# Patient Record
Sex: FEMALE | Race: WHITE | Employment: UNEMPLOYED
[De-identification: names, ages, dates, MRNs, and addresses within clinical notes are randomized per-mention and may not be internally consistent; named-entity substitution may affect disease eponyms.]

---

## 2017-01-16 ENCOUNTER — OFFICE VISIT (OUTPATIENT)
Dept: INTERNAL MEDICINE | Facility: CLINIC | Age: 22
End: 2017-01-16

## 2017-01-16 VITALS
HEART RATE: 70 BPM | BODY MASS INDEX: 19.74 KG/M2 | SYSTOLIC BLOOD PRESSURE: 113 MMHG | WEIGHT: 115.6 LBS | DIASTOLIC BLOOD PRESSURE: 64 MMHG | HEIGHT: 64 IN

## 2017-01-16 DIAGNOSIS — F33.9 EPISODE OF RECURRENT MAJOR DEPRESSIVE DISORDER, UNSPECIFIED DEPRESSION EPISODE SEVERITY (HCC): ICD-10-CM

## 2017-01-16 DIAGNOSIS — F90.2 ATTENTION DEFICIT HYPERACTIVITY DISORDER (ADHD), COMBINED TYPE: ICD-10-CM

## 2017-01-16 DIAGNOSIS — F31.12 BIPOLAR 1 DISORDER WITH MODERATE MANIA (HCC): ICD-10-CM

## 2017-01-16 DIAGNOSIS — F41.1 GAD (GENERALIZED ANXIETY DISORDER): Primary | ICD-10-CM

## 2017-01-16 DIAGNOSIS — N39.0 URINARY TRACT INFECTION, SITE UNSPECIFIED: ICD-10-CM

## 2017-01-16 LAB
BILIRUBIN, POC: NORMAL
BLOOD URINE, POC: NORMAL
CLARITY, POC: CLEAR
COLOR, POC: NORMAL
GLUCOSE URINE, POC: NORMAL
KETONES, POC: NORMAL
LEUKOCYTE EST, POC: NORMAL
NITRITE, POC: NORMAL
PH, POC: 6
PROTEIN, POC: NORMAL
SPECIFIC GRAVITY, POC: 1.01
UROBILINOGEN, POC: 0.2

## 2017-01-16 PROCEDURE — 99203 OFFICE O/P NEW LOW 30 MIN: CPT | Performed by: STUDENT IN AN ORGANIZED HEALTH CARE EDUCATION/TRAINING PROGRAM

## 2017-01-16 PROCEDURE — 81002 URINALYSIS NONAUTO W/O SCOPE: CPT | Performed by: STUDENT IN AN ORGANIZED HEALTH CARE EDUCATION/TRAINING PROGRAM

## 2017-01-16 RX ORDER — FLUOXETINE HYDROCHLORIDE 20 MG/1
20 CAPSULE ORAL DAILY
Qty: 30 CAPSULE | Refills: 3 | Status: CANCELLED | OUTPATIENT
Start: 2017-01-16

## 2017-01-16 ASSESSMENT — PATIENT HEALTH QUESTIONNAIRE - PHQ9
SUM OF ALL RESPONSES TO PHQ9 QUESTIONS 1 & 2: 4
8. MOVING OR SPEAKING SO SLOWLY THAT OTHER PEOPLE COULD HAVE NOTICED. OR THE OPPOSITE, BEING SO FIGETY OR RESTLESS THAT YOU HAVE BEEN MOVING AROUND A LOT MORE THAN USUAL: 1
7. TROUBLE CONCENTRATING ON THINGS, SUCH AS READING THE NEWSPAPER OR WATCHING TELEVISION: 2
2. FEELING DOWN, DEPRESSED OR HOPELESS: 3
6. FEELING BAD ABOUT YOURSELF - OR THAT YOU ARE A FAILURE OR HAVE LET YOURSELF OR YOUR FAMILY DOWN: 3
1. LITTLE INTEREST OR PLEASURE IN DOING THINGS: 1
SUM OF ALL RESPONSES TO PHQ QUESTIONS 1-9: 20
3. TROUBLE FALLING OR STAYING ASLEEP: 3
9. THOUGHTS THAT YOU WOULD BE BETTER OFF DEAD, OR OF HURTING YOURSELF: 1
5. POOR APPETITE OR OVEREATING: 3
4. FEELING TIRED OR HAVING LITTLE ENERGY: 3
10. IF YOU CHECKED OFF ANY PROBLEMS, HOW DIFFICULT HAVE THESE PROBLEMS MADE IT FOR YOU TO DO YOUR WORK, TAKE CARE OF THINGS AT HOME, OR GET ALONG WITH OTHER PEOPLE: 2

## 2017-05-24 ENCOUNTER — TELEPHONE (OUTPATIENT)
Dept: INTERNAL MEDICINE | Age: 22
End: 2017-05-24

## 2017-06-09 ENCOUNTER — HOSPITAL ENCOUNTER (EMERGENCY)
Age: 22
Discharge: HOME | End: 2017-06-09
Attending: EMERGENCY MEDICINE
Payer: MEDICARE

## 2017-06-09 VITALS
OXYGEN SATURATION: 99 % | DIASTOLIC BLOOD PRESSURE: 79 MMHG | TEMPERATURE: 97.5 F | RESPIRATION RATE: 16 BRPM | HEART RATE: 74 BPM | SYSTOLIC BLOOD PRESSURE: 133 MMHG

## 2017-06-09 DIAGNOSIS — T50.905A DRUG-INDUCED SEIZURE (HCC): ICD-10-CM

## 2017-06-09 DIAGNOSIS — T50.901A OVERDOSE, ACCIDENTAL OR UNINTENTIONAL, INITIAL ENCOUNTER: Primary | ICD-10-CM

## 2017-06-09 DIAGNOSIS — R56.9 DRUG-INDUCED SEIZURE (HCC): ICD-10-CM

## 2017-06-09 LAB
ABSOLUTE EOS #: 0.2 K/UL (ref 0–0.4)
ABSOLUTE LYMPH #: 2.3 K/UL (ref 1.2–5.2)
ABSOLUTE MONO #: 0.6 K/UL (ref 0.1–1.4)
ACETAMINOPHEN LEVEL: <10 UG/ML (ref 10–30)
ALBUMIN SERPL-MCNC: 4.3 G/DL (ref 3.5–5.2)
ALBUMIN/GLOBULIN RATIO: 1.4 (ref 1–2.5)
ALP BLD-CCNC: 74 U/L (ref 35–104)
ALT SERPL-CCNC: 11 U/L (ref 5–33)
AMPHETAMINE SCREEN URINE: NEGATIVE
ANION GAP SERPL CALCULATED.3IONS-SCNC: 15 MMOL/L (ref 9–17)
AST SERPL-CCNC: 24 U/L
BARBITURATE SCREEN URINE: NEGATIVE
BASOPHILS # BLD: 1 %
BASOPHILS ABSOLUTE: 0.1 K/UL (ref 0–0.2)
BENZODIAZEPINE SCREEN, URINE: NEGATIVE
BILIRUB SERPL-MCNC: 0.28 MG/DL (ref 0.3–1.2)
BUN BLDV-MCNC: 11 MG/DL (ref 6–20)
BUN/CREAT BLD: ABNORMAL (ref 9–20)
BUPRENORPHINE URINE: ABNORMAL
CALCIUM SERPL-MCNC: 9.5 MG/DL (ref 8.6–10.4)
CANNABINOID SCREEN URINE: POSITIVE
CHLORIDE BLD-SCNC: 98 MMOL/L (ref 98–107)
CO2: 24 MMOL/L (ref 20–31)
COCAINE METABOLITE, URINE: NEGATIVE
CREAT SERPL-MCNC: 0.73 MG/DL (ref 0.5–0.9)
DIFFERENTIAL TYPE: NORMAL
EOSINOPHILS RELATIVE PERCENT: 2 %
ETHANOL PERCENT: <0.01 %
ETHANOL: <10 MG/DL
GFR AFRICAN AMERICAN: >60 ML/MIN
GFR NON-AFRICAN AMERICAN: >60 ML/MIN
GFR SERPL CREATININE-BSD FRML MDRD: ABNORMAL ML/MIN/{1.73_M2}
GFR SERPL CREATININE-BSD FRML MDRD: ABNORMAL ML/MIN/{1.73_M2}
GLUCOSE BLD-MCNC: 89 MG/DL (ref 70–99)
HCT VFR BLD CALC: 43 % (ref 36–46)
HEMOGLOBIN: 14.6 G/DL (ref 12–16)
LYMPHOCYTES # BLD: 28 %
MCH RBC QN AUTO: 30.1 PG (ref 26–34)
MCHC RBC AUTO-ENTMCNC: 34 G/DL (ref 31–37)
MCV RBC AUTO: 88.6 FL (ref 80–100)
MDMA URINE: ABNORMAL
METHADONE SCREEN, URINE: NEGATIVE
METHAMPHETAMINE, URINE: ABNORMAL
MONOCYTES # BLD: 8 %
OPIATES, URINE: POSITIVE
OXYCODONE SCREEN URINE: NEGATIVE
PDW BLD-RTO: 14 % (ref 12.5–15.4)
PHENCYCLIDINE, URINE: NEGATIVE
PLATELET # BLD: 231 K/UL (ref 140–450)
PLATELET ESTIMATE: NORMAL
PMV BLD AUTO: 10.2 FL (ref 6–12)
POTASSIUM SERPL-SCNC: 3.8 MMOL/L (ref 3.7–5.3)
PROPOXYPHENE, URINE: ABNORMAL
RBC # BLD: 4.85 M/UL (ref 4–5.2)
RBC # BLD: NORMAL 10*6/UL
SALICYLATE LEVEL: <1 MG/DL (ref 3–10)
SEG NEUTROPHILS: 61 %
SEGMENTED NEUTROPHILS ABSOLUTE COUNT: 5.1 K/UL (ref 1.8–8)
SODIUM BLD-SCNC: 137 MMOL/L (ref 135–144)
TEST INFORMATION: ABNORMAL
TOTAL PROTEIN: 7.3 G/DL (ref 6.4–8.3)
TOXIC TRICYCLIC SC,BLOOD: NEGATIVE
TRICYCLIC ANTIDEPRESSANTS, UR: ABNORMAL
WBC # BLD: 8.3 K/UL (ref 4.5–13.5)
WBC # BLD: NORMAL 10*3/UL

## 2017-06-09 PROCEDURE — 80307 DRUG TEST PRSMV CHEM ANLYZR: CPT

## 2017-06-09 PROCEDURE — 80053 COMPREHEN METABOLIC PANEL: CPT

## 2017-06-09 PROCEDURE — 93005 ELECTROCARDIOGRAM TRACING: CPT

## 2017-06-09 PROCEDURE — G0480 DRUG TEST DEF 1-7 CLASSES: HCPCS

## 2017-06-09 PROCEDURE — 99285 EMERGENCY DEPT VISIT HI MDM: CPT

## 2017-06-09 PROCEDURE — 85025 COMPLETE CBC W/AUTO DIFF WBC: CPT

## 2017-06-09 ASSESSMENT — PAIN DESCRIPTION - ORIENTATION: ORIENTATION: LEFT;RIGHT

## 2017-06-09 ASSESSMENT — PAIN DESCRIPTION - DESCRIPTORS: DESCRIPTORS: ACHING

## 2017-06-09 ASSESSMENT — PAIN DESCRIPTION - ONSET: ONSET: ON-GOING

## 2017-06-09 ASSESSMENT — PAIN DESCRIPTION - LOCATION: LOCATION: HEAD

## 2017-06-09 ASSESSMENT — PAIN DESCRIPTION - PROGRESSION: CLINICAL_PROGRESSION: NOT CHANGED

## 2017-06-09 ASSESSMENT — PAIN SCALES - GENERAL: PAINLEVEL_OUTOF10: 9

## 2017-06-10 LAB
EKG ATRIAL RATE: 64 BPM
EKG P AXIS: 33 DEGREES
EKG P-R INTERVAL: 154 MS
EKG Q-T INTERVAL: 404 MS
EKG QRS DURATION: 98 MS
EKG QTC CALCULATION (BAZETT): 416 MS
EKG R AXIS: 81 DEGREES
EKG T AXIS: 48 DEGREES
EKG VENTRICULAR RATE: 64 BPM

## 2017-06-10 ASSESSMENT — ENCOUNTER SYMPTOMS
BACK PAIN: 0
COLOR CHANGE: 0

## 2017-07-05 ENCOUNTER — TELEPHONE (OUTPATIENT)
Dept: INTERNAL MEDICINE | Age: 22
End: 2017-07-05

## 2017-11-21 ENCOUNTER — HOSPITAL ENCOUNTER (EMERGENCY)
Age: 22
Discharge: HOME | End: 2017-11-21
Attending: EMERGENCY MEDICINE

## 2017-11-21 VITALS
DIASTOLIC BLOOD PRESSURE: 64 MMHG | WEIGHT: 124 LBS | HEART RATE: 56 BPM | RESPIRATION RATE: 16 BRPM | HEIGHT: 64 IN | BODY MASS INDEX: 21.17 KG/M2 | TEMPERATURE: 98 F | SYSTOLIC BLOOD PRESSURE: 112 MMHG | OXYGEN SATURATION: 100 %

## 2017-11-21 DIAGNOSIS — Z87.42 HISTORY OF VAGINAL DISCHARGE: Primary | ICD-10-CM

## 2017-11-21 LAB
BILIRUBIN URINE: NEGATIVE
CHP ED QC CHECK: YES
COLOR: YELLOW
COMMENT UA: NORMAL
GLUCOSE URINE: NEGATIVE
KETONES, URINE: NEGATIVE
LEUKOCYTE ESTERASE, URINE: NEGATIVE
NITRITE, URINE: NEGATIVE
PH UA: 6 (ref 5–8)
PREGNANCY TEST URINE, POC: NEGATIVE
PROTEIN UA: NEGATIVE
SPECIFIC GRAVITY UA: 1.02 (ref 1–1.03)
TURBIDITY: CLEAR
URINE HGB: NEGATIVE
UROBILINOGEN, URINE: NORMAL

## 2017-11-21 PROCEDURE — 99283 EMERGENCY DEPT VISIT LOW MDM: CPT

## 2017-11-21 PROCEDURE — 84703 CHORIONIC GONADOTROPIN ASSAY: CPT

## 2017-11-21 PROCEDURE — 81003 URINALYSIS AUTO W/O SCOPE: CPT

## 2017-11-21 RX ORDER — METRONIDAZOLE 500 MG/1
500 TABLET ORAL 2 TIMES DAILY
Qty: 14 TABLET | Refills: 0 | Status: SHIPPED | OUTPATIENT
Start: 2017-11-21 | End: 2017-11-28

## 2017-11-21 RX ORDER — FLUCONAZOLE 150 MG/1
150 TABLET ORAL ONCE
Qty: 1 TABLET | Refills: 0 | Status: SHIPPED | OUTPATIENT
Start: 2017-11-21 | End: 2017-11-21

## 2017-11-21 NOTE — ED PROVIDER NOTES
[address] [place] [address] ED  eMERGENCY dEPARTMENT eNCOUnter      Pt Name: Ira Andino  MRN: [id number]  [place] 1995  Date of evaluation: 11/21/2017  Provider: Sumi Cox Dr       Chief Complaint   Patient presents with    Vaginal Discharge     with odor past week         HISTORY OF PRESENT ILLNESS  (Location/Symptom, Timing/Onset, Context/Setting, Quality, Duration, Modifying Factors, Severity.)   Ira Andino is a 23 y.o. female who presents to the emergency department with Passive discharge and odor for the past week. Patient states it feels similar to back to her vaginosis that she has had in the past.  She is requesting a prescription of Flagyl. She states it does not work very well for her. She has no concern for STDs. No fevers or chills. No pain. No urinary symptoms. No other complaints. No dysuria or urinary frequency. Nursing Notes were reviewed. ALLERGIES     Nuts [macadamia nut oil]    CURRENT MEDICATIONS       Discharge Medication List as of 11/21/2017  6:58 PM      CONTINUE these medications which have NOT CHANGED    Details   Salicylic Acid 2 % PADS Apply 1 each topically daily, Disp-30 each, R-1             PAST MEDICAL HISTORY         Diagnosis Date    ADHD (attention deficit hyperactivity disorder)     ADHD (attention deficit hyperactivity disorder)     Anxiety     ANDRE (generalized anxiety disorder)     MDD (major depressive disorder)        SURGICAL HISTORY     History reviewed. No pertinent surgical history. FAMILY HISTORY     History reviewed. No pertinent family history. No family status information on file. SOCIAL HISTORY      reports that she has been smoking Cigarettes and E-Cigarettes. She started smoking about 2 years ago. She has been smoking about 1.00 pack per day. She has never used smokeless tobacco. She reports that she drinks about 2.4 oz of alcohol per week .  She reports that she uses drugs, including Marijuana, about 7 times per week. REVIEW OF SYSTEMS    (2-9 systems for level 4, 10 or more for level 5)   Review of Systems    Except as noted above the remainder of the review of systems was reviewed and negative. PHYSICAL EXAM    (up to 7 for level 4, 8 or more for level 5)     ED Triage Vitals [11/21/17 1848]   BP Temp Temp Source Pulse Resp SpO2 Height Weight   112/64 98 °F (36.7 °C) Oral 56 16 100 % 5' 4\" (1.626 m) 124 lb (56.2 kg)     Physical Exam   Constitutional: She is oriented to person, place, and time. She appears well-developed. HENT:   Head: Normocephalic and atraumatic. Neck: Normal range of motion. Neck supple. Cardiovascular: Normal rate and regular rhythm. Pulmonary/Chest: Effort normal and breath sounds normal.   Abdominal: Soft. She exhibits no distension. There is no tenderness. No CVA tenderness. Musculoskeletal: Normal range of motion. Neurological: She is alert and oriented to person, place, and time. Skin: Skin is warm. No rash noted. Psychiatric: She has a normal mood and affect. Her behavior is normal.               DIAGNOSTIC RESULTS     EKG: All EKG's are interpreted by the Emergency Department Physician who either signs or Co-signs this chart in the absence of a cardiologist.        RADIOLOGY:   Non-plain film images such as CT, Ultrasound and MRI are read by the radiologist. Plain radiographic images are visualized and preliminarily interpreted by the emergency physician with the below findings:        Interpretation per the Radiologist below, if available at the time of this note:          ED BEDSIDE ULTRASOUND:   Performed by ED Physician - none    LABS:  Labs Reviewed   POCT URINE PREGNANCY - Normal   UA W/REFLEX CULTURE   POCT URINALYSIS DIPSTICK       All other labs were within normal range or not returned as of this dictation.     EMERGENCY DEPARTMENT COURSE and DIFFERENTIAL DIAGNOSIS/MDM:   Vitals:    Vitals:    11/21/17 1848   BP: 112/64   Pulse: 56

## 2017-11-21 NOTE — ED NOTES
Pt. C/o vaginal discharge 2 3 days. History of BV. Denies known fever.       Jay Ku RN  11/21/17 1644

## 2017-11-22 LAB — HCG, PREGNANCY URINE (POC): NEGATIVE

## 2017-11-22 NOTE — ED PROVIDER NOTES
I was present in the ED during this patient's evaluation and management by the Advance Practice Provider and was available to address any concerns about their medical management.     Julia Fuentes MD  Attending, Emergency Department      Jacinto Cox MD  11/22/17 4996

## 2018-02-07 ENCOUNTER — HOSPITAL ENCOUNTER (EMERGENCY)
Age: 23
Discharge: HOME | End: 2018-02-07
Attending: EMERGENCY MEDICINE

## 2018-02-07 VITALS
DIASTOLIC BLOOD PRESSURE: 48 MMHG | BODY MASS INDEX: 21.51 KG/M2 | SYSTOLIC BLOOD PRESSURE: 102 MMHG | TEMPERATURE: 98.7 F | OXYGEN SATURATION: 100 % | WEIGHT: 126 LBS | RESPIRATION RATE: 16 BRPM | HEIGHT: 64 IN | HEART RATE: 70 BPM

## 2018-02-07 DIAGNOSIS — B96.89 BACTERIAL VAGINOSIS: Primary | ICD-10-CM

## 2018-02-07 DIAGNOSIS — N76.0 BACTERIAL VAGINOSIS: Primary | ICD-10-CM

## 2018-02-07 LAB
-: NORMAL
AMORPHOUS: NORMAL
BACTERIA: NORMAL
BILIRUBIN URINE: NEGATIVE
CASTS UA: NORMAL /LPF (ref 0–8)
COLOR: YELLOW
CRYSTALS, UA: NORMAL /HPF
DIRECT EXAM: ABNORMAL
EPITHELIAL CELLS UA: NORMAL /HPF (ref 0–5)
GLUCOSE URINE: NEGATIVE
HCG(URINE) PREGNANCY TEST: NEGATIVE
KETONES, URINE: NEGATIVE
LEUKOCYTE ESTERASE, URINE: NEGATIVE
Lab: ABNORMAL
MUCUS: NORMAL
NITRITE, URINE: NEGATIVE
OTHER OBSERVATIONS UA: NORMAL
PH UA: 6 (ref 5–8)
PROTEIN UA: NEGATIVE
RBC UA: NORMAL /HPF (ref 0–4)
RENAL EPITHELIAL, UA: NORMAL /HPF
SPECIFIC GRAVITY UA: 1.02 (ref 1–1.03)
SPECIMEN DESCRIPTION: ABNORMAL
STATUS: ABNORMAL
TRICHOMONAS: NORMAL
TURBIDITY: CLEAR
URINE HGB: NEGATIVE
UROBILINOGEN, URINE: NORMAL
WBC UA: NORMAL /HPF (ref 0–5)
YEAST: NORMAL

## 2018-02-07 PROCEDURE — 87660 TRICHOMONAS VAGIN DIR PROBE: CPT

## 2018-02-07 PROCEDURE — 87591 N.GONORRHOEAE DNA AMP PROB: CPT

## 2018-02-07 PROCEDURE — 84703 CHORIONIC GONADOTROPIN ASSAY: CPT

## 2018-02-07 PROCEDURE — 87480 CANDIDA DNA DIR PROBE: CPT

## 2018-02-07 PROCEDURE — 99284 EMERGENCY DEPT VISIT MOD MDM: CPT

## 2018-02-07 PROCEDURE — 87491 CHLMYD TRACH DNA AMP PROBE: CPT

## 2018-02-07 PROCEDURE — 81001 URINALYSIS AUTO W/SCOPE: CPT

## 2018-02-07 PROCEDURE — 87510 GARDNER VAG DNA DIR PROBE: CPT

## 2018-02-07 RX ORDER — METRONIDAZOLE 500 MG/1
500 TABLET ORAL 2 TIMES DAILY
Qty: 14 TABLET | Refills: 0 | Status: SHIPPED | OUTPATIENT
Start: 2018-02-07 | End: 2018-02-14

## 2018-02-07 ASSESSMENT — ENCOUNTER SYMPTOMS
SHORTNESS OF BREATH: 0
VOMITING: 0
NAUSEA: 0
BACK PAIN: 0
RHINORRHEA: 0
COUGH: 0
ABDOMINAL PAIN: 0
DIARRHEA: 0
SORE THROAT: 0

## 2018-02-07 NOTE — ED PROVIDER NOTES
[facility] ED  Emergency Department Encounter  Emergency Medicine Resident     Pt Name: Jean Plascencia  MRN: [id number]  [place] 1995  Date of evaluation: 2/7/18  PCP:  No primary care provider on file. CHIEF COMPLAINT       Chief Complaint   Patient presents with    Vaginal Odor    Vaginal Discharge     x 1 day       HISTORY OF PRESENT ILLNESS  (Location/Symptom, Timing/Onset, Context/Setting, Quality, Duration, Modifying Factors, Severity.)      Jean Plascencia is a 26 y.o. female who presents with a chief complaint of malodorous vaginal discharge that has been present for approximately 1 day. Patient reports this is consistent with her prior episodes of bacterial vaginosis. Patient reports the smell is characterized as fishing or cheesy. Patient notes that the discharge is whitish in color. Discharge has been constant since onset. Patient reports that she is having unprotected vaginal sexual intercourse. Patient also reports that she has had intermittent dysuria over the past few weeks without hematuria. Patient denies associated nausea, vomiting, abdominal pain, chest pain, shortness of breath. PAST MEDICAL / SURGICAL / SOCIAL / FAMILY HISTORY      has a past medical history of ADHD (attention deficit hyperactivity disorder); ADHD (attention deficit hyperactivity disorder); Anxiety; ANDRE (generalized anxiety disorder); and MDD (major depressive disorder). No significant past surgical history per review with patient. Social History     Social History    Marital status: Single     Spouse name: N/A    Number of children: N/A    Years of education: N/A     Occupational History    Not on file.      Social History Main Topics    Smoking status: Current Every Day Smoker     Packs/day: 1.00     Types: Cigarettes, E-Cigarettes     Start date: 1/1/2015    Smokeless tobacco: Never Used    Alcohol use 2.4 oz/week     4 Cans of beer per week      Comment: daily     Drug intact distal pulses. Pulmonary/Chest: Effort normal and breath sounds normal. No stridor. No respiratory distress. She has no wheezes. She has no rales. Abdominal: Soft. Bowel sounds are normal. She exhibits no distension and no mass. There is no tenderness. There is no rebound, no guarding, no CVA tenderness, no tenderness at McBurney's point and negative Arellano's sign. Genitourinary: Uterus normal. Uterus is not deviated, not enlarged, not fixed and not tender. Cervix exhibits discharge (thin, milky) and friability. Cervix exhibits no motion tenderness. Right adnexum displays no mass, no tenderness and no fullness. Left adnexum displays no mass, no tenderness and no fullness. No erythema in the vagina. No signs of injury around the vagina. Musculoskeletal: Normal range of motion. She exhibits no deformity. Neurological: She is alert and oriented to person, place, and time. Skin: Skin is warm and dry. No rash noted. She is not diaphoretic. Psychiatric: Her speech is normal.   Nursing note and vitals reviewed. WORK-UP     PLAN (LABS / IMAGING / EKG):  Orders Placed This Encounter   Procedures    C.trachomatis N.gonorrhoeae DNA    VAGINITIS DNA PROBE    Urinalysis with Microscopic    Pregnancy, Urine    Vaginal exam       MEDICATIONS ORDERED:  Orders Placed This Encounter   Medications    metroNIDAZOLE (FLAGYL) 500 MG tablet     Sig: Take 1 tablet by mouth 2 times daily for 7 days     Dispense:  14 tablet     Refill:  0       DIAGNOSTIC RESULTS / EMERGENCY DEPARTMENT COURSE / MDM / DIFFERENTIAL DIAGNOSIS     LABS:  Results for orders placed or performed during the hospital encounter of 02/07/18   VAGINITIS DNA PROBE   Result Value Ref Range    Specimen Description . VAGINA     Special Requests NOT REPORTED     Direct Exam POSITIVE for Gardnerella vaginalis. (A)     Direct Exam NEGATIVE for Candida sp.      Direct Exam NEGATIVE for Trichomonas vaginalis     Direct Exam       Method of testing Ilana Rojas, DO  Resident  02/07/18 1987

## 2018-02-08 LAB
C TRACH DNA GENITAL QL NAA+PROBE: NEGATIVE
N. GONORRHOEAE DNA: NEGATIVE

## 2018-06-23 ENCOUNTER — HOSPITAL ENCOUNTER (EMERGENCY)
Age: 23
Discharge: HOME | End: 2018-06-23

## 2018-06-23 VITALS
SYSTOLIC BLOOD PRESSURE: 129 MMHG | TEMPERATURE: 97.8 F | HEART RATE: 71 BPM | DIASTOLIC BLOOD PRESSURE: 76 MMHG | WEIGHT: 108 LBS | OXYGEN SATURATION: 96 % | HEIGHT: 64 IN | BODY MASS INDEX: 18.44 KG/M2 | RESPIRATION RATE: 20 BRPM

## 2018-06-23 DIAGNOSIS — N76.0 BV (BACTERIAL VAGINOSIS): Primary | ICD-10-CM

## 2018-06-23 DIAGNOSIS — B96.89 BV (BACTERIAL VAGINOSIS): Primary | ICD-10-CM

## 2018-06-23 DIAGNOSIS — N39.0 URINARY TRACT INFECTION WITHOUT HEMATURIA, SITE UNSPECIFIED: ICD-10-CM

## 2018-06-23 LAB
-: ABNORMAL
AMORPHOUS: ABNORMAL
BACTERIA: ABNORMAL
BILIRUBIN URINE: ABNORMAL
CASTS UA: ABNORMAL /LPF
CHP ED QC CHECK: NORMAL
COLOR: YELLOW
COMMENT UA: ABNORMAL
CRYSTALS, UA: ABNORMAL /HPF
DIRECT EXAM: ABNORMAL
EPITHELIAL CELLS UA: ABNORMAL /HPF (ref 0–5)
GLUCOSE URINE: NEGATIVE
KETONES, URINE: NEGATIVE
LEUKOCYTE ESTERASE, URINE: ABNORMAL
Lab: ABNORMAL
MUCUS: ABNORMAL
NITRITE, URINE: NEGATIVE
OTHER OBSERVATIONS UA: ABNORMAL
PH UA: 6.5 (ref 5–8)
PREGNANCY TEST URINE, POC: NORMAL
PROTEIN UA: ABNORMAL
RBC UA: ABNORMAL /HPF (ref 0–2)
RENAL EPITHELIAL, UA: ABNORMAL /HPF
SPECIFIC GRAVITY UA: 1.02 (ref 1–1.03)
SPECIMEN DESCRIPTION: ABNORMAL
STATUS: ABNORMAL
TRICHOMONAS: ABNORMAL
TURBIDITY: ABNORMAL
URINE HGB: NEGATIVE
UROBILINOGEN, URINE: NORMAL
WBC UA: ABNORMAL /HPF (ref 0–5)
YEAST: ABNORMAL

## 2018-06-23 PROCEDURE — 87086 URINE CULTURE/COLONY COUNT: CPT

## 2018-06-23 PROCEDURE — 87510 GARDNER VAG DNA DIR PROBE: CPT

## 2018-06-23 PROCEDURE — 84703 CHORIONIC GONADOTROPIN ASSAY: CPT

## 2018-06-23 PROCEDURE — 87491 CHLMYD TRACH DNA AMP PROBE: CPT

## 2018-06-23 PROCEDURE — 81001 URINALYSIS AUTO W/SCOPE: CPT

## 2018-06-23 PROCEDURE — 87660 TRICHOMONAS VAGIN DIR PROBE: CPT

## 2018-06-23 PROCEDURE — 87591 N.GONORRHOEAE DNA AMP PROB: CPT

## 2018-06-23 PROCEDURE — 87480 CANDIDA DNA DIR PROBE: CPT

## 2018-06-23 PROCEDURE — 99283 EMERGENCY DEPT VISIT LOW MDM: CPT

## 2018-06-23 RX ORDER — NITROFURANTOIN 25; 75 MG/1; MG/1
100 CAPSULE ORAL 2 TIMES DAILY
Qty: 14 CAPSULE | Refills: 0 | Status: SHIPPED | OUTPATIENT
Start: 2018-06-23 | End: 2018-06-27

## 2018-06-23 RX ORDER — FLUCONAZOLE 150 MG/1
150 TABLET ORAL ONCE
Qty: 1 TABLET | Refills: 0 | Status: SHIPPED | OUTPATIENT
Start: 2018-06-23 | End: 2018-06-23

## 2018-06-23 RX ORDER — METRONIDAZOLE 500 MG/1
500 TABLET ORAL 2 TIMES DAILY
Qty: 14 TABLET | Refills: 0 | Status: SHIPPED | OUTPATIENT
Start: 2018-06-23 | End: 2018-06-30

## 2018-06-23 RX ORDER — ALPRAZOLAM 0.5 MG/1
0.5 TABLET ORAL NIGHTLY PRN
Status: ON HOLD | COMMUNITY
End: 2020-07-12

## 2018-06-24 LAB
CULTURE: NORMAL
Lab: NORMAL
SPECIMEN DESCRIPTION: NORMAL
STATUS: NORMAL

## 2018-06-25 LAB
C TRACH DNA GENITAL QL NAA+PROBE: ABNORMAL
HCG, PREGNANCY URINE (POC): NEGATIVE
N. GONORRHOEAE DNA: NEGATIVE

## 2018-12-03 ENCOUNTER — HOSPITAL ENCOUNTER (EMERGENCY)
Age: 23
Discharge: HOME | End: 2018-12-03
Attending: EMERGENCY MEDICINE

## 2018-12-03 VITALS
HEART RATE: 88 BPM | SYSTOLIC BLOOD PRESSURE: 120 MMHG | DIASTOLIC BLOOD PRESSURE: 70 MMHG | RESPIRATION RATE: 16 BRPM | TEMPERATURE: 98.1 F | HEIGHT: 64 IN | OXYGEN SATURATION: 100 % | WEIGHT: 124 LBS | BODY MASS INDEX: 21.17 KG/M2

## 2018-12-03 DIAGNOSIS — R56.9 SEIZURE-LIKE ACTIVITY (HCC): ICD-10-CM

## 2018-12-03 DIAGNOSIS — F19.10 POLYSUBSTANCE ABUSE (HCC): Primary | ICD-10-CM

## 2018-12-03 LAB
ABSOLUTE EOS #: 0.21 K/UL (ref 0–0.44)
ABSOLUTE IMMATURE GRANULOCYTE: 0.1 K/UL (ref 0–0.3)
ABSOLUTE LYMPH #: 2.25 K/UL (ref 1.1–3.7)
ABSOLUTE MONO #: 0.89 K/UL (ref 0.1–1.4)
AMPHETAMINE SCREEN URINE: NEGATIVE
ANION GAP SERPL CALCULATED.3IONS-SCNC: 17 MMOL/L (ref 9–17)
BARBITURATE SCREEN URINE: NEGATIVE
BASOPHILS # BLD: 1 % (ref 0–2)
BASOPHILS ABSOLUTE: 0.11 K/UL (ref 0–0.2)
BENZODIAZEPINE SCREEN, URINE: NEGATIVE
BUN BLDV-MCNC: 9 MG/DL (ref 6–20)
BUN/CREAT BLD: NORMAL (ref 9–20)
BUPRENORPHINE URINE: ABNORMAL
CALCIUM SERPL-MCNC: 9.4 MG/DL (ref 8.6–10.4)
CANNABINOID SCREEN URINE: POSITIVE
CHLORIDE BLD-SCNC: 98 MMOL/L (ref 98–107)
CO2: 22 MMOL/L (ref 20–31)
COCAINE METABOLITE, URINE: POSITIVE
CREAT SERPL-MCNC: 0.71 MG/DL (ref 0.5–0.9)
DIFFERENTIAL TYPE: ABNORMAL
EKG ATRIAL RATE: 65 BPM
EKG P AXIS: 37 DEGREES
EKG P-R INTERVAL: 158 MS
EKG Q-T INTERVAL: 418 MS
EKG QRS DURATION: 98 MS
EKG QTC CALCULATION (BAZETT): 434 MS
EKG R AXIS: 80 DEGREES
EKG T AXIS: 62 DEGREES
EKG VENTRICULAR RATE: 65 BPM
EOSINOPHILS RELATIVE PERCENT: 2 % (ref 1–4)
GFR AFRICAN AMERICAN: >60 ML/MIN
GFR NON-AFRICAN AMERICAN: >60 ML/MIN
GFR SERPL CREATININE-BSD FRML MDRD: NORMAL ML/MIN/{1.73_M2}
GFR SERPL CREATININE-BSD FRML MDRD: NORMAL ML/MIN/{1.73_M2}
GLUCOSE BLD-MCNC: 87 MG/DL (ref 70–99)
HCG QUALITATIVE: NEGATIVE
HCT VFR BLD CALC: 43.9 % (ref 36.3–47.1)
HEMOGLOBIN: 14.4 G/DL (ref 11.9–15.1)
IMMATURE GRANULOCYTES: 1 %
LYMPHOCYTES # BLD: 17 % (ref 25–45)
MCH RBC QN AUTO: 29.9 PG (ref 25.2–33.5)
MCHC RBC AUTO-ENTMCNC: 32.8 G/DL (ref 28.4–34.8)
MCV RBC AUTO: 91.3 FL (ref 82.6–102.9)
MDMA URINE: ABNORMAL
METHADONE SCREEN, URINE: NEGATIVE
METHAMPHETAMINE, URINE: ABNORMAL
MONOCYTES # BLD: 7 % (ref 2–8)
NRBC AUTOMATED: 0 PER 100 WBC
OPIATES, URINE: POSITIVE
OXYCODONE SCREEN URINE: NEGATIVE
PDW BLD-RTO: 13.5 % (ref 11.8–14.4)
PHENCYCLIDINE, URINE: NEGATIVE
PLATELET # BLD: ABNORMAL K/UL (ref 138–453)
PLATELET ESTIMATE: ABNORMAL
PLATELET, FLUORESCENCE: NORMAL K/UL (ref 138–453)
PLATELET, IMMATURE FRACTION: NORMAL % (ref 1.1–10.3)
PMV BLD AUTO: ABNORMAL FL (ref 8.1–13.5)
POTASSIUM SERPL-SCNC: 4.6 MMOL/L (ref 3.7–5.3)
PROPOXYPHENE, URINE: ABNORMAL
RBC # BLD: 4.81 M/UL (ref 3.95–5.11)
RBC # BLD: ABNORMAL 10*6/UL
SEG NEUTROPHILS: 73 % (ref 34–64)
SEGMENTED NEUTROPHILS ABSOLUTE COUNT: 9.64 K/UL (ref 1.5–8.1)
SODIUM BLD-SCNC: 137 MMOL/L (ref 135–144)
TEST INFORMATION: ABNORMAL
TRICYCLIC ANTIDEPRESSANTS, UR: ABNORMAL
WBC # BLD: 13.2 K/UL (ref 4.5–13.5)
WBC # BLD: ABNORMAL 10*3/UL

## 2018-12-03 PROCEDURE — 99283 EMERGENCY DEPT VISIT LOW MDM: CPT

## 2018-12-03 PROCEDURE — 85055 RETICULATED PLATELET ASSAY: CPT

## 2018-12-03 PROCEDURE — 84703 CHORIONIC GONADOTROPIN ASSAY: CPT

## 2018-12-03 PROCEDURE — 80307 DRUG TEST PRSMV CHEM ANLYZR: CPT

## 2018-12-03 PROCEDURE — 93005 ELECTROCARDIOGRAM TRACING: CPT

## 2018-12-03 PROCEDURE — 85025 COMPLETE CBC W/AUTO DIFF WBC: CPT

## 2018-12-03 PROCEDURE — 80048 BASIC METABOLIC PNL TOTAL CA: CPT

## 2018-12-03 ASSESSMENT — ENCOUNTER SYMPTOMS
VOMITING: 0
RHINORRHEA: 0
ABDOMINAL PAIN: 0
NAUSEA: 0
BACK PAIN: 0
SHORTNESS OF BREATH: 0
COUGH: 0

## 2018-12-03 NOTE — ED NOTES
Patient yelling and cussing, grabbing phone from grandmother instructing patient not to push grandmother and to stop yelling with profanity. Patient stops briefly, then continues to yell at grandmother. Instructing her that she will be discharged as soon as she allows IV to be taken out. Security in hallway related to loud voice, screaming profanity and for safety of other patients and families. Grandmother left room, near tears. Patient then stops yelling long enough for discharge instructions and Iv removal.  Bolts from room with belongings and yelling at Grandmother to hurry up. Writer walking with Grandmother to waiting room. Patient given instructions to use phone in lobby for cab for discharge. Aware that she is not allowed to cuss and use profanity in the Waiting room. States understanding. Aware if she continues with behavior Security will escort her from location. Discussed with patient's grandmother and grandmother aware. She may be able to call her son for her to get a ride if patient continues to act out. Dave Caruso RN in Triage aware of events. Aware of possible difficulties. Security aware of events.      Guille Escudero RN  12/03/18 2172

## 2018-12-03 NOTE — ED PROVIDER NOTES
I performed a history and physical examination of the patient and discussed management with the resident. I reviewed the residents note and agree with the documented findings and plan of care. Any areas of disagreement are noted on the chart. I was personally present for the key portions of any procedures. I have documented in the chart those procedures where I was not present during the key portions. I have reviewed the emergency nurses triage note. I agree with the chief complaint, past medical history, past surgical history, allergies, medications, social and family history as documented unless otherwise noted below. Documentation of the HPI, Physical Exam and Medical Decision Making performed by medical students or scribes is based on my personal performance of the HPI, PE and MDM. For Phys Assistant/ Nurse Practitioner cases/documentation I have personally evaluated this patient and have completed at least one if not all key elements of the E/M (history, physical exam, and MDM). I find the patient's history and physical exam are consistent with the NP/PA documentation. I agree with the care provided, treatment rendered, disposition and followup plan. Additional findings are as noted. Aisha Rodriguez. Agapito Guthrie MD  Attending Emergency  Physician    EKG Interpretation    Interpreted by me    Rhythm: normal sinus   Rate: normal  Axis: normal  Ectopy: none  Conduction: normal  ST Segments: no acute change  T Waves: no acute change  Q Waves: none    Clinical Impression: no acute changes and normal EKG. No significant morphologic changes noted on comparison with prior tracing dated 9 June 2017. PATIENT WITH REPORTED 'SEIZURE' TODAY. NOW ADMITS TO SNORTING HEROIN AND COCAINE AND SMOKING MARIJUANA TODAY PRIOR TO ONSET OF SX. NORMAL PO INTAKE, NO CHEST PAIN/PALPITATIONS. NO HX OF MENOMETRORRHAGIA. NO HX OF SEIZURE DISORDER. C/O ONLY OF SWELLING/PAIN IN LEFT UPPER LIP APPARENTLY RESULTING FROM SYNCOPE/FALL.  NO

## 2019-05-12 ENCOUNTER — HOSPITAL ENCOUNTER (EMERGENCY)
Age: 24
Discharge: LEFT BEFORE DISCHARGE | End: 2019-05-12
Attending: EMERGENCY MEDICINE

## 2019-05-12 VITALS
WEIGHT: 108 LBS | TEMPERATURE: 98.4 F | DIASTOLIC BLOOD PRESSURE: 78 MMHG | HEART RATE: 71 BPM | OXYGEN SATURATION: 95 % | HEIGHT: 64 IN | RESPIRATION RATE: 20 BRPM | SYSTOLIC BLOOD PRESSURE: 127 MMHG | BODY MASS INDEX: 18.44 KG/M2

## 2019-05-12 DIAGNOSIS — F19.10 SUBSTANCE ABUSE (HCC): Primary | ICD-10-CM

## 2019-05-12 DIAGNOSIS — F10.10 ALCOHOL ABUSE: ICD-10-CM

## 2019-05-12 PROCEDURE — 99283 EMERGENCY DEPT VISIT LOW MDM: CPT

## 2019-05-12 ASSESSMENT — ENCOUNTER SYMPTOMS
SHORTNESS OF BREATH: 0
BACK PAIN: 0
ABDOMINAL PAIN: 1
COUGH: 0

## 2019-05-12 NOTE — ED NOTES
Bed: 16  Expected date: 5/12/19  Expected time: 11:31 AM  Means of arrival:   Comments:  [facility] [address]  05/12/19 2484

## 2019-05-12 NOTE — ED PROVIDER NOTES
CHANGED    Details   ALPRAZolam (XANAX) 0.5 MG tablet Take 0.5 mg by mouth nightly as needed for Sleep. [facility] Historical Med      Magnesium 100 MG TABS Take by mouth daily otcHistorical Med      Salicylic Acid 2 % PADS Apply 1 each topically daily, Disp-30 each, R-1             ALLERGIES     is allergic to nuts [macadamia nut oil]. FAMILY HISTORY     has no family status information on file. family history is not on file. SOCIAL HISTORY      reports that she has been smoking cigarettes and e-cigarettes. She started smoking about 4 years ago. She has been smoking about 1.00 pack per day. She has never used smokeless tobacco. She reports that she drinks about 2.4 oz of alcohol per week. She reports that she has current or past drug history. Drug: Marijuana. Frequency: 7.00 times per week. PHYSICAL EXAM     INITIAL VITALS:  height is 5' 4\" (1.626 m) and weight is 108 lb (49 kg). Her oral temperature is 98.4 °F (36.9 °C). Her blood pressure is 127/78 and her pulse is 71. Her respiration is 20 and oxygen saturation is 95%. Physical Exam   Constitutional: She appears well-developed. HENT:   Head: Normocephalic and atraumatic. Eyes: Pupils are equal, round, and reactive to light. Neck: Normal range of motion. Cardiovascular: Normal rate, regular rhythm, normal heart sounds and intact distal pulses. Pulmonary/Chest: Effort normal and breath sounds normal.   Abdominal: Soft. Bowel sounds are normal.   Musculoskeletal: Normal range of motion. Neurological: She is alert. She displays normal reflexes. No cranial nerve deficit or sensory deficit. She exhibits normal muscle tone. Coordination abnormal.   distorted slurred speech     Skin: Skin is warm.          DIFFERENTIAL DIAGNOSIS/MDM:   drug intoxication  Substance use  Anxiety  Alcohol use    DIAGNOSTIC RESULTS     EKG: All EKG's are interpreted by the Emergency Department Physician who either signs or Co-signs this chart in the absence of a cardiologist.    RADIOLOGY:   I directly visualized the following  images and reviewed the radiologist interpretations:  No orders to display       LABS:  Labs Reviewed - No data to display        EMERGENCY DEPARTMENT COURSE:   Vitals:    Vitals:    05/12/19 1139   BP: 127/78   Pulse: 71   Resp: 20   Temp: 98.4 °F (36.9 °C)   TempSrc: Oral   SpO2: 95%   Weight: 108 lb (49 kg)   Height: 5' 4\" (1.626 m)     Patient with significant h/o drug abuse comes was brought by EMS due to abnormal behaviour. patient has distorted speech. And looks intoxicated. She does not smell of alcohol right now, not in alcohol withdrawals, no tremors or tongue fasciculations. We talked to the patient, patient can only go home after we have a sober ride. She refuses any testing right now. FINAL IMPRESSION      1. Substance abuse ([facility] [address]    2. Alcohol abuse          DISPOSITION/PLAN   DISPOSITION  05/12/2019 01:19:47 PM    Patient eloped      PATIENT REFERRED TO:  No follow-up provider specified.     DISCHARGE MEDICATIONS:  Discharge Medication List as of 5/12/2019  1:00 PM          (Please note that portions of this note were completed with a voice recognition program.  Efforts were made to edit the dictations but occasionally words are mis-transcribed.)    Charlotte Andrade  Emergency Medicine Resident             Jen Paula MD  Resident  05/12/19 315 [address] MD Momo  Resident  05/12/19

## 2019-05-12 NOTE — ED PROVIDER NOTES
[address]  ED  eMERGENCY dEPARTMENT eNCOUnter   Attending Attestation     Pt Name: Sreedhar Ji  MRN: [id number]  [place] 1995  Date of evaluation: 5/12/19       Sreedhar Ji is a 27 y.o. female who presents with Anxiety      History: PT presents with anxiety and heavy breathing. Pt has no other complaints. Pt was drinking ETOH and used xanax last night. Pt has no other complaints except heroin withdrawal and some diarrhea associated. Pt states she is also on her period. Exam: HRRR, lungs CTABL, abdomen soft and non tender. Pt is well appearing. Plan for observation. Pt does not want any labs. Pt well appearing and alert and oriented but has slurred speech. Pt eloped. I performed a history and physical examination of the patient and discussed management with the resident. I reviewed the residents note and agree with the documented findings and plan of care. Any areas of disagreement are noted on the chart. I was personally present for the key portions of any procedures. I have documented in the chart those procedures where I was not present during the key portions. I have personally reviewed all images and agree with the resident's interpretation. I have reviewed the emergency nurses triage note. I agree with the chief complaint, past medical history, past surgical history, allergies, medications, social and family history as documented unless otherwise noted below. Documentation of the HPI, Physical Exam and Medical Decision Making performed by medical students or scribes is based on my personal performance of the HPI, PE and MDM. For Phys Assistant/ Nurse Practitioner cases/documentation I have had a face to face evaluation of this patient and have completed at least one if not all key elements of the E/M (history, physical exam, and MDM). Additional findings are as noted.     For APC cases I have personally evaluated and examined the patient in conjunction with the
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
## 2019-05-12 NOTE — ED NOTES
Pt up to br and returns and back up to br requests feminine pads. Pt given pads. Pt remains in br. Denies any assistance at this time. Pt aware of need for a sober ride.  Phone is currently on the  to retrieve phone numbers      Romero Salamanca RN  05/12/19 4502 [facility]  05/12/19 1645

## 2019-09-10 ENCOUNTER — HOSPITAL ENCOUNTER (OUTPATIENT)
Age: 24
Discharge: HOME | End: 2019-09-10
Payer: MEDICAID

## 2019-09-10 LAB
ABSOLUTE EOS #: 0.41 K/UL (ref 0–0.44)
ABSOLUTE IMMATURE GRANULOCYTE: 0.04 K/UL (ref 0–0.3)
ABSOLUTE LYMPH #: 2.84 K/UL (ref 1.1–3.7)
ABSOLUTE MONO #: 0.89 K/UL (ref 0.1–1.2)
ALBUMIN SERPL-MCNC: 4.6 G/DL (ref 3.5–5.2)
ALBUMIN/GLOBULIN RATIO: 1.4 (ref 1–2.5)
ALP BLD-CCNC: 97 U/L (ref 35–104)
ALT SERPL-CCNC: 14 U/L (ref 5–33)
ANION GAP SERPL CALCULATED.3IONS-SCNC: 15 MMOL/L (ref 9–17)
AST SERPL-CCNC: 21 U/L
BASOPHILS # BLD: 1 % (ref 0–2)
BASOPHILS ABSOLUTE: 0.12 K/UL (ref 0–0.2)
BILIRUB SERPL-MCNC: 0.22 MG/DL (ref 0.3–1.2)
BILIRUBIN DIRECT: 0.08 MG/DL
BILIRUBIN, INDIRECT: 0.14 MG/DL (ref 0–1)
BUN BLDV-MCNC: 15 MG/DL (ref 6–20)
BUN/CREAT BLD: NORMAL (ref 9–20)
CALCIUM SERPL-MCNC: 9.8 MG/DL (ref 8.6–10.4)
CHLORIDE BLD-SCNC: 100 MMOL/L (ref 98–107)
CO2: 26 MMOL/L (ref 20–31)
CREAT SERPL-MCNC: 0.65 MG/DL (ref 0.5–0.9)
DIFFERENTIAL TYPE: ABNORMAL
EOSINOPHILS RELATIVE PERCENT: 4 % (ref 1–4)
GFR AFRICAN AMERICAN: >60 ML/MIN
GFR NON-AFRICAN AMERICAN: >60 ML/MIN
GFR SERPL CREATININE-BSD FRML MDRD: NORMAL ML/MIN/{1.73_M2}
GFR SERPL CREATININE-BSD FRML MDRD: NORMAL ML/MIN/{1.73_M2}
GLOBULIN: ABNORMAL G/DL (ref 1.5–3.8)
GLUCOSE BLD-MCNC: 81 MG/DL (ref 70–99)
HAV IGM SER IA-ACNC: NONREACTIVE
HCG QUANTITATIVE: <1 IU/L
HCT VFR BLD CALC: 44.3 % (ref 36.3–47.1)
HEMOGLOBIN: 13.8 G/DL (ref 11.9–15.1)
HEPATITIS B CORE IGM ANTIBODY: NONREACTIVE
HEPATITIS B SURFACE ANTIGEN: NONREACTIVE
HEPATITIS C ANTIBODY: NONREACTIVE
HIV AG/AB: NONREACTIVE
IMMATURE GRANULOCYTES: 0 %
LYMPHOCYTES # BLD: 27 % (ref 24–43)
MCH RBC QN AUTO: 28.6 PG (ref 25.2–33.5)
MCHC RBC AUTO-ENTMCNC: 31.2 G/DL (ref 28.4–34.8)
MCV RBC AUTO: 91.9 FL (ref 82.6–102.9)
MONOCYTES # BLD: 8 % (ref 3–12)
NRBC AUTOMATED: 0 PER 100 WBC
PDW BLD-RTO: 14.6 % (ref 11.8–14.4)
PLATELET # BLD: 325 K/UL (ref 138–453)
PLATELET ESTIMATE: ABNORMAL
PMV BLD AUTO: 11 FL (ref 8.1–13.5)
POTASSIUM SERPL-SCNC: 4.4 MMOL/L (ref 3.7–5.3)
RBC # BLD: 4.82 M/UL (ref 3.95–5.11)
RBC # BLD: ABNORMAL 10*6/UL
SEG NEUTROPHILS: 60 % (ref 36–65)
SEGMENTED NEUTROPHILS ABSOLUTE COUNT: 6.36 K/UL (ref 1.5–8.1)
SODIUM BLD-SCNC: 141 MMOL/L (ref 135–144)
TOTAL PROTEIN: 7.9 G/DL (ref 6.4–8.3)
WBC # BLD: 10.7 K/UL (ref 3.5–11.3)
WBC # BLD: ABNORMAL 10*3/UL

## 2019-09-10 PROCEDURE — 85025 COMPLETE CBC W/AUTO DIFF WBC: CPT

## 2019-09-10 PROCEDURE — 80074 ACUTE HEPATITIS PANEL: CPT

## 2019-09-10 PROCEDURE — 86480 TB TEST CELL IMMUN MEASURE: CPT

## 2019-09-10 PROCEDURE — 36415 COLL VENOUS BLD VENIPUNCTURE: CPT

## 2019-09-10 PROCEDURE — 87389 HIV-1 AG W/HIV-1&-2 AB AG IA: CPT

## 2019-09-10 PROCEDURE — 84702 CHORIONIC GONADOTROPIN TEST: CPT

## 2019-09-10 PROCEDURE — 80048 BASIC METABOLIC PNL TOTAL CA: CPT

## 2019-09-10 PROCEDURE — 80076 HEPATIC FUNCTION PANEL: CPT

## 2019-09-13 LAB
QUANTI TB GOLD PLUS: NEGATIVE
QUANTI TB1 MINUS NIL: 0 IU/ML (ref 0–0.34)
QUANTI TB2 MINUS NIL: 0 IU/ML (ref 0–0.34)
QUANTIFERON MITOGEN: >10 IU/ML
QUANTIFERON NIL: 0.02 IU/ML

## 2020-03-15 ENCOUNTER — HOSPITAL ENCOUNTER (EMERGENCY)
Age: 25
Discharge: HOME | End: 2020-03-15
Attending: EMERGENCY MEDICINE
Payer: MEDICAID

## 2020-03-15 VITALS
BODY MASS INDEX: 17.75 KG/M2 | SYSTOLIC BLOOD PRESSURE: 117 MMHG | HEIGHT: 64 IN | RESPIRATION RATE: 16 BRPM | OXYGEN SATURATION: 99 % | HEART RATE: 106 BPM | TEMPERATURE: 98.3 F | DIASTOLIC BLOOD PRESSURE: 77 MMHG | WEIGHT: 104 LBS

## 2020-03-15 PROCEDURE — 99284 EMERGENCY DEPT VISIT MOD MDM: CPT

## 2020-03-15 ASSESSMENT — ENCOUNTER SYMPTOMS
CONSTIPATION: 0
EYE DISCHARGE: 0
WHEEZING: 0
SORE THROAT: 0
SHORTNESS OF BREATH: 0
EYE PAIN: 0
COLOR CHANGE: 0
DIARRHEA: 0
COUGH: 0
FACIAL SWELLING: 0
BLOOD IN STOOL: 0
VOMITING: 0
BACK PAIN: 0
EYE REDNESS: 0
TROUBLE SWALLOWING: 0
SINUS PRESSURE: 0
ABDOMINAL PAIN: 0
CHEST TIGHTNESS: 0
RHINORRHEA: 0
NAUSEA: 0

## 2020-03-15 NOTE — ED PROVIDER NOTES
[address] ED  eMERGENCY dEPARTMENT eNCOUnter      Pt Name: Brianne Navarro  MRN: [id number]  [place] 1995  Date of evaluation: 3/15/20      CHIEF COMPLAINT       Chief Complaint   Patient presents with    Seizures     two today and 1 yesterday         HISTORY OF PRESENT ILLNESS    Brianne Navarro is a 23 y.o. female who presents complaining of seizure. Patient states that she has been having some seizures. Patient has a history of stress-induced seizures and does not take any medication for it. Patient is stating that she had drank some alcohol last night and stayed up all night and that tends to be what causes her to have her seizures. Patient is requesting that we do not do any testing and she just wants to leave. Patient denies any injuries has no headache and is otherwise feeling well. REVIEW OF SYSTEMS       Review of Systems   Constitutional: Negative for activity change, appetite change, chills, diaphoresis and fever. HENT: Negative for congestion, ear pain, facial swelling, nosebleeds, rhinorrhea, sinus pressure, sore throat and trouble swallowing. Eyes: Negative for pain, discharge and redness. Respiratory: Negative for cough, chest tightness, shortness of breath and wheezing. Cardiovascular: Negative for chest pain, palpitations and leg swelling. Gastrointestinal: Negative for abdominal pain, blood in stool, constipation, diarrhea, nausea and vomiting. Genitourinary: Negative for difficulty urinating, dysuria, flank pain, frequency, genital sores and hematuria. Musculoskeletal: Negative for arthralgias, back pain, gait problem, joint swelling, myalgias and neck pain. Skin: Negative for color change, pallor, rash and wound. Neurological: Positive for seizures. Negative for dizziness, tremors, syncope, speech difficulty, weakness, numbness and headaches.    Psychiatric/Behavioral: Negative for confusion, decreased concentration, hallucinations, self-injury, Pulmonary:      Effort: Pulmonary effort is normal. No respiratory distress. Breath sounds: Normal breath sounds. No wheezing or rales. Chest:      Chest wall: No tenderness. Abdominal:      General: Bowel sounds are normal. There is no distension. Palpations: Abdomen is soft. There is no mass. Tenderness: There is no abdominal tenderness. There is no guarding or rebound. Musculoskeletal: Normal range of motion. General: No tenderness. Skin:     General: Skin is warm and dry. Coloration: Skin is not pale. Findings: No erythema or rash. Neurological:      Mental Status: She is alert and oriented to person, place, and time. Cranial Nerves: No cranial nerve deficit. Sensory: No sensory deficit. Motor: No abnormal muscle tone. Coordination: Coordination normal.      Deep Tendon Reflexes: Reflexes normal.   Psychiatric:         Behavior: Behavior normal.         Thought Content: Thought content normal.         Judgment: Judgment normal.         DIAGNOSTIC RESULTS     RADIOLOGY:All plain film, CT,MRI, and formal ultrasound images (except ED bedside ultrasound) are read by the radiologist and the interpretations are directly viewed by the emergency physician. LABS: All lab results were reviewed by myself, and all abnormals are listed below. Labs Reviewed - No data to display      MEDICAL DECISION MAKING:     Patient is alert and oriented x3 and is not requesting that we do any further testing as she has been in the hospital multiple times and it does not change anything. Did recommend that she follow-up with her doctor.       EMERGENCY DEPARTMENT COURSE:   Vitals:    Vitals:    03/15/20 1444   BP: 117/77   Pulse: 106   Resp: 16   Temp: 98.3 °F (36.8 °C)   TempSrc: Oral   SpO2: 99%   Weight: 104 lb (47.2 kg)   Height: 5' 4\" (1.626 m)       The patient was given the following medications while in the emergency department:  No orders of the defined types were placed in this encounter. -------------------------      CONSULTS:  None    PROCEDURES:  None    FINAL IMPRESSION      1.  Seizure [facility])          DISPOSITION/PLAN   DISPOSITION Decision To Discharge 03/15/2020 02:43:52 PM      PATIENT REFERREDTO:  [facility] ED  [facility] [address] [phone]    If symptoms worsen      DISCHARGEMEDICATIONS:  Current Discharge Medication List          (Please note that portions of this note were completed with a voice recognition program.  Efforts were made to edit thedictations but occasionally words are mis-transcribed.)    Murray Canas MD  Attending Emergency Physician                       Murray Canas MD  03/15/20 3907

## 2020-03-15 NOTE — ED NOTES
Bed: 10  Expected date:   Expected time:   Means of arrival:   Comments:     Kaia Vyas RN  03/15/20 1366

## 2020-03-15 NOTE — ED NOTES
Pt made it known to RN and Dr Sydney Teixeira that she doesn't want any treatment and tried to explain that to EMS. Pt states that she only wants to return home and is refusing any care.        Ifrah Stubbs RN  03/15/20 2196

## 2020-03-28 ENCOUNTER — HOSPITAL ENCOUNTER (EMERGENCY)
Age: 25
Discharge: HOME | End: 2020-03-29
Attending: EMERGENCY MEDICINE
Payer: MEDICAID

## 2020-03-28 PROCEDURE — 99285 EMERGENCY DEPT VISIT HI MDM: CPT

## 2020-03-29 VITALS
DIASTOLIC BLOOD PRESSURE: 77 MMHG | WEIGHT: 104 LBS | HEART RATE: 67 BPM | OXYGEN SATURATION: 98 % | SYSTOLIC BLOOD PRESSURE: 122 MMHG | BODY MASS INDEX: 17.85 KG/M2 | TEMPERATURE: 98 F | RESPIRATION RATE: 16 BRPM

## 2020-03-29 LAB
-: ABNORMAL
ABSOLUTE EOS #: 0 K/UL (ref 0–0.4)
ABSOLUTE IMMATURE GRANULOCYTE: ABNORMAL K/UL (ref 0–0.3)
ABSOLUTE LYMPH #: 0.9 K/UL (ref 1–4.8)
ABSOLUTE MONO #: 0.5 K/UL (ref 0.1–1.3)
ACETAMINOPHEN LEVEL: <5 UG/ML (ref 10–30)
ALBUMIN SERPL-MCNC: 4.7 G/DL (ref 3.5–5.2)
ALBUMIN/GLOBULIN RATIO: ABNORMAL (ref 1–2.5)
ALP BLD-CCNC: 79 U/L (ref 35–104)
ALT SERPL-CCNC: 14 U/L (ref 5–33)
AMORPHOUS: ABNORMAL
AMPHETAMINE SCREEN URINE: NEGATIVE
ANION GAP SERPL CALCULATED.3IONS-SCNC: 15 MMOL/L (ref 9–17)
AST SERPL-CCNC: 21 U/L
BACTERIA: ABNORMAL
BARBITURATE SCREEN URINE: NEGATIVE
BASOPHILS # BLD: 1 % (ref 0–2)
BASOPHILS ABSOLUTE: 0.1 K/UL (ref 0–0.2)
BENZODIAZEPINE SCREEN, URINE: NEGATIVE
BILIRUB SERPL-MCNC: <0.15 MG/DL (ref 0.3–1.2)
BILIRUBIN URINE: NEGATIVE
BUN BLDV-MCNC: 11 MG/DL (ref 6–20)
BUN/CREAT BLD: ABNORMAL (ref 9–20)
BUPRENORPHINE URINE: ABNORMAL
CALCIUM SERPL-MCNC: 9.8 MG/DL (ref 8.6–10.4)
CANNABINOID SCREEN URINE: NEGATIVE
CASTS UA: ABNORMAL /LPF
CHLORIDE BLD-SCNC: 105 MMOL/L (ref 98–107)
CO2: 23 MMOL/L (ref 20–31)
COCAINE METABOLITE, URINE: POSITIVE
COLOR: YELLOW
COMMENT UA: ABNORMAL
CREAT SERPL-MCNC: 0.54 MG/DL (ref 0.5–0.9)
CRYSTALS, UA: ABNORMAL /HPF
DIFFERENTIAL TYPE: ABNORMAL
EOSINOPHILS RELATIVE PERCENT: 0 % (ref 0–4)
EPITHELIAL CELLS UA: ABNORMAL /HPF
ETHANOL PERCENT: <0.01 %
ETHANOL: <10 MG/DL
GFR AFRICAN AMERICAN: >60 ML/MIN
GFR NON-AFRICAN AMERICAN: >60 ML/MIN
GFR SERPL CREATININE-BSD FRML MDRD: ABNORMAL ML/MIN/{1.73_M2}
GFR SERPL CREATININE-BSD FRML MDRD: ABNORMAL ML/MIN/{1.73_M2}
GLUCOSE BLD-MCNC: 107 MG/DL (ref 70–99)
GLUCOSE URINE: NEGATIVE
HCG QUALITATIVE: NEGATIVE
HCT VFR BLD CALC: 46.8 % (ref 36–46)
HEMOGLOBIN: 15.6 G/DL (ref 12–16)
IMMATURE GRANULOCYTES: ABNORMAL %
KETONES, URINE: NEGATIVE
LEUKOCYTE ESTERASE, URINE: NEGATIVE
LYMPHOCYTES # BLD: 10 % (ref 24–44)
MCH RBC QN AUTO: 29.6 PG (ref 26–34)
MCHC RBC AUTO-ENTMCNC: 33.3 G/DL (ref 31–37)
MCV RBC AUTO: 88.8 FL (ref 80–100)
MDMA URINE: ABNORMAL
METHADONE SCREEN, URINE: NEGATIVE
METHAMPHETAMINE, URINE: ABNORMAL
MONOCYTES # BLD: 6 % (ref 1–7)
MUCUS: ABNORMAL
NITRITE, URINE: NEGATIVE
NRBC AUTOMATED: ABNORMAL PER 100 WBC
OPIATES, URINE: POSITIVE
OTHER OBSERVATIONS UA: ABNORMAL
OXYCODONE SCREEN URINE: POSITIVE
PDW BLD-RTO: 14.3 % (ref 11.5–14.9)
PH UA: 8 (ref 5–8)
PHENCYCLIDINE, URINE: NEGATIVE
PLATELET # BLD: 236 K/UL (ref 150–450)
PLATELET ESTIMATE: ABNORMAL
PMV BLD AUTO: 10.1 FL (ref 6–12)
POTASSIUM SERPL-SCNC: 3.7 MMOL/L (ref 3.7–5.3)
PROPOXYPHENE, URINE: ABNORMAL
PROTEIN UA: ABNORMAL
RBC # BLD: 5.27 M/UL (ref 4–5.2)
RBC # BLD: ABNORMAL 10*6/UL
RBC UA: ABNORMAL /HPF
RENAL EPITHELIAL, UA: ABNORMAL /HPF
SALICYLATE LEVEL: <1 MG/DL (ref 3–10)
SEG NEUTROPHILS: 83 % (ref 36–66)
SEGMENTED NEUTROPHILS ABSOLUTE COUNT: 7.7 K/UL (ref 1.3–9.1)
SODIUM BLD-SCNC: 143 MMOL/L (ref 135–144)
SPECIFIC GRAVITY UA: 1.03 (ref 1–1.03)
TEST INFORMATION: ABNORMAL
TOTAL CK: 72 U/L (ref 26–192)
TOTAL PROTEIN: 7.9 G/DL (ref 6.4–8.3)
TRICHOMONAS: ABNORMAL
TRICYCLIC ANTIDEPRESSANTS, UR: ABNORMAL
TURBIDITY: ABNORMAL
URINE HGB: NEGATIVE
UROBILINOGEN, URINE: NORMAL
WBC # BLD: 9.2 K/UL (ref 3.5–11)
WBC # BLD: ABNORMAL 10*3/UL
WBC UA: ABNORMAL /HPF
YEAST: ABNORMAL

## 2020-03-29 PROCEDURE — 36415 COLL VENOUS BLD VENIPUNCTURE: CPT

## 2020-03-29 PROCEDURE — G0480 DRUG TEST DEF 1-7 CLASSES: HCPCS

## 2020-03-29 PROCEDURE — 81001 URINALYSIS AUTO W/SCOPE: CPT

## 2020-03-29 PROCEDURE — 80307 DRUG TEST PRSMV CHEM ANLYZR: CPT

## 2020-03-29 PROCEDURE — 82550 ASSAY OF CK (CPK): CPT

## 2020-03-29 PROCEDURE — 93005 ELECTROCARDIOGRAM TRACING: CPT | Performed by: EMERGENCY MEDICINE

## 2020-03-29 PROCEDURE — 84703 CHORIONIC GONADOTROPIN ASSAY: CPT

## 2020-03-29 PROCEDURE — 80053 COMPREHEN METABOLIC PANEL: CPT

## 2020-03-29 PROCEDURE — 85025 COMPLETE CBC W/AUTO DIFF WBC: CPT

## 2020-03-29 NOTE — ED PROVIDER NOTES
EMERGENCY DEPARTMENT ENCOUNTER    Pt Name: Amy Barrett  MRN: [id number]  [place] 1995  Date of evaluation: 3/28/20  CHIEF COMPLAINT       Chief Complaint   Patient presents with    Addiction Problem     HISTORY OF PRESENT ILLNESS   HPI    HISTORY OF PRESENT ILLNESS:  Past medical history of polysubstance abuse presents for chief complaint of request for heroin detox. Patient last used earlier today. No other complaints. Severity is moderate. No aggravating or relieving factors. Timing is 1 day. Course constant.   Context is polysubstance abuse  -----------------------  -----------------------  REVIEW OF SYSTEMS  *see ED Caveat  ED Caveat: [none]  Gen:  No fever  CV: No CP, no palpitations  Resp: No SOB, no respiratory distress  GI: No V/D, no abd pain  : No dysuria, no increased frequency  Skin: No rash, no purulent lesions  Eyes: No blurry vision, No double vision  MSK: No back pain, no joint pain  Neuro: No HA, no sensation changes  Psych: No SI/HI  -----------------------  -----------------------  ALLERGIES  -per nursing records, reviewed    PAST MEDICAL HISTORY  -See HPI    SOCIAL HISTORY  -No daily drinking, no IV drugs  -----------------------  -----------------------  PHYSICAL EXAM  Gen: no acute distress  Skin: no rashes  Head: Normocephalic, atraumatic  Neck: no nuchal rigidity  Eye: PERRLA, normal conjunctiva  ENT: Mucous membranes moist  CV: Normal rate  Resp: Respirations unlabored  MSK: no large joint effusions  ABD: Non distended  Neuro: Alert and oriented, no focal neurological deficits observed  Psych: Cooperative  -----------------------  -----------------------  MEDICAL DECISION MAKING  Differential Diagnosis:  - Consideration is given for hypoglycemia, intracranial hemorrhage, cva, acs, dissection, withdrawal, medication overdose, electrolyte abnormality, sepsis, encephalitis, meningitis, vitamin deficiency,     -  #Impression/Plan:  - Clinically patient's presentation is most MD  Attending Emergency Physician                   Francenia Holter, MD  03/29/20 9104 verbal instruction/written material

## 2020-03-29 NOTE — ED NOTES
Pt is medically stable to be cabbed to [facility], per ED Dr. Osmin Lewis agreeable to be cabbed to [facility]. SW arranged pt transportation through [facility] and [facility] to transport pt to [facility]. ELZBIETA informed Shelby Conner at [facility] of pt's ETA and that pt will be transported to [facility] via cab.

## 2020-03-30 LAB
EKG ATRIAL RATE: 56 BPM
EKG P AXIS: 62 DEGREES
EKG P-R INTERVAL: 154 MS
EKG Q-T INTERVAL: 434 MS
EKG QRS DURATION: 92 MS
EKG QTC CALCULATION (BAZETT): 418 MS
EKG R AXIS: 86 DEGREES
EKG T AXIS: 73 DEGREES
EKG VENTRICULAR RATE: 56 BPM

## 2020-03-30 PROCEDURE — 93010 ELECTROCARDIOGRAM REPORT: CPT | Performed by: INTERNAL MEDICINE

## 2020-06-02 ENCOUNTER — HOSPITAL ENCOUNTER (EMERGENCY)
Age: 25
Discharge: HOME | End: 2020-06-02
Attending: EMERGENCY MEDICINE
Payer: MEDICAID

## 2020-06-02 VITALS
TEMPERATURE: 97 F | HEART RATE: 97 BPM | BODY MASS INDEX: 23.9 KG/M2 | DIASTOLIC BLOOD PRESSURE: 82 MMHG | HEIGHT: 64 IN | WEIGHT: 140 LBS | SYSTOLIC BLOOD PRESSURE: 115 MMHG | OXYGEN SATURATION: 94 % | RESPIRATION RATE: 10 BRPM

## 2020-06-02 PROCEDURE — 12011 RPR F/E/E/N/L/M 2.5 CM/<: CPT

## 2020-06-02 PROCEDURE — 99283 EMERGENCY DEPT VISIT LOW MDM: CPT

## 2020-06-02 RX ORDER — QUETIAPINE FUMARATE 100 MG/1
100 TABLET, FILM COATED ORAL 2 TIMES DAILY
Status: ON HOLD | COMMUNITY
End: 2020-07-15 | Stop reason: HOSPADM

## 2020-06-02 RX ORDER — GABAPENTIN 300 MG/1
600 CAPSULE ORAL 3 TIMES DAILY
Status: ON HOLD | COMMUNITY
End: 2020-07-15 | Stop reason: HOSPADM

## 2020-06-02 RX ORDER — ROPINIROLE 5 MG/1
5 TABLET, FILM COATED ORAL 3 TIMES DAILY
Status: ON HOLD | COMMUNITY
End: 2020-07-15 | Stop reason: HOSPADM

## 2020-06-02 ASSESSMENT — PAIN SCALES - WONG BAKER: WONGBAKER_NUMERICALRESPONSE: 2

## 2020-06-02 ASSESSMENT — PAIN DESCRIPTION - LOCATION: LOCATION: FACE

## 2020-06-02 ASSESSMENT — PAIN DESCRIPTION - PAIN TYPE: TYPE: ACUTE PAIN

## 2020-06-02 NOTE — ED NOTES
Patient resting on cot, eyes closed, equal non labored RR. Will continue to monitor.       Mariana Tate RN  06/02/20 1872

## 2020-06-02 NOTE — ED NOTES
Pt removed all cardiac monitor wires, walked to nurses station stating she wants food and is ready to leave, ED resident at bedside      Emilio Adams RN  06/02/20 6439
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
## 2020-06-23 ENCOUNTER — HOSPITAL ENCOUNTER (EMERGENCY)
Age: 25
Discharge: HOME | End: 2020-06-24
Attending: EMERGENCY MEDICINE
Payer: MEDICAID

## 2020-06-23 PROCEDURE — 80307 DRUG TEST PRSMV CHEM ANLYZR: CPT

## 2020-06-23 PROCEDURE — 99285 EMERGENCY DEPT VISIT HI MDM: CPT

## 2020-06-23 PROCEDURE — G0480 DRUG TEST DEF 1-7 CLASSES: HCPCS

## 2020-06-23 PROCEDURE — 93005 ELECTROCARDIOGRAM TRACING: CPT

## 2020-06-23 PROCEDURE — 84703 CHORIONIC GONADOTROPIN ASSAY: CPT

## 2020-06-23 PROCEDURE — 80053 COMPREHEN METABOLIC PANEL: CPT

## 2020-06-23 PROCEDURE — 85025 COMPLETE CBC W/AUTO DIFF WBC: CPT

## 2020-06-23 PROCEDURE — 80178 ASSAY OF LITHIUM: CPT

## 2020-06-23 RX ORDER — LITHIUM CARBONATE 450 MG
450 TABLET, EXTENDED RELEASE ORAL 2 TIMES DAILY
COMMUNITY

## 2020-06-23 RX ORDER — SODIUM CHLORIDE, SODIUM LACTATE, POTASSIUM CHLORIDE, AND CALCIUM CHLORIDE .6; .31; .03; .02 G/100ML; G/100ML; G/100ML; G/100ML
1000 INJECTION, SOLUTION INTRAVENOUS ONCE
Status: COMPLETED | OUTPATIENT
Start: 2020-06-24 | End: 2020-06-24

## 2020-06-23 RX ORDER — BUSPIRONE HYDROCHLORIDE 10 MG/1
10 TABLET ORAL 3 TIMES DAILY
COMMUNITY

## 2020-06-23 ASSESSMENT — PAIN DESCRIPTION - FREQUENCY: FREQUENCY: CONTINUOUS

## 2020-06-23 ASSESSMENT — PAIN SCALES - GENERAL: PAINLEVEL_OUTOF10: 10

## 2020-06-23 ASSESSMENT — PAIN DESCRIPTION - LOCATION: LOCATION: KNEE

## 2020-06-23 ASSESSMENT — PAIN DESCRIPTION - PAIN TYPE: TYPE: ACUTE PAIN

## 2020-06-24 ENCOUNTER — APPOINTMENT (OUTPATIENT)
Dept: CT IMAGING | Age: 25
End: 2020-06-24
Payer: MEDICAID

## 2020-06-24 VITALS
OXYGEN SATURATION: 98 % | RESPIRATION RATE: 16 BRPM | DIASTOLIC BLOOD PRESSURE: 74 MMHG | TEMPERATURE: 98.1 F | SYSTOLIC BLOOD PRESSURE: 102 MMHG | HEART RATE: 77 BPM

## 2020-06-24 LAB
ABSOLUTE EOS #: 0.48 K/UL (ref 0–0.44)
ABSOLUTE IMMATURE GRANULOCYTE: 0.06 K/UL (ref 0–0.3)
ABSOLUTE LYMPH #: 2.38 K/UL (ref 1.1–3.7)
ABSOLUTE MONO #: 0.87 K/UL (ref 0.1–1.2)
ACETAMINOPHEN LEVEL: <5 UG/ML (ref 10–30)
ALBUMIN SERPL-MCNC: 4.6 G/DL (ref 3.5–5.2)
ALBUMIN/GLOBULIN RATIO: 1.6 (ref 1–2.5)
ALP BLD-CCNC: 127 U/L (ref 35–104)
ALT SERPL-CCNC: 20 U/L (ref 5–33)
AMPHETAMINE SCREEN URINE: NEGATIVE
ANION GAP SERPL CALCULATED.3IONS-SCNC: 13 MMOL/L (ref 9–17)
AST SERPL-CCNC: 27 U/L
BARBITURATE SCREEN URINE: NEGATIVE
BASOPHILS # BLD: 1 % (ref 0–2)
BASOPHILS ABSOLUTE: 0.09 K/UL (ref 0–0.2)
BENZODIAZEPINE SCREEN, URINE: NEGATIVE
BILIRUB SERPL-MCNC: 0.19 MG/DL (ref 0.3–1.2)
BUN BLDV-MCNC: 13 MG/DL (ref 6–20)
BUN/CREAT BLD: ABNORMAL (ref 9–20)
BUPRENORPHINE URINE: ABNORMAL
CALCIUM SERPL-MCNC: 9.6 MG/DL (ref 8.6–10.4)
CANNABINOID SCREEN URINE: NEGATIVE
CHLORIDE BLD-SCNC: 101 MMOL/L (ref 98–107)
CO2: 26 MMOL/L (ref 20–31)
COCAINE METABOLITE, URINE: POSITIVE
CREAT SERPL-MCNC: 0.79 MG/DL (ref 0.5–0.9)
DIFFERENTIAL TYPE: ABNORMAL
EOSINOPHILS RELATIVE PERCENT: 5 % (ref 1–4)
ETHANOL PERCENT: <0.01 %
ETHANOL: <10 MG/DL
GFR AFRICAN AMERICAN: >60 ML/MIN
GFR NON-AFRICAN AMERICAN: >60 ML/MIN
GFR SERPL CREATININE-BSD FRML MDRD: ABNORMAL ML/MIN/{1.73_M2}
GFR SERPL CREATININE-BSD FRML MDRD: ABNORMAL ML/MIN/{1.73_M2}
GLUCOSE BLD-MCNC: 71 MG/DL (ref 70–99)
HCG QUALITATIVE: NEGATIVE
HCT VFR BLD CALC: 45.6 % (ref 36.3–47.1)
HEMOGLOBIN: 14.4 G/DL (ref 11.9–15.1)
IMMATURE GRANULOCYTES: 1 %
LITHIUM DATE LAST DOSE: ABNORMAL
LITHIUM DOSE AMOUNT: ABNORMAL
LITHIUM DOSE TIME: ABNORMAL
LITHIUM LEVEL: 0.4 MMOL/L (ref 0.6–1.2)
LYMPHOCYTES # BLD: 24 % (ref 24–43)
MCH RBC QN AUTO: 29.6 PG (ref 25.2–33.5)
MCHC RBC AUTO-ENTMCNC: 31.6 G/DL (ref 28.4–34.8)
MCV RBC AUTO: 93.6 FL (ref 82.6–102.9)
MDMA URINE: ABNORMAL
METHADONE SCREEN, URINE: NEGATIVE
METHAMPHETAMINE, URINE: ABNORMAL
MONOCYTES # BLD: 9 % (ref 3–12)
NRBC AUTOMATED: 0 PER 100 WBC
OPIATES, URINE: NEGATIVE
OXYCODONE SCREEN URINE: NEGATIVE
PDW BLD-RTO: 14.3 % (ref 11.8–14.4)
PHENCYCLIDINE, URINE: NEGATIVE
PLATELET # BLD: 266 K/UL (ref 138–453)
PLATELET ESTIMATE: ABNORMAL
PMV BLD AUTO: 12 FL (ref 8.1–13.5)
POTASSIUM SERPL-SCNC: 4.5 MMOL/L (ref 3.7–5.3)
PROPOXYPHENE, URINE: ABNORMAL
RBC # BLD: 4.87 M/UL (ref 3.95–5.11)
RBC # BLD: ABNORMAL 10*6/UL
SALICYLATE LEVEL: <1 MG/DL (ref 3–10)
SEG NEUTROPHILS: 60 % (ref 36–65)
SEGMENTED NEUTROPHILS ABSOLUTE COUNT: 6.25 K/UL (ref 1.5–8.1)
SODIUM BLD-SCNC: 140 MMOL/L (ref 135–144)
TEST INFORMATION: ABNORMAL
TOTAL PROTEIN: 7.4 G/DL (ref 6.4–8.3)
TOXIC TRICYCLIC SC,BLOOD: NEGATIVE
TRICYCLIC ANTIDEPRESSANTS, UR: ABNORMAL
WBC # BLD: 10.1 K/UL (ref 3.5–11.3)
WBC # BLD: ABNORMAL 10*3/UL

## 2020-06-24 PROCEDURE — 70450 CT HEAD/BRAIN W/O DYE: CPT

## 2020-06-24 PROCEDURE — 2580000003 HC RX 258: Performed by: STUDENT IN AN ORGANIZED HEALTH CARE EDUCATION/TRAINING PROGRAM

## 2020-06-24 PROCEDURE — 6360000002 HC RX W HCPCS: Performed by: STUDENT IN AN ORGANIZED HEALTH CARE EDUCATION/TRAINING PROGRAM

## 2020-06-24 PROCEDURE — 80307 DRUG TEST PRSMV CHEM ANLYZR: CPT

## 2020-06-24 PROCEDURE — 90471 IMMUNIZATION ADMIN: CPT | Performed by: STUDENT IN AN ORGANIZED HEALTH CARE EDUCATION/TRAINING PROGRAM

## 2020-06-24 PROCEDURE — 90715 TDAP VACCINE 7 YRS/> IM: CPT | Performed by: STUDENT IN AN ORGANIZED HEALTH CARE EDUCATION/TRAINING PROGRAM

## 2020-06-24 RX ADMIN — SODIUM CHLORIDE, POTASSIUM CHLORIDE, SODIUM LACTATE AND CALCIUM CHLORIDE 1000 ML: 600; 310; 30; 20 INJECTION, SOLUTION INTRAVENOUS at 00:00

## 2020-06-24 RX ADMIN — TETANUS TOXOID, REDUCED DIPHTHERIA TOXOID AND ACELLULAR PERTUSSIS VACCINE, ADSORBED 0.5 ML: 5; 2.5; 8; 8; 2.5 SUSPENSION INTRAMUSCULAR at 00:59

## 2020-06-24 ASSESSMENT — PAIN SCALES - GENERAL: PAINLEVEL_OUTOF10: 0

## 2020-06-24 ASSESSMENT — PAIN SCALES - WONG BAKER: WONGBAKER_NUMERICALRESPONSE: 0

## 2020-06-24 NOTE — ED NOTES
1:1 maintained, pt resting with eyes closed even RR noted.              Martín Sr, RN  06/24/20 5621

## 2020-06-24 NOTE — ED NOTES
Pt eating meal tray at this time. Water provided. Pt tolerated well. Dr. Tio Braga made aware.      Xena Thomas RN  06/24/20 2506

## 2020-06-24 NOTE — ED NOTES
Report recd from [facility], pt observed resting at this time with eyes closed and even RR noted.  Alek Hightower RN  06/24/20 1510

## 2020-06-24 NOTE — ED NOTES
Report given to Aydee Lacy, nurse denies any further questions.       Derick Shay, VINCENT  06/24/20 6826

## 2020-06-24 NOTE — ED NOTES
SW made a 2nd attempt to wake up the patient for evaluation. Patient not able to stay awake for evaluation.       Vane Salcido, [facility]  06/24/20 4431

## 2020-06-24 NOTE — ED NOTES
After exiting the restroom, pt walked into the wall instead of doorway. Pt had urinated on bed, this RN guided pt to chair while linens were changed. Pt tense, stating she is cold. Pt leaning forward, this RN had to redirect pt to lean back in chair. This RN asked if pt recalled doing cocaine today, pt mumbles. Pt stumbles into bed, this RN at bedside to guide. Bed locked in lowest position, side rails up X2, call light in bed. Pt provided with two warm blankets. Pt close to nurse's station for monitoring. Per Dr. Priya Lepe, plan to move pt to [facility] to prevent elopement and consult social work for possible transfer to [facility].        Rosalio Padilla RN  06/24/20 0516

## 2020-06-24 NOTE — ED NOTES
Patient return from [address], [facility]  06/24/20 9706

## 2020-06-24 NOTE — ED NOTES
Report received from [address] [place] [address], [address] [place] [address]  06/24/20 8036

## 2020-06-24 NOTE — ED PROVIDER NOTES
intervention. Total critical care time was 5 minutes. This excludes any time for separately reportable procedures.        [address] Barry Thomas, DO  06/23/20 1004 [address] Kurt Moura, DO  06/24/20 84 Mercedes Hodgson, DO  06/24/20 1162
walk around parking lot, acting strange. Patient alert and oriented, answering questions appropriately. Patient recently discharged from [facility] on 7 new antipsychotics. All remaining pills are accounted for, except for 7 gabapentin which she states she has been taking properly. Labs positive for cocaine use    CT head and UDS pending  Patient denies suicidal homicidal ideation, rather manic in the room requiring police, not requiring any medications at this time. ED Course as of Jun 24 0701 Wed Jun 24, 2020   0026 hCG Qual: NEGATIVE [RB]   0033 Cocaine Metabolite, Urine(!): POSITIVE [RB]   4434 Toxic Tricyclic Sc,Blood: NEGATIVE [RB]   0039 hCG Qual: NEGATIVE [RB]   8344 WBC: 10.1 [RB]   0039 Hemoglobin Quant: 14.4 [RB]   0039 Glucose: 71 [RB]   0039 Creatinine: 0.79 [RB]   4784 Pt signed out to Dr. Jacob Beatty     [RB]   0330 Patient sleeping comfortably upon entering room, somnolent, arousable, but quickly falling back asleep. Patient becomes verbally aggressive when asked questions. No obvious deficits, vital signs normal.  This possibly due to gabapentin use, cocaine withdrawal, or combination of multiple new psych medications. Will have patient transferred back to [facility], seen by social work, possibly transfer to [facility].    [SF]   7909 Per nurse patient sprinted out of her room after I attempted to speak with her, running to the bathroom. Then bumped into the wall, asked if she could take her shorts off, went back in bed, urinated in bed. Exhibiting strange behavior. [SF]   3575 Patient somnolent but more arousable and more alert than before, but still quickly falling back asleep. Unable to of obtain a full assessment, patient denies suicidal ideation but does admit to taking some of her medications more than was prescribed but she does not know which ones. She is asked again if she was suicidal she yells no.   Otherwise patient is calm and resting comfortably in no acute distress with normal vital
[address] [id number]  [phone]    As needed      DISCHARGE MEDICATIONS:  Discharge Medication List as of 6/24/2020 10:29 AM          Dr. Alvino Holter.  [address] [place] [address]  Emergency Medicine Resident Physician, PGY-2    (Please note that portions of this note were completed with a voice recognition program.  Efforts were made to edit the dictations but occasionally words are mis-transcribed.)        Castro Rossi DO  Resident  06/26/20 0223

## 2020-06-28 LAB
EKG ATRIAL RATE: 96 BPM
EKG P AXIS: 62 DEGREES
EKG P-R INTERVAL: 180 MS
EKG Q-T INTERVAL: 348 MS
EKG QRS DURATION: 84 MS
EKG QTC CALCULATION (BAZETT): 439 MS
EKG R AXIS: 86 DEGREES
EKG T AXIS: 52 DEGREES
EKG VENTRICULAR RATE: 96 BPM

## 2020-07-07 ENCOUNTER — APPOINTMENT (OUTPATIENT)
Dept: GENERAL RADIOLOGY | Age: 25
DRG: 816 | End: 2020-07-07
Payer: MEDICAID

## 2020-07-07 ENCOUNTER — APPOINTMENT (OUTPATIENT)
Dept: CT IMAGING | Age: 25
DRG: 816 | End: 2020-07-07
Payer: MEDICAID

## 2020-07-07 ENCOUNTER — HOSPITAL ENCOUNTER (INPATIENT)
Age: 25
LOS: 4 days | Discharge: PSYCHIATRIC OR SUBSTANCE USE FACILITY | DRG: 816 | End: 2020-07-11
Attending: EMERGENCY MEDICINE | Admitting: INTERNAL MEDICINE
Payer: MEDICAID

## 2020-07-07 PROBLEM — F11.921: Status: ACTIVE | Noted: 2020-07-07

## 2020-07-07 LAB
-: ABNORMAL
-: NORMAL
ABSOLUTE EOS #: 0.09 K/UL (ref 0–0.44)
ABSOLUTE IMMATURE GRANULOCYTE: 0.38 K/UL (ref 0–0.3)
ABSOLUTE LYMPH #: 1.17 K/UL (ref 1.1–3.7)
ABSOLUTE MONO #: 1.41 K/UL (ref 0.1–1.2)
ACETAMINOPHEN LEVEL: <5 UG/ML (ref 10–30)
ALBUMIN SERPL-MCNC: 4.1 G/DL (ref 3.5–5.2)
ALBUMIN/GLOBULIN RATIO: 1.1 (ref 1–2.5)
ALLEN TEST: ABNORMAL
ALLEN TEST: POSITIVE
ALP BLD-CCNC: 117 U/L (ref 35–104)
ALT SERPL-CCNC: 20 U/L (ref 5–33)
AMORPHOUS: ABNORMAL
AMPHETAMINE SCREEN URINE: NEGATIVE
ANION GAP SERPL CALCULATED.3IONS-SCNC: 18 MMOL/L (ref 9–17)
ANION GAP: 10 MMOL/L (ref 7–16)
AST SERPL-CCNC: 29 U/L
BACTERIA: ABNORMAL
BARBITURATE SCREEN URINE: NEGATIVE
BASOPHILS # BLD: 1 % (ref 0–2)
BASOPHILS ABSOLUTE: 0.18 K/UL (ref 0–0.2)
BENZODIAZEPINE SCREEN, URINE: NEGATIVE
BILIRUB SERPL-MCNC: 0.25 MG/DL (ref 0.3–1.2)
BILIRUBIN URINE: NEGATIVE
BUN BLDV-MCNC: 17 MG/DL (ref 6–20)
BUN/CREAT BLD: ABNORMAL (ref 9–20)
BUPRENORPHINE URINE: ABNORMAL
CALCIUM SERPL-MCNC: 8.9 MG/DL (ref 8.6–10.4)
CANNABINOID SCREEN URINE: NEGATIVE
CASTS UA: ABNORMAL /LPF (ref 0–2)
CASTS UA: ABNORMAL /LPF (ref 0–2)
CHLORIDE BLD-SCNC: 95 MMOL/L (ref 98–107)
CO2: 24 MMOL/L (ref 20–31)
COCAINE METABOLITE, URINE: POSITIVE
COLOR: YELLOW
CREAT SERPL-MCNC: 1.12 MG/DL (ref 0.5–0.9)
CRYSTALS, UA: ABNORMAL /HPF
DIFFERENTIAL TYPE: ABNORMAL
DIRECT EXAM: NORMAL
EOSINOPHILS RELATIVE PERCENT: 0 % (ref 1–4)
EPITHELIAL CELLS UA: ABNORMAL /HPF (ref 0–5)
ETHANOL PERCENT: <0.01 %
ETHANOL: <10 MG/DL
FERRITIN: 502 UG/L (ref 13–150)
FIO2: 100
FIO2: ABNORMAL
GFR AFRICAN AMERICAN: >60 ML/MIN
GFR NON-AFRICAN AMERICAN: 58 ML/MIN
GFR NON-AFRICAN AMERICAN: >60 ML/MIN
GFR SERPL CREATININE-BSD FRML MDRD: >60 ML/MIN
GFR SERPL CREATININE-BSD FRML MDRD: ABNORMAL ML/MIN/{1.73_M2}
GLUCOSE BLD-MCNC: 227 MG/DL (ref 70–99)
GLUCOSE BLD-MCNC: 231 MG/DL (ref 74–100)
GLUCOSE URINE: ABNORMAL
HCG QUALITATIVE: NEGATIVE
HCO3 VENOUS: 25 MMOL/L (ref 22–29)
HCT VFR BLD CALC: 42.2 % (ref 36.3–47.1)
HEMOGLOBIN: 13.2 G/DL (ref 11.9–15.1)
IMMATURE GRANULOCYTES: 2 %
INR BLD: 1
KETONES, URINE: NEGATIVE
LACTATE DEHYDROGENASE: 245 U/L (ref 135–214)
LACTIC ACID, WHOLE BLOOD: 0.8 MMOL/L (ref 0.7–2.1)
LACTIC ACID, WHOLE BLOOD: 4.2 MMOL/L (ref 0.7–2.1)
LACTIC ACID: ABNORMAL MMOL/L
LEUKOCYTE ESTERASE, URINE: NEGATIVE
LITHIUM DATE LAST DOSE: NORMAL
LITHIUM DOSE AMOUNT: NORMAL
LITHIUM DOSE TIME: NORMAL
LITHIUM LEVEL: 1.2 MMOL/L (ref 0.6–1.2)
LYMPHOCYTES # BLD: 5 % (ref 24–43)
Lab: NORMAL
MCH RBC QN AUTO: 29.5 PG (ref 25.2–33.5)
MCHC RBC AUTO-ENTMCNC: 31.3 G/DL (ref 28.4–34.8)
MCV RBC AUTO: 94.4 FL (ref 82.6–102.9)
MDMA URINE: ABNORMAL
METHADONE SCREEN, URINE: NEGATIVE
METHAMPHETAMINE, URINE: ABNORMAL
MODE: ABNORMAL
MODE: ABNORMAL
MONOCYTES # BLD: 6 % (ref 3–12)
MUCUS: ABNORMAL
MYOGLOBIN: 41 NG/ML (ref 25–58)
NEGATIVE BASE EXCESS, ART: ABNORMAL (ref 0–2)
NEGATIVE BASE EXCESS, VEN: ABNORMAL (ref 0–2)
NITRITE, URINE: NEGATIVE
NRBC AUTOMATED: 0 PER 100 WBC
O2 DEVICE/FLOW/%: ABNORMAL
O2 DEVICE/FLOW/%: ABNORMAL
O2 SAT, VEN: 67 % (ref 60–85)
OPIATES, URINE: POSITIVE
OTHER OBSERVATIONS UA: ABNORMAL
OXYCODONE SCREEN URINE: NEGATIVE
PARTIAL THROMBOPLASTIN TIME: 21.6 SEC (ref 20.5–30.5)
PATIENT TEMP: ABNORMAL
PATIENT TEMP: ABNORMAL
PCO2, VEN: 38.7 MM HG (ref 41–51)
PDW BLD-RTO: 13.5 % (ref 11.8–14.4)
PH UA: 6.5 (ref 5–8)
PH VENOUS: 7.42 (ref 7.32–7.43)
PHENCYCLIDINE, URINE: NEGATIVE
PLATELET # BLD: ABNORMAL K/UL (ref 138–453)
PLATELET ESTIMATE: ABNORMAL
PLATELET, FLUORESCENCE: NORMAL K/UL (ref 138–453)
PLATELET, IMMATURE FRACTION: NORMAL % (ref 1.1–10.3)
PMV BLD AUTO: ABNORMAL FL (ref 8.1–13.5)
PO2, VEN: 34.3 MM HG (ref 30–50)
POC CHLORIDE: 100 MMOL/L (ref 98–107)
POC CREATININE: 1.16 MG/DL (ref 0.51–1.19)
POC HCO3: 26.6 MMOL/L (ref 21–28)
POC HEMATOCRIT: 45 % (ref 36–46)
POC HEMOGLOBIN: 15.5 G/DL (ref 12–16)
POC IONIZED CALCIUM: 0.92 MMOL/L (ref 1.15–1.33)
POC LACTIC ACID: 4.05 MMOL/L (ref 0.56–1.39)
POC O2 SATURATION: 100 % (ref 94–98)
POC PCO2 TEMP: ABNORMAL MM HG
POC PCO2 TEMP: ABNORMAL MM HG
POC PCO2: 47.4 MM HG (ref 35–48)
POC PH TEMP: ABNORMAL
POC PH TEMP: ABNORMAL
POC PH: 7.36 (ref 7.35–7.45)
POC PO2 TEMP: ABNORMAL MM HG
POC PO2 TEMP: ABNORMAL MM HG
POC PO2: 432.4 MM HG (ref 83–108)
POC POTASSIUM: 4 MMOL/L (ref 3.5–4.5)
POC SODIUM: 135 MMOL/L (ref 138–146)
POSITIVE BASE EXCESS, ART: 1 (ref 0–3)
POSITIVE BASE EXCESS, VEN: 1 (ref 0–3)
POTASSIUM SERPL-SCNC: 4.1 MMOL/L (ref 3.7–5.3)
PROCALCITONIN: 0.23 NG/ML
PROPOXYPHENE, URINE: ABNORMAL
PROTEIN UA: ABNORMAL
PROTHROMBIN TIME: 10.9 SEC (ref 9–12)
RBC # BLD: 4.47 M/UL (ref 3.95–5.11)
RBC # BLD: ABNORMAL 10*6/UL
RBC UA: ABNORMAL /HPF (ref 0–2)
REASON FOR REJECTION: NORMAL
RENAL EPITHELIAL, UA: ABNORMAL /HPF
SALICYLATE LEVEL: <1 MG/DL (ref 3–10)
SAMPLE SITE: ABNORMAL
SAMPLE SITE: ABNORMAL
SARS-COV-2, PCR: NORMAL
SARS-COV-2, RAPID: NOT DETECTED
SARS-COV-2: NORMAL
SEG NEUTROPHILS: 85 % (ref 36–65)
SEGMENTED NEUTROPHILS ABSOLUTE COUNT: 18.72 K/UL (ref 1.5–8.1)
SODIUM BLD-SCNC: 137 MMOL/L (ref 135–144)
SOURCE: NORMAL
SPECIFIC GRAVITY UA: 1.02 (ref 1–1.03)
SPECIMEN DESCRIPTION: NORMAL
TCO2 (CALC), ART: 28 MMOL/L (ref 22–29)
TEST INFORMATION: ABNORMAL
TOTAL CK: 134 U/L (ref 26–192)
TOTAL CO2, VENOUS: 26 MMOL/L (ref 23–30)
TOTAL PROTEIN: 7.8 G/DL (ref 6.4–8.3)
TOXIC TRICYCLIC SC,BLOOD: NEGATIVE
TRICHOMONAS: ABNORMAL
TRICYCLIC ANTIDEPRESSANTS, UR: ABNORMAL
TROPONIN INTERP: ABNORMAL
TROPONIN T: ABNORMAL NG/ML
TROPONIN, HIGH SENSITIVITY: 34 NG/L (ref 0–14)
TROPONIN, HIGH SENSITIVITY: 70 NG/L (ref 0–14)
TROPONIN, HIGH SENSITIVITY: 82 NG/L (ref 0–14)
TURBIDITY: ABNORMAL
URINE HGB: ABNORMAL
UROBILINOGEN, URINE: NORMAL
WBC # BLD: 22 K/UL (ref 3.5–11.3)
WBC # BLD: ABNORMAL 10*3/UL
WBC UA: ABNORMAL /HPF (ref 0–5)
YEAST: ABNORMAL
ZZ NTE CLEAN UP: ORDERED TEST: NORMAL
ZZ NTE WITH NAME CLEAN UP: SPECIMEN SOURCE: NORMAL

## 2020-07-07 PROCEDURE — 94761 N-INVAS EAR/PLS OXIMETRY MLT: CPT

## 2020-07-07 PROCEDURE — 85610 PROTHROMBIN TIME: CPT

## 2020-07-07 PROCEDURE — 2580000003 HC RX 258: Performed by: EMERGENCY MEDICINE

## 2020-07-07 PROCEDURE — 2700000000 HC OXYGEN THERAPY PER DAY

## 2020-07-07 PROCEDURE — 6360000002 HC RX W HCPCS: Performed by: STUDENT IN AN ORGANIZED HEALTH CARE EDUCATION/TRAINING PROGRAM

## 2020-07-07 PROCEDURE — 87205 SMEAR GRAM STAIN: CPT

## 2020-07-07 PROCEDURE — 84295 ASSAY OF SERUM SODIUM: CPT

## 2020-07-07 PROCEDURE — 94770 HC ETCO2 MONITOR DAILY: CPT

## 2020-07-07 PROCEDURE — 2580000003 HC RX 258: Performed by: STUDENT IN AN ORGANIZED HEALTH CARE EDUCATION/TRAINING PROGRAM

## 2020-07-07 PROCEDURE — 82330 ASSAY OF CALCIUM: CPT

## 2020-07-07 PROCEDURE — 84703 CHORIONIC GONADOTROPIN ASSAY: CPT

## 2020-07-07 PROCEDURE — 99291 CRITICAL CARE FIRST HOUR: CPT

## 2020-07-07 PROCEDURE — 6360000002 HC RX W HCPCS

## 2020-07-07 PROCEDURE — 96367 TX/PROPH/DG ADDL SEQ IV INF: CPT

## 2020-07-07 PROCEDURE — 84484 ASSAY OF TROPONIN QUANT: CPT

## 2020-07-07 PROCEDURE — 36600 WITHDRAWAL OF ARTERIAL BLOOD: CPT

## 2020-07-07 PROCEDURE — 83615 LACTATE (LD) (LDH) ENZYME: CPT

## 2020-07-07 PROCEDURE — 84132 ASSAY OF SERUM POTASSIUM: CPT

## 2020-07-07 PROCEDURE — 6370000000 HC RX 637 (ALT 250 FOR IP): Performed by: STUDENT IN AN ORGANIZED HEALTH CARE EDUCATION/TRAINING PROGRAM

## 2020-07-07 PROCEDURE — 84145 PROCALCITONIN (PCT): CPT

## 2020-07-07 PROCEDURE — 80307 DRUG TEST PRSMV CHEM ANLYZR: CPT

## 2020-07-07 PROCEDURE — 5A1945Z RESPIRATORY VENTILATION, 24-96 CONSECUTIVE HOURS: ICD-10-PCS | Performed by: INTERNAL MEDICINE

## 2020-07-07 PROCEDURE — 85014 HEMATOCRIT: CPT

## 2020-07-07 PROCEDURE — 99292 CRITICAL CARE ADDL 30 MIN: CPT

## 2020-07-07 PROCEDURE — 94002 VENT MGMT INPAT INIT DAY: CPT

## 2020-07-07 PROCEDURE — 71045 X-RAY EXAM CHEST 1 VIEW: CPT

## 2020-07-07 PROCEDURE — 93005 ELECTROCARDIOGRAM TRACING: CPT | Performed by: STUDENT IN AN ORGANIZED HEALTH CARE EDUCATION/TRAINING PROGRAM

## 2020-07-07 PROCEDURE — 6360000002 HC RX W HCPCS: Performed by: EMERGENCY MEDICINE

## 2020-07-07 PROCEDURE — 87086 URINE CULTURE/COLONY COUNT: CPT

## 2020-07-07 PROCEDURE — 82565 ASSAY OF CREATININE: CPT

## 2020-07-07 PROCEDURE — 83874 ASSAY OF MYOGLOBIN: CPT

## 2020-07-07 PROCEDURE — 85025 COMPLETE CBC W/AUTO DIFF WBC: CPT

## 2020-07-07 PROCEDURE — 89220 SPUTUM SPECIMEN COLLECTION: CPT

## 2020-07-07 PROCEDURE — 80053 COMPREHEN METABOLIC PANEL: CPT

## 2020-07-07 PROCEDURE — G0480 DRUG TEST DEF 1-7 CLASSES: HCPCS

## 2020-07-07 PROCEDURE — 99291 CRITICAL CARE FIRST HOUR: CPT | Performed by: INTERNAL MEDICINE

## 2020-07-07 PROCEDURE — 82803 BLOOD GASES ANY COMBINATION: CPT

## 2020-07-07 PROCEDURE — 85730 THROMBOPLASTIN TIME PARTIAL: CPT

## 2020-07-07 PROCEDURE — 85055 RETICULATED PLATELET ASSAY: CPT

## 2020-07-07 PROCEDURE — 2500000003 HC RX 250 WO HCPCS

## 2020-07-07 PROCEDURE — 82435 ASSAY OF BLOOD CHLORIDE: CPT

## 2020-07-07 PROCEDURE — 96365 THER/PROPH/DIAG IV INF INIT: CPT

## 2020-07-07 PROCEDURE — U0002 COVID-19 LAB TEST NON-CDC: HCPCS

## 2020-07-07 PROCEDURE — 80178 ASSAY OF LITHIUM: CPT

## 2020-07-07 PROCEDURE — 87641 MR-STAPH DNA AMP PROBE: CPT

## 2020-07-07 PROCEDURE — 81001 URINALYSIS AUTO W/SCOPE: CPT

## 2020-07-07 PROCEDURE — 86403 PARTICLE AGGLUT ANTBDY SCRN: CPT

## 2020-07-07 PROCEDURE — 2000000000 HC ICU R&B

## 2020-07-07 PROCEDURE — 70450 CT HEAD/BRAIN W/O DYE: CPT

## 2020-07-07 PROCEDURE — 82947 ASSAY GLUCOSE BLOOD QUANT: CPT

## 2020-07-07 PROCEDURE — 0BH18EZ INSERTION OF ENDOTRACHEAL AIRWAY INTO TRACHEA, VIA NATURAL OR ARTIFICIAL OPENING ENDOSCOPIC: ICD-10-PCS | Performed by: INTERNAL MEDICINE

## 2020-07-07 PROCEDURE — 87040 BLOOD CULTURE FOR BACTERIA: CPT

## 2020-07-07 PROCEDURE — 31500 INSERT EMERGENCY AIRWAY: CPT

## 2020-07-07 PROCEDURE — 82728 ASSAY OF FERRITIN: CPT

## 2020-07-07 PROCEDURE — 82550 ASSAY OF CK (CPK): CPT

## 2020-07-07 PROCEDURE — 2500000003 HC RX 250 WO HCPCS: Performed by: EMERGENCY MEDICINE

## 2020-07-07 PROCEDURE — 87070 CULTURE OTHR SPECIMN AEROBIC: CPT

## 2020-07-07 PROCEDURE — 83605 ASSAY OF LACTIC ACID: CPT

## 2020-07-07 RX ORDER — VANCOMYCIN HYDROCHLORIDE 1 G/200ML
15 INJECTION, SOLUTION INTRAVENOUS ONCE
Status: COMPLETED | OUTPATIENT
Start: 2020-07-07 | End: 2020-07-07

## 2020-07-07 RX ORDER — PROMETHAZINE HYDROCHLORIDE 25 MG/1
12.5 TABLET ORAL EVERY 6 HOURS PRN
Status: DISCONTINUED | OUTPATIENT
Start: 2020-07-07 | End: 2020-07-11 | Stop reason: HOSPADM

## 2020-07-07 RX ORDER — SUCCINYLCHOLINE CHLORIDE 20 MG/ML
100 INJECTION INTRAMUSCULAR; INTRAVENOUS ONCE
Status: COMPLETED | OUTPATIENT
Start: 2020-07-07 | End: 2020-07-07

## 2020-07-07 RX ORDER — NALOXONE HYDROCHLORIDE 0.4 MG/ML
0.4 INJECTION, SOLUTION INTRAMUSCULAR; INTRAVENOUS; SUBCUTANEOUS
Status: DISCONTINUED | OUTPATIENT
Start: 2020-07-07 | End: 2020-07-07

## 2020-07-07 RX ORDER — ASPIRIN 300 MG/1
600 SUPPOSITORY RECTAL ONCE
Status: COMPLETED | OUTPATIENT
Start: 2020-07-07 | End: 2020-07-07

## 2020-07-07 RX ORDER — LORAZEPAM 2 MG/ML
INJECTION INTRAMUSCULAR
Status: COMPLETED
Start: 2020-07-07 | End: 2020-07-07

## 2020-07-07 RX ORDER — ETOMIDATE 2 MG/ML
20 INJECTION INTRAVENOUS ONCE
Status: DISCONTINUED | OUTPATIENT
Start: 2020-07-07 | End: 2020-07-07

## 2020-07-07 RX ORDER — SODIUM CHLORIDE 9 MG/ML
INJECTION, SOLUTION INTRAVENOUS CONTINUOUS
Status: DISCONTINUED | OUTPATIENT
Start: 2020-07-07 | End: 2020-07-08

## 2020-07-07 RX ORDER — LIDOCAINE HYDROCHLORIDE 40 MG/ML
4 INJECTION, SOLUTION RETROBULBAR; TOPICAL
Status: DISCONTINUED | OUTPATIENT
Start: 2020-07-07 | End: 2020-07-07

## 2020-07-07 RX ORDER — 0.9 % SODIUM CHLORIDE 0.9 %
1000 INTRAVENOUS SOLUTION INTRAVENOUS ONCE
Status: DISCONTINUED | OUTPATIENT
Start: 2020-07-07 | End: 2020-07-11 | Stop reason: HOSPADM

## 2020-07-07 RX ORDER — NALOXONE HYDROCHLORIDE 0.4 MG/ML
INJECTION, SOLUTION INTRAMUSCULAR; INTRAVENOUS; SUBCUTANEOUS
Status: COMPLETED
Start: 2020-07-07 | End: 2020-07-07

## 2020-07-07 RX ORDER — NALOXONE HYDROCHLORIDE 1 MG/ML
1 INJECTION INTRAMUSCULAR; INTRAVENOUS; SUBCUTANEOUS ONCE
Status: COMPLETED | OUTPATIENT
Start: 2020-07-07 | End: 2020-07-07

## 2020-07-07 RX ORDER — ACETYLCYSTEINE 200 MG/ML
600 SOLUTION ORAL; RESPIRATORY (INHALATION)
Status: DISCONTINUED | OUTPATIENT
Start: 2020-07-07 | End: 2020-07-07

## 2020-07-07 RX ORDER — 0.9 % SODIUM CHLORIDE 0.9 %
30 INTRAVENOUS SOLUTION INTRAVENOUS ONCE
Status: COMPLETED | OUTPATIENT
Start: 2020-07-07 | End: 2020-07-07

## 2020-07-07 RX ORDER — 0.9 % SODIUM CHLORIDE 0.9 %
1000 INTRAVENOUS SOLUTION INTRAVENOUS ONCE
Status: COMPLETED | OUTPATIENT
Start: 2020-07-07 | End: 2020-07-07

## 2020-07-07 RX ORDER — NALOXONE HYDROCHLORIDE 0.4 MG/ML
0.4 INJECTION, SOLUTION INTRAMUSCULAR; INTRAVENOUS; SUBCUTANEOUS ONCE
Status: COMPLETED | OUTPATIENT
Start: 2020-07-07 | End: 2020-07-07

## 2020-07-07 RX ORDER — SODIUM CHLORIDE 0.9 % (FLUSH) 0.9 %
10 SYRINGE (ML) INJECTION EVERY 12 HOURS SCHEDULED
Status: DISCONTINUED | OUTPATIENT
Start: 2020-07-07 | End: 2020-07-11 | Stop reason: HOSPADM

## 2020-07-07 RX ORDER — ALBUTEROL SULFATE 90 UG/1
2 AEROSOL, METERED RESPIRATORY (INHALATION)
Status: DISCONTINUED | OUTPATIENT
Start: 2020-07-07 | End: 2020-07-09

## 2020-07-07 RX ORDER — SODIUM CHLORIDE 0.9 % (FLUSH) 0.9 %
10 SYRINGE (ML) INJECTION PRN
Status: DISCONTINUED | OUTPATIENT
Start: 2020-07-07 | End: 2020-07-11 | Stop reason: HOSPADM

## 2020-07-07 RX ORDER — ONDANSETRON 2 MG/ML
4 INJECTION INTRAMUSCULAR; INTRAVENOUS EVERY 6 HOURS PRN
Status: DISCONTINUED | OUTPATIENT
Start: 2020-07-07 | End: 2020-07-11 | Stop reason: HOSPADM

## 2020-07-07 RX ORDER — IPRATROPIUM BROMIDE AND ALBUTEROL SULFATE 2.5; .5 MG/3ML; MG/3ML
1 SOLUTION RESPIRATORY (INHALATION)
Status: DISCONTINUED | OUTPATIENT
Start: 2020-07-07 | End: 2020-07-07

## 2020-07-07 RX ORDER — LORAZEPAM 2 MG/ML
4 INJECTION INTRAMUSCULAR ONCE
Status: COMPLETED | OUTPATIENT
Start: 2020-07-07 | End: 2020-07-07

## 2020-07-07 RX ORDER — DEXMEDETOMIDINE HYDROCHLORIDE 4 UG/ML
0.2 INJECTION, SOLUTION INTRAVENOUS CONTINUOUS
Status: DISCONTINUED | OUTPATIENT
Start: 2020-07-07 | End: 2020-07-08

## 2020-07-07 RX ORDER — NALOXONE HYDROCHLORIDE 1 MG/ML
2 INJECTION INTRAMUSCULAR; INTRAVENOUS; SUBCUTANEOUS ONCE
Status: DISCONTINUED | OUTPATIENT
Start: 2020-07-07 | End: 2020-07-07

## 2020-07-07 RX ADMIN — NALOXONE HYDROCHLORIDE 1.8 MG/HR: 1 INJECTION PARENTERAL at 14:14

## 2020-07-07 RX ADMIN — NALOXONE HYDROCHLORIDE 0.4 MG: 0.4 INJECTION, SOLUTION INTRAMUSCULAR; INTRAVENOUS; SUBCUTANEOUS at 09:51

## 2020-07-07 RX ADMIN — ASPIRIN 600 MG: 300 SUPPOSITORY RECTAL at 11:52

## 2020-07-07 RX ADMIN — CEFTRIAXONE SODIUM 1 G: 1 INJECTION, POWDER, FOR SOLUTION INTRAMUSCULAR; INTRAVENOUS at 08:47

## 2020-07-07 RX ADMIN — VANCOMYCIN HYDROCHLORIDE 1000 MG: 1 INJECTION, SOLUTION INTRAVENOUS at 10:24

## 2020-07-07 RX ADMIN — Medication 500 MG: at 09:18

## 2020-07-07 RX ADMIN — DEXMEDETOMIDINE HYDROCHLORIDE 0.2 MCG/KG/HR: 400 INJECTION INTRAVENOUS at 23:20

## 2020-07-07 RX ADMIN — NALOXONE HYDROCHLORIDE 0.4 MG/HR: 1 INJECTION PARENTERAL at 11:56

## 2020-07-07 RX ADMIN — LORAZEPAM 4 MG: 2 INJECTION INTRAMUSCULAR at 14:39

## 2020-07-07 RX ADMIN — SODIUM CHLORIDE 1.5 G: 900 INJECTION INTRAVENOUS at 15:35

## 2020-07-07 RX ADMIN — Medication 10 ML: at 21:00

## 2020-07-07 RX ADMIN — SODIUM CHLORIDE: 9 INJECTION, SOLUTION INTRAVENOUS at 15:49

## 2020-07-07 RX ADMIN — SODIUM CHLORIDE 1.5 G: 900 INJECTION INTRAVENOUS at 22:49

## 2020-07-07 RX ADMIN — SODIUM CHLORIDE 1000 ML: 9 INJECTION, SOLUTION INTRAVENOUS at 19:07

## 2020-07-07 RX ADMIN — SODIUM CHLORIDE 1905 ML: 9 INJECTION, SOLUTION INTRAVENOUS at 08:17

## 2020-07-07 RX ADMIN — ENOXAPARIN SODIUM 40 MG: 40 INJECTION SUBCUTANEOUS at 18:52

## 2020-07-07 RX ADMIN — NALOXONE HYDROCHLORIDE 1 MG: 1 INJECTION PARENTERAL at 10:38

## 2020-07-07 RX ADMIN — NALOXONE HYDROCHLORIDE 0.4 MG: 0.4 INJECTION, SOLUTION INTRAMUSCULAR; INTRAVENOUS; SUBCUTANEOUS at 19:00

## 2020-07-07 RX ADMIN — SUCCINYLCHOLINE CHLORIDE 100 MG: 20 INJECTION, SOLUTION INTRAMUSCULAR; INTRAVENOUS at 14:27

## 2020-07-07 RX ADMIN — Medication 10 ML: at 18:27

## 2020-07-07 RX ADMIN — Medication 3 MG/HR: at 14:34

## 2020-07-07 RX ADMIN — NALOXONE HYDROCHLORIDE 0.4 MG: 0.4 INJECTION, SOLUTION INTRAMUSCULAR; INTRAVENOUS; SUBCUTANEOUS at 11:36

## 2020-07-07 ASSESSMENT — PULMONARY FUNCTION TESTS
PIF_VALUE: 27
PIF_VALUE: 24
PIF_VALUE: 26
PIF_VALUE: 35
PIF_VALUE: 23

## 2020-07-07 NOTE — PLAN OF CARE
Problem: Restraint Use - Nonviolent/Non-Self-Destructive Behavior:  Goal: Absence of restraint indications  Description: Absence of restraint indications  Outcome: Not Met This Shift  Goal: Absence of restraint-related injury  Description: Absence of restraint-related injury  Outcome: Ongoing     Problem: OXYGENATION/RESPIRATORY FUNCTION  Goal: Patient will maintain patent airway  Outcome: Ongoing  Goal: Patient will achieve/maintain normal respiratory rate/effort  Description: Respiratory rate and effort will be within normal limits for the patient  Outcome: Ongoing     Problem: SKIN INTEGRITY  Goal: Skin integrity is maintained or improved  Outcome: Ongoing

## 2020-07-07 NOTE — ED PROVIDER NOTES
[facility]U  Emergency Department Encounter  EmergencyMedicine Resident     Pt Name:Marry Eng  MRN: [id number]  [place] 1995  Date of evaluation: 7/7/20  PCP:  No primary care provider on file. CHIEF COMPLAINT       Chief Complaint   Patient presents with    Drug Overdose     possible heroin overdose. Pt arrives arousal to painful stimuli. Pt received 2mg of Narcan IN and 4mg Narcan IV with minimal response. HISTORY OF PRESENT ILLNESS  (Location/Symptom, Timing/Onset, Context/Setting, Quality, Duration, Modifying Factors, Severity.)      Forest Lesch is a 23 y.o. female who presents with Overdose. Unknown bystander which have been called EMS for altered mental status and possible heroin overdose. [place] got to the scene and palpation unresponsive with pinpoint pupils. Patient was given 6 mg of her can intranasally and started become more responsive and pupils became 6 mm responsive to light. Patient respiratory status improved as she started breathing spontaneously. Patient was tachycardic in the 110s per EMS. Patient has been moving around and thrashing during the transport. Known history of lithium and ropinirole gabapentin, trazodone and other psych history based on med list.  No other known history. No concerns of trauma as patient was found in bed per EMS. PAST MEDICAL / SURGICAL / SOCIAL / FAMILY HISTORY      has a past medical history of ADHD (attention deficit hyperactivity disorder), ADHD (attention deficit hyperactivity disorder), Anxiety, ANDRE (generalized anxiety disorder), MDD (major depressive disorder), and Restless leg syndrome. has no past surgical history on file.     Social History     Socioeconomic History    Marital status: Single     Spouse name: Not on file    Number of children: Not on file    Years of education: Not on file    Highest education level: Not on file   Occupational History    Not on file   Social Needs    Financial resource Historical Provider, MD   gabapentin (NEURONTIN) 300 MG capsule Take 300 mg by mouth 3 times daily. Yes Historical Provider, MD   ALPRAZolam Angela [place]) 0.5 MG tablet Take 0.5 mg by mouth nightly as needed for Sleep. .   Yes Historical Provider, MD   Magnesium 100 MG TABS Take by mouth daily otc   Yes Historical Provider, MD   Salicylic Acid 2 % PADS Apply 1 each topically daily 1/16/17  Yes Bobetta Seip, MD       REVIEW OF SYSTEMS    (2-9 systems for level 4, 10 or more for level 5)      Review of Systems   Unable to perform ROS: Patient unresponsive       PHYSICAL EXAM   (up to 7 for level 4, 8 or more for level 5)      INITIAL VITALS:   BP (!) 106/59   Pulse 87   Temp 99.1 °F (37.3 °C) (Oral)   Resp 16   Wt 139 lb 5.3 oz (63.2 kg)   SpO2 97%   BMI 23.92 kg/m²     Physical Exam  Vitals signs and nursing note reviewed. Constitutional:       General: She is not in acute distress. Appearance: She is well-developed. She is not diaphoretic. HENT:      Head: Normocephalic and atraumatic. Right Ear: External ear normal.      Left Ear: External ear normal.      Nose: Nose normal.   Eyes:      Conjunctiva/sclera: Conjunctivae normal.   Neck:      Musculoskeletal: Normal range of motion. Vascular: No JVD. Trachea: No tracheal deviation. Cardiovascular:      Rate and Rhythm: Normal rate and regular rhythm. Heart sounds: Normal heart sounds, S1 normal and S2 normal. No murmur. No friction rub. No gallop. Pulmonary:      Effort: Pulmonary effort is normal. No respiratory distress. Breath sounds: Normal breath sounds. Abdominal:      General: Bowel sounds are normal.      Palpations: Abdomen is soft. Tenderness: There is no abdominal tenderness. There is no guarding. Musculoskeletal: Normal range of motion. General: No tenderness. Skin:     General: Skin is warm and dry. Capillary Refill: Capillary refill takes less than 2 seconds.       Comments: Erythema of MDI Treatment    MDI Treatment    HHN Treatment    HHN Treatment    POC Blood Gas    Venous Blood Gas, POC    Creatinine W/GFR Point of Care    Lactic Acid, POC    POCT Glucose    Anion Gap (Calc) POC    EKG 12 Lead    EKG 12 Lead    Insert peripheral IV    Insert/Change Priest Catheter    PATIENT STATUS (FROM ED OR OR/PROCEDURAL) Inpatient    PATIENT STATUS (FROM ED OR OR/PROCEDURAL) Inpatient    Restraints non-violent or non-self-destructive    Alcohol and or drug assessment    Seizure precautions    Suicide precautions    Fall precautions       MEDICATIONS ORDERED:  Orders Placed This Encounter   Medications    0.9 % sodium chloride bolus    cefTRIAXone (ROCEPHIN) 1 g IVPB in 50 mL D5W minibag    azithromycin (ZITHROMAX) 500 mg in dextrose 5% 250 mL IVPB    vancomycin (VANCOCIN) 1000 mg in dextrose 5% 200 mL IVPB    naloxone (NARCAN) 0.4 MG/ML injection     DESIREE CHILDS: cabinet override    DISCONTD: naloxone (NARCAN) injection 0.4 mg    naloxone (NARCAN) injection 1 mg    aspirin suppository 600 mg    DISCONTD: naloxone (NARCAN) injection 2 mg    DISCONTD: naloxone (NARCAN) 2 mg in sodium chloride 0.9 % 500 mL infusion    succinylcholine (ANECTINE) injection 100 mg    ampicillin-sulbactam (UNASYN) 1.5 g IVPB minibag    sodium chloride flush 0.9 % injection 10 mL    sodium chloride flush 0.9 % injection 10 mL    OR Linked Order Group     promethazine (PHENERGAN) tablet 12.5 mg     ondansetron (ZOFRAN) injection 4 mg    enoxaparin (LOVENOX) injection 40 mg    albuterol sulfate  (90 Base) MCG/ACT inhaler 2 puff    ipratropium-albuterol (DUONEB) nebulizer solution 3 mL    acetylcysteine (MUCOMYST) 20 % solution 600 mg    lidocaine PF 4 % injection 4 mL    DISCONTD: midazolam (VERSED) 1 mg/mL in D5W infusion    MIDAZOLAM 1 MG/ML IN D5W INFUSION     HARINI ANAND: cabinet override    LORazepam (ATIVAN) injection 4 mg    LORazepam (ATIVAN) 2 MG/ML injection Sakshi Fernandez: cabinet override    midazolam (VERSED) 1 mg/mL in D5W infusion    0.9 % sodium chloride bolus    0.9 % sodium chloride infusion       DDX: Heroin overdose, ICH, arrhythmia, abnormality, anemia, hypo-/hyperglycemia, polysubstance abuse, pneumonia    DIAGNOSTIC RESULTS / EMERGENCY DEPARTMENT COURSE / MDM     LABS:  Results for orders placed or performed during the hospital encounter of 07/07/20   Culture, Blood 1   Result Value Ref Range    Specimen Description . BLOOD     Special Requests rt ac 20 ml     Culture NO GROWTH 3 HOURS    Culture, Blood 1   Result Value Ref Range    Specimen Description . BLOOD     Special Requests R WRIST 17ML     Culture NO GROWTH 3 HOURS    Culture, Respiratory   Result Value Ref Range    Specimen Description . ENDOTRACHEAL     Special Requests NOT REPORTED     Direct Exam >25 NEUTROPHILS/LPF     Direct Exam < 10 EPITHELIAL CELLS/LPF     Direct Exam MIXED BACTERIAL MORPHOTYPES SEEN ON GRAM STAIN.  (A)     Culture PENDING    CBC Auto Differential   Result Value Ref Range    WBC 22.0 (H) 3.5 - 11.3 k/uL    RBC 4.47 3.95 - 5.11 m/uL    Hemoglobin 13.2 11.9 - 15.1 g/dL    Hematocrit 42.2 36.3 - 47.1 %    MCV 94.4 82.6 - 102.9 fL    MCH 29.5 25.2 - 33.5 pg    MCHC 31.3 28.4 - 34.8 g/dL    RDW 13.5 11.8 - 14.4 %    Platelets See Reflexed IPF Result 138 - 453 k/uL    MPV NOT REPORTED 8.1 - 13.5 fL    NRBC Automated 0.0 0.0 per 100 WBC    Differential Type NOT REPORTED     WBC Morphology NOT REPORTED     RBC Morphology NOT REPORTED     Platelet Estimate NOT REPORTED     Seg Neutrophils 85 (H) 36 - 65 %    Lymphocytes 5 (L) 24 - 43 %    Monocytes 6 3 - 12 %    Eosinophils % 0 (L) 1 - 4 %    Basophils 1 0 - 2 %    Immature Granulocytes 2 (H) 0 %    Segs Absolute 18.72 (H) 1.50 - 8.10 k/uL    Absolute Lymph # 1.17 1.10 - 3.70 k/uL    Absolute Mono # 1.41 (H) 0.10 - 1.20 k/uL    Absolute Eos # 0.09 0.00 - 0.44 k/uL    Basophils Absolute 0.18 0.00 - 0.20 k/uL    Absolute Immature Granulocyte 0.38 (H) 0.00 - 0.30 k/uL   Comprehensive Metabolic Panel w/ Reflex to MG   Result Value Ref Range    Glucose 227 (H) 70 - 99 mg/dL    BUN 17 6 - 20 mg/dL    CREATININE 1.12 (H) 0.50 - 0.90 mg/dL    Bun/Cre Ratio NOT REPORTED 9 - 20    Calcium 8.9 8.6 - 10.4 mg/dL    Sodium 137 135 - 144 mmol/L    Potassium 4.1 3.7 - 5.3 mmol/L    Chloride 95 (L) 98 - 107 mmol/L    CO2 24 20 - 31 mmol/L    Anion Gap 18 (H) 9 - 17 mmol/L    Alkaline Phosphatase 117 (H) 35 - 104 U/L    ALT 20 5 - 33 U/L    AST 29 <32 U/L    Total Bilirubin 0.25 (L) 0.3 - 1.2 mg/dL    Total Protein 7.8 6.4 - 8.3 g/dL    Alb 4.1 3.5 - 5.2 g/dL    Albumin/Globulin Ratio 1.1 1.0 - 2.5    GFR Non-African American >60 >60 mL/min    GFR African American >60 >60 mL/min    GFR Comment          GFR Staging NOT REPORTED    HCG Qualitative, Serum   Result Value Ref Range    hCG Qual NEGATIVE NEGATIVE   Lactic Acid, Plasma   Result Value Ref Range    Lactic Acid NOT REPORTED mmol/L    Lactic Acid, Whole Blood 4.2 (H) 0.7 - 2.1 mmol/L   Hemoglobin and hematocrit, blood   Result Value Ref Range    POC Hemoglobin 15.5 12.0 - 16.0 g/dL    POC Hematocrit 45 36 - 46 %   SODIUM (POC)   Result Value Ref Range    POC Sodium 135 (L) 138 - 146 mmol/L   POTASSIUM (POC)   Result Value Ref Range    POC Potassium 4.0 3.5 - 4.5 mmol/L   CHLORIDE (POC)   Result Value Ref Range    POC Chloride 100 98 - 107 mmol/L   CALCIUM, IONIC (POC)   Result Value Ref Range    POC Ionized Calcium 0.92 (L) 1.15 - 1.33 mmol/L   Immature Platelet Fraction   Result Value Ref Range    Platelet, Immature Fraction NOT REPORTED 1.1 - 10.3 %    Platelet, Fluorescence Platelet clumps present, count appears adequate.  138 - 453 k/uL   LACTIC ACID, WHOLE BLOOD   Result Value Ref Range    Lactic Acid, Whole Blood 0.8 0.7 - 2.1 mmol/L   Urinalysis with Microscopic   Result Value Ref Range    Color, UA YELLOW YELLOW    Turbidity UA CLOUDY (A) CLEAR    Glucose, Ur TRACE (A) NEGATIVE    Bilirubin Urine NEGATIVE NEGATIVE    Ketones, Urine NEGATIVE NEGATIVE    Specific Gravity, UA 1.018 1.005 - 1.030    Urine Hgb TRACE (A) NEGATIVE    pH, UA 6.5 5.0 - 8.0    Protein, UA 2+ (A) NEGATIVE    Urobilinogen, Urine Normal Normal    Nitrite, Urine NEGATIVE NEGATIVE    Leukocyte Esterase, Urine NEGATIVE NEGATIVE    -          WBC, UA None 0 - 5 /HPF    RBC, UA 2 TO 5 0 - 2 /HPF    Casts UA 10 TO 20 0 - 2 /LPF    Casts UA HYALINE 0 - 2 /LPF    Crystals, UA NOT REPORTED None /HPF    Epithelial Cells UA 10 TO 20 0 - 5 /HPF    Renal Epithelial, UA NOT REPORTED 0 /HPF    Bacteria, UA FEW (A) None    Mucus, UA NOT REPORTED None    Trichomonas, UA NOT REPORTED None    Amorphous, UA NOT REPORTED None    Other Observations UA NOT REPORTED NOT REQ. Yeast, UA NOT REPORTED None   Lithium Level   Result Value Ref Range    Lithium Lvl 1.2 0.6 - 1.2 mmol/L    Lithium Dose Amount . NASOPHARYNGEAL SWAB     Lithium Date Last Dose . NASOPHARYNGEAL SWAB     Lithium Dose Time . NASOPHARYNGEAL SWAB    Procalcitonin   Result Value Ref Range    Procalcitonin 0.23 (H) <0.09 ng/mL   SPECIMEN REJECTION   Result Value Ref Range    Specimen Source . BLOOD     Ordered Test PT,PTT     Reason for Rejection Unable to perform testing: Specimen clotted.      - NOT REPORTED    Protime-INR   Result Value Ref Range    Protime 10.9 9.0 - 12.0 sec    INR 1.0    APTT   Result Value Ref Range    PTT 21.6 20.5 - 30.5 sec   Troponin   Result Value Ref Range    Troponin, High Sensitivity 34 (H) 0 - 14 ng/L    Troponin T NOT REPORTED <0.03 ng/mL    Troponin Interp NOT REPORTED    Troponin   Result Value Ref Range    Troponin, High Sensitivity 82 (HH) 0 - 14 ng/L    Troponin T NOT REPORTED <0.03 ng/mL    Troponin Interp NOT REPORTED    Urine Drug Screen   Result Value Ref Range    Amphetamine Screen, Ur NEGATIVE NEGATIVE    Barbiturate Screen, Ur NEGATIVE NEGATIVE    Benzodiazepine Screen, Urine NEGATIVE NEGATIVE    Cocaine Metabolite, Urine POSITIVE (A) NEGATIVE jaleel. The nasogastric tube side-port is beyond the GE junction. Stable cardiomediastinal silhouette. The lungs show interval worsening of airspace opacities predominantly in the mid to lower lung zones since prior radiograph. No pneumothorax. No acute osseous abnormality. 1. Lines and tubes appear in appropriate position. 2. Interval appearance of airspace opacities bilaterally. Differential considerations include aspiration and atypical infection including viral pneumonia. Xr Chest Portable    Result Date: 7/7/2020  EXAMINATION: ONE XRAY VIEW OF THE CHEST 7/7/2020 8:13 am COMPARISON: None. HISTORY: ORDERING SYSTEM PROVIDED HISTORY: Possible opiate overdose, bilateral rhonchi FINDINGS: Left retrocardiac opacity. No effusion or pneumothorax. The cardiomediastinal silhouette is normal.  The osseous structures are intact without acute process. Left retrocardiac opacity suggestive of pneumonia, possibly secondary to aspiration. EKG  EKG Interpretation    Interpreted by emergency department physician    Rhythm: normal sinus   Rate: normal  Axis: normal  Ectopy: none  Conduction: normal  ST Segments: no acute change  T Waves: non specific changes  Q Waves: II and III    Clinical Impression: non-specific EKG    [facility]      All EKG's are interpreted by the Emergency Department Physician who either signs or Co-signs this chart in the absence of a cardiologist.    EKG Interpretation    Interpreted by emergency department physician    Rhythm: normal sinus   Rate: normal  Axis: normal  Ectopy: none  Conduction: normal  ST Segments: no acute change  T Waves: no acute change  Q Waves: II and III    Clinical Impression: no acute changes    [facility]      EMERGENCY DEPARTMENT COURSE:  ED Course as of Jul 07 1803   Tue Jul 07, 2020   2811 Patient is a 45-year-old female presenting for possible opiate overdose versus other encephalopathy.   Patient has rhonchorous lung sounds bilaterally, 5 mm pupils bilaterally reactive to light. Patient    [CS]   2266  withdraws to pain, and moaning, and opens eyes minimally to pain. GCS of 9. Abdomen soft nontender. Has erythema over the dorsum of the left foot with no other signs of marks including injections or scabs. Patient has stitches above the right eyebrow. Concern for opiate overdose versus other encephalopathy. Will order blood work, EKG, and monitor respiratory status. [CS]   6836 Suspect due to respiratory acidosis. We will continue to monitor and evaluate vitals. At this time I will hang 1 L of normal saline and add on procalcitonin. POC Lactic Acid(!): 4.05 [CS]   0805 Concern for sepsis at this time. WBC(!): 22.0 [CS]   0807 At this time there is no concern for respiratory failure causing elevated lactic acidosis versus septic shock. Will start 30 mL/kg of NS. Lactic Acid, Whole Blood(!): 4.2 [CS]   0824 STEVE, will rehydrate with the 30 mL/kg   Creatinine(!): 1.12 [CS]   8223 Chest x-ray reveals possible retro-cardiac pneumonia on chest x-ray. Possible pneumonia and erythema of the left dors dorsal of the foot concern for pneumonia versus cellulitis. Will start ceftriaxone, vancomycin, azithromycin.    [CS]   4915 Due to concerns of pneumonia however uncertain what patient is septic or having stress reaction from respiratory failure due to opiate overdose. At this time will treat with ABX out of concern. Procalcitonin(!): 0.23 [CS]   0921 Urinalysis is not concerning.    [CS]   1029 Lactic acid is normal 0.8. Is highly suspicious the patient is more having respiratory failure leading to the elevated lactic acid of 4.0. We will continue evaluate.    [CS]   8225 Talk to [facility] midlevel who stated they will discuss the patient with the attending for possible admission versus recommendation to go to the ICU.    [CS]   4006 Patient was reevaluated bedside.   Patient was found to be sonorous with bilateral rhonchi which cleared appropriate positioning of lines and tubes. In addition chest x-ray reveals increased opacities concerning for aspiration versus atypical pneumonia. [CS]      ED Course User Index  [CS] Charlee Councilman, DO       At approximate 1440 patient started to wake up from sedation and fight the tube. Patient required 4 mg of Ativan to be given IV along with the 5 mg of Versed bolus and Versed drip been ordered. Patient desatted to 80 on the pulse ox but no clear waveform was given. Patient's end-tidal went up to over 70 on the vent. During the event patient started to become dusky face and lips but did not turn cyanotic. At that time patient was still 23 at did not last recorded. She was noted that somehow the [facility] vent had not been properly going despite the fact that no change happened prior to the patient waking up. The vent was restarted and patient improved both on pulse ox improved waveform and color returned to normal and within 30 seconds. Sepsis Times and Checklist  Vital Signs: BP: (!) 106/59  Pulse: 87  Resp: 16  Temp: 99.1 °F (37.3 °C) SpO2: 97 %  SIRS (>2)   Temp > 38.3C or < 36C   HR > 90   RR > 20   WBC > 12 or < 4 or >10% bands  SIRS (>2) and confirmed or suspected source of infection = Sepsis  Is Sepsis due to likely bacterial infection?: Yes  If not, then sepsis is due to likely Respiratory failure etiology. Severe Sepsis Identified:  Date: 7/7/20   Time: 0805  Sepsis Orders:  ·  CBC: Yes  ·  CMP: Yes  ·  PT/PTT: Yes  ·  Blood Cultures x2: Yes  ·  Urinalysis and Urine Culture: Yes  ·  Lactate: Yes  ·  Broad Spectrum Antibiotics Given (within 3 hours of sepsis identification,  after blood cultures):  Yes    (If unable to obtain IV access for IV antibiotics within 3 hours of  identification of sepsis, IM antibiotics is acceptable.)  ·             If lactate >2.0 MUST repeat within 6 hours    If elevated, is elevated lactate from a likely infectious source?: Yes.       IV Fluid Bolus:  Is lactate > 4.0:  Yes  If lactate >  4.0 OR hypotension (MAP<65 mmHg) (with 2 BP readings) 30ml/kg crystalloid MUST be ordered. Fluids must be initiated within 3 hours of sepsis identification. These fluids must have a rate > 125 ml/hr. · Is the patient Morbidly Obese (BMI > 30): No  · IV Fluids given prior to arrival can be used in this calculation but the following information must be documented:  Start time: 0815, Type of fluid NS, Volume of fluid 1.9L , and Rate (Duration or End time) 1015. · Does the patient or patient advocate refuse the entire 30 ml/kg IV fluid bolus? No      Septic Shock Identified (Initial lactate > 4.0 or 2 Hypotensive Blood Pressure readings within the first 6 hours): For septic shock sepsis focus physical exam must be completed AND documented (within 6 hours). Date: 7/7/20 Time: 1040      Sepsis focus exam completed. For persistent hypotension after 30ml/kg fluid bolus vasopressors must be started (within 6 hours)        PROCEDURES:  PROCEDURE NOTE - EMERGENCY INTUBATION @ 1428    PATIENT NAME: Tegan Barakat RECORD NO. [id number]  DATE: 7/7/2020  ATTENDING PHYSICIAN: Dr. Lindsey Damon DIAGNOSIS:  Acute Respiratory Failure  POSTOPERATIVE DIAGNOSIS:  Same  PROCEDURE PERFORMED:  Emergency endotracheal intubation  PERFORMING PHYSICIAN: Harsha Cobos DO  COMPLICATIONS:  None    MEDICATIONS: succinycholine intravenously and etomidate 20 mg intravenously    DISCUSSION:  Urmila Alfaro is a 23y.o.-year-old female who requires intubation and ventilatory support due to impending respiratory failure, potential airway compromise and airway protection. The history and physical examination were reviewed and confirmed. CONSENT: Unable to be obtained due to patient's condition. PROCEDURE:  A timeout was initiated by the bedside nurse and was confirmed by those present. The patient was placed in the appropriate position.   Cricoid pressure was not required. Intubation was performed via [place] Scope (Video laryngoscopy) a 7.5 cuffed endotracheal tube. The cuff was then inflated and the tube was secured appropriately at a distance of 21 cm to the dental ridge. Initial confirmation of placement included bilateral breath sounds, an end tidal CO2 detector, absence of sounds over the stomach, tube fogging, adequate chest rise and adequate pulse oximetry reading. A chest x-ray to verify correct placement of the tube showed appropriate tube position. The patient tolerated the procedure well. Garima Garza DO  6:04 PM, 7/7/20    CONSULTS:  IP CONSULT TO SOCIAL WORK  IP CONSULT TO HOSPITALIST  IP CONSULT TO CARDIOLOGY  IP CONSULT TO CRITICAL CARE  IP CONSULT TO SOCIAL WORK    CRITICAL CARE:  Patient reported over hour and 15 minutes of critical care time in terms of procedures, reviewing medical charts, reviewing past medical charts, and bedside evaluation. Addendum:      FINAL IMPRESSION      1. Accidental overdose of heroin, initial encounter ([facility] [address]    2. Sepsis, due to unspecified organism, unspecified whether acute organ dysfunction present ([facility] [address]    3. STEVE (acute kidney injury) ([facility] [address]    4. Cellulitis of left lower extremity    5. Pneumonia due to organism          DISPOSITION / PLAN     DISPOSITION Admitted 07/07/2020 02:15:09 PM      PATIENT REFERRED TO:  No follow-up provider specified.     DISCHARGE MEDICATIONS:  Current Discharge Medication List          Garima Gazra DO  Emergency Medicine Resident    (Please note that portions of thisnote were completed with a voice recognition program.  Efforts were made to edit the dictations but occasionally words are mis-transcribed.)        Garima Garza DO  Resident  07/07/20 [address],   Resident  07/07/20 [address], DO  Resident  07/07/20 [address], DO  Resident  07/08/20 1410

## 2020-07-07 NOTE — ED NOTES
No response from pt with the Narcan drip. Minimal response with painful stimuli. Plan for intubation if no improvement.      Lucas Sorto RN  07/07/20 3206

## 2020-07-07 NOTE — ED NOTES
Plan for intubation.  RT, critical care, Dr. Ami Okeefe and Dr. Terry Villatoro at bedside     Katherine Edinson, [place]  07/07/20 3858

## 2020-07-07 NOTE — ED PROVIDER NOTES
[facility]     Emergency Department     Faculty Note/ Attestation      Pt Name: Tatiana Ag                                       MRN: [id number]  [place] 1995  Date of evaluation: 7/7/2020  Patients PCP:    No primary care provider on file. Attestation  I performed a history and physical examination of the patient/ or directly observed  and discussed management with the resident. I reviewed the residents note and agree with the documented findings and plan of care. Any areas of disagreement are noted on the chart. I was personally present for the key portions of any procedures. I have documented in the chart those procedures where I was not present during the key portions. I have reviewed the emergency nurses triage note. I agree with the chief complaint, past medical history, past surgical history, allergies, medications, social and family history as documented unless otherwise noted below. For Physician Assistant/ Nurse Practitioner cases/documentation I have personally evaluated this patient and have completed at least one if not all key elements of the E/M (history, physical exam, and MDM). Additional findings are as noted. Initial Screens:             Vitals:    Vitals:    07/07/20 0738 07/07/20 0741   BP: 121/80    Pulse: 101 88   Resp: 25    Temp: 97.9 °F (36.6 °C)    TempSrc: Axillary    SpO2: 100%    Weight:  140 lb (63.5 kg)       Chief Complaint      Chief Complaint   Patient presents with    Drug Overdose     possible heroin overdose. Pt arrives arousal to painful stimuli. Pt received 2mg of Narcan IN and 4mg Narcan IV with minimal response. weight is 140 lb (63.5 kg). Her axillary temperature is 97.9 °F (36.6 °C). Her blood pressure is 121/80 and her pulse is 88. Her respiration is 25 and oxygen saturation is 100%.             DIAGNOSTIC RESULTS       RADIOLOGY:   XR CHEST PORTABLE    (Results Pending)         LABS:  Labs Reviewed   SODIUM

## 2020-07-07 NOTE — ED NOTES
Pt remains arousal to painful stimuli with snoring respirations. Remains on full continuous monitor and NRB at 100%.  Will continue to monitor     Marvin Morillo RN  07/07/20 1699

## 2020-07-07 NOTE — ED PROVIDER NOTES
nebulizer solution 3 mL    acetylcysteine (MUCOMYST) 20 % solution 600 mg    lidocaine PF 4 % injection 4 mL    DISCONTD: midazolam (VERSED) 1 mg/mL in D5W infusion    MIDAZOLAM 1 MG/ML IN D5W INFUSION     HARINI ANAND: cabinet override    LORazepam (ATIVAN) injection 4 mg    LORazepam (ATIVAN) 2 MG/ML injection     FREDDIE ROYAL: cabinet override    midazolam (VERSED) 1 mg/mL in D5W infusion       RECENT VITALS:   BP: 105/64  Pulse: 102  Resp: 16  Temp: 97.5 °F (36.4 °C) SpO2: 98 %    (Please note that portions of this note were completed with a voice recognition program.  Efforts were made to edit the dictations but occasionally words are mis-transcribed.)    MD Britney Johnson  Attending Emergency Medicine Physician       Maycol Wild MD  07/07/20 7164

## 2020-07-07 NOTE — ED NOTES
Pt opens eyes to voice but quickly falls back to sleep, snoring respirations noted.           Flaquito López, VINCENT  07/07/20 9015

## 2020-07-07 NOTE — ED NOTES
Pt to ER via [facility] 3 with possible heroin overdose. Pt arrives arousal to painful stimuli only with occasional moaning and groaning. Pt received 2mg of Narcan IN and 4mg Narcan IV with minimal response. Per report pt was at the [address] [place] [address] on Cristine Passer found unresponsive with agonal respirations and pinpoint pupils. Has h/o drug abuse. Pt arrives on a NRB at 100%. Placed on full cardiac monitor. Dr. Juliane Shaw, Dr. Keisha Jung at bedside to evaluate the pt.      Claribel Quintanilla RN  07/07/20 0904

## 2020-07-07 NOTE — PROGRESS NOTES
RAPID Covid 19 swab taken from left nare, labeled, placed in red dot bag, and handed off to second healthcare worker outside of room for transport to laboratory per hospital policy and procedure. Patient tolerated procedure well. Sputum culture collected and sent to lab.

## 2020-07-07 NOTE — FLOWSHEET NOTE
Attempted to call emergency contact listed - Gary Garza at [phone]. When asked if Gary Garza was there a gentleman who answered the phone stated, \"who? \"  Writer repeated again Gary Kayla, he stated, \"who? Writer asked if he knew a Gary Garza or a Keshawn Morgan & he stated, \"no. \"
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
## 2020-07-07 NOTE — ED NOTES
Plan for Narcan drip and possible intubation if no improvement.  Intubation equipment at the bedside     Kiera Gambino RN  07/07/20 9349

## 2020-07-08 PROBLEM — E87.20 LACTIC ACIDOSIS: Status: ACTIVE | Noted: 2020-07-08

## 2020-07-08 PROBLEM — G92.8 TOXIC METABOLIC ENCEPHALOPATHY: Status: ACTIVE | Noted: 2020-07-08

## 2020-07-08 PROBLEM — F19.10 POLYSUBSTANCE ABUSE (HCC): Status: ACTIVE | Noted: 2020-07-08

## 2020-07-08 PROBLEM — N17.0 ACUTE KIDNEY INJURY (AKI) WITH ACUTE TUBULAR NECROSIS (ATN) (HCC): Status: ACTIVE | Noted: 2020-07-08

## 2020-07-08 LAB
ABSOLUTE EOS #: 0.15 K/UL (ref 0–0.4)
ABSOLUTE IMMATURE GRANULOCYTE: 0 K/UL (ref 0–0.3)
ABSOLUTE LYMPH #: 1.98 K/UL (ref 1–4.8)
ABSOLUTE MONO #: 0.76 K/UL (ref 0.1–0.8)
ANION GAP SERPL CALCULATED.3IONS-SCNC: 13 MMOL/L (ref 9–17)
BASOPHILS # BLD: 0 % (ref 0–2)
BASOPHILS ABSOLUTE: 0 K/UL (ref 0–0.2)
BUN BLDV-MCNC: 8 MG/DL (ref 6–20)
BUN/CREAT BLD: ABNORMAL (ref 9–20)
CALCIUM SERPL-MCNC: 7.8 MG/DL (ref 8.6–10.4)
CHLORIDE BLD-SCNC: 111 MMOL/L (ref 98–107)
CO2: 20 MMOL/L (ref 20–31)
CREAT SERPL-MCNC: 0.62 MG/DL (ref 0.5–0.9)
CULTURE: NORMAL
DIFFERENTIAL TYPE: ABNORMAL
EKG ATRIAL RATE: 87 BPM
EKG P AXIS: 38 DEGREES
EKG P-R INTERVAL: 146 MS
EKG Q-T INTERVAL: 394 MS
EKG QRS DURATION: 94 MS
EKG QTC CALCULATION (BAZETT): 474 MS
EKG R AXIS: 90 DEGREES
EKG T AXIS: 24 DEGREES
EKG VENTRICULAR RATE: 87 BPM
EOSINOPHILS RELATIVE PERCENT: 1 % (ref 1–4)
GFR AFRICAN AMERICAN: >60 ML/MIN
GFR NON-AFRICAN AMERICAN: >60 ML/MIN
GFR SERPL CREATININE-BSD FRML MDRD: ABNORMAL ML/MIN/{1.73_M2}
GFR SERPL CREATININE-BSD FRML MDRD: ABNORMAL ML/MIN/{1.73_M2}
GLUCOSE BLD-MCNC: 102 MG/DL (ref 65–105)
GLUCOSE BLD-MCNC: 78 MG/DL (ref 70–99)
GLUCOSE BLD-MCNC: 84 MG/DL (ref 65–105)
GLUCOSE BLD-MCNC: 93 MG/DL (ref 65–105)
GLUCOSE BLD-MCNC: 96 MG/DL (ref 65–105)
HCT VFR BLD CALC: 32.7 % (ref 36.3–47.1)
HEMOGLOBIN: 10.1 G/DL (ref 11.9–15.1)
IMMATURE GRANULOCYTES: 0 %
LV EF: 58 %
LVEF MODALITY: NORMAL
LYMPHOCYTES # BLD: 13 % (ref 24–44)
Lab: NORMAL
MAGNESIUM: 2.2 MG/DL (ref 1.6–2.6)
MCH RBC QN AUTO: 30 PG (ref 25.2–33.5)
MCHC RBC AUTO-ENTMCNC: 30.9 G/DL (ref 28.4–34.8)
MCV RBC AUTO: 97 FL (ref 82.6–102.9)
MONOCYTES # BLD: 5 % (ref 1–7)
MORPHOLOGY: NORMAL
NRBC AUTOMATED: 0 PER 100 WBC
PDW BLD-RTO: 14.2 % (ref 11.8–14.4)
PLATELET # BLD: 243 K/UL (ref 138–453)
PLATELET ESTIMATE: ABNORMAL
PMV BLD AUTO: 12 FL (ref 8.1–13.5)
POTASSIUM SERPL-SCNC: 3.2 MMOL/L (ref 3.7–5.3)
POTASSIUM SERPL-SCNC: 3.5 MMOL/L (ref 3.7–5.3)
RBC # BLD: 3.37 M/UL (ref 3.95–5.11)
RBC # BLD: ABNORMAL 10*6/UL
SEG NEUTROPHILS: 81 % (ref 36–66)
SEGMENTED NEUTROPHILS ABSOLUTE COUNT: 12.31 K/UL (ref 1.8–7.7)
SODIUM BLD-SCNC: 144 MMOL/L (ref 135–144)
SPECIMEN DESCRIPTION: NORMAL
TROPONIN INTERP: ABNORMAL
TROPONIN T: ABNORMAL NG/ML
TROPONIN, HIGH SENSITIVITY: 35 NG/L (ref 0–14)
WBC # BLD: 15.2 K/UL (ref 3.5–11.3)
WBC # BLD: ABNORMAL 10*3/UL

## 2020-07-08 PROCEDURE — 2500000003 HC RX 250 WO HCPCS: Performed by: STUDENT IN AN ORGANIZED HEALTH CARE EDUCATION/TRAINING PROGRAM

## 2020-07-08 PROCEDURE — 2500000003 HC RX 250 WO HCPCS: Performed by: EMERGENCY MEDICINE

## 2020-07-08 PROCEDURE — 94761 N-INVAS EAR/PLS OXIMETRY MLT: CPT

## 2020-07-08 PROCEDURE — 36415 COLL VENOUS BLD VENIPUNCTURE: CPT

## 2020-07-08 PROCEDURE — 6360000002 HC RX W HCPCS: Performed by: STUDENT IN AN ORGANIZED HEALTH CARE EDUCATION/TRAINING PROGRAM

## 2020-07-08 PROCEDURE — 82947 ASSAY GLUCOSE BLOOD QUANT: CPT

## 2020-07-08 PROCEDURE — 83735 ASSAY OF MAGNESIUM: CPT

## 2020-07-08 PROCEDURE — 94003 VENT MGMT INPAT SUBQ DAY: CPT

## 2020-07-08 PROCEDURE — 2580000003 HC RX 258: Performed by: STUDENT IN AN ORGANIZED HEALTH CARE EDUCATION/TRAINING PROGRAM

## 2020-07-08 PROCEDURE — 99291 CRITICAL CARE FIRST HOUR: CPT | Performed by: INTERNAL MEDICINE

## 2020-07-08 PROCEDURE — 2700000000 HC OXYGEN THERAPY PER DAY

## 2020-07-08 PROCEDURE — 85025 COMPLETE CBC W/AUTO DIFF WBC: CPT

## 2020-07-08 PROCEDURE — 84132 ASSAY OF SERUM POTASSIUM: CPT

## 2020-07-08 PROCEDURE — 6370000000 HC RX 637 (ALT 250 FOR IP): Performed by: STUDENT IN AN ORGANIZED HEALTH CARE EDUCATION/TRAINING PROGRAM

## 2020-07-08 PROCEDURE — 93306 TTE W/DOPPLER COMPLETE: CPT

## 2020-07-08 PROCEDURE — 2000000000 HC ICU R&B

## 2020-07-08 PROCEDURE — 93010 ELECTROCARDIOGRAM REPORT: CPT | Performed by: INTERNAL MEDICINE

## 2020-07-08 PROCEDURE — 80048 BASIC METABOLIC PNL TOTAL CA: CPT

## 2020-07-08 PROCEDURE — 94770 HC ETCO2 MONITOR DAILY: CPT

## 2020-07-08 PROCEDURE — 84484 ASSAY OF TROPONIN QUANT: CPT

## 2020-07-08 RX ORDER — FENTANYL CITRATE 50 UG/ML
50 INJECTION, SOLUTION INTRAMUSCULAR; INTRAVENOUS
Status: DISCONTINUED | OUTPATIENT
Start: 2020-07-08 | End: 2020-07-11 | Stop reason: HOSPADM

## 2020-07-08 RX ORDER — POTASSIUM CHLORIDE 7.45 MG/ML
10 INJECTION INTRAVENOUS PRN
Status: DISCONTINUED | OUTPATIENT
Start: 2020-07-08 | End: 2020-07-11 | Stop reason: HOSPADM

## 2020-07-08 RX ORDER — FENTANYL CITRATE 50 UG/ML
50 INJECTION, SOLUTION INTRAMUSCULAR; INTRAVENOUS ONCE
Status: DISCONTINUED | OUTPATIENT
Start: 2020-07-08 | End: 2020-07-08

## 2020-07-08 RX ORDER — POTASSIUM CHLORIDE 1.5 G/1.77G
40 POWDER, FOR SOLUTION ORAL ONCE
Status: DISCONTINUED | OUTPATIENT
Start: 2020-07-08 | End: 2020-07-08

## 2020-07-08 RX ORDER — NICOTINE POLACRILEX 4 MG
15 LOZENGE BUCCAL PRN
Status: DISCONTINUED | OUTPATIENT
Start: 2020-07-08 | End: 2020-07-11 | Stop reason: HOSPADM

## 2020-07-08 RX ORDER — DEXMEDETOMIDINE HYDROCHLORIDE 4 UG/ML
0.2 INJECTION, SOLUTION INTRAVENOUS CONTINUOUS
Status: DISCONTINUED | OUTPATIENT
Start: 2020-07-08 | End: 2020-07-09

## 2020-07-08 RX ORDER — DEXTROSE MONOHYDRATE 25 G/50ML
12.5 INJECTION, SOLUTION INTRAVENOUS PRN
Status: DISCONTINUED | OUTPATIENT
Start: 2020-07-08 | End: 2020-07-11 | Stop reason: HOSPADM

## 2020-07-08 RX ORDER — FENTANYL CITRATE 50 UG/ML
100 INJECTION, SOLUTION INTRAMUSCULAR; INTRAVENOUS ONCE
Status: COMPLETED | OUTPATIENT
Start: 2020-07-08 | End: 2020-07-08

## 2020-07-08 RX ORDER — SODIUM CHLORIDE 9 MG/ML
INJECTION, SOLUTION INTRAVENOUS CONTINUOUS
Status: DISCONTINUED | OUTPATIENT
Start: 2020-07-08 | End: 2020-07-10

## 2020-07-08 RX ORDER — DEXTROSE AND SODIUM CHLORIDE 5; .9 G/100ML; G/100ML
INJECTION, SOLUTION INTRAVENOUS CONTINUOUS
Status: DISCONTINUED | OUTPATIENT
Start: 2020-07-08 | End: 2020-07-08

## 2020-07-08 RX ORDER — LIDOCAINE HYDROCHLORIDE 40 MG/ML
SOLUTION TOPICAL ONCE
Status: COMPLETED | OUTPATIENT
Start: 2020-07-08 | End: 2020-07-09

## 2020-07-08 RX ORDER — POTASSIUM CHLORIDE 20 MEQ/1
40 TABLET, EXTENDED RELEASE ORAL PRN
Status: DISCONTINUED | OUTPATIENT
Start: 2020-07-08 | End: 2020-07-11 | Stop reason: HOSPADM

## 2020-07-08 RX ORDER — DEXTROSE MONOHYDRATE 50 MG/ML
100 INJECTION, SOLUTION INTRAVENOUS PRN
Status: DISCONTINUED | OUTPATIENT
Start: 2020-07-08 | End: 2020-07-11 | Stop reason: HOSPADM

## 2020-07-08 RX ORDER — FENTANYL CITRATE 50 UG/ML
100 INJECTION, SOLUTION INTRAMUSCULAR; INTRAVENOUS
Status: DISCONTINUED | OUTPATIENT
Start: 2020-07-08 | End: 2020-07-11 | Stop reason: HOSPADM

## 2020-07-08 RX ADMIN — Medication 10 ML: at 09:04

## 2020-07-08 RX ADMIN — Medication 3 MG/HR: at 16:17

## 2020-07-08 RX ADMIN — SODIUM CHLORIDE 1.5 G: 900 INJECTION INTRAVENOUS at 09:01

## 2020-07-08 RX ADMIN — SODIUM CHLORIDE 1.5 G: 900 INJECTION INTRAVENOUS at 15:08

## 2020-07-08 RX ADMIN — FENTANYL CITRATE 100 MCG: 50 INJECTION, SOLUTION INTRAMUSCULAR; INTRAVENOUS at 19:48

## 2020-07-08 RX ADMIN — FENTANYL CITRATE 100 MCG: 50 INJECTION, SOLUTION INTRAMUSCULAR; INTRAVENOUS at 15:41

## 2020-07-08 RX ADMIN — FENTANYL CITRATE 100 MCG: 50 INJECTION, SOLUTION INTRAMUSCULAR; INTRAVENOUS at 10:32

## 2020-07-08 RX ADMIN — SODIUM CHLORIDE 1.5 G: 900 INJECTION INTRAVENOUS at 20:56

## 2020-07-08 RX ADMIN — DEXMEDETOMIDINE HYDROCHLORIDE 0.2 MCG/KG/HR: 400 INJECTION INTRAVENOUS at 21:29

## 2020-07-08 RX ADMIN — SODIUM CHLORIDE: 9 INJECTION, SOLUTION INTRAVENOUS at 18:25

## 2020-07-08 RX ADMIN — POTASSIUM BICARBONATE 40 MEQ: 782 TABLET, EFFERVESCENT ORAL at 09:08

## 2020-07-08 RX ADMIN — FENTANYL CITRATE 100 MCG: 50 INJECTION, SOLUTION INTRAMUSCULAR; INTRAVENOUS at 12:40

## 2020-07-08 RX ADMIN — SODIUM CHLORIDE: 9 INJECTION, SOLUTION INTRAVENOUS at 01:17

## 2020-07-08 RX ADMIN — Medication 10 ML: at 20:56

## 2020-07-08 RX ADMIN — FAMOTIDINE 20 MG: 10 INJECTION, SOLUTION INTRAVENOUS at 09:03

## 2020-07-08 RX ADMIN — FENTANYL CITRATE 100 MCG: 50 INJECTION, SOLUTION INTRAMUSCULAR; INTRAVENOUS at 21:00

## 2020-07-08 RX ADMIN — FAMOTIDINE 20 MG: 10 INJECTION, SOLUTION INTRAVENOUS at 20:56

## 2020-07-08 RX ADMIN — DEXMEDETOMIDINE HYDROCHLORIDE 0.5 MCG/KG/HR: 400 INJECTION INTRAVENOUS at 11:06

## 2020-07-08 RX ADMIN — FENTANYL CITRATE 100 MCG: 50 INJECTION, SOLUTION INTRAMUSCULAR; INTRAVENOUS at 16:14

## 2020-07-08 RX ADMIN — SODIUM CHLORIDE 1.5 G: 900 INJECTION INTRAVENOUS at 03:30

## 2020-07-08 RX ADMIN — ENOXAPARIN SODIUM 40 MG: 40 INJECTION SUBCUTANEOUS at 09:02

## 2020-07-08 ASSESSMENT — PULMONARY FUNCTION TESTS
PIF_VALUE: 29
PIF_VALUE: 24
PIF_VALUE: 15
PIF_VALUE: 23
PIF_VALUE: 12
PIF_VALUE: 23
PIF_VALUE: 24

## 2020-07-08 NOTE — PLAN OF CARE
Problem: Nutrition  Goal: Optimal nutrition therapy  Outcome: Ongoing  Note: Nutrition Problem: Inadequate oral intake  Intervention: Food and/or Nutrient Delivery: Start Tube Feeding(Suggest Osmolite 1.2 (std w/o fiber) goal rate 50 mL/hr.)  Nutritional Goals: Meet % of estimated nutrition needs.

## 2020-07-08 NOTE — PROGRESS NOTES
Nutrition Assessment    Type and Reason for Visit: Initial, Consult(Vent Check/TF Ordering and Management)    Nutrition Recommendations:   - As able, start Tube Feedings of Osmolite 1.2 (std without fiber) goal rate 50 mL/hr = 1440 kcals, 67 gm pro/day. Monitor TF tolerance/adequacy of intakes. - Will monitor labs, bowel function, and plan of care. Nutrition Assessment: Consulted for Tube Feedings. Pt currently intubated and sedated. Admitted s/p drug overdose. Discussed with RN start of feedings. Labs reviewed - K+3.2 mmol/L. Malnutrition Assessment:  · Malnutrition Status: At risk for malnutrition  · Context: Acute illness or injury  · Findings of the 6 clinical characteristics of malnutrition (Minimum of 2 out of 6 clinical characteristics is required to make the diagnosis of moderate or severe Protein Calorie Malnutrition based on AND/ASPEN Guidelines):  1. Energy Intake-Unable to assess, Unable to assess    2. Weight Loss-Unable to assess  3. Fat Loss-No significant subcutaneous fat loss  4. Muscle Loss-No significant muscle mass loss  5. Fluid Accumulation-(Mild to Moderate fluid accumulation), Extremities    Nutrition Risk Level: High    Nutrient Needs:  · Estimated Daily Total Kcal: 1787-7396 kcals/day  · Estimated Daily Protein (g): 65-85 gm pro/day    Nutrition Diagnosis:   · Problem: Inadequate oral intake  · Etiology: related to Impaired respiratory function-inability to consume food     Signs and symptoms:  as evidenced by NPO status due to medical condition(need for nutrition support - EN)    Objective Information:  · Nutrition-Focused Physical Findings: Meds reviewed. Labs reviewed: K+3.2.   · Wound Type: None  · Current Nutrition Therapies:  · Oral Diet Orders: NPO   · Oral Diet intake: NPO  · Anthropometric Measures:  · Ht: (5'4\" (162.6 cm) per chart review)   · Current Body Wt: 139 lb 5.3 oz (63.2 kg)  · Admission Body Wt: 139 lb 5.3 oz (63.2 kg)  · Ideal Body Wt: 120 lb (54.4 kg), % Ideal Body 116%  · BMI Classification: BMI 18.5 - 24.9 Normal Weight(23.9)    Nutrition Interventions:   Start Tube Feeding(Suggest Osmolite 1.2 (std w/o fiber) goal rate 50 mL/hr.)  Continued Inpatient Monitoring, Education Not Indicated    Nutrition Evaluation:   · Evaluation: Goals set   · Goals: Meet % of estimated nutrition needs.     · Monitoring: Nutrition Progression, TF Intake, TF Tolerance, Skin Integrity, I&O, Pertinent Labs, Weight, Monitor Bowel Function, Monitor Hemodynamic Status    Electronically signed by Radhika Montoya RD, JEREMY on 7/8/20 at 1:08 PM EDT    Contact Number: [phone]

## 2020-07-08 NOTE — PLAN OF CARE
Problem: Restraint Use - Nonviolent/Non-Self-Destructive Behavior:  Goal: Absence of restraint indications  Description: Absence of restraint indications  7/8/2020 0849 by Austin Andre RN  Outcome: Ongoing  7/7/2020 2039 by Marcie Law RN  Outcome: Not Met This Shift  Goal: Absence of restraint-related injury  Description: Absence of restraint-related injury  7/8/2020 0849 by Austin Andre RN  Outcome: Ongoing  7/7/2020 2039 by Marcie Law RN  Outcome: Ongoing     Problem: OXYGENATION/RESPIRATORY FUNCTION  Goal: Patient will maintain patent airway  7/8/2020 0849 by Austin Andre RN  Outcome: Ongoing  7/7/2020 2223 by Morelia Bermudez RCP  Outcome: Ongoing  7/7/2020 2039 by Marcie Law RN  Outcome: Ongoing  Goal: Patient will achieve/maintain normal respiratory rate/effort  Description: Respiratory rate and effort will be within normal limits for the patient  7/8/2020 0849 by Austin Andre RN  Outcome: Ongoing  7/7/2020 2223 by Morelia Bermudez RCP  Outcome: Ongoing  7/7/2020 2039 by Marcie Law RN  Outcome: Ongoing     Problem: MECHANICAL VENTILATION  Goal: Patient will maintain patent airway  7/8/2020 0849 by Austin Andre RN  Outcome: Ongoing  7/7/2020 2223 by Morelia Bermudez RCP  Outcome: Ongoing  7/7/2020 2039 by Marcie Law RN  Outcome: Ongoing  Goal: Oral health is maintained or improved  7/8/2020 0849 by Austin Andre RN  Outcome: Ongoing  7/7/2020 2223 by Morelia Bermudez RCP  Outcome: Ongoing  7/7/2020 2039 by Marcie Law RN  Outcome: Ongoing  Goal: ET tube will be managed safely  7/8/2020 0849 by Austin Andre RN  Outcome: Ongoing  7/7/2020 2223 by Morelia Bermudez RCP  Outcome: Ongoing  7/7/2020 2039 by Marcie Law RN  Outcome: Ongoing  Goal: Ability to express needs and understand communication  7/8/2020 0849 by Austin Andre RN  Outcome: Ongoing  7/7/2020 2223 by Morelia Bermudez RCP  Outcome: Ongoing  7/7/2020 2039 by Marcie Ani, RN  Outcome: Ongoing  Goal: Mobility/activity is maintained at optimum level for patient  7/8/2020 0849 by Chela Moreno RN  Outcome: Ongoing  7/7/2020 2223 by Morelia Bermudez RCP  Outcome: Ongoing  7/7/2020 2039 by Marcie Law RN  Outcome: Ongoing     Problem: SKIN INTEGRITY  Goal: Skin integrity is maintained or improved  7/8/2020 0849 by Chela Moreno RN  Outcome: Ongoing  7/7/2020 2223 by Morelia Bermudez RCP  Outcome: Ongoing  7/7/2020 2039 by Marcie Law RN  Outcome: Ongoing     Problem: Falls - Risk of:  Goal: Will remain free from falls  Description: Will remain free from falls  Outcome: Ongoing  Goal: Absence of physical injury  Description: Absence of physical injury  Outcome: Ongoing     Problem: Skin Integrity:  Goal: Will show no infection signs and symptoms  Description: Will show no infection signs and symptoms  Outcome: Ongoing  Goal: Absence of new skin breakdown  Description: Absence of new skin breakdown  Outcome: Ongoing

## 2020-07-08 NOTE — FLOWSHEET NOTE
Pt is intubated and did not respond when spoken to. Pt is listed as \"Sabianist\" so writer introduced self and offered prayer of encouragement and comfort. Chaplains will remain available to offer spiritual and emotional support as needed.      07/08/20 1030   Encounter Summary   Services provided to: Patient   Referral/Consult From: Rounding   Complexity of Encounter Moderate   Length of Encounter 15 minutes   Routine   Type Initial   Assessment Unable to respond   Intervention Prayer;Sustaining presence/ Ministry of presence   Outcome Did not respond

## 2020-07-08 NOTE — CARE COORDINATION
Attempt to do initial transitional discharge plan, patient is intubated and ventillated, I called several numbers in chart, no valid contacts, message left for Zaida Winkler, awaiting return call, also a number for her father Beulah Barboza listed as [phone] (not sure if this is a valid number)    0960 Attempt to call both sister and mother at new numbers listed, no answer from either number at this time

## 2020-07-08 NOTE — PROGRESS NOTES
Pt status- during cardiac echo. . pt became highly agitated, almost self extubated several times. . was on precedex drip, not versed. Pt given extra dose of fentanyl IV, restarting versed gtt and will dc precedex. Pt. Is also having some bradycardic episodes. .crit. care is aware. .bp remains stable , on low dose levo drip. Savannah Lance
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
## 2020-07-08 NOTE — PLAN OF CARE
Problem: OXYGENATION/RESPIRATORY FUNCTION  Goal: Patient will maintain patent airway  7/7/2020 2223 by Lisa Jacobo RCP  Outcome: Ongoing     Problem: OXYGENATION/RESPIRATORY FUNCTION  Goal: Patient will achieve/maintain normal respiratory rate/effort  Description: Respiratory rate and effort will be within normal limits for the patient  7/7/2020 2223 by Lisa Jacobo RCP  Outcome: Ongoing     Problem: MECHANICAL VENTILATION  Goal: Patient will maintain patent airway  7/7/2020 2223 by Lisa Jacobo RCP  Outcome: Ongoing     Problem: MECHANICAL VENTILATION  Goal: Oral health is maintained or improved  7/7/2020 2223 by Lisa Jacobo RCP  Outcome: Ongoing     Problem: MECHANICAL VENTILATION  Goal: ET tube will be managed safely  7/7/2020 2223 by Lisa Jacobo RCP  Outcome: Ongoing     Problem: MECHANICAL VENTILATION  Goal: Ability to express needs and understand communication  7/7/2020 2223 by Lisa Jacobo RCP  Outcome: Ongoing     Problem: MECHANICAL VENTILATION  Goal: Mobility/activity is maintained at optimum level for patient  7/7/2020 2223 by Lisa Jacobo RCP  Outcome: Ongoing     Problem: SKIN INTEGRITY  Goal: Skin integrity is maintained or improved  7/7/2020 2223 by Lisa Jacobo RCP  Outcome: Ongoing

## 2020-07-08 NOTE — CONSULTS
Attestation signed by      Attending Physician Statement:    I have discussed the care of  Leora Campos , including pertinent history and exam findings, with the Cardiology fellow/resident. I have seen and examined the patient and the key elements of all parts of the encounter have been performed by me. I agree with the assessment, plan and orders as documented by the fellow/resident, after I modified exam findings and plan of treatments, and the final version is my approved version of the assessment. Additional Comments:   NSTEMI type 2 due to heroine overdose- no trend to suggest ischemia/injury  - echo pending, if preserved LVEF no further cardiac workup    Discussed with patient and nursing. Thank you for allowing me to participate in the care of this patient, please do not hesitate to call if you have any questions. Aziza Jimenez DO, [facility], [facility]  [facility]. [facility]  [phone]     [facility] Cardiology    Consult / H&P               Today's Date: 7/8/2020  Patient Name: Leora Campos  Date of admission: 7/7/2020  7:37 AM  Patient's age: 23 y. o., 1995  Admission Dx: Opioid intoxication, with delirium ([facility] [address] [F11.921]  Opioid intoxication, with delirium ([facility] [address] [X32.703]    Reason for Consult:  Cardiac evaluation    Requesting Physician: Josafat Wise MD    CHIEF COMPLAINT:         History Obtained From: Chart review     HISTORY OF PRESENT ILLNESS:      59-year-old female past medical history of IV drug use was found on at home. Possibly heroin overdose. Received Narcan and her mentation improved. She was intubated because of agitation. Toxicology positive for cocaine and opiates. She was consulted for elevated troponin. Troponin 34->82->70. Had acute kidney injury. Improving. AG showed early repolarization changes and possible left atrial enlargement. No significant ST-T wave abnormality.             Past Medical History:   has a past medical history of ADHD (attention deficit hyperactivity disorder), ADHD (attention deficit hyperactivity disorder), Anxiety, ANDRE (generalized anxiety disorder), MDD (major depressive disorder), and Restless leg syndrome. Past Surgical History:   has no past surgical history on file. Home Medications:    Prior to Admission medications    Medication Sig Start Date End Date Taking? Authorizing Provider   lithium (ESKALITH) 450 MG extended release tablet Take 450 mg by mouth 2 times daily   Yes Historical Provider, MD   busPIRone (BUSPAR) 10 MG tablet Take 10 mg by mouth 3 times daily   Yes Historical Provider, MD   QUEtiapine (SEROQUEL) 100 MG tablet Take 100 mg by mouth 2 times daily   Yes Historical Provider, MD   rOPINIRole (REQUIP) 5 MG tablet Take 5 mg by mouth 3 times daily   Yes Historical Provider, MD   gabapentin (NEURONTIN) 300 MG capsule Take 300 mg by mouth 3 times daily. Yes Historical Provider, MD   ALPRAZolam Gleneela Ramirez) 0.5 MG tablet Take 0.5 mg by mouth nightly as needed for Sleep. .   Yes Historical Provider, MD   Magnesium 100 MG TABS Take by mouth daily otc   Yes Historical Provider, MD   Salicylic Acid 2 % PADS Apply 1 each topically daily 1/16/17  Yes Milton Shankar MD      Current Facility-Administered Medications: ampicillin-sulbactam (UNASYN) 1.5 g IVPB minibag, 1.5 g, Intravenous, Q6H  sodium chloride flush 0.9 % injection 10 mL, 10 mL, Intravenous, 2 times per day  sodium chloride flush 0.9 % injection 10 mL, 10 mL, Intravenous, PRN  promethazine (PHENERGAN) tablet 12.5 mg, 12.5 mg, Oral, Q6H PRN **OR** ondansetron (ZOFRAN) injection 4 mg, 4 mg, Intravenous, Q6H PRN  enoxaparin (LOVENOX) injection 40 mg, 40 mg, Subcutaneous, Daily  albuterol sulfate  (90 Base) MCG/ACT inhaler 2 puff, 2 puff, Inhalation, As Directed RT PRN  midazolam (VERSED) 1 mg/mL in D5W infusion, 1 mg/hr, Intravenous, Continuous  0.9 % sodium chloride bolus, 1,000 mL, Intravenous, Once  0.9 % sodium chloride infusion, , Intravenous, Continuous  dexmedetomidine (PRECEDEX) 400 mcg in sodium chloride 0.9 % 100 mL infusion, 0.2 mcg/kg/hr, Intravenous, Continuous    Allergies:  Nuts [macadamia nut oil]    Social History:   reports that she has been smoking cigarettes and e-cigarettes. She started smoking about 5 years ago. She has been smoking about 1.00 pack per day. She has never used smokeless tobacco. She reports previous alcohol use of about 4.0 standard drinks of alcohol per week. She reports current drug use. Frequency: 2.00 times per week. Drugs: Marijuana and Opiates . Family History: family history is not on file. No h/o sudden cardiac death. No for premature CAD    REVIEW OF SYSTEMS:    · Unable to perform as patient is intubated and sedated       PHYSICAL EXAM:      /82   Pulse 63   Temp 99.3 °F (37.4 °C) (Oral)   Resp 16   Wt 139 lb 5.3 oz (63.2 kg)   SpO2 100%   BMI 23.92 kg/m²    Constitutional and General Appearance: Intubated and sedated     HEENT: PERRL, no cervical lymphadenopathy. No masses palpable. Normal oral mucosa  Respiratory:  · Normal excursion and expansion without use of accessory muscles  · Resp Auscultation: Good respiratory effort. No for increased work of breathing. On auscultation: clear to auscultation bilaterally  Cardiovascular:  · The apical impulse is not displaced  · Heart tones are crisp and normal. regular S1 and S2.  · Jugular venous pulsation Normal  · The carotid upstroke is normal in amplitude and contour without delay or bruit  · Peripheral pulses are symmetrical and full   Abdomen:   · No masses or tenderness  · Bowel sounds present  Extremities:  ·  No Cyanosis or Clubbing  ·  Lower extremity edema: No  ·  Skin: Warm and dry  Neurological:  Intubated and sedated   DATA:    Diagnostics:    EKG: normal EKG, normal sinus rhythm, unchanged from previous tracings. ECHO: not obtained.      Labs:     CBC:   Recent Labs     07/07/20  0746 07/08/20  0426   WBC 22.0* 15.2*   HGB 13.2 10.1*   HCT 42.2 32.7*   PLT See Reflexed IPF Result 243     BMP:   Recent Labs     07/07/20  0746 07/08/20  0426    144   K 4.1 3.2*   CO2 24 20   BUN 17 8   CREATININE 1.12* 0.62   LABGLOM >60 >60   GLUCOSE 227* 78     BNP: No results for input(s): BNP in the last 72 hours. PT/INR:   Recent Labs     07/07/20  0856   PROTIME 10.9   INR 1.0     APTT:  Recent Labs     07/07/20  0856   APTT 21.6     CARDIAC ENZYMES:  Recent Labs     07/07/20  1220   CKTOTAL 134     FASTING LIPID PANEL:No results found for: HDL, LDLDIRECT, LDLCALC, TRIG  LIVER PROFILE:  Recent Labs     07/07/20  0746   AST 29   ALT 20   LABALBU 4.1       IMPRESSION:    Patient Active Problem List   Diagnosis    Opioid intoxication, with delirium ([facility] [address]     1. Elevated troponin - NSTEMI type 2   2. Cocaine abuse   3. Opiate overdose   4. STEVE   5. Bipolar disorder    RECOMMENDATIONS:  Elevated troponin likely 2/2 sepsis and STEVE. 34->82->70. EKG showed early repolarization changes. No significant ST-T wave abnormality. qTc normal.  Will order ECHO.        Velasquez Resendez MD  Internal Medicine [address], [facility]  PGY-3

## 2020-07-08 NOTE — CARE COORDINATION
Consult received d/t drug overdose  Pt +cocaine and opiates  Pt intubated  Will f/u with pt once she is extubated/alert/oriented

## 2020-07-09 LAB
ABSOLUTE EOS #: 0.63 K/UL (ref 0–0.44)
ABSOLUTE IMMATURE GRANULOCYTE: 0.19 K/UL (ref 0–0.3)
ABSOLUTE LYMPH #: 2.68 K/UL (ref 1.1–3.7)
ABSOLUTE MONO #: 1.28 K/UL (ref 0.1–1.2)
ANION GAP SERPL CALCULATED.3IONS-SCNC: 11 MMOL/L (ref 9–17)
BASOPHILS # BLD: 1 % (ref 0–2)
BASOPHILS ABSOLUTE: 0.13 K/UL (ref 0–0.2)
BUN BLDV-MCNC: 9 MG/DL (ref 6–20)
BUN/CREAT BLD: ABNORMAL (ref 9–20)
CALCIUM SERPL-MCNC: 8.1 MG/DL (ref 8.6–10.4)
CHLORIDE BLD-SCNC: 116 MMOL/L (ref 98–107)
CO2: 19 MMOL/L (ref 20–31)
CREAT SERPL-MCNC: 0.62 MG/DL (ref 0.5–0.9)
CULTURE: ABNORMAL
CULTURE: ABNORMAL
DIFFERENTIAL TYPE: ABNORMAL
DIRECT EXAM: ABNORMAL
EKG ATRIAL RATE: 91 BPM
EKG P AXIS: 52 DEGREES
EKG P-R INTERVAL: 148 MS
EKG Q-T INTERVAL: 400 MS
EKG QRS DURATION: 98 MS
EKG QTC CALCULATION (BAZETT): 492 MS
EKG R AXIS: 80 DEGREES
EKG T AXIS: 35 DEGREES
EKG VENTRICULAR RATE: 91 BPM
EOSINOPHILS RELATIVE PERCENT: 5 % (ref 1–4)
GFR AFRICAN AMERICAN: >60 ML/MIN
GFR NON-AFRICAN AMERICAN: >60 ML/MIN
GFR SERPL CREATININE-BSD FRML MDRD: ABNORMAL ML/MIN/{1.73_M2}
GFR SERPL CREATININE-BSD FRML MDRD: ABNORMAL ML/MIN/{1.73_M2}
GLUCOSE BLD-MCNC: 101 MG/DL (ref 70–99)
GLUCOSE BLD-MCNC: 104 MG/DL (ref 65–105)
GLUCOSE BLD-MCNC: 81 MG/DL (ref 65–105)
GLUCOSE BLD-MCNC: 82 MG/DL (ref 65–105)
HCT VFR BLD CALC: 34 % (ref 36.3–47.1)
HEMOGLOBIN: 10.3 G/DL (ref 11.9–15.1)
IMMATURE GRANULOCYTES: 1 %
IRON SATURATION: 46 % (ref 20–55)
IRON: 73 UG/DL (ref 37–145)
LYMPHOCYTES # BLD: 19 % (ref 24–43)
Lab: ABNORMAL
MCH RBC QN AUTO: 29.8 PG (ref 25.2–33.5)
MCHC RBC AUTO-ENTMCNC: 30.3 G/DL (ref 28.4–34.8)
MCV RBC AUTO: 98.3 FL (ref 82.6–102.9)
MONOCYTES # BLD: 9 % (ref 3–12)
NRBC AUTOMATED: 0 PER 100 WBC
PDW BLD-RTO: 14.2 % (ref 11.8–14.4)
PLATELET # BLD: 274 K/UL (ref 138–453)
PLATELET ESTIMATE: ABNORMAL
PMV BLD AUTO: 11.5 FL (ref 8.1–13.5)
POTASSIUM SERPL-SCNC: 3.6 MMOL/L (ref 3.7–5.3)
RBC # BLD: 3.46 M/UL (ref 3.95–5.11)
RBC # BLD: ABNORMAL 10*6/UL
SEG NEUTROPHILS: 65 % (ref 36–65)
SEGMENTED NEUTROPHILS ABSOLUTE COUNT: 9.05 K/UL (ref 1.5–8.1)
SODIUM BLD-SCNC: 146 MMOL/L (ref 135–144)
SPECIMEN DESCRIPTION: ABNORMAL
TOTAL IRON BINDING CAPACITY: 157 UG/DL (ref 250–450)
UNSATURATED IRON BINDING CAPACITY: 84 UG/DL (ref 112–347)
WBC # BLD: 14 K/UL (ref 3.5–11.3)
WBC # BLD: ABNORMAL 10*3/UL

## 2020-07-09 PROCEDURE — 97162 PT EVAL MOD COMPLEX 30 MIN: CPT

## 2020-07-09 PROCEDURE — 2500000003 HC RX 250 WO HCPCS: Performed by: STUDENT IN AN ORGANIZED HEALTH CARE EDUCATION/TRAINING PROGRAM

## 2020-07-09 PROCEDURE — 2580000003 HC RX 258: Performed by: STUDENT IN AN ORGANIZED HEALTH CARE EDUCATION/TRAINING PROGRAM

## 2020-07-09 PROCEDURE — 36415 COLL VENOUS BLD VENIPUNCTURE: CPT

## 2020-07-09 PROCEDURE — 80048 BASIC METABOLIC PNL TOTAL CA: CPT

## 2020-07-09 PROCEDURE — 97166 OT EVAL MOD COMPLEX 45 MIN: CPT

## 2020-07-09 PROCEDURE — 6370000000 HC RX 637 (ALT 250 FOR IP): Performed by: STUDENT IN AN ORGANIZED HEALTH CARE EDUCATION/TRAINING PROGRAM

## 2020-07-09 PROCEDURE — 99291 CRITICAL CARE FIRST HOUR: CPT | Performed by: INTERNAL MEDICINE

## 2020-07-09 PROCEDURE — 93010 ELECTROCARDIOGRAM REPORT: CPT | Performed by: INTERNAL MEDICINE

## 2020-07-09 PROCEDURE — 83540 ASSAY OF IRON: CPT

## 2020-07-09 PROCEDURE — 2060000000 HC ICU INTERMEDIATE R&B

## 2020-07-09 PROCEDURE — 6360000002 HC RX W HCPCS: Performed by: STUDENT IN AN ORGANIZED HEALTH CARE EDUCATION/TRAINING PROGRAM

## 2020-07-09 PROCEDURE — 94761 N-INVAS EAR/PLS OXIMETRY MLT: CPT

## 2020-07-09 PROCEDURE — 82947 ASSAY GLUCOSE BLOOD QUANT: CPT

## 2020-07-09 PROCEDURE — 94770 HC ETCO2 MONITOR DAILY: CPT

## 2020-07-09 PROCEDURE — 2700000000 HC OXYGEN THERAPY PER DAY

## 2020-07-09 PROCEDURE — 97530 THERAPEUTIC ACTIVITIES: CPT

## 2020-07-09 PROCEDURE — 85025 COMPLETE CBC W/AUTO DIFF WBC: CPT

## 2020-07-09 PROCEDURE — 94003 VENT MGMT INPAT SUBQ DAY: CPT

## 2020-07-09 PROCEDURE — 83550 IRON BINDING TEST: CPT

## 2020-07-09 PROCEDURE — 97535 SELF CARE MNGMENT TRAINING: CPT

## 2020-07-09 RX ORDER — QUETIAPINE FUMARATE 100 MG/1
100 TABLET, FILM COATED ORAL 2 TIMES DAILY
Status: DISCONTINUED | OUTPATIENT
Start: 2020-07-09 | End: 2020-07-11 | Stop reason: HOSPADM

## 2020-07-09 RX ORDER — POTASSIUM CHLORIDE 20 MEQ/1
40 TABLET, EXTENDED RELEASE ORAL ONCE
Status: COMPLETED | OUTPATIENT
Start: 2020-07-09 | End: 2020-07-09

## 2020-07-09 RX ORDER — BUSPIRONE HYDROCHLORIDE 10 MG/1
10 TABLET ORAL 3 TIMES DAILY
Status: DISCONTINUED | OUTPATIENT
Start: 2020-07-09 | End: 2020-07-11 | Stop reason: HOSPADM

## 2020-07-09 RX ORDER — NICOTINE 21 MG/24HR
1 PATCH, TRANSDERMAL 24 HOURS TRANSDERMAL DAILY
Status: DISCONTINUED | OUTPATIENT
Start: 2020-07-09 | End: 2020-07-11 | Stop reason: HOSPADM

## 2020-07-09 RX ORDER — ALBUTEROL SULFATE 90 UG/1
2 AEROSOL, METERED RESPIRATORY (INHALATION) EVERY 6 HOURS PRN
Status: DISCONTINUED | OUTPATIENT
Start: 2020-07-09 | End: 2020-07-11 | Stop reason: HOSPADM

## 2020-07-09 RX ORDER — BUPRENORPHINE HYDROCHLORIDE AND NALOXONE HYDROCHLORIDE DIHYDRATE 8; 2 MG/1; MG/1
1 TABLET SUBLINGUAL 2 TIMES DAILY
Status: DISCONTINUED | OUTPATIENT
Start: 2020-07-09 | End: 2020-07-11 | Stop reason: HOSPADM

## 2020-07-09 RX ORDER — ALPRAZOLAM 0.5 MG/1
0.5 TABLET ORAL NIGHTLY PRN
Status: DISCONTINUED | OUTPATIENT
Start: 2020-07-09 | End: 2020-07-11

## 2020-07-09 RX ADMIN — SODIUM CHLORIDE 1.5 G: 900 INJECTION INTRAVENOUS at 20:29

## 2020-07-09 RX ADMIN — FENTANYL CITRATE 100 MCG: 50 INJECTION, SOLUTION INTRAMUSCULAR; INTRAVENOUS at 00:40

## 2020-07-09 RX ADMIN — Medication 8 MG/HR: at 03:36

## 2020-07-09 RX ADMIN — SODIUM CHLORIDE 1.5 G: 900 INJECTION INTRAVENOUS at 15:23

## 2020-07-09 RX ADMIN — SODIUM CHLORIDE: 9 INJECTION, SOLUTION INTRAVENOUS at 03:00

## 2020-07-09 RX ADMIN — POTASSIUM CHLORIDE 40 MEQ: 1500 TABLET, EXTENDED RELEASE ORAL at 22:56

## 2020-07-09 RX ADMIN — Medication 10 ML: at 21:00

## 2020-07-09 RX ADMIN — Medication 10 ML: at 09:12

## 2020-07-09 RX ADMIN — FAMOTIDINE 20 MG: 10 INJECTION, SOLUTION INTRAVENOUS at 09:11

## 2020-07-09 RX ADMIN — ENOXAPARIN SODIUM 40 MG: 40 INJECTION SUBCUTANEOUS at 09:11

## 2020-07-09 RX ADMIN — SODIUM CHLORIDE 1.5 G: 900 INJECTION INTRAVENOUS at 03:50

## 2020-07-09 RX ADMIN — QUETIAPINE FUMARATE 100 MG: 100 TABLET ORAL at 20:29

## 2020-07-09 RX ADMIN — FENTANYL CITRATE 100 MCG: 50 INJECTION, SOLUTION INTRAMUSCULAR; INTRAVENOUS at 06:04

## 2020-07-09 RX ADMIN — LIDOCAINE HYDROCHLORIDE: 40 SOLUTION TOPICAL at 04:50

## 2020-07-09 RX ADMIN — FENTANYL CITRATE 100 MCG: 50 INJECTION, SOLUTION INTRAMUSCULAR; INTRAVENOUS at 04:01

## 2020-07-09 RX ADMIN — FENTANYL CITRATE 100 MCG: 50 INJECTION, SOLUTION INTRAMUSCULAR; INTRAVENOUS at 02:57

## 2020-07-09 RX ADMIN — BUPRENORPHINE AND NALOXONE 1 TABLET: 8; 2 TABLET SUBLINGUAL at 20:29

## 2020-07-09 RX ADMIN — SODIUM CHLORIDE 1.5 G: 900 INJECTION INTRAVENOUS at 09:11

## 2020-07-09 RX ADMIN — BUSPIRONE HYDROCHLORIDE 10 MG: 10 TABLET ORAL at 20:29

## 2020-07-09 ASSESSMENT — PULMONARY FUNCTION TESTS
PIF_VALUE: 21
PIF_VALUE: 21
PIF_VALUE: 11

## 2020-07-09 ASSESSMENT — PAIN SCALES - GENERAL
PAINLEVEL_OUTOF10: 7
PAINLEVEL_OUTOF10: 10
PAINLEVEL_OUTOF10: 0

## 2020-07-09 NOTE — PLAN OF CARE
Problem: Restraint Use - Nonviolent/Non-Self-Destructive Behavior:  Goal: Absence of restraint-related injury  Description: Absence of restraint-related injury  7/8/2020 1959 by Natalia Mendoza RN  Outcome: Ongoing  7/8/2020 0849 by Rizwana Sethi RN  Outcome: Ongoing     Problem: OXYGENATION/RESPIRATORY FUNCTION  Goal: Patient will maintain patent airway  7/8/2020 1959 by Natalia Mendoza RN  Outcome: Ongoing  7/8/2020 0849 by Rizwana Sethi RN  Outcome: Ongoing  Goal: Patient will achieve/maintain normal respiratory rate/effort  Description: Respiratory rate and effort will be within normal limits for the patient  7/8/2020 1959 by Natalia Mendoza RN  Outcome: Ongoing  7/8/2020 0849 by Rizwana Sethi RN  Outcome: Ongoing     Problem: MECHANICAL VENTILATION  Goal: Patient will maintain patent airway  7/8/2020 1959 by Natalia Mendoza RN  Outcome: Ongoing  7/8/2020 0849 by Rizwana Sethi RN  Outcome: Ongoing  Goal: Oral health is maintained or improved  7/8/2020 1959 by Natalia Mendoza RN  Outcome: Ongoing  7/8/2020 0849 by Rizwana Sethi RN  Outcome: Ongoing  Goal: ET tube will be managed safely  7/8/2020 1959 by Natalia Mendoza RN  Outcome: Ongoing  7/8/2020 0849 by Rizwana Sethi RN  Outcome: Ongoing  Goal: Ability to express needs and understand communication  7/8/2020 1959 by Natalia Mendoza RN  Outcome: Ongoing  7/8/2020 0849 by Rizwana Sethi RN  Outcome: Ongoing  Goal: Mobility/activity is maintained at optimum level for patient  7/8/2020 1959 by Natalia Mendoza RN  Outcome: Ongoing  7/8/2020 0849 by Rizwana Sethi RN  Outcome: Ongoing     Problem: SKIN INTEGRITY  Goal: Skin integrity is maintained or improved  7/8/2020 1959 by Natalia Mendoza RN  Outcome: Ongoing  7/8/2020 0849 by Rizwana Sethi RN  Outcome: Ongoing     Problem: Falls - Risk of:  Goal: Will remain free from falls  Description: Will remain free from falls  7/8/2020 1959 by Natalia Mendoza RN  Outcome: Ongoing  7/8/2020 0849 by Rizwana Sethi RN  Outcome: Ongoing  Goal: Absence of physical injury  Description: Absence of physical injury  7/8/2020 1959 by Edinson Huitron RN  Outcome: Ongoing  7/8/2020 0849 by Jose Lauren RN  Outcome: Ongoing     Problem: Skin Integrity:  Goal: Will show no infection signs and symptoms  Description: Will show no infection signs and symptoms  7/8/2020 1959 by Edinson Huitron RN  Outcome: Ongoing  7/8/2020 0849 by Jose Lauren RN  Outcome: Ongoing  Goal: Absence of new skin breakdown  Description: Absence of new skin breakdown  7/8/2020 1959 by Edinson Huitron RN  Outcome: Ongoing  7/8/2020 0849 by Jose Lauren RN  Outcome: Ongoing     Problem: Nutrition  Goal: Optimal nutrition therapy  7/8/2020 1959 by Edinson Huitron RN  Outcome: Ongoing  7/8/2020 1309 by Gem Blackman RD, LD  Outcome: Ongoing  Note: Nutrition Problem: Inadequate oral intake  Intervention: Food and/or Nutrient Delivery: Start Tube Feeding(Suggest Osmolite 1.2 (std w/o fiber) goal rate 50 mL/hr.)  Nutritional Goals: Meet % of estimated nutrition needs.      Problem: Restraint Use - Nonviolent/Non-Self-Destructive Behavior:  Goal: Absence of restraint indications  Description: Absence of restraint indications  7/8/2020 1959 by Edinson Huitron RN  Outcome: Not Met This Shift  7/8/2020 0849 by Jose Lauren RN  Outcome: Ongoing
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
## 2020-07-09 NOTE — PROGRESS NOTES
Occupational Therapy   Occupational Therapy Initial Assessment  Date: 2020   Patient Name: Anjali Madera  MRN: [id number]     : 1995    Date of Service: 2020    Discharge Recommendations:  Patient would benefit from continued therapy after discharge  OT Equipment Recommendations  Other: CTA    Assessment   Performance deficits / Impairments: Decreased functional mobility ; Decreased ADL status; Decreased safe awareness;Decreased cognition;Decreased balance;Decreased high-level IADLs;Decreased posture;Decreased coordination  Assessment: Pt agreeable to OT eval with max encouragement. Pt requires Max VCs/TCs for redirection throughout therapy session. Pt minimally cooperative, attempting to get out of chair prior to instruction and attempting to leave room for a cigarette. Pt requires Mod A X2 for functional transfers and mobility this date d/t severe balance and safety awareness deficits. Pt requires Max A X2 to return to seated in proper positioning. Pt is unsafe to return to prior living arrangements at this time d/t significant balance deficits as well as deficits in ADLs and functional mobility. Pt to require continued OT services to increase safety and independence with functional activities  Prognosis: Good  Decision Making: Medium Complexity  Patient Education: Pt educated on OT role, OT POC, safety awareness; fair return noted  REQUIRES OT FOLLOW UP: Yes  Activity Tolerance  Activity Tolerance: Treatment limited secondary to decreased cognition;Treatment limited secondary to agitation  Safety Devices  Safety Devices in place: Yes  Type of devices: Nurse notified; Left in chair;Gait belt;Call light within reach  Restraints  Initially in place: No           Patient Diagnosis(es): The primary encounter diagnosis was Accidental overdose of heroin, initial encounter ([facility] [address]  Diagnoses of Sepsis, due to unspecified organism, unspecified whether acute organ dysfunction present [facility]), STEVE (acute kidney injury) ([facility] [address] Cellulitis of left lower extremity, and Pneumonia due to organism were also pertinent to this visit. has a past medical history of ADHD (attention deficit hyperactivity disorder), ADHD (attention deficit hyperactivity disorder), Anxiety, ANDRE (generalized anxiety disorder), MDD (major depressive disorder), and Restless leg syndrome. has no past surgical history on file. Restrictions  Restrictions/Precautions  Restrictions/Precautions: Seizure, Up as Tolerated, Fall Risk  Required Braces or Orthoses?: No    Subjective   General  Patient assessed for rehabilitation services?: Yes  Family / Caregiver Present: Yes(father present at end of session)  General Comment  Comments: RN ok'd pt for therapy this date. Pt minimally cooperative throughout session  Patient Currently in Pain: Denies    Social/Functional History  Social/Functional History  Type of Home: Homeless  ADL Assistance: Independent  Ambulation Assistance: Independent  Transfer Assistance: Independent  Active : No  Patient's  Info: Pt not allowed to drive d/t seizures. Occupation: Unemployed  Additional Comments: Per father patient was living with him but was causing too much trouble. Pt has been just going from house to house and living wherever. Objective   Vision: Within Functional Limits  Hearing: Within functional limits         Balance  Sitting Balance: Moderate assistance  Standing Balance: Moderate assistance(X2)  Functional Mobility  Functional - Mobility Device: Rolling Walker  Activity: Other  Assist Level: Moderate assistance  Functional Mobility Comments: Pt requires Mod A X2 for functional mobility within room. Pt has no awareness of safety and was fixated on leaving room to go smoke. Pt very unsteady and is a high fall risk d/t safety awareness deficits     ADL  Feeding: Moderate assistance;Setup; Increased time to complete;Bringing food to mouth assist  Grooming:  Moderate assistance;Setup;Verbal cueing; Increased time to complete  UE Bathing: Moderate assistance;Setup;Verbal cueing; Increased time to complete  LE Bathing: Maximum assistance;Setup;Verbal cueing; Increased time to complete  UE Dressing: Moderate assistance;Setup;Verbal cueing; Increased time to complete  LE Dressing: Maximum assistance;Setup;Verbal cueing; Increased time to complete  Toileting: Maximum assistance;Setup; Increased time to complete  Tone RUE  RUE Tone: Normotonic  Tone LUE  LUE Tone: Normotonic  Coordination  Movements Are Fluid And Coordinated: Yes    Bed mobility  Comment: pt returned to supine after performing functional mobility within room however RN requesting pt to return to chair; pt required Max VCs to follow directions  Transfers  Sit to stand:  Moderate assistance;2 Person assistance  Stand to sit: Moderate assistance;2 Person assistance    Cognition  Overall Cognitive Status: Exceptions  Arousal/Alertness: Inconsistent responses to stimuli  Following Commands: Inconsistently follows commands  Attention Span: Difficulty attending to directions  Safety Judgement: Decreased awareness of need for safety;Decreased awareness of need for assistance  Problem Solving: Assistance required to correct errors made  Insights: Decreased awareness of deficits  Initiation: Requires cues for all  Sequencing: Requires cues for all       Sensation  Overall Sensation Status: WFL        LUE AROM (degrees)  LUE AROM : WFL(based on observation; pt not cooperative with formal ROM test)  Left Hand AROM (degrees)  Left Hand AROM: WFL  RUE AROM (degrees)  RUE AROM : WFL  Right Hand AROM (degrees)  Right Hand AROM: WFL  LUE Strength  Gross LUE Strength: WFL  LUE Strength Comment: based on observation; pt not following commands for formal MMT  RUE Strength  Gross RUE Strength: WFL                   Plan   Plan  Times per week: 3-5 x/wk  Current Treatment Recommendations: Balance Training, Functional Mobility Training, Cognitive Reorientation, Safety Education & Training, Patient/Caregiver Education & Training, Equipment Evaluation, Education, & procurement, Self-Care / ADL, Home Management Training    AM-PAC Score        AM-PAC Inpatient Daily Activity Raw Score: 12 (07/09/20 1609)  AM-PAC Inpatient ADL T-Scale Score : 30.6 (07/09/20 1609)  ADL Inpatient CMS 0-100% Score: 66.57 (07/09/20 1609)  ADL Inpatient CMS G-Code Modifier : CL (07/09/20 1609)    Goals  Short term goals  Time Frame for Short term goals: By discharge, pt will:  Short term goal 1: Demo Min A for functional transfers and functional mobility with use of LRD PRN  Short term goal 2: Demo CGA for UB ADLs and grooming tasks with setup and <5 VCs for safety and direction  Short term goal 3: Demo CGA for LB ADLs and toileting tasks with setup and <5 VCs for safety and direction  Short term goal 4: Demo 5+ minutes dynamic standing with Min A and LRD PRN to increase independence and safety with ADLs  Short term goal 5: Follow 75% of commands throughout therapy session to increase overall safety and functional performance  Short term goal 6: Demo good safety awareness throughout therapy session with <4 VCs/TCs       Therapy Time   Individual Concurrent Group Co-treatment   Time In 1321         Time Out 1341         Minutes 20         Timed Code Treatment Minutes: 8 Minutes       Chen Vu OTR/L

## 2020-07-09 NOTE — PROGRESS NOTES
Physical Therapy    Facility/Department: [address] MICU  Initial Assessment    NAME: Patricia Cameron  : 1995  MRN: [id number]    Date of Service: 2020  Patricia Cameron is a young 23 y.o. female who was found unresponsive in a hotel room. An unknown bystander called EMS for altered mental status and possible heroin overdose. EMS found the patient unresponsive with pinpoint pupils. Patient was given 2 mg IM Narcan followed by 4 mg IV Narcan which improved her mentation and she became more responsive and pupils became 6 mm and responsive to light. Patient became aggressive and agitated thrashing during the transport. Became tachycardic in 110s per EMS. Discharge Recommendations:  Continue to assess pending progress        Assessment   Body structures, Functions, Activity limitations: Decreased functional mobility ; Decreased endurance;Decreased safe awareness;Decreased cognition;Decreased balance  Decision Making: Medium Complexity  PT Education: Plan of Care;General Safety  Barriers to Learning: Agitaition, cognition  REQUIRES PT FOLLOW UP: Yes  Activity Tolerance  Activity Tolerance: Patient limited by cognitive status;Treatment limited secondary to agitation       Patient Diagnosis(es): The primary encounter diagnosis was Accidental overdose of heroin, initial encounter ([facility] [address] Diagnoses of Sepsis, due to unspecified organism, unspecified whether acute organ dysfunction present ([facility] [address] STEVE (acute kidney injury) ([facility] [address] Cellulitis of left lower extremity, and Pneumonia due to organism were also pertinent to this visit. has a past medical history of ADHD (attention deficit hyperactivity disorder), ADHD (attention deficit hyperactivity disorder), Anxiety, ANDRE (generalized anxiety disorder), MDD (major depressive disorder), and Restless leg syndrome. has no past surgical history on file.     Restrictions  Restrictions/Precautions  Restrictions/Precautions: Seizure, Up as Tolerated, Fall Risk  Required 4: Pt to navigate 4 stairs with 1 rail SBA  Short term goal 5: Pt to follow commands for PT treatment. Patient Goals   Patient goals : \" Have a cigarette. \"       Therapy Time   Individual Concurrent Group Co-treatment   Time In 1320         Time Out 1345         Minutes 25         Timed Code Treatment Minutes: [facility]  JUICE LAM
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
## 2020-07-09 NOTE — PLAN OF CARE
Problem: Restraint Use - Nonviolent/Non-Self-Destructive Behavior:  Goal: Absence of restraint indications  Description: Absence of restraint indications  7/9/2020 0803 by Antoinette Fregoso RN  Outcome: Ongoing  7/8/2020 1959 by Jules Pardo RN  Outcome: Not Met This Shift  Goal: Absence of restraint-related injury  Description: Absence of restraint-related injury  7/9/2020 0803 by Antoinette Fregoso RN  Outcome: Ongoing  7/8/2020 1959 by Jules Pardo RN  Outcome: Ongoing     Problem: OXYGENATION/RESPIRATORY FUNCTION  Goal: Patient will maintain patent airway  7/9/2020 0803 by Antoinette Fregoso RN  Outcome: Ongoing  7/9/2020 0800 by Lulú Haro RCP  Outcome: Ongoing  7/8/2020 2148 by Bright Garrett RCP  Outcome: Ongoing  7/8/2020 1959 by Jules Pardo RN  Outcome: Ongoing  Goal: Patient will achieve/maintain normal respiratory rate/effort  Description: Respiratory rate and effort will be within normal limits for the patient  7/9/2020 0803 by Antoinette Fregoso RN  Outcome: Ongoing  7/9/2020 0800 by Lulú Haro RCP  Outcome: Ongoing  7/8/2020 2148 by Bright Garrett RCP  Outcome: Ongoing  7/8/2020 1959 by Jules Pardo RN  Outcome: Ongoing     Problem: MECHANICAL VENTILATION  Goal: Patient will maintain patent airway  7/9/2020 0803 by Antoinette Fregoso RN  Outcome: Ongoing  7/9/2020 0800 by Lulú Haro RCP  Outcome: Ongoing  7/8/2020 2148 by Bright Garrett RCP  Outcome: Ongoing  7/8/2020 1959 by Jules Pardo RN  Outcome: Ongoing  Goal: Oral health is maintained or improved  7/9/2020 0803 by Antoinette Fregoso RN  Outcome: Ongoing  7/9/2020 0800 by Lulú Haro RCP  Outcome: Ongoing  7/8/2020 2148 by Bright Garrett RCP  Outcome: Ongoing  7/8/2020 1959 by Jules Pardo RN  Outcome: Ongoing  Goal: ET tube will be managed safely  7/9/2020 0803 by Antoinette Fregoso RN  Outcome: Ongoing  7/9/2020 0800 by Lulú Haro RCP  Outcome: Ongoing  7/8/2020 2148 by Bright Garrett RCP  Outcome: Ongoing  7/8/2020 65 by Edinson Huitron RN  Outcome: Ongoing  Goal: Ability to express needs and understand communication  7/9/2020 0803 by Jose Lauren RN  Outcome: Ongoing  7/9/2020 0800 by Blane Boss RCP  Outcome: Ongoing  7/8/2020 2148 by Jose Alberto Zimmerman RCP  Outcome: Ongoing  7/8/2020 1959 by Edinson Huitron RN  Outcome: Ongoing  Goal: Mobility/activity is maintained at optimum level for patient  7/9/2020 0803 by Jose Lauren RN  Outcome: Ongoing  7/9/2020 0800 by Blane Boss RCP  Outcome: Ongoing  7/8/2020 2148 by Jose Alberto Zimmerman BRYAN  Outcome: Ongoing  7/8/2020 1959 by Edinson Huitron RN  Outcome: Ongoing     Problem: SKIN INTEGRITY  Goal: Skin integrity is maintained or improved  7/9/2020 0803 by Jose Lauren RN  Outcome: Ongoing  7/8/2020 2148 by Jose Alberto Zimmerman RCP  Outcome: Ongoing  7/8/2020 1959 by Edinson Huitron RN  Outcome: Ongoing     Problem: Falls - Risk of:  Goal: Will remain free from falls  Description: Will remain free from falls  7/9/2020 0803 by Jose Lauren RN  Outcome: Ongoing  7/8/2020 1959 by Edinson Huitron RN  Outcome: Ongoing  Goal: Absence of physical injury  Description: Absence of physical injury  7/9/2020 0803 by Jose Lauren RN  Outcome: Ongoing  7/8/2020 1959 by Edinson Huitron RN  Outcome: Ongoing     Problem: Skin Integrity:  Goal: Will show no infection signs and symptoms  Description: Will show no infection signs and symptoms  7/9/2020 0803 by Jose Lauren RN  Outcome: Ongoing  7/8/2020 1959 by Edinson Huitron RN  Outcome: Ongoing  Goal: Absence of new skin breakdown  Description: Absence of new skin breakdown  7/9/2020 0803 by Jose Lauren RN  Outcome: Ongoing  7/8/2020 1959 by Edinson Huitron RN  Outcome: Ongoing     Problem: Nutrition  Goal: Optimal nutrition therapy  7/9/2020 0803 by Jose Lauren RN  Outcome: Ongoing  7/8/2020 1959 by Edinson Huitron RN  Outcome: Ongoing

## 2020-07-09 NOTE — PROGRESS NOTES
[facility]   Progress Note                   Date:   7/9/2020  Patient name: Natalia Caba  Date of admission:  7/7/2020  7:37 AM  MRN:   [id number]  YOB: 1995  PCP: No primary care provider on file. Reason for Admission:      Subjective: There were no acute events overnight, remained hemodynamically stable,  Intubated. Medications:   Scheduled Meds:   famotidine (PEPCID) injection  20 mg Intravenous BID    insulin lispro  0-12 Units Subcutaneous TID WC    insulin lispro  0-6 Units Subcutaneous Nightly    ampicillin-sulbactam  1.5 g Intravenous Q6H    sodium chloride flush  10 mL Intravenous 2 times per day    enoxaparin  40 mg Subcutaneous Daily    sodium chloride  1,000 mL Intravenous Once       Continuous Infusions:   dextrose      midazolam 6 mg/hr (07/09/20 0702)    sodium chloride 125 mL/hr at 07/09/20 0300    dexmedetomidine Stopped (07/08/20 2341)       CBC:   Recent Labs     07/07/20  0746 07/08/20 0426 07/09/20  0345   WBC 22.0* 15.2* 14.0*   HGB 13.2 10.1* 10.3*   PLT See Reflexed IPF Result [id number]     BMP:    Recent Labs     07/07/20  0746 07/08/20  0426 07/08/20 2026 07/09/20  0345    144  --  146*   K 4.1 3.2* 3.5* 3.6*   CL 95* 111*  --  116*   CO2 24 20  --  19*   BUN 17 8  --  9   CREATININE 1.12* 0.62  --  0.62   GLUCOSE 227* 78  --  101*     Hepatic:   Recent Labs     07/07/20  0746   AST 29   ALT 20   BILITOT 0.25*   ALKPHOS 117*     Troponin: No results for input(s): TROPONINI in the last 72 hours. BNP: No results for input(s): BNP in the last 72 hours. Lipids: No results for input(s): CHOL, HDL in the last 72 hours.     Invalid input(s): LDLCALCU  INR:   Recent Labs     07/07/20  0856   INR 1.0       Objective:   Vitals: /65   Pulse 69   Temp 98.4 °F (36.9 °C) (Oral)   Resp 19   Wt 139 lb 5.3 oz (63.2 kg)   SpO2 100%   BMI 23.92 kg/m²     General appearance:Intubated   HEENT: Head: Normocephalic, no lesions, without obvious abnormality  Neck: no JVD  Lungs: clear to auscultation bilaterally, no basilar rales, no wheezing   Heart: regular rate and rhythm, S1, S2 normal, no murmur, click, rub or gallop  Abdomen: soft, non-tender; bowel sounds normal  Extremities: No LE edema  Neurologic: Intubated, follows commands             Echocardiogram:  Left ventricle is normal in size with normal systolic function globally. Calculated ejection fraction is 58%  Mitral valve structure is normal.  Mild mitral regurgitation. Tricuspid valve structure is normal.  Mild tricuspid regurgitation. Estimated right ventricular systolic pressure is 23 mmHg. 1. Elevated troponin - NSTEMI type 2   2. Cocaine abuse   3. Opiate overdose   4. STEVE   5. Bipolar disorder  6. Prolong qT    RECOMMENDATIONS:  1. Elevated troponin likely 2/2 sepsis and STEVE. 34->82->70->35. EKG showed early repolarization changes. No significant ST-T wave abnormality. ECHO Normal. No further ischemia workup. 2. Will sign off.      Bella Johnson MD  Internal Medicine [address] [facility], [place], [place]  PGY-3    I performed a history and physical examination of the patient and discussed management with the resident. I reviewed the residents note and agree with the documented findings and plan of care. Any areas of disagreement are noted on the chart. I was personally present for the key portions of any procedures. I have documented in the chart those procedures where I was not present during the key portions. I have personally evaluated this patient and have completed at least one if not all key elements of the E/M (history, physical exam, and MDM). Additional findings are as noted. Preserved LV systolic function. Presentation with drug overdose including cocaine. No ischemic changes. Extubated. Counseled to quit drug use. Call cardiology with questions.     Spencer Ward MD

## 2020-07-09 NOTE — PLAN OF CARE
Problem: MECHANICAL VENTILATION  Goal: Patient will maintain patent airway  7/9/2020 0800 by Carol Kerns RCP  Outcome: Ongoing     Problem: MECHANICAL VENTILATION  Goal: Oral health is maintained or improved  7/9/2020 0800 by Carol Kerns RCP  Outcome: Ongoing     Problem: MECHANICAL VENTILATION  Goal: ET tube will be managed safely  7/9/2020 0800 by Carol Kerns RCP  Outcome: Ongoing     Problem: MECHANICAL VENTILATION  Goal: Mobility/activity is maintained at optimum level for patient  7/9/2020 0800 by Carol Kerns RCP  Outcome: Ongoing

## 2020-07-09 NOTE — PLAN OF CARE
Problem: OXYGENATION/RESPIRATORY FUNCTION  Goal: Patient will maintain patent airway  7/8/2020 2148 by Sierra Griffith RCP  Outcome: Ongoing  Goal: Patient will achieve/maintain normal respiratory rate/effort  Description: Respiratory rate and effort will be within normal limits for the patient  7/8/2020 2148 by Sierra Griffith RCP  Outcome: Ongoing     Problem: MECHANICAL VENTILATION  Goal: Patient will maintain patent airway  7/8/2020 2148 by Sierra Griffith RCP  Outcome: Ongoing  Goal: Oral health is maintained or improved  7/8/2020 2148 by Sierra Griffith RCP  Outcome: Ongoing  Goal: ET tube will be managed safely  7/8/2020 2148 by Sierra Griffith RCP  Outcome: Ongoing  Goal: Ability to express needs and understand communication  7/8/2020 2148 by Sierra Griffith RCP  Outcome: Ongoing  Goal: Mobility/activity is maintained at optimum level for patient  7/8/2020 2148 by Sierra Griffith RCP  Outcome: Ongoing     Problem: SKIN INTEGRITY  Goal: Skin integrity is maintained or improved  7/8/2020 2148 by Sierra Griffith RCP  Outcome: Ongoing

## 2020-07-09 NOTE — PROGRESS NOTES
Ventilator Bronchodilator assessment    Breath sounds: clear  Inspiratory Pressure: 23  Plateau Pressure: 19    Patient assessed at level 1          []    Bronchodilator Assessment    BRONCHODILATOR ASSESSMENT SCORE  Score 0 (Home) 1 2 3 4   Breath Sounds   []  Chronic Ventilator: Patient at baseline [x]  Mild Wheezes/ Clear []  Intermittent wheezes with good air entry []  Bilateral/unilateral wheezing with diminished air entry []  Insp/Exp wheeze and/or poor aeration   Ventilator Pressures   []  Chronic Ventilator [x]  Insp. Pressure less than 25 cm H20 []  Insp. Pressure less than 25 cm H20 []  Insp. Pressure exceeds 25 cm H20 []  Insp.  Pressure exceeds 30 cm H20   Plateau Pressure []  NA   [x]  Plateau Pressure less than 4  []  Plateau Pressure less than or equal to 5 []  Plateau Pressure greater than or equal to 6 []  Plateau Pressure greater than or equal to [place]  8:01 AM

## 2020-07-09 NOTE — CARE COORDINATION
Case Management Initial Discharge Plan  Keshawn Morgan,             Met with:patient to discuss discharge plans. Information verified: address, contacts, phone number, , insurance yes    Emergency Contact/Next of Kin name & number: Mother Cassia Wild [phone]    PCP: No primary care provider on file. Date of last visit: could not recall     Insurance Provider: [employer]    Living Arrangements:      Support Systems:       Patient is independent and does not use any DME for mobility  Patient able to perform ADL's:Independent    Current Services (outpatient & in home) none  DME equipment: none  DME provider: n/a     Receiving oral anticoagulation therapy? No    If indicated:   Physician managing anticoagulation treatment: no  Where does patient obtain lab work for ATC treatment? N/a       Potential Assistance Needed:       Patient agreeable to home care: No  [place] of choice provided:  n/a    Prior SNF/Rehab Placement and Facility: no  Agreeable to SNF/Rehab: No  [place] of choice provided: n/a     Evaluation: yes    Expected Discharge date:       Patient expects to be discharged to: Follow Up Appointment: Best Day/ Time:      Transportation provider: [facility]  Transportation arrangements needed for discharge: Yes    Readmission Risk              Risk of Unplanned Readmission:        30             Does patient have a readmission risk score greater than 14?: yes  If yes, follow-up appointment must be made within 7 days of discharge. Goals of Care: psych eval ordered today. Patient on 1:1 monitoring with security. Discharge Plan: independent. Anticipate BHI depending on psych recs. Patient has a very flat affect and gives minimal information.            Electronically signed by Stacie Bolton RN on 20 at 1:14 PM EDT

## 2020-07-10 PROBLEM — F11.921: Status: ACTIVE | Noted: 2020-07-10

## 2020-07-10 LAB
ABSOLUTE EOS #: 0.28 K/UL (ref 0–0.4)
ABSOLUTE IMMATURE GRANULOCYTE: 0.7 K/UL (ref 0–0.3)
ABSOLUTE LYMPH #: 3.89 K/UL (ref 1–4.8)
ABSOLUTE MONO #: 0.14 K/UL (ref 0.1–0.8)
ANION GAP SERPL CALCULATED.3IONS-SCNC: 15 MMOL/L (ref 9–17)
BASOPHILS # BLD: 1 % (ref 0–2)
BASOPHILS ABSOLUTE: 0.14 K/UL (ref 0–0.2)
BUN BLDV-MCNC: 5 MG/DL (ref 6–20)
BUN/CREAT BLD: ABNORMAL (ref 9–20)
CALCIUM SERPL-MCNC: 8.4 MG/DL (ref 8.6–10.4)
CHLORIDE BLD-SCNC: 110 MMOL/L (ref 98–107)
CO2: 23 MMOL/L (ref 20–31)
CREAT SERPL-MCNC: 0.67 MG/DL (ref 0.5–0.9)
DIFFERENTIAL TYPE: ABNORMAL
EOSINOPHILS RELATIVE PERCENT: 2 % (ref 1–4)
GFR AFRICAN AMERICAN: >60 ML/MIN
GFR NON-AFRICAN AMERICAN: >60 ML/MIN
GFR SERPL CREATININE-BSD FRML MDRD: ABNORMAL ML/MIN/{1.73_M2}
GFR SERPL CREATININE-BSD FRML MDRD: ABNORMAL ML/MIN/{1.73_M2}
GLUCOSE BLD-MCNC: 81 MG/DL (ref 65–105)
GLUCOSE BLD-MCNC: 88 MG/DL (ref 65–105)
GLUCOSE BLD-MCNC: 88 MG/DL (ref 70–99)
HCT VFR BLD CALC: 37.8 % (ref 36.3–47.1)
HEMOGLOBIN: 12.3 G/DL (ref 11.9–15.1)
IMMATURE GRANULOCYTES: 5 %
LYMPHOCYTES # BLD: 28 % (ref 24–44)
MAGNESIUM: 1.6 MG/DL (ref 1.6–2.6)
MCH RBC QN AUTO: 29.4 PG (ref 25.2–33.5)
MCHC RBC AUTO-ENTMCNC: 32.5 G/DL (ref 28.4–34.8)
MCV RBC AUTO: 90.4 FL (ref 82.6–102.9)
MONOCYTES # BLD: 1 % (ref 1–7)
MORPHOLOGY: NORMAL
NRBC AUTOMATED: 0 PER 100 WBC
PDW BLD-RTO: 13.7 % (ref 11.8–14.4)
PLATELET # BLD: 342 K/UL (ref 138–453)
PLATELET ESTIMATE: ABNORMAL
PMV BLD AUTO: 12.1 FL (ref 8.1–13.5)
POTASSIUM SERPL-SCNC: 3.1 MMOL/L (ref 3.7–5.3)
RBC # BLD: 4.18 M/UL (ref 3.95–5.11)
RBC # BLD: ABNORMAL 10*6/UL
SEG NEUTROPHILS: 63 % (ref 36–66)
SEGMENTED NEUTROPHILS ABSOLUTE COUNT: 8.75 K/UL (ref 1.8–7.7)
SODIUM BLD-SCNC: 148 MMOL/L (ref 135–144)
WBC # BLD: 13.9 K/UL (ref 3.5–11.3)
WBC # BLD: ABNORMAL 10*3/UL

## 2020-07-10 PROCEDURE — 6360000002 HC RX W HCPCS: Performed by: STUDENT IN AN ORGANIZED HEALTH CARE EDUCATION/TRAINING PROGRAM

## 2020-07-10 PROCEDURE — 99222 1ST HOSP IP/OBS MODERATE 55: CPT | Performed by: INTERNAL MEDICINE

## 2020-07-10 PROCEDURE — 85025 COMPLETE CBC W/AUTO DIFF WBC: CPT

## 2020-07-10 PROCEDURE — 80048 BASIC METABOLIC PNL TOTAL CA: CPT

## 2020-07-10 PROCEDURE — 6370000000 HC RX 637 (ALT 250 FOR IP): Performed by: STUDENT IN AN ORGANIZED HEALTH CARE EDUCATION/TRAINING PROGRAM

## 2020-07-10 PROCEDURE — 90792 PSYCH DIAG EVAL W/MED SRVCS: CPT | Performed by: NURSE PRACTITIONER

## 2020-07-10 PROCEDURE — 36415 COLL VENOUS BLD VENIPUNCTURE: CPT

## 2020-07-10 PROCEDURE — 2580000003 HC RX 258: Performed by: STUDENT IN AN ORGANIZED HEALTH CARE EDUCATION/TRAINING PROGRAM

## 2020-07-10 PROCEDURE — 83735 ASSAY OF MAGNESIUM: CPT

## 2020-07-10 PROCEDURE — 82947 ASSAY GLUCOSE BLOOD QUANT: CPT

## 2020-07-10 PROCEDURE — 1200000000 HC SEMI PRIVATE

## 2020-07-10 PROCEDURE — 6360000002 HC RX W HCPCS: Performed by: INTERNAL MEDICINE

## 2020-07-10 RX ORDER — MAGNESIUM SULFATE 1 G/100ML
1 INJECTION INTRAVENOUS
Status: COMPLETED | OUTPATIENT
Start: 2020-07-10 | End: 2020-07-10

## 2020-07-10 RX ORDER — ACETAMINOPHEN 325 MG/1
650 TABLET ORAL EVERY 4 HOURS PRN
Status: DISCONTINUED | OUTPATIENT
Start: 2020-07-10 | End: 2020-07-11 | Stop reason: HOSPADM

## 2020-07-10 RX ADMIN — ALPRAZOLAM 0.5 MG: 0.5 TABLET ORAL at 04:28

## 2020-07-10 RX ADMIN — BUSPIRONE HYDROCHLORIDE 10 MG: 10 TABLET ORAL at 13:00

## 2020-07-10 RX ADMIN — BUPRENORPHINE AND NALOXONE 1 TABLET: 8; 2 TABLET SUBLINGUAL at 09:14

## 2020-07-10 RX ADMIN — SODIUM CHLORIDE 1.5 G: 900 INJECTION INTRAVENOUS at 02:42

## 2020-07-10 RX ADMIN — POTASSIUM CHLORIDE 40 MEQ: 1500 TABLET, EXTENDED RELEASE ORAL at 15:41

## 2020-07-10 RX ADMIN — BENZOCAINE AND MENTHOL 1 LOZENGE: 15; 3.6 LOZENGE ORAL at 17:49

## 2020-07-10 RX ADMIN — BUSPIRONE HYDROCHLORIDE 10 MG: 10 TABLET ORAL at 09:14

## 2020-07-10 RX ADMIN — SODIUM CHLORIDE 1.5 G: 900 INJECTION INTRAVENOUS at 09:14

## 2020-07-10 RX ADMIN — MAGNESIUM SULFATE HEPTAHYDRATE 1 G: 1 INJECTION, SOLUTION INTRAVENOUS at 18:43

## 2020-07-10 RX ADMIN — BUSPIRONE HYDROCHLORIDE 10 MG: 10 TABLET ORAL at 20:28

## 2020-07-10 RX ADMIN — ACETAMINOPHEN 650 MG: 325 TABLET ORAL at 17:48

## 2020-07-10 RX ADMIN — BENZOCAINE AND MENTHOL 1 LOZENGE: 15; 3.6 LOZENGE ORAL at 14:03

## 2020-07-10 RX ADMIN — SODIUM CHLORIDE 1.5 G: 900 INJECTION INTRAVENOUS at 20:28

## 2020-07-10 RX ADMIN — QUETIAPINE FUMARATE 100 MG: 100 TABLET ORAL at 20:28

## 2020-07-10 RX ADMIN — ENOXAPARIN SODIUM 40 MG: 40 INJECTION SUBCUTANEOUS at 09:14

## 2020-07-10 RX ADMIN — QUETIAPINE FUMARATE 100 MG: 100 TABLET ORAL at 09:14

## 2020-07-10 RX ADMIN — ALPRAZOLAM 0.5 MG: 0.5 TABLET ORAL at 20:35

## 2020-07-10 RX ADMIN — BUPRENORPHINE AND NALOXONE 1 TABLET: 8; 2 TABLET SUBLINGUAL at 20:28

## 2020-07-10 RX ADMIN — MAGNESIUM SULFATE HEPTAHYDRATE 1 G: 1 INJECTION, SOLUTION INTRAVENOUS at 17:42

## 2020-07-10 RX ADMIN — SODIUM CHLORIDE 1.5 G: 900 INJECTION INTRAVENOUS at 14:03

## 2020-07-10 RX ADMIN — Medication 10 ML: at 20:29

## 2020-07-10 ASSESSMENT — PAIN SCALES - GENERAL
PAINLEVEL_OUTOF10: 10
PAINLEVEL_OUTOF10: 8
PAINLEVEL_OUTOF10: 10

## 2020-07-10 NOTE — FLOWSHEET NOTE
DATE: 7/10/2020    NAME: Gelacio Fuchs  MRN: [id number]   : 1995    Patient not seen this date for Physical Therapy due to:  [] Blood transfusion in progress  [] Hemodialysis  [x]  Patient Declined- pt states she just took her meds and they make her sleep. Encouraged pt to attempt even just sitting on EOB, however pt continues to decline. Will check back as able.   [] Spine Precautions   [] Strict Bedrest  [] Surgery/ Procedure  [] Testing      [] Other        [] PT being discontinued at this time. Patient independent. No further needs. [] PT being discontinued at this time as the patient has been transferred to palliative care. No further needs.     Marina Keating, PT

## 2020-07-10 NOTE — PROGRESS NOTES
[facility]  Internal Medicine Teaching Residency Program  Inpatient Daily Progress Note  ______________________________________________________________________________    Patient: Jessie Zafar  YOB: 1995   NTQ:[id number]    Acct: [de-identified]     Room: [facility]  Admit date: 7/7/2020  Today's date: 07/10/20  Number of days in the hospital: 3    SUBJECTIVE   Admitting Diagnosis: Acute toxic metabolic encephalopathy. CC: AMS  Pt examined at bedside. Chart & results reviewed. Vitals stable. Today c/o cough, no fever or chills. No change in urinary or bowel movements. Pt tested negative for HCG on 7/07/2020. ROS:  Constitutional:  negative for chills, fevers, sweats  Respiratory:  C/O cough. No dyspnea on exertion, hemoptysis, shortness of breath, wheezing  Cardiovascular:  negative for chest pain, chest pressure/discomfort, lower extremity edema, palpitations  Gastrointestinal:  negative for abdominal pain, constipation, diarrhea, nausea, vomiting  Neurological:  negative for dizziness, headache  BRIEF HISTORY     23 y.o. female with history of polysubstance abuse was found unresponsive in a hotel room. She was brought to the ED via EMS for altered mental status and possible drug overdose. She was unresponsive with pinpoint pupils, was given 2 mg IM Narcan followed by 4 mg IV Narcan mentation improved and she became unresponsive pupils were 6 mm responsive to light. EMS reported that she became aggressive and agitated, heart rate went up to 110, during the transit. In ED GCS was 9 pupil size 5mm with waxing and waning mentation, she worsened to GCS 3 and improved to 9 with Narcan. She was started on Narcan drip. Lactic acid was 4.2, WBC 22 patient became hypotensive. CXR showed consolidation. Patient was given 1 dose of ceftriaxone, azithromycin and vancomycin. Also received IV fluids per sepsis protocol.   Urine tox was positive for opioids and cocaine, lactic acid was high at presentation. Patient was saturating 95 200% with NRB, but her mentation was not improving. Patient was intubated in the ED to protect airways on 2020 and Narcan was discontinued. Tested negative for COVID. OBJECTIVE     Vital Signs:  BP (!) 131/93   Pulse 75   Temp 98.4 °F (36.9 °C) (Oral)   Resp 21   Wt 139 lb 5.3 oz (63.2 kg)   SpO2 97%   BMI 23.92 kg/m²     Temp (24hrs), Av.8 °F (36.6 °C), Min:97.2 °F (36.2 °C), Max:98.4 °F (36.9 °C)    In: 1364   Out: 500 [Urine:500]    Physical Exam:  Constitutional: This is a well developed, well nourished, 18.5-24.9 - Normal 23y.o. year old female who is alert, oriented, cooperative and in no apparent distress. EENT:  PERRLA. No conjunctival injections. Neck: Supple without thyromegaly. No elevated JVP. Trachea was midline. Respiratory: Chest was symmetrical without dullness to percussion. Breath sounds bilaterally were clear to auscultation. Cardiovascular: Regular without murmur, clicks, gallops or rubs. Abdomen: Slightly rounded and soft without organomegaly. No rebound, rigidity or guarding was appreciated. Lymphatic: No lymphadenopathy. Musculoskeletal: Normal curvature of the spine. No gross muscle weakness. Extremities:  No lower extremity edema, ulcerations, tenderness, varicosities or erythema. Muscle size, tone and strength are normal.  No involuntary movements are noted. Skin:  Warm and dry. Good color, turgor and pigmentation. No lesions or scars.   No cyanosis or clubbing  Neurological/Psychiatric: The patient's general behavior, level of consciousness, thought content and emotional status is normal.        Medications:  Scheduled Medications:    nicotine  1 patch Transdermal Daily    busPIRone  10 mg Oral TID    QUEtiapine  100 mg Oral BID    buprenorphine-naloxone  1 tablet Sublingual BID    insulin lispro  0-12 Units Subcutaneous TID     insulin lispro  0-6 Units Subcutaneous Nightly    ampicillin-sulbactam  1.5 g Intravenous Q6H    sodium chloride flush  10 mL Intravenous 2 times per day    enoxaparin  40 mg Subcutaneous Daily    sodium chloride  1,000 mL Intravenous Once     Continuous Infusions:    dextrose      sodium chloride 50 mL/hr at 07/09/20 1640     PRN Medicationsalbuterol sulfate HFA, 2 puff, Q6H PRN  benzocaine-menthol, 1 lozenge, Q2H PRN  ALPRAZolam, 0.5 mg, Nightly PRN  potassium chloride, 40 mEq, PRN    Or  potassium alternative oral replacement, 40 mEq, PRN    Or  potassium chloride, 10 mEq, PRN  fentanNYL, 50 mcg, Q1H PRN    Or  fentanNYL, 100 mcg, Q1H PRN  glucose, 15 g, PRN  dextrose, 12.5 g, PRN  glucagon (rDNA), 1 mg, PRN  dextrose, 100 mL/hr, PRN  sodium chloride flush, 10 mL, PRN  promethazine, 12.5 mg, Q6H PRN    Or  ondansetron, 4 mg, Q6H PRN        Diagnostic Labs:  CBC:   Recent Labs     07/08/20 0426 07/09/20  0345   WBC 15.2* 14.0*   RBC 3.37* 3.46*   HGB 10.1* 10.3*   HCT 32.7* 34.0*   MCV 97.0 98.3   RDW 14.2 14.2    274     BMP:   Recent Labs     07/08/20 0426 07/08/20 2026 07/09/20  0345     --  146*   K 3.2* 3.5* 3.6*   *  --  116*   CO2 20  --  19*   BUN 8  --  9   CREATININE 0.62  --  0.62     BNP: No results for input(s): BNP in the last 72 hours. PT/INR: No results for input(s): PROTIME, INR in the last 72 hours. APTT: No results for input(s): APTT in the last 72 hours. CARDIAC ENZYMES: No results for input(s): CKMB, CKMBINDEX, TROPONINI in the last 72 hours. Invalid input(s): CKTOTAL;3  FASTING LIPID PANEL:No results found for: CHOL, HDL, TRIG  LIVER PROFILE: No results for input(s): AST, ALT, ALB, BILIDIR, BILITOT, ALKPHOS in the last 72 hours.    MICROBIOLOGY:   Lab Results   Component Value Date/Time    CULTURE STREPTOCOCCUS PYOGENES (GROUP A) MODERATE GROWTH (A) 07/07/2020 03:12 PM    CULTURE NORMAL RESPIRATORY KOFI MODERATE GROWTH 07/07/2020 03:12 PM       Imaging:    Ct Head Wo

## 2020-07-10 NOTE — PROGRESS NOTES
Nutrition Assessment    Type and Reason for Visit: Reassess    Nutrition Recommendations:   - Continue current General diet. Encourage/monitor intakes as tolerated. Monitor need for ONS. Nutrition Assessment: Pt extubated 7/9. Pt has been started on a General diet - consuming more than 50% of her meals. Sitter at bedside. Malnutrition Assessment:  · Malnutrition Status: At risk for malnutrition  · Context: Acute illness or injury  · Findings of the 6 clinical characteristics of malnutrition (Minimum of 2 out of 6 clinical characteristics is required to make the diagnosis of moderate or severe Protein Calorie Malnutrition based on AND/ASPEN Guidelines):  1. Energy Intake-Unable to assess, Unable to assess    2. Weight Loss-Unable to assess  3. Fat Loss-No significant subcutaneous fat loss  4. Muscle Loss-No significant muscle mass loss  5. Fluid Accumulation-(Mild to Moderate fluid accumulation), Extremities    Nutrition Risk Level: Moderate    Nutrient Needs:  · Estimated Daily Total Kcal: 6274-3145 kcals/day  · Estimated Daily Protein (g): 65-85 gm pro/day    Nutrition Diagnosis:   · Problem: Inadequate oral intake  · Etiology: related to (Recent extubation, current medical condition)     Signs and symptoms:  as evidenced by (recent start of oral diet, improving PO intakes)    Objective Information:  · Nutrition-Focused Physical Findings: Labs/Meds reviewed.   · Wound Type: None  · Current Nutrition Therapies:  · Oral Diet Orders: General   · Oral Diet intake: 51-75%, %  · Oral Nutrition Supplement (ONS) Orders: None  · Anthropometric Measures:  · Ht: (5'4\" (162.6 cm) per chart review)   · Current Body Wt: 139 lb 5.3 oz (63.2 kg)  · Admission Body Wt: 139 lb 5.3 oz (63.2 kg)  · Ideal Body Wt: 120 lb (54.4 kg), % Ideal Body 116%  · BMI Classification: BMI 18.5 - 24.9 Normal Weight(23.9)    Nutrition Interventions:   Continue current diet(Monitor need for ONS with meals.)  Continued Inpatient

## 2020-07-10 NOTE — H&P
[address] [facility]     Department of Internal Medicine - Staff Internal Medicine Teaching Service          ADMISSION NOTE/HISTORY AND PHYSICAL EXAMINATION   Date: 7/9/2020  Patient Name: Gelacio Fuchs  Date of admission: 7/7/2020  7:37 AM  YOB: 1995  PCP: No primary care provider on file. History Obtained From:  patient, electronic medical record    CHIEF COMPLAINT     Chief complaint: Altered mental status    HISTORY OF PRESENTING ILLNESS      23 y.o. female with history of polysubstance abuse was found unresponsive in a hotel room. She was brought to the ED via EMS for altered mental status and possible heroin overdose. She was unresponsive with pinpoint pupils. She was given 2 mg IM Narcan followed by 4 mg IV Narcan mentation improved and she became unresponsive pupils were 6 mm responsive to light. During the transport, EMS reported that she became aggressive and agitated, heart rate went up to 110. In ED GCS was 9 pupil size 5mm. Patient's mentation was waxing and waning, she worsened to GCS 3 and improved to 9 with Narcan. She was started on Narcan drip. Lactic acid was 4.2, WBC 22 patient became hypotensive. CXR showed consolidation. Patient was given 1 dose of ceftriaxone, azithromycin and vancomycin. Also received IV fluids per sepsis protocol. Urine tox was positive for opioids and cocaine, lactic acid was high at presentation. Patient was saturating 95 200% with NRB, but her mentation was not improving. Patient was intubated in the ED to protect airways on July 7, 2020 and Narcan was discontinued. Tested negative for COVID. Cardiology was consulted due to elevated troponin. EKG showed early repolarization changes. No significant ST-T wave abnormality. QTc normal.  Echo done, normal with EF 58%.       PAST MEDICAL HISTORY     Past Medical History:   Diagnosis Date    ADHD (attention deficit hyperactivity disorder)     ADHD (attention deficit hyperactivity disorder)     Anxiety     ANDRE (generalized anxiety disorder)     MDD (major depressive disorder)     Restless leg syndrome        PAST SURGICAL HISTORY     No past surgical history on file. ALLERGIES     Nuts [macadamia nut oil]    MEDICATIONS PRIOR TO ADMISSION     Prior to Admission medications    Medication Sig Start Date End Date Taking? Authorizing Provider   lithium (ESKALITH) 450 MG extended release tablet Take 450 mg by mouth 2 times daily   Yes Historical Provider, MD   busPIRone (BUSPAR) 10 MG tablet Take 10 mg by mouth 3 times daily   Yes Historical Provider, MD   QUEtiapine (SEROQUEL) 100 MG tablet Take 100 mg by mouth 2 times daily   Yes Historical Provider, MD   rOPINIRole (REQUIP) 5 MG tablet Take 5 mg by mouth 3 times daily   Yes Historical Provider, MD   gabapentin (NEURONTIN) 300 MG capsule Take 300 mg by mouth 3 times daily. Yes Historical Provider, MD   ALPRAZolam Angela [place]) 0.5 MG tablet Take 0.5 mg by mouth nightly as needed for Sleep. Harjinder Alpha Historical Provider, MD   Magnesium 100 MG TABS Take by mouth daily otc   Yes Historical Provider, MD   Salicylic Acid 2 % PADS Apply 1 each topically daily 17  Yes Bobetta Seip, MD       SOCIAL HISTORY     Tobacco: Smokes cigarettes and e-cigarettes. Alcohol: Alcohol use of about 4 standard drinks of alcohol per week  Illicits: Uses marijuana and opiates, 2 times per week. FAMILY HISTORY     No family history on file. PHYSICAL EXAM     Vitals: BP (!) 131/100   Pulse 83   Temp 98 °F (36.7 °C) (Oral)   Resp 24   Wt 139 lb 5.3 oz (63.2 kg)   SpO2 97%   BMI 23.92 kg/m²   Tmax: Temp (24hrs), Av.2 °F (36.8 °C), Min:98 °F (36.7 °C), Max:98.4 °F (36.9 °C)    Last Body weight:   Wt Readings from Last 3 Encounters:   20 139 lb 5.3 oz (63.2 kg)   20 140 lb (63.5 kg)   20 104 lb (47.2 kg)     Body Mass Index : Body mass index is 23.92 kg/m².       PHYSICAL EXAMINATION:  Constitutional:  18.5-24.9 - Normal 23y.o. year old female who is alert, oriented with flat affect. EENT: Pupil normal, got scar kinsey on right eyebrow. Geralene Carmela Respiratory: Chest was symmetrical without dullness to percussion. Breath sounds bilaterally were clear to auscultation. Cardiovascular: Regular without murmur, clicks, gallops or rubs. Musculoskeletal:  No gross muscle weakness. Extremities: Rash on medial aspect of left foot. Skin:  Warm and dry. Good color, turgor and pigmentation.   Neurological/Psychiatric: The patient's general behavior, level of consciousness, thought content and emotional status is normal.          INVESTIGATIONS     Laboratory Testing:     Recent Results (from the past 24 hour(s))   POC Glucose Fingerstick    Collection Time: 07/08/20  9:37 PM   Result Value Ref Range    POC Glucose 93 65 - 105 mg/dL   Basic Metabolic Panel w/ Reflex to MG    Collection Time: 07/09/20  3:45 AM   Result Value Ref Range    Glucose 101 (H) 70 - 99 mg/dL    BUN 9 6 - 20 mg/dL    CREATININE 0.62 0.50 - 0.90 mg/dL    Bun/Cre Ratio NOT REPORTED 9 - 20    Calcium 8.1 (L) 8.6 - 10.4 mg/dL    Sodium 146 (H) 135 - 144 mmol/L    Potassium 3.6 (L) 3.7 - 5.3 mmol/L    Chloride 116 (H) 98 - 107 mmol/L    CO2 19 (L) 20 - 31 mmol/L    Anion Gap 11 9 - 17 mmol/L    GFR Non-African American >60 >60 mL/min    GFR African American >60 >60 mL/min    GFR Comment          GFR Staging NOT REPORTED    CBC auto differential    Collection Time: 07/09/20  3:45 AM   Result Value Ref Range    WBC 14.0 (H) 3.5 - 11.3 k/uL    RBC 3.46 (L) 3.95 - 5.11 m/uL    Hemoglobin 10.3 (L) 11.9 - 15.1 g/dL    Hematocrit 34.0 (L) 36.3 - 47.1 %    MCV 98.3 82.6 - 102.9 fL    MCH 29.8 25.2 - 33.5 pg    MCHC 30.3 28.4 - 34.8 g/dL    RDW 14.2 11.8 - 14.4 %    Platelets 158 522 - 523 k/uL    MPV 11.5 8.1 - 13.5 fL    NRBC Automated 0.0 0.0 per 100 WBC    Differential Type NOT REPORTED     Seg Neutrophils 65 36 - 65 %    Lymphocytes 19 (L) 24 - 43 %    Monocytes 9 3 - 12 % consulted. Patient refused to see SANE worker. 7.  History of bipolar disorder  Psych consulted. Suicide precautions and bedside sitter. DVT ppx: Lovenox 40 mg      PT/OT/SW: Consulted  Discharge Planning: As per  recommendation. Pema Hutchison MD  Internal Medicine Resident, PGY-1  [facility]; [place], [place]  7/9/2020, 8:55 PM      Attending Physician Statement  I have discussed the case, including pertinent history and exam findings with the resident and the team.  I have seen and examined the patient and the key elements of the encounter have been performed by me. I agree with the assessment, plan and orders as documented by the resident. 72-year-old female transferred from ICU. Initially admitted for drug overdose. Possibly suicide attempt but that is not confirmed. Currently she is awake and following commands. She is not in acute distress. She is on room air. She is on Unasyn. Denies any acute issues. Patient to have psychiatry consult today. If accepted to [facility] patient can be discharged as she is cleared medically to be discharged to [facility].   On discharge can change Unasyn to Augmentin    Franco Patterson MD  Attending Physician, Internal Medicine Service    Internal Medicine Residency Program  7/10/2020, 5:34 PM

## 2020-07-10 NOTE — CARE COORDINATION
Noted psych recs for [facility] at discharge    Met with pt again  Discussed recovery and importance of getting into a tx program that also has recovery housing  Provided pt with list of resources/tx programs/recovery houses  Pt stated she would only be agreeable to Aubrey Pryor the last time he was at [facility], she was supposed to go to [facility] afterwards but \"I f---ed up\"   Pt stated she is leaving tomorrow and she is going to go stay with Randall Rm (RN updated)  Stated she will consider tx/recovery housing but would not be agreeable to going from the hospital    Rec AOD tx/recovery housing once pt is discharged from [facility], however, pt would need to be agreeable

## 2020-07-10 NOTE — PROGRESS NOTES
Occupational Therapy    Occupational Therapy Not Seen Note    DATE: 7/10/2020  Name: Miriam Villatoro  : 1995  MRN: [id number]    Patient not available for Occupational Therapy due to:    Patient Declined: Pt agitated when woken up stating \"leave me alone, I just want to sleep\".         Electronically signed by ELIE Hinson on 7/10/2020 at 4:12 PM

## 2020-07-10 NOTE — PLAN OF CARE
Skin assessment complete. No breakdown noted. Interventions in place. See doc flowsheet for interventions initiated. Pt encouraged to turn. Will continue to monitor for additional needs and skin breakdown. Patient assessed for fall risk and fall precautions initiated. Patient and family instructed about safety devices. Environment kept free of clutter and adequate lighting provided. Bed in lowest position with brakes locked. Call light in reach. Patient ID band correct and in place. Patient transferred with appropriate methods. Will continue to monitor.   Sin Esteban RN

## 2020-07-10 NOTE — VIRTUAL HEALTH
Inpatient consult to Psychiatry  Consult performed by: OLGA Hutson - CNP  Consult ordered by: Nallely Mcgregor MD      Patient Location:  [address] [facility]    Department of Psychiatry  Consult Service  Psychiatric Assessment      Thank you very much for allowing us to participate in the care of this patient. Reason for Consult:  Overdose, unknown intention      History obtained from:  patient, electronic medical record    HISTORY OF PRESENT ILLNESS:          The patient is a 23 y.o. female with significant past medical history of   Past Medical History:        Diagnosis Date    ADHD (attention deficit hyperactivity disorder)     ADHD (attention deficit hyperactivity disorder)     Anxiety     ANDRE (generalized anxiety disorder)     MDD (major depressive disorder)     Restless leg syndrome       Per records, patient was found unresponsive in a hotel room. She was brought in by EMS for altered mental status and possible heroin overdose. She was given 2mg IM Narcan followed by 4mg IV Narcan which improved responsiveness and mentation. She was aggressive on transport. Patient was able extubated yesterday morning. She would not give a clear answer if overdose was intentional or accidental.  Additionally, when RN commented about EMS saving patients life, patient sated I wish they wouldnt have. Current Outpatient Psychiatric Medications:    Currently receiving   buspar 10mg TID, suboxone 8-2mg SL BID, seroquel 100mg BID    PAST PSYCHIATRIC HISTORY:      Patient reports recent use of heroin and crack. She been to [facility] 3x and was most recently in their detox and PHP program. Her longest period of sobriety was 3 years. She is unable to identify when she last relapsed, frequency or use. She reports she is not currently in treatment. She reports a history of \"manic depression\".   She has tried many medications per her report but was unable to identify any of them.    Past psychiatric medications include: reports previous trials but did not recall names    Adverse reactions from psychotropic medications:  DIAMANTE    Lifetime Psychiatric Review of Systems:     Megan: endorses hx of bipolar disorder.  Recent sx include irritability and decreased sleep  Panic: denies  Phobia: denies  Hallucinations: denies  Delusions: denies     Medications:    Current Facility-Administered Medications: albuterol sulfate  (90 Base) MCG/ACT inhaler 2 puff, 2 puff, Inhalation, Q6H PRN  nicotine (NICODERM CQ) 21 MG/24HR 1 patch, 1 patch, Transdermal, Daily  benzocaine-menthol (CEPACOL SORE THROAT) lozenge 1 lozenge, 1 lozenge, Oral, Q2H PRN  ALPRAZolam (XANAX) tablet 0.5 mg, 0.5 mg, Oral, Nightly PRN  busPIRone (BUSPAR) tablet 10 mg, 10 mg, Oral, TID  QUEtiapine (SEROQUEL) tablet 100 mg, 100 mg, Oral, BID  buprenorphine-naloxone (SUBOXONE) 8-2 MG SL tablet 1 tablet, 1 tablet, Sublingual, BID  potassium chloride (KLOR-CON M) extended release tablet 40 mEq, 40 mEq, Oral, PRN **OR** potassium bicarb-citric acid (EFFER-K) effervescent tablet 40 mEq, 40 mEq, Oral, PRN **OR** potassium chloride 10 mEq/100 mL IVPB (Peripheral Line), 10 mEq, Intravenous, PRN  fentaNYL (SUBLIMAZE) injection 50 mcg, 50 mcg, Intravenous, Q1H PRN **OR** fentaNYL (SUBLIMAZE) injection 100 mcg, 100 mcg, Intravenous, Q1H PRN  glucose (GLUTOSE) 40 % oral gel 15 g, 15 g, Oral, PRN  dextrose 50 % IV solution, 12.5 g, Intravenous, PRN  glucagon (rDNA) injection 1 mg, 1 mg, Intramuscular, PRN  dextrose 5 % solution, 100 mL/hr, Intravenous, PRN  insulin lispro (HUMALOG) injection vial 0-12 Units, 0-12 Units, Subcutaneous, TID WC  insulin lispro (HUMALOG) injection vial 0-6 Units, 0-6 Units, Subcutaneous, Nightly  0.9 % sodium chloride infusion, , Intravenous, Continuous  ampicillin-sulbactam (UNASYN) 1.5 g IVPB minibag, 1.5 g, Intravenous, Q6H  sodium chloride flush 0.9 % injection 10 mL, 10 mL, Intravenous, 2 times per day  sodium chloride flush 0.9 % injection 10 mL, 10 mL, Intravenous, PRN  promethazine (PHENERGAN) tablet 12.5 mg, 12.5 mg, Oral, Q6H PRN **OR** ondansetron (ZOFRAN) injection 4 mg, 4 mg, Intravenous, Q6H PRN  enoxaparin (LOVENOX) injection 40 mg, 40 mg, Subcutaneous, Daily  0.9 % sodium chloride bolus, 1,000 mL, Intravenous, Once        Past Surgical History:    No past surgical history on file. Allergies: Nuts [macadamia nut oil]      Social History:    Patients mother has been in contact with with hospital staff for collateral and stated patient does not have a set address. Patient reports she lives with her grandmother. She is unable to identify a support system. She reports use of cigarettes, crack, and heroin but was unable to identify timeline or approximate usage.     Social History     Socioeconomic History    Marital status: Single     Spouse name: Not on file    Number of children: Not on file    Years of education: Not on file    Highest education level: Not on file   Occupational History    Not on file   Social Needs    Financial resource strain: Not on file    Food insecurity     Worry: Not on file     Inability: Not on file    Transportation needs     Medical: Not on file     Non-medical: Not on file   Tobacco Use    Smoking status: Current Every Day Smoker     Packs/day: 1.00     Types: Cigarettes, E-Cigarettes     Start date: 1/1/2015    Smokeless tobacco: Never Used   Substance and Sexual Activity    Alcohol use: Not Currently     Alcohol/week: 4.0 standard drinks     Types: 4 Cans of beer per week     Comment: social    Drug use: Yes     Frequency: 2.0 times per week     Types: Marijuana, Opiates      Comment: uses heroin everyday she states     Sexual activity: Yes     Partners: Male   Lifestyle    Physical activity     Days per week: Not on file     Minutes per session: Not on file    Stress: Not on file   Relationships    Social connections     Talks on phone: Not on file Gets together: Not on file     Attends Christian service: Not on file     Active member of club or organization: Not on file     Attends meetings of clubs or organizations: Not on file     Relationship status: Not on file    Intimate partner violence     Fear of current or ex partner: Not on file     Emotionally abused: Not on file     Physically abused: Not on file     Forced sexual activity: Not on file   Other Topics Concern    Not on file   Social History Narrative    Not on file       Family Psychiatric History:  DIAMANTE    Family History:   No family history on file. Physical  BP (!) 131/93   Pulse 75   Temp 97.2 °F (36.2 °C) (Temporal)   Resp 21   Wt 139 lb 5.3 oz (63.2 kg)   SpO2 95%   BMI 23.92 kg/m²     MENTAL STATUS EXAM:  Level of consciousness:  lethargic  Appearance:  ill-appearing, hospital attire and fair grooming  Behavior/Motor:  psychomotor retardation  Attitude toward examiner:  evasive, guarded and withdrawn  Speech:  slow and interrupting  Mood:  Dysphoric, irritable  Affect:  mood congruent  Thought processes:  slow  Thought content:  Homocidal ideation denies  Suicidal Ideation:  denies suicidal ideation  Delusions:  no evidence of delusions  Perceptual Disturbance:  denies any perceptual disturbance  Cognition:  oriented to person, place, and time (month and year only)  Memory: does not recall incident leading up to hosptialization  Insight & Judgement: limited   Medication side effects: denies       Patient Active Problem List   Diagnosis Code    Opioid intoxication, with delirium ([address] F11.921    Toxic metabolic encephalopathy N89    Polysubstance abuse ([address] F19.10    Acute kidney injury (STEVE) with acute tubular necrosis (ATN) ([address] N17.0    Lactic acidosis E87.2    Accidental overdose of heroin ([address] T40.1X1A         DSM-V DIAGNOSIS:  Opiate use disorder    Impression    Jane Cárdenas was tired, guarded, irritable, and her speech was slurred.   Questions needed to be repeated to elicit answers. Neli Pratt reports that she has been depressed for years. Her most recent symptoms include sadness, irritability, difficulty sleeping, isolation, hopelessness. She denies auditory or visual hallucinations. Denies HI. She reports she is unable to recall why she is in the hospital or the event that led up to the hospitalization, \"I don't know, probably an overdose\". She stated she doesn't know what her intention was with the last use but that she hadn't been suicidal over the last few weeks. She has had several accidental overdoses in the past. She denies a history of suicide attempts; though ED records indicate a suicide attempt by hanging Sept 2019 (cord broke and she stated it was for attention). She expresses hopelessness and declined to future plan. She feels her medication is not stabilizing her mood and she could benefit from adjustments. . She verbalized that she would return to [facility] but would not be willing to go to a sober house. Recommendations: Though patient declined current suicidal ideation, her symptoms prior to admission, actions, and comments to nursing staff warrant additional treatment. The patient was guarded, falling asleep, and had difficulty engaging in the assessment. If she begins to better engage in conversation, a follow up consult may be beneficial. However, at this time it is recommended to contact the on-call provider once patient is medically cleared for consideration for admission to the [facility]. This tele-consult is in place for 72 hours. After 72 hours, please request a re-consult for evaluation of patient status prior to contacting the [facility] for admission consideration. Thank you very much for allowing us to participate in the care of this patient. Time spent > 60 min.  Physicians Signature:  Electronically signed by OLGA Hamilton CNP on 7/10/2020 at 9:57 AM.    Dragon voice recognition software used in portions of this document. Provider Location (City/State): [place], [place]    This virtual visit was conducted via interactive/real-time audio/video.

## 2020-07-10 NOTE — CARE COORDINATION
Transitional Planning  Psych consult completed this morning, recommending discharge to [facility]. IV antibiotics. Patient declined PT/OT today. Anticipate discharge soon.

## 2020-07-11 ENCOUNTER — HOSPITAL ENCOUNTER (INPATIENT)
Age: 25
LOS: 4 days | Discharge: HOME | DRG: 753 | End: 2020-07-15
Attending: PSYCHIATRY & NEUROLOGY | Admitting: PSYCHIATRY & NEUROLOGY
Payer: MEDICAID

## 2020-07-11 VITALS
SYSTOLIC BLOOD PRESSURE: 106 MMHG | OXYGEN SATURATION: 92 % | HEART RATE: 60 BPM | DIASTOLIC BLOOD PRESSURE: 81 MMHG | HEIGHT: 64 IN | WEIGHT: 139.33 LBS | BODY MASS INDEX: 23.79 KG/M2 | TEMPERATURE: 97.9 F | RESPIRATION RATE: 15 BRPM

## 2020-07-11 PROBLEM — F31.9 BIPOLAR 1 DISORDER (HCC): Status: ACTIVE | Noted: 2020-07-11

## 2020-07-11 LAB
GLUCOSE BLD-MCNC: 76 MG/DL (ref 65–105)
GLUCOSE BLD-MCNC: 87 MG/DL (ref 65–105)

## 2020-07-11 PROCEDURE — 6360000002 HC RX W HCPCS: Performed by: STUDENT IN AN ORGANIZED HEALTH CARE EDUCATION/TRAINING PROGRAM

## 2020-07-11 PROCEDURE — 6370000000 HC RX 637 (ALT 250 FOR IP): Performed by: STUDENT IN AN ORGANIZED HEALTH CARE EDUCATION/TRAINING PROGRAM

## 2020-07-11 PROCEDURE — 82947 ASSAY GLUCOSE BLOOD QUANT: CPT

## 2020-07-11 PROCEDURE — 6370000000 HC RX 637 (ALT 250 FOR IP): Performed by: PSYCHIATRY & NEUROLOGY

## 2020-07-11 PROCEDURE — 2580000003 HC RX 258: Performed by: STUDENT IN AN ORGANIZED HEALTH CARE EDUCATION/TRAINING PROGRAM

## 2020-07-11 PROCEDURE — 99232 SBSQ HOSP IP/OBS MODERATE 35: CPT | Performed by: INTERNAL MEDICINE

## 2020-07-11 PROCEDURE — 1240000000 HC EMOTIONAL WELLNESS R&B

## 2020-07-11 RX ORDER — MAGNESIUM SULFATE 1 G/100ML
1 INJECTION INTRAVENOUS
Status: COMPLETED | OUTPATIENT
Start: 2020-07-11 | End: 2020-07-11

## 2020-07-11 RX ORDER — ALPRAZOLAM 0.5 MG/1
0.5 TABLET ORAL 2 TIMES DAILY PRN
Status: DISCONTINUED | OUTPATIENT
Start: 2020-07-11 | End: 2020-07-11 | Stop reason: HOSPADM

## 2020-07-11 RX ORDER — GUAIFENESIN 600 MG/1
600 TABLET, EXTENDED RELEASE ORAL 2 TIMES DAILY
Status: DISCONTINUED | OUTPATIENT
Start: 2020-07-11 | End: 2020-07-11 | Stop reason: HOSPADM

## 2020-07-11 RX ORDER — TRAZODONE HYDROCHLORIDE 50 MG/1
50 TABLET ORAL NIGHTLY PRN
Status: DISCONTINUED | OUTPATIENT
Start: 2020-07-11 | End: 2020-07-15 | Stop reason: HOSPADM

## 2020-07-11 RX ORDER — GUAIFENESIN 600 MG/1
600 TABLET, EXTENDED RELEASE ORAL 2 TIMES DAILY
Qty: 20 TABLET | Refills: 0 | Status: ON HOLD | OUTPATIENT
Start: 2020-07-11 | End: 2020-07-12

## 2020-07-11 RX ORDER — BENZTROPINE MESYLATE 1 MG/ML
2 INJECTION INTRAMUSCULAR; INTRAVENOUS 2 TIMES DAILY PRN
Status: DISCONTINUED | OUTPATIENT
Start: 2020-07-11 | End: 2020-07-15 | Stop reason: HOSPADM

## 2020-07-11 RX ORDER — LOPERAMIDE HYDROCHLORIDE 2 MG/1
2 CAPSULE ORAL 4 TIMES DAILY PRN
Status: DISCONTINUED | OUTPATIENT
Start: 2020-07-11 | End: 2020-07-15 | Stop reason: HOSPADM

## 2020-07-11 RX ORDER — CLONIDINE HYDROCHLORIDE 0.1 MG/1
0.1 TABLET ORAL 3 TIMES DAILY PRN
Status: DISCONTINUED | OUTPATIENT
Start: 2020-07-11 | End: 2020-07-14

## 2020-07-11 RX ORDER — TRAZODONE HYDROCHLORIDE 50 MG/1
50 TABLET ORAL NIGHTLY PRN
Status: DISCONTINUED | OUTPATIENT
Start: 2020-07-12 | End: 2020-07-11

## 2020-07-11 RX ORDER — POTASSIUM CHLORIDE 20 MEQ/1
40 TABLET, EXTENDED RELEASE ORAL 2 TIMES DAILY WITH MEALS
Status: DISCONTINUED | OUTPATIENT
Start: 2020-07-11 | End: 2020-07-11 | Stop reason: HOSPADM

## 2020-07-11 RX ORDER — BENZONATATE 100 MG/1
100 CAPSULE ORAL 3 TIMES DAILY PRN
Qty: 10 CAPSULE | Refills: 0 | Status: ON HOLD | OUTPATIENT
Start: 2020-07-11 | End: 2020-07-12

## 2020-07-11 RX ORDER — BACLOFEN 10 MG/1
10 TABLET ORAL 3 TIMES DAILY PRN
Status: DISCONTINUED | OUTPATIENT
Start: 2020-07-11 | End: 2020-07-15 | Stop reason: HOSPADM

## 2020-07-11 RX ORDER — DICYCLOMINE HCL 20 MG
20 TABLET ORAL 4 TIMES DAILY PRN
Status: DISCONTINUED | OUTPATIENT
Start: 2020-07-11 | End: 2020-07-15 | Stop reason: HOSPADM

## 2020-07-11 RX ORDER — HYDROXYZINE HYDROCHLORIDE 25 MG/1
25 TABLET, FILM COATED ORAL 3 TIMES DAILY PRN
Status: DISCONTINUED | OUTPATIENT
Start: 2020-07-11 | End: 2020-07-15 | Stop reason: HOSPADM

## 2020-07-11 RX ORDER — PROMETHAZINE HYDROCHLORIDE 25 MG/1
25 TABLET ORAL EVERY 6 HOURS PRN
Status: DISCONTINUED | OUTPATIENT
Start: 2020-07-11 | End: 2020-07-15 | Stop reason: HOSPADM

## 2020-07-11 RX ORDER — AMOXICILLIN AND CLAVULANATE POTASSIUM 875; 125 MG/1; MG/1
1 TABLET, FILM COATED ORAL 2 TIMES DAILY
Qty: 14 TABLET | Refills: 0 | Status: ON HOLD | OUTPATIENT
Start: 2020-07-11 | End: 2020-07-12

## 2020-07-11 RX ORDER — ACETAMINOPHEN 325 MG/1
650 TABLET ORAL EVERY 4 HOURS PRN
Status: DISCONTINUED | OUTPATIENT
Start: 2020-07-11 | End: 2020-07-15 | Stop reason: HOSPADM

## 2020-07-11 RX ORDER — BENZONATATE 100 MG/1
100 CAPSULE ORAL 3 TIMES DAILY PRN
Status: DISCONTINUED | OUTPATIENT
Start: 2020-07-11 | End: 2020-07-11 | Stop reason: HOSPADM

## 2020-07-11 RX ADMIN — SODIUM CHLORIDE 1.5 G: 900 INJECTION INTRAVENOUS at 08:09

## 2020-07-11 RX ADMIN — BUSPIRONE HYDROCHLORIDE 10 MG: 10 TABLET ORAL at 08:09

## 2020-07-11 RX ADMIN — MAGNESIUM SULFATE HEPTAHYDRATE 1 G: 1 INJECTION, SOLUTION INTRAVENOUS at 10:50

## 2020-07-11 RX ADMIN — CLONIDINE HYDROCHLORIDE 0.1 MG: 0.1 TABLET ORAL at 22:20

## 2020-07-11 RX ADMIN — SODIUM CHLORIDE 1.5 G: 900 INJECTION INTRAVENOUS at 02:30

## 2020-07-11 RX ADMIN — BACLOFEN 10 MG: 10 TABLET ORAL at 22:20

## 2020-07-11 RX ADMIN — GUAIFENESIN 600 MG: 600 TABLET, EXTENDED RELEASE ORAL at 14:38

## 2020-07-11 RX ADMIN — ENOXAPARIN SODIUM 40 MG: 40 INJECTION SUBCUTANEOUS at 08:10

## 2020-07-11 RX ADMIN — HYDROXYZINE HYDROCHLORIDE 25 MG: 25 TABLET, FILM COATED ORAL at 22:20

## 2020-07-11 RX ADMIN — BUPRENORPHINE AND NALOXONE 1 TABLET: 8; 2 TABLET SUBLINGUAL at 08:15

## 2020-07-11 RX ADMIN — MAGNESIUM SULFATE HEPTAHYDRATE 1 G: 1 INJECTION, SOLUTION INTRAVENOUS at 09:34

## 2020-07-11 RX ADMIN — TRAZODONE HYDROCHLORIDE 50 MG: 50 TABLET ORAL at 22:20

## 2020-07-11 RX ADMIN — QUETIAPINE FUMARATE 100 MG: 100 TABLET ORAL at 08:08

## 2020-07-11 RX ADMIN — DICYCLOMINE HYDROCHLORIDE 20 MG: 20 TABLET ORAL at 22:20

## 2020-07-11 RX ADMIN — Medication 10 ML: at 10:08

## 2020-07-11 RX ADMIN — ALPRAZOLAM 0.5 MG: 0.5 TABLET ORAL at 10:08

## 2020-07-11 RX ADMIN — POTASSIUM CHLORIDE 40 MEQ: 1500 TABLET, EXTENDED RELEASE ORAL at 09:34

## 2020-07-11 RX ADMIN — BUSPIRONE HYDROCHLORIDE 10 MG: 10 TABLET ORAL at 14:38

## 2020-07-11 ASSESSMENT — LIFESTYLE VARIABLES: HISTORY_ALCOHOL_USE: NO

## 2020-07-11 ASSESSMENT — PAIN SCALES - GENERAL
PAINLEVEL_OUTOF10: 0
PAINLEVEL_OUTOF10: 10

## 2020-07-11 ASSESSMENT — SLEEP AND FATIGUE QUESTIONNAIRES
AVERAGE NUMBER OF SLEEP HOURS: 6
DO YOU USE A SLEEP AID: NO
DO YOU HAVE DIFFICULTY SLEEPING: NO

## 2020-07-11 NOTE — CARE COORDINATION
Discharge [facility] Case Management Department  Written by: Ricky Miranda RN    Patient Name: Jessie Zafar  Attending Provider: Ceasar Story MD  Admit Date: 2020  7:37 AM  MRN: [id number]  Account: [de-identified]                     : 1995  Discharge Date:  2020    Disposition:  To [facility] and transport by [facility].     Ricky Miranda RN

## 2020-07-11 NOTE — PROGRESS NOTES
[facility]  Internal Medicine Teaching Residency Program  Inpatient Daily Progress Note  ______________________________________________________________________________    Patient: Miguel Lopez  YOB: 1995   OGX:[id number]    Acct: [de-identified]     Room: [address] [place] [address]  Admit date: 7/7/2020  Today's date: 07/11/20  Number of days in the hospital: 4    SUBJECTIVE   Admitting Diagnosis: Acute Toxic Metabolic Encephalopathy. CC: AMS  Pt examined at bedside. Chart & results reviewed. Vitals stable, Pt is awake and follow commands. TelePsych recommended: Though patient declined current suicidal ideation, her symptoms prior to admission, actions, and comments to nursing staff warrant additional treatment, follow up consult may be beneficial. However, at this time it is recommended to consider for admission to the [facility].      ROS:  Constitutional:  negative for chills, fevers, sweats  Respiratory:  negative for cough, dyspnea on exertion, hemoptysis, shortness of breath, wheezing  Cardiovascular:  negative for chest pain, chest pressure/discomfort, lower extremity edema, palpitations  Gastrointestinal:  negative for abdominal pain, constipation, diarrhea, nausea, vomiting  Neurological:  negative for dizziness, headache  BRIEF HISTORY     25 y.o. female with history of polysubstance abuse was found unresponsive in a hotel room.  She was brought to the ED via EMS for AMS and possible drug overdose.  She was unresponsive with pinpoint pupils,recieved 2 mg IM Narcan followed by 4 mg IV Narcan mentation improved but soon she became unresponsive pupils were 6 mm responsive to light. EMS reported that she became aggressive and agitated, heart rate went up to 110, during the transit. In ED GCS was 9 pupil size 5mm with waxing and waning mentation, she worsened to GCS 3 and improved to 9 with Narcan.  She was started on Narcan drip.   Lactic acid was 4.2, WBC 22 patient became hypotensive.  CXR showed consolidation.  Patient was given 1 dose of ceftriaxone, azithromycin and vancomycin.  Also received IV fluids per sepsis protocol. Urine tox was positive for opioids and cocaine, lactic acid was high at presentation. Patient was saturating 95 200% with NRB, but her mentation was not improving.  Patient was intubated in the ED to protect airways on 2020 and Narcan was discontinued. Tested negative for COVID. OBJECTIVE     Vital Signs:  BP (!) 100/58   Pulse 70   Temp 96.1 °F (35.6 °C) (Axillary)   Resp 15   Ht 5' 4\" (1.626 m)   Wt 139 lb 5.3 oz (63.2 kg)   SpO2 92%   BMI 23.92 kg/m²     Temp (24hrs), Av.8 °F (36.6 °C), Min:96.1 °F (35.6 °C), Max:98.8 °F (37.1 °C)    In: 1323   Out: -     Physical Exam:  Constitutional: This is a well developed, well nourished, 18.5-24.9 - Normal 23y.o. year old female who is alert, oriented, cooperative and in no apparent distress. Head:normocephalic and atraumatic. EENT:  PERRLA. No conjunctival injections. Septum was midline, mucosa was without erythema, exudates or cobblestoning. No thrush was noted. Neck: Supple without thyromegaly. No elevated JVP. Trachea was midline. Respiratory: Chest was symmetrical without dullness to percussion. Breath sounds bilaterally were clear to auscultation. Cardiovascular: Regular without murmur, clicks, gallops or rubs. Abdomen: Slightly rounded and soft without organomegaly. No rebound, rigidity or guarding was appreciated. Lymphatic: No lymphadenopathy. Musculoskeletal: Normal curvature of the spine. No gross muscle weakness. Extremities:  No lower extremity edema, ulcerations, tenderness, varicosities or erythema. Muscle size, tone and strength are normal.  No involuntary movements are noted. Skin:  Warm and dry. Good color, turgor and pigmentation. No lesions or scars.   No cyanosis or clubbing  Neurological/Psychiatric: The patient's general behavior, level of consciousness, thought content and emotional status is normal.        Medications:  Scheduled Medications:    potassium chloride  40 mEq Oral BID     guaiFENesin  600 mg Oral BID    nicotine  1 patch Transdermal Daily    busPIRone  10 mg Oral TID    QUEtiapine  100 mg Oral BID    buprenorphine-naloxone  1 tablet Sublingual BID    insulin lispro  0-12 Units Subcutaneous TID     insulin lispro  0-6 Units Subcutaneous Nightly    ampicillin-sulbactam  1.5 g Intravenous Q6H    sodium chloride flush  10 mL Intravenous 2 times per day    enoxaparin  40 mg Subcutaneous Daily    sodium chloride  1,000 mL Intravenous Once     Continuous Infusions:    dextrose       PRN MedicationsALPRAZolam, 0.5 mg, BID PRN  benzonatate, 100 mg, TID PRN  acetaminophen, 650 mg, Q4H PRN  albuterol sulfate HFA, 2 puff, Q6H PRN  benzocaine-menthol, 1 lozenge, Q2H PRN  potassium chloride, 40 mEq, PRN    Or  potassium alternative oral replacement, 40 mEq, PRN    Or  potassium chloride, 10 mEq, PRN  fentanNYL, 50 mcg, Q1H PRN    Or  fentanNYL, 100 mcg, Q1H PRN  glucose, 15 g, PRN  dextrose, 12.5 g, PRN  glucagon (rDNA), 1 mg, PRN  dextrose, 100 mL/hr, PRN  sodium chloride flush, 10 mL, PRN  promethazine, 12.5 mg, Q6H PRN    Or  ondansetron, 4 mg, Q6H PRN        Diagnostic Labs:  CBC:   Recent Labs     07/09/20  0345 07/10/20  1233   WBC 14.0* 13.9*   RBC 3.46* 4.18   HGB 10.3* 12.3   HCT 34.0* 37.8   MCV 98.3 90.4   RDW 14.2 13.7    342     BMP:   Recent Labs     07/08/20 2026 07/09/20  0345 07/10/20  1233   NA  --  146* 148*   K 3.5* 3.6* 3.1*   CL  --  116* 110*   CO2  --  19* 23   BUN  --  9 5*   CREATININE  --  0.62 0.67     BNP: No results for input(s): BNP in the last 72 hours. PT/INR: No results for input(s): PROTIME, INR in the last 72 hours. APTT: No results for input(s): APTT in the last 72 hours. CARDIAC ENZYMES: No results for input(s): CKMB, CKMBINDEX, TROPONINI in the last 72 hours.     Invalid input(s): CKTOTAL;3  FASTING LIPID PANEL:No results found for: CHOL, HDL, TRIG  LIVER PROFILE: No results for input(s): AST, ALT, ALB, BILIDIR, BILITOT, ALKPHOS in the last 72 hours. MICROBIOLOGY:   Lab Results   Component Value Date/Time    CULTURE STREPTOCOCCUS PYOGENES (GROUP A) MODERATE GROWTH (A) 07/07/2020 03:12 PM    CULTURE NORMAL RESPIRATORY KOFI MODERATE GROWTH 07/07/2020 03:12 PM       Imaging:    Ct Head Wo Contrast    Result Date: 7/7/2020  Negative CT brain with no acute intracranial abnormality. Xr Chest Portable    Result Date: 7/7/2020  1. Lines and tubes appear in appropriate position. 2. Interval appearance of airspace opacities bilaterally. Differential considerations include aspiration and atypical infection including viral pneumonia. Xr Chest Portable    Result Date: 7/7/2020  Left retrocardiac opacity suggestive of pneumonia, possibly secondary to aspiration. ASSESSMENT & PLAN     LLL pneumonia  Plan:IV ampi-sulbactum started on 7/7/2020. Mucinex started. Insentive spirometry        Opioid intoxication, with delirium ([address]  Plan: Improved.       Toxic metabolic encephalopathy  Plan: Resolving           Seizure precaution, sitter in room.       Polysubstance abuse ([address]  Plan:  consulted.       Lactic acidosis  Plan: Resolved.        STEVE  Plan: Resolved.         DVT ppx : Lovenox 40 mg. PT/OT: Pt declined PT/OT today. Discharge Planning : Possibly today. Pema Hutchison MD  Internal Medicine Resident, PGY-1. [facility]; [place], [place]  7/11/2020, 1:12 PM      Attending Physician Statement  I have discussed the case, including pertinent history and exam findings with the resident and the team.  I have seen and examined the patient and the key elements of the encounter have been performed by me. I agree with the assessment, plan and orders as documented by the resident.       Continue Unasyn, Augmentin on discharge  Add incentive spirometry  Add Mucinex  Medically stable for discharge to [facility]  Follow-up on BMP  John Chilel MD  Attending Physician, Internal Medicine Service    Internal Medicine Residency Program  7/11/2020, 1:45 PM

## 2020-07-12 PROBLEM — F43.10 POSTTRAUMATIC STRESS DISORDER: Status: ACTIVE | Noted: 2020-07-12

## 2020-07-12 PROCEDURE — 1240000000 HC EMOTIONAL WELLNESS R&B

## 2020-07-12 PROCEDURE — 6370000000 HC RX 637 (ALT 250 FOR IP): Performed by: NURSE PRACTITIONER

## 2020-07-12 PROCEDURE — 6370000000 HC RX 637 (ALT 250 FOR IP): Performed by: PSYCHIATRY & NEUROLOGY

## 2020-07-12 PROCEDURE — 90792 PSYCH DIAG EVAL W/MED SRVCS: CPT | Performed by: NURSE PRACTITIONER

## 2020-07-12 RX ORDER — TRAZODONE HYDROCHLORIDE 100 MG/1
100 TABLET ORAL NIGHTLY
COMMUNITY

## 2020-07-12 RX ORDER — MIRTAZAPINE 15 MG/1
15 TABLET, FILM COATED ORAL NIGHTLY
Status: ON HOLD | COMMUNITY
End: 2020-07-15 | Stop reason: HOSPADM

## 2020-07-12 RX ORDER — TRAZODONE HYDROCHLORIDE 100 MG/1
100 TABLET ORAL NIGHTLY
Status: DISCONTINUED | OUTPATIENT
Start: 2020-07-12 | End: 2020-07-15 | Stop reason: HOSPADM

## 2020-07-12 RX ORDER — ESCITALOPRAM OXALATE 10 MG/1
10 TABLET ORAL DAILY
Status: DISCONTINUED | OUTPATIENT
Start: 2020-07-12 | End: 2020-07-15 | Stop reason: HOSPADM

## 2020-07-12 RX ORDER — ESCITALOPRAM OXALATE 10 MG/1
10 TABLET ORAL DAILY
COMMUNITY

## 2020-07-12 RX ORDER — BUSPIRONE HYDROCHLORIDE 10 MG/1
10 TABLET ORAL 3 TIMES DAILY
Status: DISCONTINUED | OUTPATIENT
Start: 2020-07-12 | End: 2020-07-15 | Stop reason: HOSPADM

## 2020-07-12 RX ORDER — LITHIUM CARBONATE 450 MG
450 TABLET, EXTENDED RELEASE ORAL 2 TIMES DAILY
Status: DISCONTINUED | OUTPATIENT
Start: 2020-07-12 | End: 2020-07-15 | Stop reason: HOSPADM

## 2020-07-12 RX ORDER — BUPRENORPHINE AND NALOXONE 8; 2 MG/1; MG/1
1 FILM, SOLUBLE BUCCAL; SUBLINGUAL 2 TIMES DAILY
COMMUNITY

## 2020-07-12 RX ADMIN — BACLOFEN 10 MG: 10 TABLET ORAL at 09:48

## 2020-07-12 RX ADMIN — BUSPIRONE HYDROCHLORIDE 10 MG: 10 TABLET ORAL at 20:41

## 2020-07-12 RX ADMIN — HYDROXYZINE HYDROCHLORIDE 25 MG: 25 TABLET, FILM COATED ORAL at 20:41

## 2020-07-12 RX ADMIN — NICOTINE POLACRILEX 2 MG: 2 GUM, CHEWING BUCCAL at 19:53

## 2020-07-12 RX ADMIN — CLONIDINE HYDROCHLORIDE 0.1 MG: 0.1 TABLET ORAL at 17:07

## 2020-07-12 RX ADMIN — NICOTINE POLACRILEX 2 MG: 2 GUM, CHEWING BUCCAL at 11:13

## 2020-07-12 RX ADMIN — DICYCLOMINE HYDROCHLORIDE 20 MG: 20 TABLET ORAL at 09:48

## 2020-07-12 RX ADMIN — CLONIDINE HYDROCHLORIDE 0.1 MG: 0.1 TABLET ORAL at 09:47

## 2020-07-12 RX ADMIN — TRAZODONE HYDROCHLORIDE 100 MG: 100 TABLET ORAL at 20:41

## 2020-07-12 RX ADMIN — HYDROXYZINE HYDROCHLORIDE 25 MG: 25 TABLET, FILM COATED ORAL at 09:48

## 2020-07-12 RX ADMIN — ACETAMINOPHEN 650 MG: 325 TABLET, FILM COATED ORAL at 09:47

## 2020-07-12 RX ADMIN — ACETAMINOPHEN 650 MG: 325 TABLET, FILM COATED ORAL at 17:44

## 2020-07-12 RX ADMIN — PROMETHAZINE HYDROCHLORIDE 25 MG: 25 TABLET ORAL at 09:48

## 2020-07-12 RX ADMIN — DICYCLOMINE HYDROCHLORIDE 20 MG: 20 TABLET ORAL at 20:40

## 2020-07-12 RX ADMIN — BUSPIRONE HYDROCHLORIDE 10 MG: 10 TABLET ORAL at 17:08

## 2020-07-12 RX ADMIN — BACLOFEN 10 MG: 10 TABLET ORAL at 19:51

## 2020-07-12 RX ADMIN — QUETIAPINE FUMARATE 150 MG: 100 TABLET ORAL at 20:41

## 2020-07-12 RX ADMIN — NICOTINE POLACRILEX 2 MG: 2 GUM, CHEWING BUCCAL at 17:44

## 2020-07-12 RX ADMIN — NICOTINE POLACRILEX 2 MG: 2 GUM, CHEWING BUCCAL at 13:02

## 2020-07-12 RX ADMIN — LITHIUM CARBONATE 450 MG: 450 TABLET, EXTENDED RELEASE ORAL at 20:41

## 2020-07-12 RX ADMIN — ESCITALOPRAM OXALATE 10 MG: 10 TABLET ORAL at 17:08

## 2020-07-12 ASSESSMENT — LIFESTYLE VARIABLES: HISTORY_ALCOHOL_USE: NO

## 2020-07-12 ASSESSMENT — PAIN DESCRIPTION - LOCATION
LOCATION: FOOT
LOCATION: LEG

## 2020-07-12 ASSESSMENT — PAIN DESCRIPTION - ORIENTATION
ORIENTATION: RIGHT
ORIENTATION: RIGHT;LEFT

## 2020-07-12 ASSESSMENT — PAIN SCALES - GENERAL
PAINLEVEL_OUTOF10: 5
PAINLEVEL_OUTOF10: 10
PAINLEVEL_OUTOF10: 3
PAINLEVEL_OUTOF10: 6

## 2020-07-12 ASSESSMENT — PAIN DESCRIPTION - PAIN TYPE: TYPE: ACUTE PAIN

## 2020-07-12 NOTE — BH NOTE
BHI Biopsychosocial Assessment  Current Level of Psychosocial Functioning      Independent   Dependent    Minimal Assist      Comments:  Client has a provisional diagnosis of at time of admission of. .; client has a prior diagnosis of.      Psychosocial High-Risk Factors (check all that apply)     Unable to obtain meds   Chronic illness/pain    Not taking medications  Substance abuse   Lack of Family Support   Addictive Behaviors  Financial stress   Isolation  Inadequate Community Resources  Suicide attempt(s)   Homicidal ideations  Self-mutilation  Victim of crime   Legal issues  Developmental Delay  Unable to manage personal needs    Age 72 or older   Homeless  No transportation   Readmission within 30 days   Unemployment  Traumatic Event    Psychiatric Advanced Directives:        Family to [employer] in Treatment:       Sexual Orientation:        Patient Strengths:       Patient Barriers:        Opiate/AOD Referral and/or Education Provided:        CMHC/mental health history:        Plan of Care:  Medication management, group/individual therapies, family meetings, psycho-education, treatment team meetings to assist with stabilization     Safety Plan:  Writer initiates safety plan at time of assessment    Initial Discharge Plan:         Clinical Summary:

## 2020-07-12 NOTE — BH NOTE
Patient given tobacco quitline number 64505223605 at this time, refusing to call at this time, states \" I just dont want to quit now\"- patient given information as to the dangers of long term tobacco use. Continue to reinforce the importance of tobacco cessation.

## 2020-07-12 NOTE — GROUP NOTE
Group Therapy Note    Date: 7/12/2020    Group Start Time: 0900  Group End Time: 0915  Group Topic: Community Meeting    [facility]    [place]    Patient refused to attend community meeting/ goals group at 0900 after encouragement from staff. 1:1 talk time provided by staff.      Signature:  H[place] Osgood
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
## 2020-07-12 NOTE — BH NOTE
`[facility] [place]  Admission Note     Admission Type:   Admission Type: Voluntary    Reason for admission:  Reason for Admission: OD    PATIENT STRENGTHS:  Strengths: Communication    Patient Strengths and Limitations:  Limitations: Inappropriate/potentially harmful leisure interests    Addictive Behavior:   Addictive Behavior  In the past 3 months, have you felt or has someone told you that you have a problem with:  : None  Do you have a history of Chemical Use?: No  Do you have a history of Alcohol Use?: No  Do you have a history of Street Drug Abuse?: Yes  Histroy of Prescripton Drug Abuse?: No    Medical Problems:   Past Medical History:   Diagnosis Date    ADHD (attention deficit hyperactivity disorder)     ADHD (attention deficit hyperactivity disorder)     Anxiety     ANDRE (generalized anxiety disorder)     MDD (major depressive disorder)     Restless leg syndrome        Status EXAM:  Status and Exam  Normal: No  Facial Expression: Hostile  Affect: Blunt  Level of Consciousness: Alert  Mood:Normal: No  Mood: Depressed, Anxious, Labile  Motor Activity:Normal: Yes  Interview Behavior: Cooperative  Preception: [place] to Person, [place] to Time, [place] to Place, [place] to Situation  Attention:Normal: No  Attention: Distractible  Thought Processes: Circumstantial  Thought Content:Normal: No  Thought Content: Preoccupations  Hallucinations: None  Delusions: No  Memory:Normal: No  Memory: Confabulation  Insight and Judgment: No  Insight and Judgment: Poor Judgment, Poor Insight, Unrealistic  Present Suicidal Ideation: No  Present Homicidal Ideation: No    Tobacco Screening:  Practical Counseling, on admission, kinsey X, if applicable and completed (first 3 are required if patient doesn't refuse):            ( )  Recognizing danger situations (included triggers and roadblocks)                    ( )  Coping skills (new ways to manage stress, exercise, relaxation techniques, changing routine,

## 2020-07-12 NOTE — PLAN OF CARE
[id number] [address]  Initial Interdisciplinary Treatment Plan NO      Original treatment plan Date & Time: 7/12/2020 0942    Admission Type:  Admission Type: Voluntary    Reason for admission:   Reason for Admission: OD    Estimated Length of Stay:  5-7days  Estimated Discharge Date: to be determined by physician    PATIENT STRENGTHS:  Patient Strengths:Strengths: Communication  Patient Strengths and Limitations:Limitations: Inappropriate/potentially harmful leisure interests  Addictive Behavior: Addictive Behavior  In the past 3 months, have you felt or has someone told you that you have a problem with:  : None  Do you have a history of Chemical Use?: No  Do you have a history of Alcohol Use?: No  Do you have a history of Street Drug Abuse?: Yes  Histroy of Prescripton Drug Abuse?: No  Medical Problems:  Past Medical History:   Diagnosis Date    ADHD (attention deficit hyperactivity disorder)     ADHD (attention deficit hyperactivity disorder)     Anxiety     ANDRE (generalized anxiety disorder)     MDD (major depressive disorder)     Restless leg syndrome      Status EXAM:Status and Exam  Normal: No  Facial Expression: Hostile  Affect: Blunt  Level of Consciousness: Alert  Mood:Normal: No  Mood: Depressed, Anxious, Labile  Motor Activity:Normal: Yes  Interview Behavior: Cooperative  Preception: [place] to Person, [place] to Time, [place] to Place, [place] to Situation  Attention:Normal: No  Attention: Distractible  Thought Processes: Circumstantial  Thought Content:Normal: No  Thought Content: Preoccupations  Hallucinations: None  Delusions: No  Memory:Normal: No  Memory: Confabulation  Insight and Judgment: No  Insight and Judgment: Poor Judgment, Poor Insight, Unrealistic  Present Suicidal Ideation: No  Present Homicidal Ideation: No    EDUCATION:   Learner Progress Toward Treatment Goals: reviewed group plans and strategies for care    Method:group therapy, medication compliance, individualized assessments and care planning    Outcome: needs reinforcement    PATIENT GOALS: to be discussed with patient within 72 hours    PLAN/TREATMENT RECOMMENDATIONS:     continue group therapy , medications compliance, goal setting, individualized assessments and care, continue to monitor pt on unit      SHORT-TERM GOALS:   Time frame for Short-Term Goals: 5-7 days    LONG-TERM GOALS:  Time frame for Long-Term Goals: 6 months  Members Present in Team Meeting: See Signature Sheet    Emily Baptiste

## 2020-07-12 NOTE — PLAN OF CARE
Problem: Altered Mood, Depressive Behavior:  Goal: Able to verbalize and/or display a decrease in depressive symptoms  Description: Able to verbalize and/or display a decrease in depressive symptoms  Note: Patient out for meals. Medication compliant and behavior controlled. Focused on trying to get her xanax but doctor has refused for now. No morning groups.
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
## 2020-07-13 LAB
CULTURE: NORMAL
CULTURE: NORMAL
Lab: NORMAL
Lab: NORMAL
SPECIMEN DESCRIPTION: NORMAL
SPECIMEN DESCRIPTION: NORMAL

## 2020-07-13 PROCEDURE — 1240000000 HC EMOTIONAL WELLNESS R&B

## 2020-07-13 PROCEDURE — 6370000000 HC RX 637 (ALT 250 FOR IP): Performed by: PSYCHIATRY & NEUROLOGY

## 2020-07-13 PROCEDURE — 99232 SBSQ HOSP IP/OBS MODERATE 35: CPT | Performed by: PSYCHIATRY & NEUROLOGY

## 2020-07-13 PROCEDURE — 6370000000 HC RX 637 (ALT 250 FOR IP): Performed by: NURSE PRACTITIONER

## 2020-07-13 RX ADMIN — CLONIDINE HYDROCHLORIDE 0.1 MG: 0.1 TABLET ORAL at 19:12

## 2020-07-13 RX ADMIN — TRAZODONE HYDROCHLORIDE 100 MG: 100 TABLET ORAL at 19:04

## 2020-07-13 RX ADMIN — NICOTINE POLACRILEX 2 MG: 2 GUM, CHEWING BUCCAL at 12:45

## 2020-07-13 RX ADMIN — HYDROXYZINE HYDROCHLORIDE 25 MG: 25 TABLET, FILM COATED ORAL at 12:03

## 2020-07-13 RX ADMIN — CLONIDINE HYDROCHLORIDE 0.1 MG: 0.1 TABLET ORAL at 12:03

## 2020-07-13 RX ADMIN — ESCITALOPRAM OXALATE 10 MG: 10 TABLET ORAL at 08:08

## 2020-07-13 RX ADMIN — NICOTINE POLACRILEX 2 MG: 2 GUM, CHEWING BUCCAL at 08:11

## 2020-07-13 RX ADMIN — LITHIUM CARBONATE 450 MG: 450 TABLET, EXTENDED RELEASE ORAL at 08:08

## 2020-07-13 RX ADMIN — NICOTINE POLACRILEX 2 MG: 2 GUM, CHEWING BUCCAL at 17:38

## 2020-07-13 RX ADMIN — ACETAMINOPHEN 650 MG: 325 TABLET, FILM COATED ORAL at 17:38

## 2020-07-13 RX ADMIN — LITHIUM CARBONATE 450 MG: 450 TABLET, EXTENDED RELEASE ORAL at 21:13

## 2020-07-13 RX ADMIN — BUSPIRONE HYDROCHLORIDE 10 MG: 10 TABLET ORAL at 08:08

## 2020-07-13 RX ADMIN — QUETIAPINE FUMARATE 150 MG: 100 TABLET ORAL at 19:03

## 2020-07-13 RX ADMIN — BUSPIRONE HYDROCHLORIDE 10 MG: 10 TABLET ORAL at 14:18

## 2020-07-13 RX ADMIN — TRAZODONE HYDROCHLORIDE 50 MG: 50 TABLET ORAL at 21:23

## 2020-07-13 RX ADMIN — HYDROXYZINE HYDROCHLORIDE 25 MG: 25 TABLET, FILM COATED ORAL at 19:04

## 2020-07-13 RX ADMIN — BUSPIRONE HYDROCHLORIDE 10 MG: 10 TABLET ORAL at 21:13

## 2020-07-13 RX ADMIN — ACETAMINOPHEN 650 MG: 325 TABLET, FILM COATED ORAL at 09:03

## 2020-07-13 RX ADMIN — QUETIAPINE FUMARATE 150 MG: 100 TABLET ORAL at 08:08

## 2020-07-13 ASSESSMENT — PAIN SCALES - GENERAL
PAINLEVEL_OUTOF10: 3
PAINLEVEL_OUTOF10: 3
PAINLEVEL_OUTOF10: 0

## 2020-07-13 NOTE — GROUP NOTE
Group Therapy Note    Date: 7/13/2020    Group Start Time: 1430  Group End Time: 3989  Group Topic: Cognitive Skills    SEBASTIAN Perry        Group Therapy Note    Attendees: 11/19         Patient's Goal:  To increase social interaction and to practice self expression, explore positive coping skills and resources, and communication skills. Notes: Pt participated fully in group task . Pt was able to practice practice self expression using creative expression, explore positive coping skills and resources, and communication skills through individual sharing and group discussion. Pt was supportive of peers and able to relate to some of peers' feelings and experiences. Status After Intervention:  Improved    Participation Level:  Active Listener and Interactive    Participation Quality: Appropriate, Attentive, Sharing and Supportive      Speech:  normal      Thought Process/Content: Logical   but guarded    Affective Functioning: Congruent      Mood: euthymic      Level of consciousness:  Alert, Oriented x4 and Attentive      Response to Learning: Able to verbalize current knowledge/experience, Able to verbalize/acknowledge new learning, Able to retain information and Progressing to goal      Endings: None Reported    Modes of Intervention: Education, Support, Socialization, Exploration, Clarifying and Problem-solving      Discipline Responsible: Psychoeducational Specialist      Signature:  Maite Form

## 2020-07-13 NOTE — GROUP NOTE
Group Therapy Note    Date: 7/13/2020    Group Start Time: 1100  Group End Time: 1130  Group Topic: Healthy Living/Wellness    [facility] Edrick Denver, CHAPINS    Pt did not attend 1100 wellness group d/t resting in room despite staff invitation to attend. 1:1 talk time offered as alternative to group session, pt declined.           Signature:  Keyona Valdivia

## 2020-07-13 NOTE — GROUP NOTE
Group Therapy Note    Date: 2020    Group Start Time: 1000  Group End Time:   Group Topic: Group Therapy    [facility]    Eunice Riley              Patient's Goal:  ***    Notes:  ***    Status After Intervention:  {Status After Intervention:491061456}    Participation Level: {Participation Level:577886938}    Participation Quality: {[facility] PARTICIPATION QUALITY:239214225}      Speech:  {ED  CD_SPEECH:69378}      Thought Process/Content: {Thought Process/Content:420227575}      Affective Functioning: {Affective Functionin}      Mood: {Mood:434533605}      Level of consciousness:  {Level of consciousness:110805186}      Response to Learnin [facility] Responses to Learnin}      Endings: {[facility] Endings:12250}    Modes of Intervention: {[facility] Modes of Intervention:835409435}      Discipline Responsible: {[facility] Multidisciplinary:505259041}      Signature:  Mak Nickerson

## 2020-07-13 NOTE — H&P
HISTORY and [address] [facility] [id number]       NAME:  Miriam Villatoro  MRN: [id number]   YOB: 1995   Date: 7/13/2020   Age: 23 y.o. Gender: female       COMPLAINT AND PRESENT HISTORY:     Miriam Villatoro is 23 y.o.  female, admitted because of depression/ Bipolar 1 Disorder. According to the ER note  The patient was found overdosed  on heroin. Patient denies any current alcohol use. She said  In the past she had marijuana but, currently use heroin everyday  because of her depression and anxiety. Patient denies sleep disturbances . Patient admits to good appetite . She smoke cigaret about  1 pack / day, and drink alcohol often but she did not say how much. She denies drugs used. besides  her heroin. Pt said she had couple seizure episode before but she is not on medication for epilepsy. Patient complain of her right lateral toes pain. The pain level is 4 out of 10  but can not tell when it started. She denies history of fall or injury. Patient denies any other  somatic complaints. No  chest pain or  shortness of breath. No fever/chills. Please see patient's psychiatric hx for more information. PAST MEDICAL HISTORY     Past Medical History:   Diagnosis Date    ADHD (attention deficit hyperactivity disorder)     ADHD (attention deficit hyperactivity disorder)     Anxiety     ANDRE (generalized anxiety disorder)     MDD (major depressive disorder)     Restless leg syndrome      SURGICAL HISTORY     History reviewed. No pertinent surgical history. FAMILY HISTORY     History reviewed. No pertinent family history.     SOCIAL HISTORY       Social History     Socioeconomic History    Marital status: Single     Spouse name: None    Number of children: None    Years of education: None    Highest education level: None   Occupational History    None   Social Needs    Financial resource strain: None    Food insecurity     Worry: None     Inability: None    Transportation needs     Medical: None     Non-medical: None   Tobacco Use    Smoking status: Current Every Day Smoker     Packs/day: 1.00     Types: Cigarettes, E-Cigarettes     Start date: 1/1/2015    Smokeless tobacco: Never Used   Substance and Sexual Activity    Alcohol use: Not Currently     Alcohol/week: 4.0 standard drinks     Types: 4 Cans of beer per week     Comment: social    Drug use: Yes     Frequency: 2.0 times per week     Types: Marijuana, Opiates      Comment: uses heroin everyday she states     Sexual activity: Yes     Partners: Male   Lifestyle    Physical activity     Days per week: None     Minutes per session: None    Stress: None   Relationships    Social connections     Talks on phone: None     Gets together: None     Attends Yarsanism service: None     Active member of club or organization: None     Attends meetings of clubs or organizations: None     Relationship status: None    Intimate partner violence     Fear of current or ex partner: None     Emotionally abused: None     Physically abused: None     Forced sexual activity: None   Other Topics Concern    None   Social History Narrative    None           REVIEW OF SYSTEMS      Allergies   Allergen Reactions    Nuts [Macadamia Nut Oil] Anaphylaxis     All tree nuts allergy       No current facility-administered medications on file prior to encounter. Current Outpatient Medications on File Prior to Encounter   Medication Sig Dispense Refill    escitalopram (LEXAPRO) 10 MG tablet Take 10 mg by mouth daily      mirtazapine (REMERON) 15 MG tablet Take 15 mg by mouth nightly      traZODone (DESYREL) 100 MG tablet Take 100 mg by mouth nightly      buprenorphine-naloxone (SUBOXONE) 8-2 MG FILM SL film Place 1 Film under the tongue 2 times daily.       lithium (ESKALITH) 450 MG extended release tablet Take 450 mg by mouth 2 times daily      busPIRone (BUSPAR) 10 MG tablet Take 10 mg by mouth 3 times daily      QUEtiapine (SEROQUEL) 100 MG tablet Take 100 mg by mouth 2 times daily      rOPINIRole (REQUIP) 5 MG tablet Take 5 mg by mouth 3 times daily      gabapentin (NEURONTIN) 300 MG capsule Take 600 mg by mouth 3 times daily. General health:  Fairly good. No fever or chills. Skin:  No itching, redness or rash. HEENT:  No headache, epistaxis or sore throat. Neck:  No pain, stiffness or masses. Cardiovascular/Respiratory system:  No chest pain, palpitation or shortness of breath. Gastrointestinal tract: No abdominal pain, Dysphagia, nausea, vomiting, diarrhea or constipation. Genitourinary:  No burning on micturition. No hesitancy, urgency, frequency or discoloration of urine. Locomotor:  No bone or joint pains. No swelling. Neuropsychiatric:  No referable complaints. GENERAL PHYSICAL EXAM:     Vitals: /61   Pulse 81   Temp 97.7 °F (36.5 °C) (Oral)   Resp 14   Ht 5' 4\" (1.626 m)   Wt 139 lb (63 kg)   SpO2 100%   BMI 23.86 kg/m²  Body mass index is 23.86 kg/m². GENERAL APPEARANCE:   Patricia Cameron is 23 y.o.,  female,  nourished, conscious, alert. Does not appear to be distress or pain at this time. SKIN:  Warm, dry, no cyanosis or jaundice. HEAD:  Normocephalic, atraumatic, no swelling or tenderness. EYES:  Pupils equal, reactive to light. EARS:  No discharge, no marked hearing loss. NOSE:  No rhinorrhea, epistaxis or septal deformity. THROAT:  Not congested. No ulceration bleeding or discharge. NECK:  No stiffness, trachea central.                  CHEST:  Symmetrical and equal on expansion. HEART:  RRR S1 > S2. No audible murmurs or gallops. LUNGS:  Equal on expansion, normal breath sounds.   No adventitious sounds. ABDOMEN:  Obese. Soft on palpation. No localized tenderness. No guarding or rigidity. No palpable hepatosplenomegaly. LYMPHATICS:  No palpable cervical lymphadenopathy. LOCOMOTOR, BACK AND SPINE:  No tenderness or deformities. EXTREMITIES:  Symmetrical, no pretibial edema. Kellys sign negative or no calf tenderness. No discoloration or ulcerations. The right toes no redness or swelling but  positive of tenderness. NEUROLOGIC:  The patient is conscious, alert, oriented,Cranial nerve II-XII intact, taste and smell were not examined. No apparent focal sensory or motor deficits.                                                                                      PROVISIONAL DIAGNOSES / SURGERY:      Bipolar 1 disorder     Patient Active Problem List    Diagnosis Date Noted    Posttraumatic stress disorder 07/12/2020    Bipolar 1 disorder ([facility] [address] 07/11/2020    Opioid intoxication with delirium ([address] 07/10/2020    Opioid use disorder ([address]     Toxic metabolic encephalopathy 93/20/2665    Polysubstance abuse ([address] 07/08/2020    Acute kidney injury (STEVE) with acute tubular necrosis (ATN) ([address] 07/08/2020    Lactic acidosis 07/08/2020    Accidental overdose of heroin ([address]     Opioid intoxication, with delirium ([address] 07/07/2020           OLGA Lopez - CNP on 7/13/2020 at 2:57 PM

## 2020-07-13 NOTE — PLAN OF CARE
Problem: Altered Mood, Depressive Behavior:  Goal: Able to verbalize and/or display a decrease in depressive symptoms  Description: Able to verbalize and/or display a decrease in depressive symptoms  7/12/2020 2218 by Dean Mcgee RN  Outcome: Ongoing  Note: Patient has complaints of mild feelings of depression at this time. Patient has complaints of having no energy. Patient spent some time in the day area but is not social with others. Patient denies experiencing suicidal ideations. She is encouraged to seek out staff if these thoughts arise. Safety checks remain in place every 15 minutes and as needed. Problem: Substance Abuse:  Goal: Absence of drug withdrawal signs and symptoms  Description: Absence of drug withdrawal signs and symptoms  Outcome: Ongoing  Note: Patient has complaints of withdrawal symptoms at this time. Patient has complaints of anxiety, stomach cramping, chills, sweats, and feeling restless. Patient received PRN medications for withdrawal as ordered. Patient tolerated well. Problem: Tobacco Use:  Goal: Inpatient tobacco use cessation counseling participation  Description: Inpatient tobacco use cessation counseling participation  Outcome: Ongoing  Note: Patient given tobacco quitline number 95347049444 at this time, refusing to call at this time, states \" I just dont want to quit now\"- patient given information as to the dangers of long term tobacco use. Continue to reinforce the importance of tobacco cessation.

## 2020-07-13 NOTE — PROGRESS NOTES
BEHAVIORAL HEALTH FOLLOW-UP NOTE     7/13/2020     Patient was seen and examined in person, Chart reviewed   Patient's case discussed with staff/team    Chief Complaint: Overdose*    Interim History:   -The patient denies that her overdose was deliberate. She appears to have limited recollection of the circumstances that led to the overdose  -Lives in different places- boyfriend, grandma's etc.   - Patient was on Lithium prior to admission. She has been diagnosed with bipolar disorder.   -had a detox at [facility] about a month ago. -Meds seem to help. No side effects.   -Plans to f/u with [facility]. Appetite:   [x] Normal/Unchanged  [] Increased  [] Decreased      Sleep:       [] Normal/Unchanged  [x] Fair       [] Poor              Energy:    [] Normal/Unchanged  [] Increased  [x] Decreased        Aggression:  [] yes  [x] no    Patient is [x] able  [] unable to CONTRACT FOR SAFETY ON THE UNIT    PAST MEDICAL/PSYCHIATRIC HISTORY:   Past Medical History:   Diagnosis Date    ADHD (attention deficit hyperactivity disorder)     ADHD (attention deficit hyperactivity disorder)     Anxiety     ANDRE (generalized anxiety disorder)     MDD (major depressive disorder)     Restless leg syndrome        FAMILY/SOCIAL HISTORY:  History reviewed. No pertinent family history.   Social History     Socioeconomic History    Marital status: Single     Spouse name: Not on file    Number of children: Not on file    Years of education: Not on file    Highest education level: Not on file   Occupational History    Not on file   Social Needs    Financial resource strain: Not on file    Food insecurity     Worry: Not on file     Inability: Not on file    Transportation needs     Medical: Not on file     Non-medical: Not on file   Tobacco Use    Smoking status: Current Every Day Smoker     Packs/day: 1.00     Types: Cigarettes, E-Cigarettes     Start date: 1/1/2015    Smokeless tobacco: Never Used   Substance and Sexual Activity    Alcohol use: Not Currently     Alcohol/week: 4.0 standard drinks     Types: 4 Cans of beer per week     Comment: social    Drug use: Yes     Frequency: 2.0 times per week     Types: Marijuana, Opiates      Comment: uses heroin everyday she states     Sexual activity: Yes     Partners: Male   Lifestyle    Physical activity     Days per week: Not on file     Minutes per session: Not on file    Stress: Not on file   Relationships    Social connections     Talks on phone: Not on file     Gets together: Not on file     Attends Tenriism service: Not on file     Active member of club or organization: Not on file     Attends meetings of clubs or organizations: Not on file     Relationship status: Not on file    Intimate partner violence     Fear of current or ex partner: Not on file     Emotionally abused: Not on file     Physically abused: Not on file     Forced sexual activity: Not on file   Other Topics Concern    Not on file   Social History Narrative    Not on file           ROS:  [x] All negative/unchanged except if checked.  Explain positive(checked items) below:  [] Constitutional  [] Eyes  [] Ear/Nose/Mouth/Throat  [] Respiratory  [] CV  [] GI  []   [] Musculoskeletal  [] Skin/Breast  [] Neurological  [] Endocrine  [] Heme/Lymph  [] Allergic/Immunologic    Explanation:     MEDICATIONS:    Current Facility-Administered Medications:     escitalopram (LEXAPRO) tablet 10 mg, 10 mg, Oral, Daily, Perry Colmenares RN, NP, 10 mg at 07/13/20 7792    busPIRone (BUSPAR) tablet 10 mg, 10 mg, Oral, TID, Perry Colmenares RN, NP, 10 mg at 07/13/20 8273    lithium (ESKALITH) extended release tablet 450 mg, 450 mg, Oral, BID, Perry Colmenares RN, NP, 450 mg at 07/13/20 9480    QUEtiapine (SEROQUEL) tablet 150 mg, 150 mg, Oral, BID, Perry Colmenares RN, NP, 150 mg at 07/13/20 7643    traZODone (DESYREL) tablet 100 mg, 100 mg, Oral, Nightly, Perry Colmenares RN, NP, 100 mg at 07/12/20 2041    nicotine Well controlled  [x] Improving  [] Worsening  [] No change      Diagnosis:   Principal Problem:    Posttraumatic stress disorder  Active Problems:    Bipolar 1 disorder (HCC)  Resolved Problems:    * No resolved hospital problems. *  Opioid use disorder  LABS:    No results for input(s): WBC, HGB, PLT in the last 72 hours. No results for input(s): NA, K, CL, CO2, BUN, CREATININE, GLUCOSE in the last 72 hours. No results for input(s): BILITOT, ALKPHOS, AST, ALT in the last 72 hours. Lab Results   Component Value Date    BARBSCNU NEGATIVE 07/07/2020    LABBENZ NEGATIVE 07/07/2020    LABMETH NEGATIVE 07/07/2020    PPXUR NOT REPORTED 07/07/2020     Lab Results   Component Value Date    TSH 1.22 01/21/2014     Lab Results   Component Value Date    LITHIUM 1.2 07/07/2020     No results found for: VALPROATE, CBMZ    RISK ASSESSMENT: Moderate risk of suicide. Low risk of harm to others    Treatment Plan:  Reviewed current Medications with the patient. No changes to medications noted above  Risks, benefits, side effects, drug-to-drug interactions and alternatives to treatment were discussed. The patient  consented to treatment. Encourage patient to attend group and other milieu activities. Discharge planning discussed with the patient and treatment team.    PSYCHOTHERAPY/COUNSELING:  [] Therapeutic interview  [x] Supportive  [] CBT  [] Ongoing  [] Other    [x] Patient continues to need, on a daily basis, active treatment furnished directly by or requiring the supervision of inpatient psychiatric personnel      Anticipated Length of stay:2-3 days. Rufus Willard is a 23 y.o. female being evaluated by a Virtual Visit (video visit) encounter to address concerns as mentioned above. A caregiver was present in the room along with the patient.  Pursuant to the emergency declaration under the [facility], [address]5 waiver authority and the Coronavirus Preparedness and Response Supplemental Appropriations Act, this Virtual Visit was conducted with patient's (and/or legal guardian's) consent, to reduce the patient's risk of exposure to COVID-19 and provide necessary medical care. Services were provided through a video synchronous discussion virtually to substitute for in-person visit by provider. Patient is present at [address] [facility]  and I am physically present at my home in [address] [place] [address], [facility] Old Galilea Andrea MD on 7/13/2020 at 1:53 PM    An electronic signature was used to authenticate this note. **This report has been created using voice recognition software. It may contain minor errors which are inherent in voice recognition technology. **

## 2020-07-13 NOTE — GROUP NOTE
Group Therapy Note    Date: 7/13/2020    Group Start Time: 0900  Group End Time: 0930  Group Topic: Community Meeting    HAFSA SHERIFF    [place], [place]        Group Therapy Note    Attendees: 10/21         Pt did not participate in Community Meeting/Goals Group at 900am when encouraged by RT due to resting in room. Pt was offered talk time as an alternative to group but declined.       Discipline Responsible: Psychoeducational Specialist      Signature:  Komal Chawla

## 2020-07-13 NOTE — PLAN OF CARE
[address] [facility]  Day 3 Interdisciplinary Treatment Plan NOTE    Review Date & Time: 7/13/2020  1315    Admission Type:   Admission Type: Voluntary    Reason for admission:  Reason for Admission: OD  Estimated Length of Stay: 5-7 days  Estimated Discharge Date Update: to be determined by physician    PATIENT STRENGTHS:  Patient Strengths Strengths: Communication  Patient Strengths and Limitations:Limitations: Tendency to isolate self, Inappropriate/potentially harmful leisure interests, Difficult relationships / poor social skills, Difficulty problem solving/relies on others to help solve problems  Addictive Behavior:Addictive Behavior  In the past 3 months, have you felt or has someone told you that you have a problem with:  : None  Do you have a history of Chemical Use?: Yes  Do you have a history of Alcohol Use?: No  Do you have a history of Street Drug Abuse?: Yes  Histroy of Prescripton Drug Abuse?: No  Medical Problems:  Past Medical History:   Diagnosis Date    ADHD (attention deficit hyperactivity disorder)     ADHD (attention deficit hyperactivity disorder)     Anxiety     ANDRE (generalized anxiety disorder)     MDD (major depressive disorder)     Restless leg syndrome        Risk:  Fall RiskTotal: 89  Kumar Scale Kumar Scale Score: 22  BVC Total: 0  Change in scores no Changes to plan of Care no    Status EXAM:   Status and Exam  Normal: No  Facial Expression: Flat, Avoids Gaze  Affect: Appropriate  Level of Consciousness: Alert  Mood:Normal: No  Mood: Depressed  Motor Activity:Normal: Yes  Motor Activity: Decreased  Interview Behavior: Cooperative  Preception: [place] to Person, [place] to Time, [place] to Place, [place] to Situation  Attention:Normal: Yes  Attention: Distractible  Thought Processes: Circumstantial  Thought Content:Normal: Yes  Thought Content: Preoccupations  Hallucinations: None  Delusions: No  Memory:Normal: Yes  Memory: Poor Recent  Insight and Judgment: No  Insight and GOALS UPDATE:   Time frame for Long-Term Goals: 6 months  Members Present in Team Meeting: See Signature Sheet    ELIZABETH Martinez

## 2020-07-13 NOTE — PLAN OF CARE
Problem: Altered Mood, Depressive Behavior:  Goal: Able to verbalize and/or display a decrease in depressive symptoms  Description: Able to verbalize and/or display a decrease in depressive symptoms  7/13/2020 1042 by Og Jones  Outcome: Ongoing   Patient states depressive symptoms, she rates her score on a scale of 1/10, 10 being the worst, at a 7. Problem: Substance Abuse:  Goal: Absence of drug withdrawal signs and symptoms  Description: Absence of drug withdrawal signs and symptoms  7/13/2020 1042 by Og Jones  Outcome: Ongoing   Patient has not stated any symptom of withdrawal at this time. Patient is encouraged to reach out to staff if this changes. Problem: Pain:  Goal: Pain level will decrease  Description: Pain level will decrease  Outcome: Ongoing   Patient complains of pain in right foot, states entire foot and ankle is in pain and that it is making it hard for her to walk.

## 2020-07-14 PROCEDURE — 6370000000 HC RX 637 (ALT 250 FOR IP): Performed by: PSYCHIATRY & NEUROLOGY

## 2020-07-14 PROCEDURE — 99232 SBSQ HOSP IP/OBS MODERATE 35: CPT | Performed by: PSYCHIATRY & NEUROLOGY

## 2020-07-14 PROCEDURE — 1240000000 HC EMOTIONAL WELLNESS R&B

## 2020-07-14 PROCEDURE — 6370000000 HC RX 637 (ALT 250 FOR IP): Performed by: NURSE PRACTITIONER

## 2020-07-14 RX ADMIN — QUETIAPINE FUMARATE 150 MG: 100 TABLET ORAL at 20:29

## 2020-07-14 RX ADMIN — CLONIDINE HYDROCHLORIDE 0.1 MG: 0.1 TABLET ORAL at 06:56

## 2020-07-14 RX ADMIN — NICOTINE POLACRILEX 2 MG: 2 GUM, CHEWING BUCCAL at 10:33

## 2020-07-14 RX ADMIN — BUSPIRONE HYDROCHLORIDE 10 MG: 10 TABLET ORAL at 20:30

## 2020-07-14 RX ADMIN — NICOTINE POLACRILEX 2 MG: 2 GUM, CHEWING BUCCAL at 16:24

## 2020-07-14 RX ADMIN — HYDROXYZINE HYDROCHLORIDE 25 MG: 25 TABLET, FILM COATED ORAL at 06:56

## 2020-07-14 RX ADMIN — ACETAMINOPHEN 650 MG: 325 TABLET, FILM COATED ORAL at 17:36

## 2020-07-14 RX ADMIN — HYDROXYZINE HYDROCHLORIDE 25 MG: 25 TABLET, FILM COATED ORAL at 19:12

## 2020-07-14 RX ADMIN — QUETIAPINE FUMARATE 150 MG: 100 TABLET ORAL at 10:33

## 2020-07-14 RX ADMIN — LITHIUM CARBONATE 450 MG: 450 TABLET, EXTENDED RELEASE ORAL at 20:29

## 2020-07-14 RX ADMIN — TRAZODONE HYDROCHLORIDE 100 MG: 100 TABLET ORAL at 20:29

## 2020-07-14 RX ADMIN — LITHIUM CARBONATE 450 MG: 450 TABLET, EXTENDED RELEASE ORAL at 10:33

## 2020-07-14 RX ADMIN — NICOTINE POLACRILEX 2 MG: 2 GUM, CHEWING BUCCAL at 17:36

## 2020-07-14 RX ADMIN — TRAZODONE HYDROCHLORIDE 50 MG: 50 TABLET ORAL at 20:32

## 2020-07-14 RX ADMIN — ESCITALOPRAM OXALATE 10 MG: 10 TABLET ORAL at 10:33

## 2020-07-14 RX ADMIN — BACLOFEN 10 MG: 10 TABLET ORAL at 15:07

## 2020-07-14 RX ADMIN — BUSPIRONE HYDROCHLORIDE 10 MG: 10 TABLET ORAL at 10:33

## 2020-07-14 RX ADMIN — BACLOFEN 10 MG: 10 TABLET ORAL at 22:12

## 2020-07-14 RX ADMIN — HYDROXYZINE HYDROCHLORIDE 25 MG: 25 TABLET, FILM COATED ORAL at 16:24

## 2020-07-14 RX ADMIN — BUSPIRONE HYDROCHLORIDE 10 MG: 10 TABLET ORAL at 14:34

## 2020-07-14 ASSESSMENT — PAIN - FUNCTIONAL ASSESSMENT: PAIN_FUNCTIONAL_ASSESSMENT: 0-10

## 2020-07-14 ASSESSMENT — PAIN SCALES - GENERAL
PAINLEVEL_OUTOF10: 1
PAINLEVEL_OUTOF10: 7

## 2020-07-14 NOTE — GROUP NOTE
Group Therapy Note    Date: 7/14/2020    Group Start Time: 1430  Group End Time: 2083  Group Topic: Psychoeducation    HAFSA Lassiter      Patient declined to attend relaxation group at 1430 despite encouragement from staff. 1:1 talk time offered by staff as alternative to group session.         Signature:  Trisha Lassiter

## 2020-07-14 NOTE — GROUP NOTE
Group Therapy Note    Date: 7/13/2020    Group Start Time: 2015  Group End Time: 2045  Group Topic: Wrap-Up    [facility]    Nancy Paula RN        Group Therapy Note    Attendees: 7/18         Patient's Goal:    1. To be able to reflect on daily unit activities/experiences. 2.  To review accomplished daily goals and be encouraged to set new goals for the next day. 3.  To demonstrate increased interpersonal interaction. Notes:  Pt attended and actively participated in Wrap-Up group this evening. Status After Intervention:  Improved     Participation Level:  Active Listener and Interactive     Participation Quality: Appropriate, Attentive and Sharing     Speech:  normal     Thought Process/Content: Logical     Affective Functioning: Congruent     Mood: euthymic     Level of consciousness:  Alert, Oriented x4 and Attentive     Response to Learning: Able to verbalize current knowledge/experience, Able to verbalize/acknowledge new learning, Able to retain information and Capable of insight     Endings: None Reported     Modes of Intervention: Education, Support and Socialization     Discipline Responsible: Registered Nurse        Signature:  Nancy Paula RN

## 2020-07-14 NOTE — BH NOTE
Patient requested medication for bilateral leg pain and cravings to smoke.  Patient was given PRN nicotine gum and PRN tylenol for bilateral legs per patients request.

## 2020-07-14 NOTE — PLAN OF CARE
Problem: Altered Mood, Depressive Behavior:  Goal: Able to verbalize and/or display a decrease in depressive symptoms  Description: Able to verbalize and/or display a decrease in depressive symptoms  7/13/2020 2218 by Dionisio Dodd RN  Outcome: Ongoing  Note: Patient is out and social in dayroom watching television. Patient reports tremors in hands and stiffness in neck. Patient is on withdrawal protocol for opiate use. Patient denies suicidal ideation and thoughts of self harm. Patient is agreeable to seek out staff if changes in thoughts arise. Patient reports mild depression and increased anxiety. Staff continues to encourage patient to be an active part of her own recovery.       Problem: Substance Abuse:  Goal: Absence of drug withdrawal signs and symptoms  Description: Absence of drug withdrawal signs and symptoms  7/13/2020 2218 by Dionisio Dodd RN  Outcome: Ongoing     Problem: Tobacco Use:  Goal: Inpatient tobacco use cessation counseling participation  Description: Inpatient tobacco use cessation counseling participation  7/13/2020 2218 by Dionisio Dodd RN  Outcome: Ongoing     Problem: Pain:  Goal: Control of acute pain  Description: Control of acute pain  7/13/2020 1315 by SEBASTIAN Dukes  Outcome: Ongoing     Problem: Pain:  Goal: Control of chronic pain  Description: Control of chronic pain  7/13/2020 2218 by Dionisio Dodd RN  Outcome: Ongoing

## 2020-07-14 NOTE — BH NOTE
Patient blood pressure is low, 97/48 pulse 70. Patient was given Gatorade to help with fluid intake and to hopefully increase blood pressure.

## 2020-07-14 NOTE — PLAN OF CARE
Problem: Altered Mood, Depressive Behavior:  Goal: Able to verbalize and/or display a decrease in depressive symptoms  Description: Able to verbalize and/or display a decrease in depressive symptoms  7/14/2020 1115 by Aleisha Padilla LPN  Outcome: Ongoing  Note: Patient is alert and oriented x4. Patient denies any suicidal ideations at this time. Patient does admit to depression and anxiety at times. Patient is brightened but focused on medications. Patient is cooperative and pleasant with staff. Patient is medication complaint and behavior controled. Patient did have low blood pressure this morning, Dr Iman Wilks was notified and made medication adjustments. Patient is out in day area social with peers. Staff will continue to do 15 minute safety checks per facility protocol. Problem: Tobacco Use:  Goal: Inpatient tobacco use cessation counseling participation  Description: Inpatient tobacco use cessation counseling participation  7/14/2020 1115 by Aleisha Padilla LPN  Outcome: Ongoing  Note: Patient was educated on use of tobacco products. Patient requested nicotine gum for cravings. Patient does not want to quit at this time.

## 2020-07-14 NOTE — PROGRESS NOTES
BEHAVIORAL HEALTH FOLLOW-UP NOTE     7/14/2020     Patient was seen and examined in person, Chart reviewed   Patient's case discussed with staff/team    Chief Complaint: Overdose*    Interim History:   -The patient reports that she is feeling better. She is discharged focused. She has been taking her medications as prescribed. She denies any side effects.  -The patient is minimizing symptoms. Comes across as depressed. Appetite:   [x] Normal/Unchanged  [] Increased  [] Decreased      Sleep:       [] Normal/Unchanged  [x] Fair       [] Poor              Energy:    [] Normal/Unchanged  [] Increased  [x] Decreased        Aggression:  [] yes  [x] no    Patient is [x] able  [] unable to CONTRACT FOR SAFETY ON THE UNIT    PAST MEDICAL/PSYCHIATRIC HISTORY:   Past Medical History:   Diagnosis Date    ADHD (attention deficit hyperactivity disorder)     ADHD (attention deficit hyperactivity disorder)     Anxiety     ANDRE (generalized anxiety disorder)     MDD (major depressive disorder)     Restless leg syndrome        FAMILY/SOCIAL HISTORY:  History reviewed. No pertinent family history.   Social History     Socioeconomic History    Marital status: Single     Spouse name: Not on file    Number of children: Not on file    Years of education: Not on file    Highest education level: Not on file   Occupational History    Not on file   Social Needs    Financial resource strain: Not on file    Food insecurity     Worry: Not on file     Inability: Not on file    Transportation needs     Medical: Not on file     Non-medical: Not on file   Tobacco Use    Smoking status: Current Every Day Smoker     Packs/day: 1.00     Types: Cigarettes, E-Cigarettes     Start date: 1/1/2015    Smokeless tobacco: Never Used   Substance and Sexual Activity    Alcohol use: Not Currently     Alcohol/week: 4.0 standard drinks     Types: 4 Cans of beer per week     Comment: social    Drug use: Yes     Frequency: 2.0 times per week last 72 hours. No results for input(s): NA, K, CL, CO2, BUN, CREATININE, GLUCOSE in the last 72 hours. No results for input(s): BILITOT, ALKPHOS, AST, ALT in the last 72 hours. Lab Results   Component Value Date    BARBSCNU NEGATIVE 07/07/2020    LABBENZ NEGATIVE 07/07/2020    LABMETH NEGATIVE 07/07/2020    PPXUR NOT REPORTED 07/07/2020     Lab Results   Component Value Date    TSH 1.22 01/21/2014     Lab Results   Component Value Date    LITHIUM 1.2 07/07/2020     No results found for: VALPROATE, CBMZ    RISK ASSESSMENT: Moderate risk of suicide. Low risk of harm to others    Treatment Plan:  Reviewed current Medications with the patient. Li levels and BMP ordered. No changes to medications noted above  Risks, benefits, side effects, drug-to-drug interactions and alternatives to treatment were discussed. The patient  consented to treatment. Encourage patient to attend group and other milieu activities. Discharge planning discussed with the patient and treatment team.    PSYCHOTHERAPY/COUNSELING:  [] Therapeutic interview  [x] Supportive  [] CBT  [] Ongoing  [] Other    [x] Patient continues to need, on a daily basis, active treatment furnished directly by or requiring the supervision of inpatient psychiatric personnel      Anticipated Length of stay:2-3 days. Evangelina Roque is a 23 y.o. female being evaluated by a Virtual Visit (video visit) encounter to address concerns as mentioned above. A caregiver was present in the room along with the patient. Pursuant to the emergency declaration under the [facility] [facility], [address] [place] [address] authority and the [employer] and [employer] Act, this Virtual Visit was conducted with patient's (and/or legal guardian's) consent, to reduce the patient's risk of exposure to COVID-19 and provide necessary medical care.    Services were provided through a video synchronous discussion virtually to substitute for in-person visit by provider. Patient is present at [facility]  and I am physically present at my home in [place], [facility] Old Galilea Andrea MD on 7/14/2020 at 2:06 PM    An electronic signature was used to authenticate this note. **This report has been created using voice recognition software. It may contain minor errors which are inherent in voice recognition technology. **

## 2020-07-15 VITALS
BODY MASS INDEX: 23.73 KG/M2 | DIASTOLIC BLOOD PRESSURE: 53 MMHG | OXYGEN SATURATION: 100 % | HEIGHT: 64 IN | HEART RATE: 69 BPM | SYSTOLIC BLOOD PRESSURE: 100 MMHG | RESPIRATION RATE: 14 BRPM | TEMPERATURE: 98.1 F | WEIGHT: 139 LBS

## 2020-07-15 PROBLEM — F32.A DEPRESSION WITH SUICIDAL IDEATION: Status: ACTIVE | Noted: 2020-07-15

## 2020-07-15 PROBLEM — R45.851 DEPRESSION WITH SUICIDAL IDEATION: Status: ACTIVE | Noted: 2020-07-15

## 2020-07-15 LAB
ANION GAP SERPL CALCULATED.3IONS-SCNC: 9 MMOL/L (ref 9–17)
BUN BLDV-MCNC: 15 MG/DL (ref 6–20)
BUN/CREAT BLD: NORMAL (ref 9–20)
CALCIUM SERPL-MCNC: 9.3 MG/DL (ref 8.6–10.4)
CHLORIDE BLD-SCNC: 107 MMOL/L (ref 98–107)
CO2: 24 MMOL/L (ref 20–31)
CREAT SERPL-MCNC: 0.87 MG/DL (ref 0.5–0.9)
GFR AFRICAN AMERICAN: >60 ML/MIN
GFR NON-AFRICAN AMERICAN: >60 ML/MIN
GFR SERPL CREATININE-BSD FRML MDRD: NORMAL ML/MIN/{1.73_M2}
GFR SERPL CREATININE-BSD FRML MDRD: NORMAL ML/MIN/{1.73_M2}
GLUCOSE BLD-MCNC: 98 MG/DL (ref 70–99)
LITHIUM DATE LAST DOSE: NORMAL
LITHIUM DOSE AMOUNT: 450
LITHIUM DOSE TIME: 2029
LITHIUM LEVEL: 0.8 MMOL/L (ref 0.6–1.2)
POTASSIUM SERPL-SCNC: 4.5 MMOL/L (ref 3.7–5.3)
SODIUM BLD-SCNC: 140 MMOL/L (ref 135–144)

## 2020-07-15 PROCEDURE — 6370000000 HC RX 637 (ALT 250 FOR IP): Performed by: PSYCHIATRY & NEUROLOGY

## 2020-07-15 PROCEDURE — 80048 BASIC METABOLIC PNL TOTAL CA: CPT

## 2020-07-15 PROCEDURE — 99239 HOSP IP/OBS DSCHRG MGMT >30: CPT | Performed by: PSYCHIATRY & NEUROLOGY

## 2020-07-15 PROCEDURE — 6370000000 HC RX 637 (ALT 250 FOR IP): Performed by: NURSE PRACTITIONER

## 2020-07-15 PROCEDURE — 80178 ASSAY OF LITHIUM: CPT

## 2020-07-15 PROCEDURE — 36415 COLL VENOUS BLD VENIPUNCTURE: CPT

## 2020-07-15 RX ORDER — QUETIAPINE FUMARATE 50 MG/1
150 TABLET, FILM COATED ORAL 2 TIMES DAILY
Qty: 180 TABLET | Refills: 0 | Status: ON HOLD | OUTPATIENT
Start: 2020-07-15 | End: 2021-07-26 | Stop reason: HOSPADM

## 2020-07-15 RX ORDER — HYDROXYZINE HYDROCHLORIDE 25 MG/1
25 TABLET, FILM COATED ORAL 3 TIMES DAILY PRN
Qty: 60 TABLET | Refills: 0 | Status: SHIPPED | OUTPATIENT
Start: 2020-07-15 | End: 2020-07-25

## 2020-07-15 RX ADMIN — BUSPIRONE HYDROCHLORIDE 10 MG: 10 TABLET ORAL at 13:44

## 2020-07-15 RX ADMIN — BACLOFEN 10 MG: 10 TABLET ORAL at 11:50

## 2020-07-15 RX ADMIN — NICOTINE POLACRILEX 2 MG: 2 GUM, CHEWING BUCCAL at 09:30

## 2020-07-15 RX ADMIN — ESCITALOPRAM OXALATE 10 MG: 10 TABLET ORAL at 08:37

## 2020-07-15 RX ADMIN — ACETAMINOPHEN 650 MG: 325 TABLET, FILM COATED ORAL at 13:23

## 2020-07-15 RX ADMIN — LITHIUM CARBONATE 450 MG: 450 TABLET, EXTENDED RELEASE ORAL at 08:37

## 2020-07-15 RX ADMIN — BUSPIRONE HYDROCHLORIDE 10 MG: 10 TABLET ORAL at 08:37

## 2020-07-15 RX ADMIN — QUETIAPINE FUMARATE 150 MG: 100 TABLET ORAL at 08:37

## 2020-07-15 RX ADMIN — NICOTINE POLACRILEX 2 MG: 2 GUM, CHEWING BUCCAL at 13:23

## 2020-07-15 ASSESSMENT — PAIN SCALES - GENERAL
PAINLEVEL_OUTOF10: 2
PAINLEVEL_OUTOF10: 3

## 2020-07-15 NOTE — GROUP NOTE
Group Therapy Note    Date: 7/14/2020    Group Start Time: 2100  Group End Time: 2115  Group Topic: Relaxation    [facility]    Edwardo Woodard RN        Group Therapy Note    Attendees: 12         Status After Intervention:  Unchanged    Participation Level:  Active Listener    Participation Quality: Appropriate      Speech:  normal      Thought Process/Content: Logical      Affective Functioning: Congruent      Mood: anxious      Level of consciousness:  Alert and Oriented x4      Response to Learning: Progressing to goal      Endings: None Reported    Modes of Intervention: Socialization      Discipline Responsible: Registered Nurse      Signature:  Herlinda Padilla RN

## 2020-07-15 NOTE — PLAN OF CARE
Problem: Altered Mood, Depressive Behavior:  Goal: Able to verbalize and/or display a decrease in depressive symptoms  Description: Able to verbalize and/or display a decrease in depressive symptoms  7/14/2020 2135 by Hafsa Carrizales RN  Outcome: Ongoing  Note: Patient denies depression at this time. She reports a readiness for discharge and is hopeful she will be able to go home tomorrow. She does report increased anxiety. She is compliant with hs scheduled medications and remains in behavioral control. Safety maintained per unit policy, including q 57U patient safety checks. Problem: Substance Abuse:  Goal: Absence of drug withdrawal signs and symptoms  Description: Absence of drug withdrawal signs and symptoms  Outcome: Ongoing  Note: Patient reports mild withdrawals from discontinuing her Clonidine today. She does request her prn anxiety medication earlier due to increased restlessness. Will continue to monitor patient for safety and behavior. Problem: Tobacco Use:  Goal: Inpatient tobacco use cessation counseling participation  Description: Inpatient tobacco use cessation counseling participation  7/14/2020 2135 by Hafsa Carrizales RN  Outcome: Ongoing  Note: Patient given tobacco quitline number 44339098176 at this time, refusing to call at this time, states \" I just dont want to quit now\"- patient given information as to the dangers of long term tobacco use. Continue to reinforce the importance of tobacco cessation. Problem: Pain:  Goal: Pain level will decrease  Description: Pain level will decrease  Outcome: Ongoing  Note: Patient denies pain to writer this evening.

## 2020-07-15 NOTE — GROUP NOTE
Group Therapy Note    Date: 7/15/2020    Group Start Time: 1100  Group End Time: 3388  Group Topic: Cognitive Skills    [facility] BH[facility]ANE Garcia, [facility]        Group Therapy Note    Attendees: 15/22         Patient's Goal:  To increase social interaction and to practice problem solving and communication skills. Notes: Pt participated fully in group task . Pt was able to practice problem solving and communication skills. Status After Intervention:  Improved    Participation Level:  Active Listener and Interactive    Participation Quality: Appropriate, Attentive, Sharing and Supportive      Speech:  normal      Thought Process/Content: Logical  Linear      Affective Functioning: Congruent      Mood: euthymic      Level of consciousness:  Alert, Oriented x4 and Attentive      Response to Learning: Able to verbalize current knowledge/experience, Able to verbalize/acknowledge new learning, Able to retain information and Progressing to goal      Endings: None Reported    Modes of Intervention: Education, Support, Socialization, Exploration, Clarifying and Problem-solving      Discipline Responsible: Psychoeducational Specialist      Signature:  Antoinette Buenrostro

## 2020-07-15 NOTE — GROUP NOTE
Group Therapy Note    Date: 7/14/2020    Group Start Time: 2030  Group End Time: 2100  Group Topic: Wrap-Up    [facility]    Debora Soliz, RN        Group Therapy Note    Attendees: 12           Status After Intervention:  Unchanged    Participation Level: Interactive    Participation Quality: Appropriate      Speech:  normal      Thought Process/Content: Logical      Affective Functioning: Congruent      Mood: anxious      Level of consciousness:  Alert and Oriented x4      Response to Learning: Progressing to goal      Endings: None Reported    Modes of Intervention: Socialization      Discipline Responsible: Registered Nurse      Signature:  Nay Persaud RN

## 2020-07-15 NOTE — BH NOTE
Patient up at desk complaining of increased anxiety and difficulty concentrating and sitting still. Patient requesting prn anxiety medication. Atarax 25mg PO given as prescribed at this time.

## 2020-07-15 NOTE — GROUP NOTE
Group Therapy Note    Date: 2020    Group Start Time: 1000  Group End Time: 85  Group Topic: Group Therapy    [facility] D    Leon Romero              Patient's Goal:  ***    Notes:  ***    Status After Intervention:  {Status After Intervention:539927486}    Participation Level: {Participation Level:152834990}    Participation Quality: {[facility] PARTICIPATION QUALITY:823281569}      Speech:  {ED  CD_SPEECH:31780}      Thought Process/Content: {Thought Process/Content:933812757}      Affective Functioning: {Affective Functionin}      Mood: {Mood:727049749}      Level of consciousness:  {Level of consciousness:940590612}      Response to LearninDarien Santacruz [facility] Responses to Learnin}      Endings: {[facility] Endings:11529}    Modes of Intervention: {[facility] Modes of Intervention:303219687}      Discipline Responsible: Anamaria MOYER Multidisciplinary:866180845}      Signature:  Jayla Butts

## 2020-07-15 NOTE — DISCHARGE SUMMARY
DISCHARGE SUMMARY      Patient ID:  Madhu Zaragoza  [id number]  85 y.o.  1995    Admit date: 7/11/2020    Discharge date and time: 7/15/2020    Disposition: Home     Admitting Physician: Stephani Shah MD     Discharge Physician: Dr Xochitl Walden MD    Admission Diagnoses: Bipolar 1 disorder ([facility] [F31.9]  Opioid use disorder    Admission Condition: poor    Discharged Condition: stable    Admission Circumstance: The patient was admitted to psychiatry after taking an overdose of heroin. She had to be intubated. The patient denied that this was deliberate overdose      PAST MEDICAL/PSYCHIATRIC HISTORY:   Past Medical History:   Diagnosis Date    ADHD (attention deficit hyperactivity disorder)     ADHD (attention deficit hyperactivity disorder)     Anxiety     ANDRE (generalized anxiety disorder)     MDD (major depressive disorder)     Restless leg syndrome        FAMILY/SOCIAL HISTORY:  History reviewed. No pertinent family history.   Social History     Socioeconomic History    Marital status: Single     Spouse name: Not on file    Number of children: Not on file    Years of education: Not on file    Highest education level: Not on file   Occupational History    Not on file   Social Needs    Financial resource strain: Not on file    Food insecurity     Worry: Not on file     Inability: Not on file    Transportation needs     Medical: Not on file     Non-medical: Not on file   Tobacco Use    Smoking status: Current Every Day Smoker     Packs/day: 1.00     Types: Cigarettes, E-Cigarettes     Start date: 1/1/2015    Smokeless tobacco: Never Used   Substance and Sexual Activity    Alcohol use: Not Currently     Alcohol/week: 4.0 standard drinks     Types: 4 Cans of beer per week     Comment: social    Drug use: Yes     Frequency: 2.0 times per week     Types: Marijuana, Opiates      Comment: uses heroin everyday she states     Sexual activity: Yes     Partners: Male   Lifestyle    Physical activity Days per week: Not on file     Minutes per session: Not on file    Stress: Not on file   Relationships    Social connections     Talks on phone: Not on file     Gets together: Not on file     Attends Jew service: Not on file     Active member of club or organization: Not on file     Attends meetings of clubs or organizations: Not on file     Relationship status: Not on file    Intimate partner violence     Fear of current or ex partner: Not on file     Emotionally abused: Not on file     Physically abused: Not on file     Forced sexual activity: Not on file   Other Topics Concern    Not on file   Social History Narrative    Not on file       MEDICATIONS:    Current Facility-Administered Medications:     escitalopram (LEXAPRO) tablet 10 mg, 10 mg, Oral, Daily, Ed Villatoro RN, NP, 10 mg at 07/15/20 0837    busPIRone (BUSPAR) tablet 10 mg, 10 mg, Oral, TID, Ed Villatoro, RN, NP, 10 mg at 07/15/20 0837    lithium (ESKALITH) extended release tablet 450 mg, 450 mg, Oral, BID, Ed Villatoro RN, NP, 450 mg at 07/15/20 0837    QUEtiapine (SEROQUEL) tablet 150 mg, 150 mg, Oral, BID, Ed Villatoro RN, NP, 150 mg at 07/15/20 6443    traZODone (DESYREL) tablet 100 mg, 100 mg, Oral, Nightly, Ed Villatoro, RN, NP, 100 mg at 07/14/20 2029    nicotine polacrilex (NICORETTE) gum 2 mg, 2 mg, Oral, PRN, Padmini Nolasco MD, 2 mg at 07/15/20 0930    acetaminophen (TYLENOL) tablet 650 mg, 650 mg, Oral, Q4H PRN, Yulisa Mcmillan MD, 650 mg at 07/14/20 1736    benztropine mesylate (COGENTIN) injection 2 mg, 2 mg, Intramuscular, BID PRN, Yulisa Mcmillan MD    hydrOXYzine (ATARAX) tablet 25 mg, 25 mg, Oral, TID PRN, Yulisa Mcmillan MD, 25 mg at 07/14/20 1912    promethazine (PHENERGAN) tablet 25 mg, 25 mg, Oral, Q6H PRN, Yulisa Mcmillan MD, 25 mg at 07/12/20 0948    loperamide (IMODIUM) capsule 2 mg, 2 mg, Oral, 4x Daily PRN, Yulisa Mcmillan MD    dicyclomine (BENTYL) tablet 20 mg, 20 mg, Oral, 4x Daily PRN, Romelle Paget, MD, 20 mg at 07/12/20 2040    baclofen (LIORESAL) tablet 10 mg, 10 mg, Oral, TID PRN, Romelle Paget, MD, 10 mg at 07/14/20 2212    traZODone (DESYREL) tablet 50 mg, 50 mg, Oral, Nightly PRN, Romelle Paget, MD, 50 mg at 07/14/20 2032    Examination:  BP (!) 100/53   Pulse 69   Temp 98.1 °F (36.7 °C) (Oral)   Resp 14   Ht 5' 4\" (1.626 m)   Wt 139 lb (63 kg)   SpO2 100%   BMI 23.86 kg/m²   Gait - steady    HOSPITAL COURSE[de-identified]  Following admission to the hospital, patient had a complete physical exam and blood work up, which was unremarkable. Patient was monitored closely with suicide precaution  Patient was started on Lexapro and lithium and Seroquel as noted above  Was encouraged to participate in group and other milieu activity  Patient started to feel better with this combination of treatment. Significant progress in the symptoms since admission. Mood is improved  The patient denies AVH or paranoid thoughts  The patient denies any hopelessness or worthlessness  No active SI/HI  Appetite:  [x] Normal  [] Increased  [] Decreased    Sleep:       [x] Normal  [] Fair       [] Poor            Energy:    [x] Normal  [] Increased  [] Decreased     SI [] Present  [x] Absent  HI  []Present  [x] Absent   Aggression:  [] yes  [] no  Patient is [x] able  [] unable to CONTRACT FOR SAFETY   Medication side effects(SE):  [x] None(Psych.  Meds.) [] Other      Mental Status Examination on discharge:    Level of consciousness:  within normal limits   Appearance:  well-appearing  Behavior/Motor:  no abnormalities noted  Attitude toward examiner:  attentive and good eye contact  Speech:  spontaneous, normal rate and normal volume   Mood: euthymic  Affect:  mood congruent  Thought processes:  linear, goal directed and coherent   Thought content:  Suicidal Ideation:  denies suicidal ideation  Delusions:  no evidence of delusions  Perceptual Disturbance:  denies any perceptual disturbance  Cognition:  oriented to person, place, and time   Concentration intact  Memory intact  Insight good   Judgement fair   Fund of Knowledge adequate      ASSESSMENT:  Patient symptoms are:  [x] Well controlled  [x] Improving  [] Worsening  [] No change      Diagnosis:  Principal Problem:    Posttraumatic stress disorder  Active Problems:    Bipolar 1 disorder ([facility]    Depression with suicidal ideation  Resolved Problems:    * No resolved hospital problems. *      LABS:    No results for input(s): WBC, HGB, PLT in the last 72 hours. Recent Labs     07/15/20  0621      K 4.5      CO2 24   BUN 15   CREATININE 0.87   GLUCOSE 98     No results for input(s): BILITOT, ALKPHOS, AST, ALT in the last 72 hours. Lab Results   Component Value Date    BARBSCNU NEGATIVE 07/07/2020    LABBENZ NEGATIVE 07/07/2020    LABMETH NEGATIVE 07/07/2020    PPXUR NOT REPORTED 07/07/2020     Lab Results   Component Value Date    TSH 1.22 01/21/2014     Lab Results   Component Value Date    LITHIUM 0.8 07/15/2020     No results found for: VALPROATE, CBMZ    RISK ASSESSMENT AT DISCHARGE: Low risk for suicide and homicide. Treatment Plan:  Reviewed current Medications with the patient. Education provided on the complaince with treatment. Risks, benefits, side effects, drug-to-drug interactions and alternatives to treatment were discussed. Encourage patient to attend outpatient follow up appointment and therapy. Patient was advised to call the outpatient provider, visit the nearest ED or call 911 if symptoms are not manageable.              Medication List      START taking these medications    hydrOXYzine 25 MG tablet  Commonly known as:  ATARAX  Take 1 tablet by mouth 3 times daily as needed for Anxiety        CHANGE how you take these medications    QUEtiapine 50 MG tablet  Commonly known as:  SEROQUEL  Take 3 tablets by mouth 2 times daily  What changed:    · medication strength  · how much to discussion virtually to substitute for in-person visit by provider. Patient is present at [facility]  and I am physically present at my home in [place] [address], MD on 7/15/2020 at 11:03 AM    An electronic signature was used to authenticate this note. **This report has been created using voice recognition software. It may contain minor errors which are inherent in voice recognition technology. **

## 2020-07-15 NOTE — GROUP NOTE
Group Therapy Note    Date: 7/15/2020    Group Start Time: 0900  Group End Time: 8441  Group Topic: Community Meeting    [address] [place] [address] [place], [place]        Group Therapy Note    Attendees: 12/22            Patient's Goal:  To increase social interaction and to explore and identify short term goals r/t wellness and recovery. RT discussed group schedule and unit structure/resources and encouraged pts to be engaged in their treatment. Pts were given opportunities to ask questions. Notes: Pt participated in group task and was able to identify short term goals r/t wellness and recovery. Pt is pleasant and supportive of peers        Status After Intervention:  Improved     Participation Level:  Active Listener and Interactive     Participation Quality: Appropriate, Attentive, Sharing         Speech:   Normal     Thought Process/Content: Logical,linear     Affective Functioning: Congruent   Mood: euthymic        Level of consciousness:  Alert, and Attentive        Response to Learning: Able to verbalize current knowledge/experience, Able to verbalize/acknowledge new learning, and Progressing to goal        Endings: None Reported     Modes of Intervention: Education, Support, Socialization, Exploration, Clarifying and Problem-solving        Discipline Responsible: Psychoeducational Specialist        Signature:  Valorie Izaguirre

## 2020-07-15 NOTE — PROGRESS NOTES
CLINICAL PHARMACY NOTE: MEDS TO [address] Select Patient?: No  Total # of Prescriptions Filled: 3   The following medications were delivered to the patient:  · Quetiapine 100 mg  · Quetiapine 500 mg  · Hydroxyzine  ·   Total # of Interventions Completed: 0  Time Spent (min): 30    Additional Documentation:

## 2020-07-15 NOTE — PROGRESS NOTES
Pharmacy Med Education Group Note    Date: 07/15/20  Start Time: 1430  End Time: 1520    Number Participants in Group:  13    Goal:  Patient will demonstrate an understanding of the medications intended purpose and possible adverse effects  Topic: [place] for Pharmacy Med Ed Group    Discipline Responsible:     OT  AT  [facility].  RT     X Other       Participation Level:     None  Minimal      X Active Listener    X Interactive    Monopolizing         Participation Quality:    X Appropriate  Inappropriate     X       Attentive        Intrusive          Sharing        Resistant          Supportive        Lethargic       Affective:     X Congruent  Incongruent  Blunted  Flat    Constricted  Anxious  Elated  Angry    Labile  Depressed  Other         Cognitive:    X Alert  Oriented PPTP     Concentration   X G  F  P   Attention Span   X G  F  P   Short-Term Memory   X G  F  P   Long-Term Memory  G  F  P   ProblemSolving/  Decision Making  G  F  P   Ability to Process  Information   X G  F  P      Contributing Factors             Delusional             Hallucinating             Flight of Ideas             Other:       Modes of Intervention:    X Education   X Support  Exploration    Clarifying  Problem Solving  Confrontation    Socialization  Limit Setting  Reality Testing    Activity  Movement  Media    Other:            Response to Learning:    X Able to verbalize current knowledge/experience    Able to verbalize/acknowledge new learning    Able to retain information    Capable of insight    Able to change behavior    Progressing to goal    Other:        Comments: Patient left group early due to discharge.     Kings Mckenzie PharmD, BCPS  7/15/2020 5:12 PM

## 2020-07-15 NOTE — BH NOTE
Patient given tobacco quitline number 55101120065 at this time, refusing to call at this time, states \" I just dont want to quit now\"- patient given information as to the dangers of long term tobacco use. Continue to reinforce the importance of tobacco cessation.

## 2020-07-15 NOTE — BH NOTE
[facility]  Discharge Note    Pt discharged with followings belongings:   Dentures: None  Vision - Corrective Lenses: None  Hearing Aid: None  Jewelry: None  Body Piercings Removed: N/A  Clothing: None  Were All Patient Medications Collected?: Not Applicable  Other Valuables: Cell phone, [address], [address], Money (Comment)(3 lighters, /plug, cell phone,earbuds, sandisk,insurance card, .50 cents security [phone])   Valuables sent home with patient. Valuables retrieved from safe, Security envelope number:  [id number] and returned to patient. Patient left department with staff and all belongings via in control and discharged to main entrance going home. Patient education on aftercare instructions: yes  Information faxed to [facility] minds by staff Patient verbalize understanding of AVS:  yes.     Status EXAM upon discharge:  Status and Exam  Normal: Yes  Facial Expression: Brightened  Affect: Appropriate  Level of Consciousness: Alert  Mood:Normal: Yes  Mood: Anxious  Motor Activity:Normal: Yes  Motor Activity: Increased  Interview Behavior: Cooperative  Preception: [place] to Person, [place] to Time, [place] to Place, [place] to Situation  Attention:Normal: No  Attention: Distractible  Thought Processes: Circumstantial  Thought Content:Normal: No  Thought Content: Preoccupations  Hallucinations: None  Delusions: No  Memory:Normal: Yes  Memory: Poor Recent  Insight and Judgment: No  Insight and Judgment: Poor Judgment  Present Suicidal Ideation: No  Present Homicidal Ideation: No      Metabolic Screening:    No results found for: LABA1C    No results found for: CHOL  No results found for: TRIG  No results found for: HDL  No components found for: LDLCAL  No results found for: Eder Leahy LPN

## 2020-07-16 NOTE — DISCHARGE SUMMARY
[address]     Department of Internal Medicine - Staff Internal Medicine Teaching Service    INPATIENT DISCHARGE SUMMARY      Patient Identification:  Latoya Ozuna is a 23 y.o. female. :  1995  MRN: [id number]     Acct: [de-identified]   PCP: No primary care provider on file. Admit Date:  2020  Discharge date and time: 2020  8:01 PM   Attending Provider: No att. providers found                                     [address] Problem Lists:  Active Problems:    Opioid intoxication, with delirium ([address]    Toxic metabolic encephalopathy    Polysubstance abuse ([facility]    Acute kidney injury (STEVE) with acute tubular necrosis (ATN) (HCC)    Lactic acidosis    Accidental overdose of heroin ([facility]    Opioid use disorder (HCC)    Opioid intoxication with delirium ([facility]  Resolved Problems:    * No resolved hospital problems. [facility]     Brief Inpatient course:   Latoya Ozuna is a 23 y.o. female who was admitted for the management of Toxic metabolic encephalopathy, presented to the emergency department with AMS and polysubstance abuse on 2020. Urine tox was positive for opioids and cocaine. Patient was intubated as her mentation was not improving. Pt was resuscitated and transferred to floor. COVID was negative. Procedures/ Significant Interventions:    Intubation in the ER on 2020. CT head wo Contrast: Negative with no intracranial abnormality.     Consults:     Consults:     Final Specialist Recommendations/Findings:   IP CONSULT TO SOCIAL WORK  IP CONSULT TO HOSPITALIST  IP CONSULT TO CARDIOLOGY  IP CONSULT TO CRITICAL CARE  IP CONSULT TO SOCIAL WORK  IP CONSULT TO DIETITIAN  IP CONSULT TO PSYCHIATRY  IP CONSULT TO INTERNAL MEDICINE  IP CONSULT TO CASE MANAGEMENT      Any Hospital Acquired Infections: none    Discharge Functional Status:  stable    DISCHARGE PLAN     Disposition: [facility]    Patient Instructions: Discharge Medication List as of 7/11/2020  8:01 PM      START taking these medications    Details   benzonatate (TESSALON) 100 MG capsule Take 1 capsule by mouth 3 times daily as needed for Cough, Disp-10 capsule,R-0Normal      guaiFENesin (MUCINEX) 600 MG extended release tablet Take 1 tablet by mouth 2 times daily, Disp-20 tablet,R-0Normal      amoxicillin-clavulanate (AUGMENTIN) 875-125 MG per tablet Take 1 tablet by mouth 2 times daily for 7 days, Disp-14 tablet,R-0Normal         CONTINUE these medications which have NOT CHANGED    Details   lithium (ESKALITH) 450 MG extended release tablet Take 450 mg by mouth 2 times dailyHistorical Med      busPIRone (BUSPAR) 10 MG tablet Take 10 mg by mouth 3 times dailyHistorical Med      QUEtiapine (SEROQUEL) 100 MG tablet Take 100 mg by mouth 2 times dailyHistorical Med      rOPINIRole (REQUIP) 5 MG tablet Take 5 mg by mouth 3 times dailyHistorical Med      gabapentin (NEURONTIN) 300 MG capsule Take 300 mg by mouth 3 times daily. Historical Med      ALPRAZolam (XANAX) 0.5 MG tablet Take 0.5 mg by mouth nightly as needed for Sleep. Tristan Conde Historical Med      Magnesium 100 MG TABS Take by mouth daily otcHistorical Med      Salicylic Acid 2 % PADS Apply 1 each topically daily, Disp-30 each, R-1             Activity: activity as tolerated    Diet: regular diet    Follow-up:    As per [facility] recommendations. Patient Instructions: Take medications as prescribed. Counseled about smoking, alcohol, and illicit drug abuse. Follow up labs: none  Follow up imaging: none. Note that over 30 minutes was spent in preparing discharge papers, discussing discharge with patient, medication review, etc.      Lourdes Crockett MD, MD  Internal Medicine Resident, PGY-1  [address] [facility];  [place], [place]  7/16/2020, 1:11 PM

## 2020-07-17 ENCOUNTER — HOSPITAL ENCOUNTER (EMERGENCY)
Age: 25
Discharge: HOME | End: 2020-07-18
Attending: EMERGENCY MEDICINE
Payer: MEDICAID

## 2020-07-17 PROCEDURE — 99283 EMERGENCY DEPT VISIT LOW MDM: CPT

## 2020-07-17 ASSESSMENT — ENCOUNTER SYMPTOMS
DIARRHEA: 0
EYE DISCHARGE: 0
VOMITING: 0
EYE REDNESS: 0
COUGH: 0
CONSTIPATION: 0
FACIAL SWELLING: 0
SHORTNESS OF BREATH: 0
ABDOMINAL PAIN: 0
COLOR CHANGE: 0

## 2020-07-18 VITALS
TEMPERATURE: 98.6 F | SYSTOLIC BLOOD PRESSURE: 101 MMHG | BODY MASS INDEX: 19.29 KG/M2 | HEART RATE: 56 BPM | OXYGEN SATURATION: 94 % | DIASTOLIC BLOOD PRESSURE: 64 MMHG | WEIGHT: 120 LBS | HEIGHT: 66 IN | RESPIRATION RATE: 11 BRPM

## 2020-07-18 NOTE — ED NOTES
Bed: 25  Expected date:   Expected time:   Means of arrival:   Comments:  Bridget Aceves  07/17/20 4525

## 2020-07-18 NOTE — ED TRIAGE NOTES
Patient found wandering at [facility]. EMS called and found her able to answer questions appropriately but on some sort of drug. She admitted to Dr. Moss Scales snorting heroine.
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
## 2020-08-03 ENCOUNTER — HOSPITAL ENCOUNTER (EMERGENCY)
Age: 25
Discharge: HOME | End: 2020-08-03
Attending: EMERGENCY MEDICINE
Payer: MEDICAID

## 2020-08-03 VITALS
TEMPERATURE: 98.3 F | DIASTOLIC BLOOD PRESSURE: 80 MMHG | SYSTOLIC BLOOD PRESSURE: 94 MMHG | BODY MASS INDEX: 22.2 KG/M2 | RESPIRATION RATE: 16 BRPM | HEART RATE: 74 BPM | HEIGHT: 64 IN | OXYGEN SATURATION: 99 % | WEIGHT: 130 LBS

## 2020-08-03 LAB
-: NORMAL
ABSOLUTE EOS #: 0.27 K/UL (ref 0–0.44)
ABSOLUTE IMMATURE GRANULOCYTE: <0.03 K/UL (ref 0–0.3)
ABSOLUTE LYMPH #: 2.07 K/UL (ref 1.1–3.7)
ABSOLUTE MONO #: 0.58 K/UL (ref 0.1–1.2)
ALBUMIN SERPL-MCNC: 4.5 G/DL (ref 3.5–5.2)
ALBUMIN/GLOBULIN RATIO: 1.5 (ref 1–2.5)
ALP BLD-CCNC: 101 U/L (ref 35–104)
ALT SERPL-CCNC: 12 U/L (ref 5–33)
ANION GAP SERPL CALCULATED.3IONS-SCNC: 11 MMOL/L (ref 9–17)
AST SERPL-CCNC: 27 U/L
BASOPHILS # BLD: 1 % (ref 0–2)
BASOPHILS ABSOLUTE: 0.08 K/UL (ref 0–0.2)
BILIRUB SERPL-MCNC: 0.18 MG/DL (ref 0.3–1.2)
BILIRUBIN URINE: NEGATIVE
BUN BLDV-MCNC: 5 MG/DL (ref 6–20)
BUN/CREAT BLD: ABNORMAL (ref 9–20)
CALCIUM SERPL-MCNC: 9 MG/DL (ref 8.6–10.4)
CHLORIDE BLD-SCNC: 102 MMOL/L (ref 98–107)
CO2: 25 MMOL/L (ref 20–31)
COLOR: YELLOW
COMMENT UA: NORMAL
CREAT SERPL-MCNC: 0.7 MG/DL (ref 0.5–0.9)
DIFFERENTIAL TYPE: ABNORMAL
EOSINOPHILS RELATIVE PERCENT: 5 % (ref 1–4)
ETHANOL PERCENT: 0.14 %
ETHANOL: 139 MG/DL
GFR AFRICAN AMERICAN: >60 ML/MIN
GFR NON-AFRICAN AMERICAN: >60 ML/MIN
GFR SERPL CREATININE-BSD FRML MDRD: ABNORMAL ML/MIN/{1.73_M2}
GFR SERPL CREATININE-BSD FRML MDRD: ABNORMAL ML/MIN/{1.73_M2}
GLUCOSE BLD-MCNC: 97 MG/DL (ref 70–99)
GLUCOSE URINE: NEGATIVE
HCT VFR BLD CALC: 44 % (ref 36.3–47.1)
HEMOGLOBIN: 14.1 G/DL (ref 11.9–15.1)
IMMATURE GRANULOCYTES: 0 %
KETONES, URINE: NEGATIVE
LEUKOCYTE ESTERASE, URINE: NEGATIVE
LYMPHOCYTES # BLD: 35 % (ref 24–43)
MCH RBC QN AUTO: 29.9 PG (ref 25.2–33.5)
MCHC RBC AUTO-ENTMCNC: 32 G/DL (ref 28.4–34.8)
MCV RBC AUTO: 93.4 FL (ref 82.6–102.9)
MONOCYTES # BLD: 10 % (ref 3–12)
NITRITE, URINE: NEGATIVE
NRBC AUTOMATED: 0 PER 100 WBC
PDW BLD-RTO: 14.5 % (ref 11.8–14.4)
PH UA: 6 (ref 5–8)
PLATELET # BLD: 243 K/UL (ref 138–453)
PLATELET ESTIMATE: ABNORMAL
PMV BLD AUTO: 11.9 FL (ref 8.1–13.5)
POTASSIUM SERPL-SCNC: 4.3 MMOL/L (ref 3.7–5.3)
PROTEIN UA: NEGATIVE
RBC # BLD: 4.71 M/UL (ref 3.95–5.11)
RBC # BLD: ABNORMAL 10*6/UL
REASON FOR REJECTION: NORMAL
SEG NEUTROPHILS: 49 % (ref 36–65)
SEGMENTED NEUTROPHILS ABSOLUTE COUNT: 2.97 K/UL (ref 1.5–8.1)
SODIUM BLD-SCNC: 138 MMOL/L (ref 135–144)
SPECIFIC GRAVITY UA: 1.01 (ref 1–1.03)
TOTAL PROTEIN: 7.5 G/DL (ref 6.4–8.3)
TURBIDITY: CLEAR
URINE HGB: NEGATIVE
UROBILINOGEN, URINE: NORMAL
WBC # BLD: 6 K/UL (ref 3.5–11.3)
WBC # BLD: ABNORMAL 10*3/UL
ZZ NTE CLEAN UP: ORDERED TEST: NORMAL
ZZ NTE WITH NAME CLEAN UP: SPECIMEN SOURCE: NORMAL

## 2020-08-03 PROCEDURE — G0480 DRUG TEST DEF 1-7 CLASSES: HCPCS

## 2020-08-03 PROCEDURE — 80053 COMPREHEN METABOLIC PANEL: CPT

## 2020-08-03 PROCEDURE — 81003 URINALYSIS AUTO W/O SCOPE: CPT

## 2020-08-03 PROCEDURE — 99284 EMERGENCY DEPT VISIT MOD MDM: CPT

## 2020-08-03 PROCEDURE — 6370000000 HC RX 637 (ALT 250 FOR IP): Performed by: GENERAL PRACTICE

## 2020-08-03 PROCEDURE — 85025 COMPLETE CBC W/AUTO DIFF WBC: CPT

## 2020-08-03 RX ORDER — ONDANSETRON 4 MG/1
4 TABLET, ORALLY DISINTEGRATING ORAL ONCE
Status: COMPLETED | OUTPATIENT
Start: 2020-08-03 | End: 2020-08-03

## 2020-08-03 RX ADMIN — ONDANSETRON 4 MG: 4 TABLET, ORALLY DISINTEGRATING ORAL at 01:38

## 2020-08-03 ASSESSMENT — ENCOUNTER SYMPTOMS
NAUSEA: 1
EYES NEGATIVE: 1
ABDOMINAL PAIN: 1
RESPIRATORY NEGATIVE: 1
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
## 2020-08-03 NOTE — ED NOTES
Met with pt at bedside at pt request. Pt states that she spoke to her sister who lives in [facility] [place] and her sister is going to let her come live with her and go to a rehab program in [facility] to get the pt out of this \"toxic environment. \" Pt states that her sister is a dancer in [facility] and will pick her up from their mom's house later today and take her to [facility] with her. Pt requesting food and water, writer provided requested items. Will continue to monitor for interventions as necessary.       Tyree Clarke, [facility]  08/03/20 0695

## 2020-08-03 NOTE — ED PROVIDER NOTES
use: Yes     Frequency: 2.0 times per week     Types: Marijuana, Opiates      Comment: uses heroin everyday she states     Sexual activity: Yes     Partners: Male   Lifestyle    Physical activity     Days per week: Not on file     Minutes per session: Not on file    Stress: Not on file   Relationships    Social connections     Talks on phone: Not on file     Gets together: Not on file     Attends Christianity service: Not on file     Active member of club or organization: Not on file     Attends meetings of clubs or organizations: Not on file     Relationship status: Not on file    Intimate partner violence     Fear of current or ex partner: Not on file     Emotionally abused: Not on file     Physically abused: Not on file     Forced sexual activity: Not on file   Other Topics Concern    Not on file   Social History Narrative    Not on file       History reviewed. No pertinent family history. Allergies:  Nuts [macadamia nut oil]    Home Medications:  Prior to Admission medications    Medication Sig Start Date End Date Taking? Authorizing Provider   QUEtiapine (SEROQUEL) 50 MG tablet Take 3 tablets by mouth 2 times daily 7/15/20   Kiersten Ramos MD   escitalopram (LEXAPRO) 10 MG tablet Take 10 mg by mouth daily    Historical Provider, MD   traZODone (DESYREL) 100 MG tablet Take 100 mg by mouth nightly    Historical Provider, MD   buprenorphine-naloxone (SUBOXONE) 8-2 MG FILM SL film Place 1 Film under the tongue 2 times daily. Historical Provider, MD   lithium (ESKALITH) 450 MG extended release tablet Take 450 mg by mouth 2 times daily    Historical Provider, MD   busPIRone (BUSPAR) 10 MG tablet Take 10 mg by mouth 3 times daily    Historical Provider, MD       REVIEW OF SYSTEMS    (2-9 systems for level 4, 10 or more for level 5)      Review of Systems   Constitutional: Negative. HENT: Negative. Eyes: Negative. Respiratory: Negative. Cardiovascular: Negative.     Gastrointestinal: Positive for abdominal pain and nausea. Genitourinary: Negative. PHYSICAL EXAM   (up to 7 for level 4, 8 or more for level 5)      INITIAL VITALS:   BP 94/80   Pulse 74   Temp 98.3 °F (36.8 °C) (Oral)   Resp 16   Ht 5' 4\" (1.626 m)   Wt 130 lb (59 kg)   SpO2 99%   BMI 22.31 kg/m²     Physical Exam   Gen. Appearance: patient appears well, nondistressed. intoxicated  HEENT: head atraumatic, normocephalic. Pupils equal and reactive to light. Oropharynx clear and moist.  Neck: Supple, normal range of motion. No lymphadenopathy. Pulmonary: Lungs clear to auscultation bilaterally. Equal air entry right left. Cardiovascular:  Heart sounds normal, no murmurs. Peripheral pulses strong, regular, equal.   Abdomen: Soft, nontender, nondistended. Bowel sounds normal. No palpable masses. Neurology: GCS 15. Oriented to person and place. Normal tone and power in all 4 extremities. No sensory deficits.     Skin: Warm, dry, well perfused      DIFFERENTIAL  DIAGNOSIS     PLAN (Ara Sensor / Dejuanella Brakeman / EKG):  Orders Placed This Encounter   Procedures    CBC Auto Differential    URINALYSIS    SPECIMEN REJECTION    Comprehensive Metabolic Panel    PREVIOUS SPECIMEN    Ethanol       MEDICATIONS ORDERED:  Orders Placed This Encounter   Medications    ondansetron (ZOFRAN-ODT) disintegrating tablet 4 mg           DIAGNOSTIC RESULTS / EMERGENCY DEPARTMENT COURSE / MDM     LABS:  Results for orders placed or performed during the hospital encounter of 08/03/20   CBC Auto Differential   Result Value Ref Range    WBC 6.0 3.5 - 11.3 k/uL    RBC 4.71 3.95 - 5.11 m/uL    Hemoglobin 14.1 11.9 - 15.1 g/dL    Hematocrit 44.0 36.3 - 47.1 %    MCV 93.4 82.6 - 102.9 fL    MCH 29.9 25.2 - 33.5 pg    MCHC 32.0 28.4 - 34.8 g/dL    RDW 14.5 (H) 11.8 - 14.4 %    Platelets 095 600 - 321 k/uL    MPV 11.9 8.1 - 13.5 fL    NRBC Automated 0.0 0.0 per 100 WBC    Differential Type NOT REPORTED     Seg Neutrophils 49 36 - 65 %    Lymphocytes 35 24 - 43 %    Monocytes 10 3 - 12 %    Eosinophils % 5 (H) 1 - 4 %    Basophils 1 0 - 2 %    Immature Granulocytes 0 0 %    Segs Absolute 2.97 1.50 - 8.10 k/uL    Absolute Lymph # 2.07 1.10 - 3.70 k/uL    Absolute Mono # 0.58 0.10 - 1.20 k/uL    Absolute Eos # 0.27 0.00 - 0.44 k/uL    Basophils Absolute 0.08 0.00 - 0.20 k/uL    Absolute Immature Granulocyte <0.03 0.00 - 0.30 k/uL    WBC Morphology NOT REPORTED     RBC Morphology ANISOCYTOSIS PRESENT     Platelet Estimate NOT REPORTED    URINALYSIS   Result Value Ref Range    Color, UA YELLOW YELLOW    Turbidity UA CLEAR CLEAR    Glucose, Ur NEGATIVE NEGATIVE    Bilirubin Urine NEGATIVE NEGATIVE    Ketones, Urine NEGATIVE NEGATIVE    Specific Gravity, UA 1.012 1.005 - 1.030    Urine Hgb NEGATIVE NEGATIVE    pH, UA 6.0 5.0 - 8.0    Protein, UA NEGATIVE NEGATIVE    Urobilinogen, Urine Normal Normal    Nitrite, Urine NEGATIVE NEGATIVE    Leukocyte Esterase, Urine NEGATIVE NEGATIVE    Urinalysis Comments       Microscopic exam not performed based on chemical results unless requested in original order. SPECIMEN REJECTION   Result Value Ref Range    Specimen Source . BLOOD     Ordered Test CP     Reason for Rejection Unable to perform testing: Specimen hemolyzed.      - NOT REPORTED    Comprehensive Metabolic Panel   Result Value Ref Range    Glucose 97 70 - 99 mg/dL    BUN 5 (L) 6 - 20 mg/dL    CREATININE 0.70 0.50 - 0.90 mg/dL    Bun/Cre Ratio NOT REPORTED 9 - 20    Calcium 9.0 8.6 - 10.4 mg/dL    Sodium 138 135 - 144 mmol/L    Potassium 4.3 3.7 - 5.3 mmol/L    Chloride 102 98 - 107 mmol/L    CO2 25 20 - 31 mmol/L    Anion Gap 11 9 - 17 mmol/L    Alkaline Phosphatase 101 35 - 104 U/L    ALT 12 5 - 33 U/L    AST 27 <32 U/L    Total Bilirubin 0.18 (L) 0.3 - 1.2 mg/dL    Total Protein 7.5 6.4 - 8.3 g/dL    Alb 4.5 3.5 - 5.2 g/dL    Albumin/Globulin Ratio 1.5 1.0 - 2.5    GFR Non-African American >60 >60 mL/min    GFR African American >60 >60 mL/min    GFR Comment          GFR

## 2020-08-03 NOTE — ED NOTES
Bed: 27  Expected date:   Expected time:   Means of arrival:   Comments:  CHANNING James, VINCENT  08/03/20 7453

## 2020-08-03 NOTE — ED NOTES
Pt ambulates with steady gait to and from bathroom. Pt back in room. Will continue to monitor.       Jules Mcgraw, VINCENT  08/03/20 2185

## 2020-08-03 NOTE — ED NOTES
Pt requesting to speak with . Sonu Bullock, [address] notified.       Braden Villarreal RN  08/03/20 1710

## 2020-08-03 NOTE — ED NOTES
Pt to ED via EMS. EMS reports pt was found down and they gave 2mg narcan and pt woke right up. EMS also gave 4mg of zofran and established an 18g IV in [address]. Pt reports she overdosed snorting heroin and had \"like 7 big shots of some cinnamon schnapps stuff. \"  Pt reports she has overdosed \"many times before. \"  Pt denies any SI/HI thoughts. Pt laying on cot. Placed on monitor. RR even and non labored. VSS. No signs of distress noted at this time. Will continue to monitor.       Ryland Garcia RN  08/03/20 9411

## 2020-08-03 NOTE — ED PROVIDER NOTES
[facility] ED  Emergency Department  Faculty Attestation       I performed a history and physical examination of the patient and discussed management with the resident. I reviewed the residents note and agree with the documented findings including all diagnostic interpretations and plan of care. Any areas of disagreement are noted on the chart. I was personally present for the key portions of any procedures. I have documented in the chart those procedures where I was not present during the key portions. I have reviewed the emergency nurses triage note. I agree with the chief complaint, past medical history, past surgical history, allergies, medications, social and family history as documented unless otherwise noted below. Documentation of the HPI, Physical Exam and Medical Decision Making performed by scribbeatrice is based on my personal performance of the HPI, PE and MDM. For Physician Assistant/ Nurse Practitioner cases/documentation I have personally evaluated this patient and have completed at least one if not all key elements of the E/M (history, physical exam, and MDM). Additional findings are as noted. Pertinent Comments     Primary Care Physician: No primary care provider on file. ED Triage Vitals [08/03/20 0054]   BP Temp Temp Source Pulse Resp SpO2 Height Weight   94/80 98.3 °F (36.8 °C) Oral 74 16 99 % 5' 4\" (1.626 m) 130 lb (59 kg)        History/Physical: This is a 23 y.o. female who presents to the Emergency Department with complaint of overdose. Reported alcohol, cocaine, opiates, and the Narcan prior to arrival.    Alert oriented no acute distress. Heart sounds regular lungs clear to auscultation. Abdomen soft nontender. Moving all tremors equally. No active suicidal homicidal ideations. MDM/Plan: Initially wanted to go to rehab facility, but then wanted to leave.   She is clinically sober okay for discharge      CRITICAL CARE: None     Albert Bowser MD  Attending Emergency Physician         Xiomara Naik MD  08/03/20 9496

## 2020-08-09 ENCOUNTER — HOSPITAL ENCOUNTER (EMERGENCY)
Age: 25
Discharge: HOME | End: 2020-08-10
Attending: EMERGENCY MEDICINE
Payer: MEDICAID

## 2020-08-09 ENCOUNTER — APPOINTMENT (OUTPATIENT)
Dept: CT IMAGING | Age: 25
End: 2020-08-09
Payer: MEDICAID

## 2020-08-09 PROCEDURE — 70450 CT HEAD/BRAIN W/O DYE: CPT

## 2020-08-09 PROCEDURE — 99285 EMERGENCY DEPT VISIT HI MDM: CPT

## 2020-08-09 PROCEDURE — 6370000000 HC RX 637 (ALT 250 FOR IP): Performed by: STUDENT IN AN ORGANIZED HEALTH CARE EDUCATION/TRAINING PROGRAM

## 2020-08-09 RX ORDER — ACETAMINOPHEN 325 MG/1
650 TABLET ORAL ONCE
Status: COMPLETED | OUTPATIENT
Start: 2020-08-09 | End: 2020-08-09

## 2020-08-09 RX ADMIN — ACETAMINOPHEN 650 MG: 325 TABLET ORAL at 21:57

## 2020-08-09 ASSESSMENT — ENCOUNTER SYMPTOMS
PHOTOPHOBIA: 1
COUGH: 0
ABDOMINAL PAIN: 0
NAUSEA: 0
WHEEZING: 0
VOMITING: 0

## 2020-08-09 ASSESSMENT — PAIN SCALES - GENERAL: PAINLEVEL_OUTOF10: 5

## 2020-08-09 NOTE — ED NOTES
Pt to ER by EMS from home, family called due to patient using crack/heroin after coming home/discharge from [facility] for drug abuse. Pt states IN heroin. Pt did not overdose and was given no meds. Pt arrives calm and cooperative, aox4 w/ rr even and unlabored. Pt does state she had a fall sometime over the last 48 hours, hitting her head w/out LOC. Sitter @ bedside. Will continue to monitor.      Mariana Cheney RN  08/09/20 3852

## 2020-08-09 NOTE — ED PROVIDER NOTES
homicidal ideations. PAST MEDICAL / SURGICAL / SOCIAL / FAMILY HISTORY      has a past medical history of ADHD (attention deficit hyperactivity disorder), ADHD (attention deficit hyperactivity disorder), Anxiety, ANDRE (generalized anxiety disorder), MDD (major depressive disorder), and Restless leg syndrome. has no past surgical history on file.     Social History     Socioeconomic History    Marital status: Single     Spouse name: Not on file    Number of children: Not on file    Years of education: Not on file    Highest education level: Not on file   Occupational History    Not on file   Social Needs    Financial resource strain: Not on file    Food insecurity     Worry: Not on file     Inability: Not on file    Transportation needs     Medical: Not on file     Non-medical: Not on file   Tobacco Use    Smoking status: Current Every Day Smoker     Packs/day: 1.00     Types: Cigarettes, E-Cigarettes     Start date: 1/1/2015    Smokeless tobacco: Never Used   Substance and Sexual Activity    Alcohol use: Not Currently     Alcohol/week: 4.0 standard drinks     Types: 4 Cans of beer per week     Comment: social    Drug use: Yes     Frequency: 2.0 times per week     Types: Opiates , Marijuana     Comment: uses heroin everyday she states     Sexual activity: Yes     Partners: Male   Lifestyle    Physical activity     Days per week: Not on file     Minutes per session: Not on file    Stress: Not on file   Relationships    Social connections     Talks on phone: Not on file     Gets together: Not on file     Attends Jehovah's witness service: Not on file     Active member of club or organization: Not on file     Attends meetings of clubs or organizations: Not on file     Relationship status: Not on file    Intimate partner violence     Fear of current or ex partner: Not on file     Emotionally abused: Not on file     Physically abused: Not on file     Forced sexual activity: Not on file   Other Topics Concern  Not on file   Social History Narrative    ** Merged History Encounter **            History reviewed. No pertinent family history. Allergies:  Nuts [macadamia nut oil]    Home Medications:  Prior to Admission medications    Medication Sig Start Date End Date Taking? Authorizing Provider   QUEtiapine (SEROQUEL) 50 MG tablet Take 3 tablets by mouth 2 times daily 7/15/20   Rob Lin MD   escitalopram (LEXAPRO) 10 MG tablet Take 10 mg by mouth daily    Historical Provider, MD   traZODone (DESYREL) 100 MG tablet Take 100 mg by mouth nightly    Historical Provider, MD   buprenorphine-naloxone (SUBOXONE) 8-2 MG FILM SL film Place 1 Film under the tongue 2 times daily. Historical Provider, MD   lithium (ESKALITH) 450 MG extended release tablet Take 450 mg by mouth 2 times daily    Historical Provider, MD   busPIRone (BUSPAR) 10 MG tablet Take 10 mg by mouth 3 times daily    Historical Provider, MD   QUEtiapine (SEROQUEL) 100 MG tablet Take 100 mg by mouth 2 times daily    Historical Provider, MD   gabapentin (NEURONTIN) 300 MG capsule Take 300 mg by mouth 3 times daily. Historical Provider, MD   rOPINIRole (REQUIP) 5 MG tablet Take 5 mg by mouth 3 times daily    Historical Provider, MD       REVIEW OF SYSTEMS    (2-9 systems for level 4, 10 or more for level 5)      Review of Systems   Constitutional: Negative for chills and fever. HENT:        Denies any facial trauma or pain   Eyes: Positive for photophobia. Respiratory: Negative for cough and wheezing. Cardiovascular: Negative for chest pain. Gastrointestinal: Negative for abdominal pain, nausea and vomiting. Genitourinary: Positive for vaginal bleeding (currently on menstrual cycle). Musculoskeletal: Negative for neck pain and neck stiffness. Skin: Negative for rash. Neurological: Positive for headaches. Negative for dizziness, weakness and numbness.    Psychiatric/Behavioral:        No suicidal or homicidal ideations       PHYSICAL EXAM   (up to 7 for level 4, 8 or more for level 5)      INITIAL VITALS:   /86   Pulse 60   Temp 98.6 °F (37 °C) (Oral)   Resp 14   Wt 130 lb (59 kg)   SpO2 98%   BMI 22.31 kg/m²     Physical Exam  Constitutional:       General: She is not in acute distress. Appearance: She is not ill-appearing. HENT:      Head: Normocephalic and atraumatic. Comments: No tenderness to scalp or evidence of traumatic injury     Nose: Nose normal.      Comments: No septal hematoma or evidence of septal perforation     Mouth/Throat:      Mouth: Mucous membranes are moist.      Pharynx: No oropharyngeal exudate or posterior oropharyngeal erythema. Eyes:      Extraocular Movements: Extraocular movements intact. Conjunctiva/sclera: Conjunctivae normal.      Pupils: Pupils are equal, round, and reactive to light. Comments: Pupils are 1 to 2 mm, sluggishly reactive   Neck:      Musculoskeletal: Normal range of motion and neck supple. No muscular tenderness. Cardiovascular:      Rate and Rhythm: Normal rate and regular rhythm. Pulses: Normal pulses. Pulmonary:      Effort: Pulmonary effort is normal.      Breath sounds: No wheezing or rhonchi. Abdominal:      General: There is no distension. Palpations: Abdomen is soft. Tenderness: There is no abdominal tenderness. There is no guarding or rebound. Musculoskeletal: Normal range of motion. General: No swelling or deformity. Comments: No evidence of traumatic injury to extremities, moving all extremities, 5/5 strength   Skin:     General: Skin is warm and dry. Findings: No rash. Neurological:      General: No focal deficit present. Mental Status: She is alert. Sensory: No sensory deficit. Motor: No weakness.       Comments: Alert but somnolent, answering questions appropriately,   Psychiatric:         Behavior: Behavior normal.         DIFFERENTIAL  DIAGNOSIS     PLAN (LABS / IMAGING / EKG):  Orders Placed This Encounter   Procedures    CT Head WO Contrast       MEDICATIONS ORDERED:  Orders Placed This Encounter   Medications    acetaminophen (TYLENOL) tablet 650 mg         DIAGNOSTIC RESULTS / EMERGENCY DEPARTMENT COURSE / MDM     No results found for this visit on 08/09/20. RADIOLOGY:  CT Head WO Contrast   Final Result   No acute intracranial abnormality. IMPRESSION/MDM/EMERGENCY DEPARTMENT COURSE:  Patient came to emergency department, HPI and physical exam were conducted. All nursing notes were reviewed. 81-year-old female presenting emergency department after accidental overdose on heroin and cocaine. Patient states that she started drugs earlier today. Was found by family member overdose. Was given Narcan. Still slightly tired and complaining of mild migraine-like headache. Feels that she might of fell earlier in the day and hit the back of her head. Does not think she lost consciousness. Patient was screened and has no clinical signs or symptoms of a CoVID-19 infection at this time. However, given current pandemic and atypical presentations, face mask, eye protection, surgical cap, and gloves were worn during examination. Patient was wearing surgical mask. Vitals within normal limits. Patient somnolent but alert, answering questions appropriately, stable gait, moving all extremities. Heart regular rate and rhythm without murmur. Lungs are clear to auscultate by without wheezes rales rhonchi. Abdomen soft, nontender nondistended. No evidence of traumatic injury to extremities. Patient is somnolent but alert, likely still impaired from drug use. No evidence of withdrawal symptoms. C-spine, T-spine, L-spine without step-offs tenderness or deformity, no traumatic injury to head. Normal range of motion of neck. Pupils 2 mm equal bilaterally, sluggishly reactive to light. Suspect accidental overdose on cocaine and heroin.   Likely reversed with Narcan given by family member. Patient's vitals are stable. Not displaying sympathomimetic toxidrome at this time consistent with cocaine. Pupils are narrow and patient still somnolent, likely secondary to persistent heroin effects. Low suspicion for acute traumatic injury including head injury but given patient appears intoxicated at this time will obtain CT head. Will place on pulse ox to monitor saturation and need for potentially more Narcan. At this time patient is refusing to speak to social work and does not want any help in terms of going to rehab facility. Resting comfortably upon reevaluation with stable vitals    Head CT negative for acute intracranial injury. ED Course as of Aug 10 1140   Sun Aug 09, 2020   2353 Patient tolerating diet. At this time she would like to go to rehab facility and is willing to speak to social work. Social work team notified. [ZT]      ED Course User Index  [ZT] Katelynn Puri DO     Patient is medically cleared at this time. Social work working on getting patient into rehab facility. May require additional labs for clearance. Signed out to Dr. Manuel Zaman      1.  Accidental overdose of heroin, initial encounter [facility])          DISPOSITION / PLAN     DISPOSITION        PATIENT REFERRED TO:  [facility]  [address] [place] [address]  [phone]    As needed, If symptoms worsen      DISCHARGE MEDICATIONS:  Discharge Medication List as of 8/10/2020  6:12 AM          Katelynn Puri DO  Emergency Medicine Resident    (Please note that portions of thisnote were completed with a voice recognition program.  Efforts were made to edit the dictations but occasionally words are mis-transcribed.)       Katelynn Puri DO  Resident  08/10/20 1146

## 2020-08-10 VITALS
BODY MASS INDEX: 22.31 KG/M2 | SYSTOLIC BLOOD PRESSURE: 100 MMHG | HEART RATE: 60 BPM | RESPIRATION RATE: 14 BRPM | TEMPERATURE: 98.6 F | OXYGEN SATURATION: 98 % | WEIGHT: 130 LBS | DIASTOLIC BLOOD PRESSURE: 86 MMHG

## 2020-08-10 NOTE — ED NOTES
Social work; spoke to pt who kept falling asleep as writer was trying to ask her questions about her recent stay at [facility]. Will return when she is more awake. She did finally say what she took today heroin and cocaine. She did nod that she wanted help and that it was ok to give her mom updates when we know she will be discharged. She states she does not live with her mom.   ETIENNE Sousa, [facility]  08/09/20 0320

## 2020-08-10 NOTE — ED PROVIDER NOTES
[facility]  ED  Emergency Department  Emergency Medicine Resident Sign-out     Care of Leticia Olszewski was assumed from Dr. Huber Gastelum and is being seen for Addiction Problem (heroin/crack post d/c from [facility])  . The patient's initial evaluation and plan have been discussed with the prior provider who initially evaluated the patient. EMERGENCY DEPARTMENT COURSE / MEDICAL DECISION MAKING:       MEDICATIONS GIVEN:  Orders Placed This Encounter   Medications    acetaminophen (TYLENOL) tablet 650 mg       LABS / RADIOLOGY:     Labs Reviewed - No data to display    Ct Head Wo Contrast    Result Date: 8/9/2020  EXAMINATION: CT OF THE HEAD WITHOUT CONTRAST  8/9/2020 9:51 pm TECHNIQUE: CT of the head was performed without the administration of intravenous contrast. Dose modulation, iterative reconstruction, and/or weight based adjustment of the mA/kV was utilized to reduce the radiation dose to as low as reasonably achievable. COMPARISON: July 7, 2020 HISTORY: ORDERING SYSTEM PROVIDED HISTORY: fall, hit head, intoxicated TECHNOLOGIST PROVIDED HISTORY: fall, hit head, intoxicated Is the patient pregnant?->No Reason for Exam: fall, hit head, intoxicated Acuity: Acute Type of Exam: Initial FINDINGS: BRAIN/VENTRICLES: There is no acute intracranial hemorrhage, mass effect or midline shift. No abnormal extra-axial fluid collection. The gray-white differentiation is maintained without evidence of an acute infarct. There is no evidence of hydrocephalus. ORBITS: The visualized portion of the orbits demonstrate no acute abnormality. SINUSES: The visualized paranasal sinuses and mastoid air cells demonstrate no acute abnormality. SOFT TISSUES/SKULL:  No acute abnormality of the visualized skull or soft tissues. No acute intracranial abnormality. RECENT VITALS:     Temp: 98.6 °F (37 °C),  Pulse: 55, Resp: 14, BP: 102/63, SpO2: 98 %    This patient is a 23 y.o. Female with history of drug abuse. Recently discharged from [facility]. Admits to snorting heroin and cocaine earlier today. Was given Narcan by family member. Somnolent but alert upon arrival.  Vitals within normal limits. Reported fall with hitting head, unclear loss of consciousness. CT head unremarkable. Patient tolerating diet. Agreeable with plan to speak with social work, possible placement to inpatient rehab. Discussed plan with patient and , patient agrees to go to [facility] voluntarily. Discussed with [facility], will accept patient if she is discharged from the emergency department and presents to their facility later this morning. Patient is agreeable to plan, wishes to be discharged so she can go to [facility] for treatment. Patient remained stable in the emergency department with improving vital signs, no suicidal ideation, no homicidal ideation, willing to go to a facility for treatment. Gave strict return precautions to the emergency department and discharge patient to go to [facility] for treatment. OUTSTANDING TASKS / RECOMMENDATIONS:    1. Follow-up with social work regarding rehab versus home     FINAL IMPRESSION:     1. Accidental overdose of heroin, initial encounter ([facility] [address]        DISPOSITION:         DISPOSITION:  [x]  Discharge   []  Transfer -    []  Admission -     []  Against Medical Advice   []  Eloped   FOLLOW-UP: No follow-up provider specified.    DISCHARGE MEDICATIONS: New Prescriptions    No medications on file           Sarkis Braga MD  Emergency Medicine Resident  [facility]        Sarkis Braga MD  Resident  08/10/20 7976

## 2020-08-10 NOTE — ED NOTES
Status unchanged. Safeguard at bedside. Safe environment maintained. Will continue to monitor.       Jose Jesus RN  08/09/20 9458

## 2020-08-10 NOTE — ED NOTES
Social work: spoke to pt who was awake now. She did not agree to try to return to [facility]. She states she left after day 2 because they would not give her suboxone and seraquel. She is now interested in [place] treatment as she is hoping they might give drugs to help with withdrawal.  Not sure they do. Called and await a call back. Pt is going to go there on her own. She states she does not want to call her mom and have her come and pick her up. She states she is just nosy she does not do anything for me per pt. Mom called and did want to get updates if allowed. Pt said ok in the night now she is not open to updating her mom. Asked pt where she will go and she states, \"to the back yard where I came from\". [facility] does not respond quickly and pt has been informed of that fact. She does not indicate that she is certain she wants to go into rehab. However it is being offered and suggestions of calling her mom and allowing her to care for pt seems like a better alternative pt is not going to follow the recommendations it is clear. Still trying to get [facility] to call back. willl provide the phone number to patient also so she can try when she leaves. Will follow as pt allows.   ETIENNE Teran, Henrietta Cardenas  08/10/20 6229

## 2020-08-10 NOTE — ED PROVIDER NOTES
[address] [facility]     Emergency Department     Faculty Attestation    I performed a history and physical examination of the patient and discussed management with the resident. I reviewed the residents note and agree with the documented findings including all diagnostic interpretations and plan of care. Any areas of disagreement are noted on the chart. I was personally present for the key portions of any procedures. I have documented in the chart those procedures where I was not present during the key portions. I have reviewed the emergency nurses triage note. I agree with the chief complaint, past medical history, past surgical history, allergies, medications, social and family history as documented unless otherwise noted below. Documentation of the HPI, Physical Exam and Medical Decision Making performed by scribbeatrice is based on my personal performance of the HPI, PE and MDM. For Physician Assistant/ Nurse Practitioner cases/documentation I have personally evaluated this patient and have completed at least one if not all key elements of the E/M (history, physical exam, and MDM). Additional findings are as noted. This patient was evaluated in the Emergency Department for symptoms described in the history of present illness. He/she was evaluated in the context of the global COVID-19 pandemic, which necessitated consideration that the patient might be at risk for infection with the SARS-CoV-2 virus that causes COVID-19. Institutional protocols and algorithms that pertain to the evaluation of patients at risk for COVID-19 are in a state of rapid change based on information released by regulatory bodies including the CDC and federal and state organizations. These policies and algorithms were followed during the patient's care in the ED. Primary Care Physician: No primary care provider on file. History:  This is a 23 y.o. female who presents to the Emergency Department with complaint of overdose. Snorted heroin. Recently in detox for heroin and cocaine. Unclear if any trauma. Did receive Narcan with some response. Limited history secondary to toxidrome    Physical:     weight is 130 lb (59 kg). Her oral temperature is 98.6 °F (37 °C). Her blood pressure is 102/63 and her pulse is 55. Her respiration is 14 and oxygen saturation is 98%.    23 y.o. female somnolent but arousable, pupils are 1 mm bilaterally, there is no obvious tenderness to palpation of the scalp there is no palpable skull fracture hemotympanum raccoon eyes or lopez sign. Cardiac exam regular rate and rhythm no murmurs rubs gallops, pulmonary clear bilaterally abdomen is soft nontender nondistended.     Impression: Opioid overdose    Plan: Pulse oximetry, monitor for sobriety, CT head as unclear if any trauma involved    MIPS 415     A head CT was ordered, but not by an emergency care provider: No    A head CT was ordered by an emergency care provider, and some of the indications for ordering the head CT included  Measure Exclusions:  Patient has a ventricular shunt: No  Patient has a brain tumor: No  Patient has multi-system trauma: No  Patient is pregnant: No  Patient is taking an antiplatelet medication (excluding aspirin): No  Patient is 72years old or older: No    Signs and Symptoms:  Patients GCS was less than 15: Yes  Focal neurological deficit: No  Severe Headache: No  Vomiting: No  Physical signs of a basilar skull fracture: No  Coagulopathy: No  Thrombocytopenia: No  Patient suspected of taking an anticoagulant medication: No  Dangerous mechanism of injury: No      Patient had loss of consciousness OR posttraumatic amnesia AND:   Headache: No  Patient is 61years old or older: No  Drug or Alcohol intoxication: Yes  Short-term memory deficits: Yes  Evidence of trauma above the clavicles (any visible or detected trauma to the head or neck, including lacerations, abrasions, bruising, swelling or fracture):  No  Post-traumatic seizure: No            Masood Amaya MD, [facility]  Attending Emergency Physician         Amisha Ledezma MD  08/09/20 4719

## 2020-08-10 NOTE — ED NOTES
Pt laying on cot. RR even and non labored. No signs of distress noted at this time. Safeguard at bedside. Safe environment maintained. Will continue to monitor.       Antionette Burt RN  08/10/20 4514

## 2020-08-10 NOTE — ED NOTES
Social work; pt informed the physician that she would now like to consider [facility]. Called [facility] [phone] and fax [phone]. Will fax labs when drawn and resulted. 7 am assessment person comes into work. Spoke to pt who does now agree to go there. ETIENNE Orlando, JOY  08/10/20 8773    Social work: since pt has been asleep and did not want labs earlier  Is not going to do them now. She is free to go to [facility] and do the assessment on her own. Will get cab arranged to take her there.   ETIENNE Orlando, Joann Vallecillo  08/10/20 8410

## 2020-08-10 NOTE — ED NOTES
Pt ambulates with steady gait to and from bathroom. 1:1 safety watch maintained. Will continue to monitor.       Damaso Junior RN  08/10/20 1175

## 2020-08-10 NOTE — ED NOTES
Pt given a box lunch. Safeguard at bedside. Safe environment maintained. Pt denies any other needs at this time. Will continue to monitor.       Suzan Painting RN  08/10/20 2471

## 2020-08-10 NOTE — ED PROVIDER NOTES
FACULTY SIGN-OUT  ADDENDUM       Patient: Vini Client   MRN: [id number]  PCP:  No primary care provider on file. Attestation  I was available and discussed any additional care issues that arose and coordinated the management plans with the resident(s) caring for the patient during my duty period. Any areas of disagreement with resident's documentation of care or procedures are noted on the chart. I was personally present for the key portions of any/all procedures during my duty period. I have documented in the chart those procedures where I was not present during the key portions. The patient's initial evaluation and plan have been discussed with the prior provider who initially evaluated the patient. Pertinent Comments: The patient is a 23 y.o. female taken in signout with overdose with fall with CTs negative neurologically intact and now sober.    She wishes help with her substance abuse problem  We are awaiting psychiatric social work evaluation    ED COURSE      The patient was given the following medications:  Orders Placed This Encounter   Medications    acetaminophen (TYLENOL) tablet 650 mg       RECENT VITALS:   BP: 102/63  Pulse: 55  Resp: 14  Temp: 98.6 °F (37 °C) SpO2: 98 %    (Please note that portions of this note were completed with a voice recognition program.  Efforts were made to edit the dictations but occasionally words are mis-transcribed.)    MD Perdo Pablo Foy  Attending Emergency Medicine Physician       Michelle Ellis MD  08/10/20 0137

## 2020-08-10 NOTE — ED NOTES
Security unlocked pt belongings. Pt changing back into her clothes.       Brooklyn Story RN  08/10/20 5678

## 2020-08-10 NOTE — ED NOTES
Social work: pt is now willing to see social work. Will see pt and attempt to offer treatment options. Pt was at [facility] recently. Unsure if they will take her back.  ETIENNE Andrea, [place]  08/09/20 4863

## 2020-08-10 NOTE — ED NOTES
Status unchanged. Pt remains on monitor. Safeguard at bedside. Safe environment maintained. Will continue to monitor.       Concepcion Ngo RN  08/10/20 0172

## 2020-08-10 NOTE — ED PROVIDER NOTES
FACULTY SIGN-OUT  ADDENDUM     Care of this patient was assumed from previous attending physician. The patient's initial evaluation and plan have been discussed with the prior provider who initially evaluated the patient. Attestation  I was available and discussed any additional care issues that arose and coordinated the management plans with the resident(s) caring for the patient during my duty period. Any areas of disagreement with resident's documentation of care or procedures are noted on the chart. I was personally present for the key portions of any/all procedures, during my duty period. I have documented in the chart those procedures where I was not present during the key portions. ED COURSE      The patient was given the following medications:  Orders Placed This Encounter   Medications    acetaminophen (TYLENOL) tablet 650 mg       RECENT VITALS:   Temp: 98.6 °F (37 °C), Pulse: 55, Resp: 14, BP: 102/63    MEDICAL DECISION MAKING        Mulu Davis is a 23 y.o. female who presents to the Emergency Department with complaints of OD after heroin. Await CT head. Ethan Leung MD, F.A.C.E.P.   Attending Emergency Physician    (Please note that portions of this note were completed with a voice recognition program.  Efforts were made to edit the dictations but occasionally words are mis-transcribed.)          Ethan Leung MD  08/09/20 7172

## 2020-08-10 NOTE — ED NOTES
Status unchanged. Safeguard at bedside. Safe environment maintained. Will continue to monitor.       Beata Velasquez RN  08/10/20 8852

## 2020-08-10 NOTE — ED NOTES
Pt laying on cot. RR even and non labored. No needs voiced at this time. Pt remains on pulse-ox monitor. Safeguard at bedside. Safe environment maintained. Will continue to monitor.       Edel Mead RN  08/09/20 2124

## 2020-08-10 NOTE — ED NOTES
Pt laying on cot. RR even and non labored. No signs of distress noted at this time. Safeguard at bedside. Safe environment maintained. Will continue to monitor.       Marcelle Armijo RN  08/10/20 8450

## 2020-08-10 NOTE — ED NOTES
Pt sleeping on cot. RR even and non labored. No signs of distress noted at this time. Safeguard at bedside. Safe environment maintained. Will continue to monitor.       Jose Jesus RN  08/10/20 0551

## 2020-08-10 NOTE — ED NOTES
Ursula Verma calling pt a cab to [address] [facility]       Justus Mann RN  08/10/20 7674

## 2020-08-10 NOTE — ED NOTES
Social work: spoke to patient again. Provided several brochures of treatment centers. [facility] has not called back yet. Encouraged patient to call them and brought her that phone number. Asked how she is going to get wherever she is going at discharge and she said I will get a ride from my sister. No sister is listed on the face sheet and she did not provide any numbers for other relatives. So she is going to leave and go to treatment per pt.     ETIENNE Mcfarland, [place]  08/10/20 4884

## 2020-10-07 ENCOUNTER — APPOINTMENT (OUTPATIENT)
Dept: CT IMAGING | Age: 25
End: 2020-10-07
Payer: MEDICAID

## 2020-10-07 ENCOUNTER — APPOINTMENT (OUTPATIENT)
Dept: GENERAL RADIOLOGY | Age: 25
End: 2020-10-07
Payer: MEDICAID

## 2020-10-07 ENCOUNTER — HOSPITAL ENCOUNTER (OUTPATIENT)
Age: 25
Setting detail: OBSERVATION
Discharge: HOME HEALTH | End: 2020-10-07
Attending: EMERGENCY MEDICINE
Payer: MEDICAID

## 2020-10-07 VITALS
RESPIRATION RATE: 16 BRPM | OXYGEN SATURATION: 98 % | DIASTOLIC BLOOD PRESSURE: 71 MMHG | BODY MASS INDEX: 22.2 KG/M2 | HEIGHT: 64 IN | HEART RATE: 63 BPM | TEMPERATURE: 98.4 F | WEIGHT: 130 LBS | SYSTOLIC BLOOD PRESSURE: 101 MMHG

## 2020-10-07 PROBLEM — S22.080A T12 COMPRESSION FRACTURE, INITIAL ENCOUNTER (HCC): Status: ACTIVE | Noted: 2020-10-07

## 2020-10-07 LAB
ABSOLUTE EOS #: 0.16 K/UL (ref 0–0.4)
ABSOLUTE IMMATURE GRANULOCYTE: 0 K/UL (ref 0–0.3)
ABSOLUTE LYMPH #: 1.78 K/UL (ref 1–4.8)
ABSOLUTE MONO #: 0.65 K/UL (ref 0.1–0.8)
ANION GAP SERPL CALCULATED.3IONS-SCNC: 11 MMOL/L (ref 9–17)
BASOPHILS # BLD: 0 % (ref 0–2)
BASOPHILS ABSOLUTE: 0 K/UL (ref 0–0.2)
BUN BLDV-MCNC: 8 MG/DL (ref 6–20)
BUN/CREAT BLD: ABNORMAL (ref 9–20)
CALCIUM SERPL-MCNC: 8.5 MG/DL (ref 8.6–10.4)
CHLORIDE BLD-SCNC: 105 MMOL/L (ref 98–107)
CO2: 26 MMOL/L (ref 20–31)
CREAT SERPL-MCNC: 0.65 MG/DL (ref 0.5–0.9)
DIFFERENTIAL TYPE: ABNORMAL
EKG ATRIAL RATE: 62 BPM
EKG P AXIS: 43 DEGREES
EKG P-R INTERVAL: 184 MS
EKG Q-T INTERVAL: 420 MS
EKG QRS DURATION: 94 MS
EKG QTC CALCULATION (BAZETT): 426 MS
EKG R AXIS: 89 DEGREES
EKG T AXIS: 51 DEGREES
EKG VENTRICULAR RATE: 62 BPM
EOSINOPHILS RELATIVE PERCENT: 2 % (ref 1–4)
ETHANOL PERCENT: 0.11 %
ETHANOL: 113 MG/DL
GFR AFRICAN AMERICAN: >60 ML/MIN
GFR NON-AFRICAN AMERICAN: >60 ML/MIN
GFR SERPL CREATININE-BSD FRML MDRD: ABNORMAL ML/MIN/{1.73_M2}
GFR SERPL CREATININE-BSD FRML MDRD: ABNORMAL ML/MIN/{1.73_M2}
GLUCOSE BLD-MCNC: 88 MG/DL (ref 70–99)
HCG QUALITATIVE: NEGATIVE
HCT VFR BLD CALC: 41.4 % (ref 36.3–47.1)
HEMOGLOBIN: 13.1 G/DL (ref 11.9–15.1)
IMMATURE GRANULOCYTES: 0 %
LYMPHOCYTES # BLD: 22 % (ref 24–44)
MCH RBC QN AUTO: 28.3 PG (ref 25.2–33.5)
MCHC RBC AUTO-ENTMCNC: 31.6 G/DL (ref 28.4–34.8)
MCV RBC AUTO: 89.4 FL (ref 82.6–102.9)
MONOCYTES # BLD: 8 % (ref 1–7)
MORPHOLOGY: NORMAL
NRBC AUTOMATED: 0 PER 100 WBC
PDW BLD-RTO: 14 % (ref 11.8–14.4)
PLATELET # BLD: 192 K/UL (ref 138–453)
PLATELET ESTIMATE: ABNORMAL
PMV BLD AUTO: 11.9 FL (ref 8.1–13.5)
POTASSIUM SERPL-SCNC: 3.1 MMOL/L (ref 3.7–5.3)
RBC # BLD: 4.63 M/UL (ref 3.95–5.11)
RBC # BLD: ABNORMAL 10*6/UL
SEG NEUTROPHILS: 68 % (ref 36–66)
SEGMENTED NEUTROPHILS ABSOLUTE COUNT: 5.51 K/UL (ref 1.8–7.7)
SODIUM BLD-SCNC: 142 MMOL/L (ref 135–144)
TROPONIN INTERP: NORMAL
TROPONIN T: NORMAL NG/ML
TROPONIN, HIGH SENSITIVITY: <6 NG/L (ref 0–14)
WBC # BLD: 8.1 K/UL (ref 3.5–11.3)
WBC # BLD: ABNORMAL 10*3/UL

## 2020-10-07 PROCEDURE — 80048 BASIC METABOLIC PNL TOTAL CA: CPT

## 2020-10-07 PROCEDURE — 6360000004 HC RX CONTRAST MEDICATION: Performed by: STUDENT IN AN ORGANIZED HEALTH CARE EDUCATION/TRAINING PROGRAM

## 2020-10-07 PROCEDURE — 3209999900 CT LUMBAR SPINE TRAUMA RECONSTRUCTION

## 2020-10-07 PROCEDURE — 85025 COMPLETE CBC W/AUTO DIFF WBC: CPT

## 2020-10-07 PROCEDURE — 3209999900 CT THORACIC SPINE TRAUMA RECONSTRUCTION

## 2020-10-07 PROCEDURE — 74177 CT ABD & PELVIS W/CONTRAST: CPT

## 2020-10-07 PROCEDURE — 72125 CT NECK SPINE W/O DYE: CPT

## 2020-10-07 PROCEDURE — 6360000002 HC RX W HCPCS: Performed by: STUDENT IN AN ORGANIZED HEALTH CARE EDUCATION/TRAINING PROGRAM

## 2020-10-07 PROCEDURE — 96374 THER/PROPH/DIAG INJ IV PUSH: CPT

## 2020-10-07 PROCEDURE — 93005 ELECTROCARDIOGRAM TRACING: CPT | Performed by: STUDENT IN AN ORGANIZED HEALTH CARE EDUCATION/TRAINING PROGRAM

## 2020-10-07 PROCEDURE — G0480 DRUG TEST DEF 1-7 CLASSES: HCPCS

## 2020-10-07 PROCEDURE — 84484 ASSAY OF TROPONIN QUANT: CPT

## 2020-10-07 PROCEDURE — G0378 HOSPITAL OBSERVATION PER HR: HCPCS

## 2020-10-07 PROCEDURE — 70450 CT HEAD/BRAIN W/O DYE: CPT

## 2020-10-07 PROCEDURE — 71045 X-RAY EXAM CHEST 1 VIEW: CPT

## 2020-10-07 PROCEDURE — 6370000000 HC RX 637 (ALT 250 FOR IP): Performed by: STUDENT IN AN ORGANIZED HEALTH CARE EDUCATION/TRAINING PROGRAM

## 2020-10-07 PROCEDURE — 93010 ELECTROCARDIOGRAM REPORT: CPT | Performed by: INTERNAL MEDICINE

## 2020-10-07 PROCEDURE — 99285 EMERGENCY DEPT VISIT HI MDM: CPT

## 2020-10-07 PROCEDURE — 72070 X-RAY EXAM THORAC SPINE 2VWS: CPT

## 2020-10-07 PROCEDURE — 84703 CHORIONIC GONADOTROPIN ASSAY: CPT

## 2020-10-07 RX ORDER — IBUPROFEN 400 MG/1
400 TABLET ORAL EVERY 6 HOURS PRN
Qty: 40 TABLET | Refills: 0 | Status: ON HOLD | OUTPATIENT
Start: 2020-10-07 | End: 2021-07-26 | Stop reason: HOSPADM

## 2020-10-07 RX ORDER — ONDANSETRON 4 MG/1
4 TABLET, ORALLY DISINTEGRATING ORAL EVERY 8 HOURS PRN
Status: DISCONTINUED | OUTPATIENT
Start: 2020-10-07 | End: 2020-10-07

## 2020-10-07 RX ORDER — SODIUM CHLORIDE 0.9 % (FLUSH) 0.9 %
10 SYRINGE (ML) INJECTION PRN
Status: DISCONTINUED | OUTPATIENT
Start: 2020-10-07 | End: 2020-10-07 | Stop reason: HOSPADM

## 2020-10-07 RX ORDER — POLYETHYLENE GLYCOL 3350 17 G/17G
17 POWDER, FOR SOLUTION ORAL DAILY
Status: DISCONTINUED | OUTPATIENT
Start: 2020-10-07 | End: 2020-10-07 | Stop reason: HOSPADM

## 2020-10-07 RX ORDER — METHOCARBAMOL 500 MG/1
750 TABLET, FILM COATED ORAL EVERY 6 HOURS
Status: DISCONTINUED | OUTPATIENT
Start: 2020-10-07 | End: 2020-10-07 | Stop reason: HOSPADM

## 2020-10-07 RX ORDER — GABAPENTIN 600 MG/1
300 TABLET ORAL EVERY 8 HOURS
Status: DISCONTINUED | OUTPATIENT
Start: 2020-10-07 | End: 2020-10-07 | Stop reason: HOSPADM

## 2020-10-07 RX ORDER — SODIUM CHLORIDE 0.9 % (FLUSH) 0.9 %
10 SYRINGE (ML) INJECTION EVERY 12 HOURS SCHEDULED
Status: DISCONTINUED | OUTPATIENT
Start: 2020-10-07 | End: 2020-10-07 | Stop reason: HOSPADM

## 2020-10-07 RX ORDER — SENNA AND DOCUSATE SODIUM 50; 8.6 MG/1; MG/1
1 TABLET, FILM COATED ORAL 2 TIMES DAILY
Status: DISCONTINUED | OUTPATIENT
Start: 2020-10-07 | End: 2020-10-07 | Stop reason: HOSPADM

## 2020-10-07 RX ORDER — IBUPROFEN 400 MG/1
400 TABLET ORAL EVERY 4 HOURS
Status: DISCONTINUED | OUTPATIENT
Start: 2020-10-07 | End: 2020-10-07 | Stop reason: HOSPADM

## 2020-10-07 RX ORDER — BUPRENORPHINE AND NALOXONE 8; 2 MG/1; MG/1
1 FILM, SOLUBLE BUCCAL; SUBLINGUAL 2 TIMES DAILY
Status: DISCONTINUED | OUTPATIENT
Start: 2020-10-07 | End: 2020-10-07

## 2020-10-07 RX ORDER — ONDANSETRON 2 MG/ML
4 INJECTION INTRAMUSCULAR; INTRAVENOUS ONCE
Status: COMPLETED | OUTPATIENT
Start: 2020-10-07 | End: 2020-10-07

## 2020-10-07 RX ORDER — ONDANSETRON 4 MG/1
4 TABLET, ORALLY DISINTEGRATING ORAL EVERY 8 HOURS PRN
Qty: 30 TABLET | Refills: 0 | Status: CANCELLED | OUTPATIENT
Start: 2020-10-07 | End: 2020-10-17

## 2020-10-07 RX ORDER — ONDANSETRON 2 MG/ML
4 INJECTION INTRAMUSCULAR; INTRAVENOUS EVERY 6 HOURS PRN
Status: DISCONTINUED | OUTPATIENT
Start: 2020-10-07 | End: 2020-10-07

## 2020-10-07 RX ORDER — ACETAMINOPHEN 500 MG
1000 TABLET ORAL EVERY 8 HOURS
Status: DISCONTINUED | OUTPATIENT
Start: 2020-10-07 | End: 2020-10-07 | Stop reason: HOSPADM

## 2020-10-07 RX ORDER — METHOCARBAMOL 750 MG/1
750 TABLET, FILM COATED ORAL EVERY 6 HOURS
Qty: 40 TABLET | Refills: 0 | Status: SHIPPED | OUTPATIENT
Start: 2020-10-07 | End: 2020-10-17

## 2020-10-07 RX ORDER — POTASSIUM CHLORIDE 20 MEQ/1
40 TABLET, EXTENDED RELEASE ORAL
Status: DISPENSED | OUTPATIENT
Start: 2020-10-07 | End: 2020-10-07

## 2020-10-07 RX ADMIN — POTASSIUM CHLORIDE 40 MEQ: 1500 TABLET, EXTENDED RELEASE ORAL at 01:42

## 2020-10-07 RX ADMIN — IBUPROFEN 400 MG: 400 TABLET, FILM COATED ORAL at 06:24

## 2020-10-07 RX ADMIN — METHOCARBAMOL TABLETS 750 MG: 500 TABLET, COATED ORAL at 06:24

## 2020-10-07 RX ADMIN — IOPAMIDOL 75 ML: 755 INJECTION, SOLUTION INTRAVENOUS at 02:36

## 2020-10-07 RX ADMIN — ONDANSETRON 4 MG: 2 INJECTION INTRAMUSCULAR; INTRAVENOUS at 01:42

## 2020-10-07 RX ADMIN — ACETAMINOPHEN 1000 MG: 500 TABLET ORAL at 06:24

## 2020-10-07 ASSESSMENT — ENCOUNTER SYMPTOMS
DIARRHEA: 0
GASTROINTESTINAL NEGATIVE: 1
BACK PAIN: 0
SHORTNESS OF BREATH: 0
TROUBLE SWALLOWING: 0
RESPIRATORY NEGATIVE: 1
ABDOMINAL PAIN: 1
NAUSEA: 0
VOMITING: 0
ABDOMINAL DISTENTION: 0
CHEST TIGHTNESS: 0
SORE THROAT: 0
PHOTOPHOBIA: 0
WHEEZING: 0
RHINORRHEA: 0
CONSTIPATION: 0

## 2020-10-07 ASSESSMENT — PAIN SCALES - GENERAL: PAINLEVEL_OUTOF10: 9

## 2020-10-07 ASSESSMENT — PAIN DESCRIPTION - LOCATION: LOCATION: BACK

## 2020-10-07 NOTE — ED NOTES
Bed: 33  Expected date: 10/7/20  Expected time: 12:02 AM  Means of arrival: [facility]  Comments:  Medic 15      Nola Nina, [facility]  10/07/20 9245
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
## 2020-10-07 NOTE — ED NOTES
Report called to 1D Burn RN; all questions answered, pt to have xray and go to floor after     Jamaal Sullivan RN  10/07/20 [id number]
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
## 2020-10-07 NOTE — ED NOTES
Pt refusing left shoulder xray. Pt stating she just wants to go to the floor. Neurosurg team notified.      Christiano Johnson RN  10/07/20 5166
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
## 2020-10-07 NOTE — ED NOTES
Pt currently lying on prone, writer reeducated pt on need to remain supine, pt refusing to lay supine.  Will continue to monitor      Paola Cheek RN  10/07/20 6477
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
## 2021-04-13 ENCOUNTER — HOSPITAL ENCOUNTER (EMERGENCY)
Age: 26
Discharge: HOME | End: 2021-04-13
Attending: EMERGENCY MEDICINE
Payer: MEDICAID

## 2021-04-13 VITALS
HEIGHT: 64 IN | OXYGEN SATURATION: 100 % | TEMPERATURE: 98 F | BODY MASS INDEX: 22.2 KG/M2 | SYSTOLIC BLOOD PRESSURE: 123 MMHG | WEIGHT: 130 LBS | DIASTOLIC BLOOD PRESSURE: 66 MMHG | RESPIRATION RATE: 16 BRPM | HEART RATE: 62 BPM

## 2021-04-13 DIAGNOSIS — F11.10 HEROIN ABUSE (HCC): Primary | ICD-10-CM

## 2021-04-13 PROCEDURE — 99284 EMERGENCY DEPT VISIT MOD MDM: CPT

## 2021-04-13 ASSESSMENT — ENCOUNTER SYMPTOMS
BACK PAIN: 0
COUGH: 0
NAUSEA: 0
SORE THROAT: 0
VOMITING: 0
ABDOMINAL PAIN: 0
COLOR CHANGE: 0
SHORTNESS OF BREATH: 0
CONSTIPATION: 0
BLOOD IN STOOL: 0
TROUBLE SWALLOWING: 0
DIARRHEA: 0

## 2021-04-13 ASSESSMENT — PAIN DESCRIPTION - FREQUENCY: FREQUENCY: CONTINUOUS

## 2021-04-13 ASSESSMENT — PAIN DESCRIPTION - LOCATION: LOCATION: FOOT

## 2021-04-13 ASSESSMENT — PAIN DESCRIPTION - PAIN TYPE: TYPE: ACUTE PAIN

## 2021-04-13 NOTE — ED PROVIDER NOTES
[address]  EMERGENCY DEPARTMENT ENCOUNTER    Pt Name: Corona Gutierrez  MRN: [id number]  [place]: 1995  Date of evaluation:4/13/21  PCP: [id number] [facility]       Chief Complaint   Patient presents with    Other     pt states she would like to detox from heroin; last used yesterday     Mental Health Problem       HISTORY OF PRESENT ILLNESS    Corona Gutierrez is a 27 y.o. female who presents with a chief complaint of requesting to detox from heroin. Patient states that she started using heroin daily about 5 days ago. She states this was a relapse. She has been on Suboxone up until 5 days ago. Her last use was about 24 hours ago. States she feels tired and depressed but otherwise has no other symptoms. No chest pain, difficulty breathing, nausea, vomiting or diarrhea. No seizures. She reports no other drug or alcohol use. Has no thoughts of suicide or homicide. Patient is requesting to be admitted for detox. Symptoms are acute. Symptoms are moderate peer nothing make symptoms better or worse. Patient has no other complaints at this time. REVIEW OF SYSTEMS       Review of Systems   Constitutional: Negative for chills, fatigue and fever. HENT: Negative for congestion, ear pain, sore throat and trouble swallowing. Eyes: Negative for visual disturbance. Respiratory: Negative for cough and shortness of breath. Cardiovascular: Negative for chest pain, palpitations and leg swelling. Gastrointestinal: Negative for abdominal pain, blood in stool, constipation, diarrhea, nausea and vomiting. Genitourinary: Negative for dysuria and flank pain. Musculoskeletal: Negative for arthralgias, back pain, myalgias and neck pain. Skin: Negative for color change, rash and wound. Neurological: Negative for dizziness, weakness, light-headedness, numbness and headaches. Psychiatric/Behavioral: Negative for confusion, self-injury and suicidal ideas.    All other systems reviewed and are negative. Negative in 10 essential Systems except as mentioned above and in the HPI. PAST MEDICAL HISTORY     Past Medical History:   Diagnosis Date    ADHD (attention deficit hyperactivity disorder)     ADHD (attention deficit hyperactivity disorder)     Anxiety     ANDRE (generalized anxiety disorder)     MDD (major depressive disorder)     Restless leg syndrome          SURGICAL HISTORY      has no past surgical history on file. None    CURRENT MEDICATIONS       Discharge Medication List as of 4/13/2021  3:19 PM      CONTINUE these medications which have NOT CHANGED    Details   ibuprofen (ADVIL;MOTRIN) 400 MG tablet Take 1 tablet by mouth every 6 hours as needed for Pain, Disp-40 tablet,R-0Normal      !! QUEtiapine (SEROQUEL) 50 MG tablet Take 3 tablets by mouth 2 times daily, Disp-180 tablet,R-0Normal      escitalopram (LEXAPRO) 10 MG tablet Take 10 mg by mouth dailyHistorical Med      traZODone (DESYREL) 100 MG tablet Take 100 mg by mouth nightlyHistorical Med      buprenorphine-naloxone (SUBOXONE) 8-2 MG FILM SL film Place 1 Film under the tongue 2 times daily. Historical Med      lithium (ESKALITH) 450 MG extended release tablet Take 450 mg by mouth 2 times dailyHistorical Med      busPIRone (BUSPAR) 10 MG tablet Take 10 mg by mouth 3 times dailyHistorical Med      !! QUEtiapine (SEROQUEL) 100 MG tablet Take 100 mg by mouth 2 times dailyHistorical Med      gabapentin (NEURONTIN) 300 MG capsule Take 300 mg by mouth 3 times daily. Historical Med      rOPINIRole (REQUIP) 5 MG tablet Take 5 mg by mouth 3 times dailyHistorical Med       !! - Potential duplicate medications found. Please discuss with provider. ALLERGIES     is allergic to nuts [macadamia nut oil]. FAMILY HISTORY     has no family status information on file. family history is not on file. SOCIAL HISTORY      reports that she has been smoking cigarettes and e-cigarettes.  She started smoking about 6 years ago. She has been smoking about 1.00 pack per day. She has never used smokeless tobacco. She reports previous alcohol use of about 4.0 standard drinks of alcohol per week. She reports current drug use. Frequency: 2.00 times per week. Drugs: Opiates  and Marijuana. PHYSICAL EXAM     INITIAL VITALS:  height is 5' 4\" (1.626 m) and weight is 130 lb (59 kg). Her oral temperature is 98 °F (36.7 °C). Her blood pressure is 123/66 and her pulse is 62. Her respiration is 16 and oxygen saturation is 100%. Physical Exam  Vitals signs and nursing note reviewed. Constitutional:       General: She is not in acute distress. HENT:      Head: Normocephalic and atraumatic. Eyes:      Conjunctiva/sclera: Conjunctivae normal.      Pupils: Pupils are equal, round, and reactive to light. Neck:      Musculoskeletal: Neck supple. Cardiovascular:      Rate and Rhythm: Normal rate and regular rhythm. Heart sounds: Normal heart sounds. No murmur. Pulmonary:      Effort: Pulmonary effort is normal. No respiratory distress. Breath sounds: Normal breath sounds. Abdominal:      General: Bowel sounds are normal. There is no distension. Palpations: Abdomen is soft. Tenderness: There is no abdominal tenderness. Musculoskeletal:         General: No tenderness. Lymphadenopathy:      Cervical: No cervical adenopathy. Skin:     General: Skin is warm and dry. Findings: No rash. Neurological:      Mental Status: She is alert and oriented to person, place, and time. Psychiatric:         Judgment: Judgment normal.           DIFFERENTIAL DIAGNOSIS/MDM:   72-year-old female presents requesting detox from heroin. Currently she is afebrile, nontoxic, normal vital signs. No acute distress. Has no thoughts of suicide or homicide. Does not appear to be actively withdrawing from any drug or medication at this time.   She is not actively suicidal.    Plan at this time is to reach out to detox tenderness to see if patient can be admitted there. I do not think patient meets criteria for admission to [facility] here. I believe patient to be stable for discharge to a detox facility. I do not think she needs a medical or psychiatric admission at [facility]. DIAGNOSTIC RESULTS     EKG: All EKG's are interpreted by the Emergency Department Physician who either signs or Co-signs this chart in the absence of a cardiologist.        RADIOLOGY:   I directly visualized the following  images and reviewed the radiologist interpretations:  No orders to display           ED BEDSIDE ULTRASOUND:      LABS:  Labs Reviewed - No data to display      EMERGENCY DEPARTMENT COURSE:   Vitals:    Vitals:    04/13/21 1210   BP: 123/66   Pulse: 62   Resp: 16   Temp: 98 °F (36.7 °C)   TempSrc: Oral   SpO2: 100%   Weight: 130 lb (59 kg)   Height: 5' 4\" (1.626 m)     Patient has been accepted by [facility] recovery and will be discharged there for further care and treatment. CRITICALCARE:      CONSULTS:  None      PROCEDURES:      FINAL IMPRESSION      1.  Heroin abuse [facility])            DISPOSITION/PLAN   DISPOSITION Decision To Discharge 04/13/2021 03:18:28 PM        PATIENT REFERRED TO:  Cesilia Root  [place] [id number] [address]  [phone]    Schedule an appointment as soon as possible for a visit       [facility] ED  [facility] [address] [place] [address] [address] [address]  [phone]    As needed, If symptoms worsen      DISCHARGE MEDICATIONS:  Discharge Medication List as of 4/13/2021  3:19 PM          (Please note that portions ofthis note were completed with a voice recognition program.  Efforts were made to edit the dictations but occasionally words are mis-transcribed.)    dAen Vee DO  Attending Emergency Physician          Aden Vee DO  04/13/21 1089

## 2021-04-13 NOTE — ED NOTES
Patient currently speaking on phone with [facility].      Tonny Leon, VINCENT  04/13/21 4388

## 2021-04-13 NOTE — ED NOTES
[facility] accepted patient and Janeen Montoyar is going to call cab for transport to [facility] AND [facility].       Wolf Mercer RN  04/13/21 9731

## 2021-04-13 NOTE — ED NOTES
[facility] for the following reason([facility] Suspected substance abuse disorder. Mode of arrival (squad #, walk in, police, etc) : walk in         Chief complaint(s): detox from heroin        Arrival Note (brief scenario, treatment PTA, etc). : patient states she was on Suboxone, but recently began using heroin again about 5 days ago. Patient states she last used around 24 hours ago. Patient denies any chest pain, SOB, N/V. Patient denies any other drug use currently. Patient states she is prescribed her Suboxone from [facility], but they won't accept her again until she has been through detox. Patient states she would like to go through detox from heroin. Patient is alert and oriented x4. Patient denies any suicidal or homicidal ideations. C= \"Have you ever felt that you should Cut down on your drinking? \"  No  A= \"Have people Annoyed you by criticizing your drinking? \"  No  G= \"Have you ever felt bad or Guilty about your drinking? \"  No  E= \"Have you ever had a drink as an Eye-opener first thing in the morning to steady your nerves or to help a hangover? \"  No      Deferred []      Reason for deferring: N/A    *If yes to two or more: probable alcohol abuse. *       Sara Lamb, VINCENT  04/13/21 [address], RN  04/13/21 1678

## 2021-04-13 NOTE — ED NOTES
Currently waiting for return phone call from [facility]. Requested documents faxed. Patient continues to rest comfortably in bed. Patient ambulated with steady gait to restroom.       Jessica Zheng RN  04/13/21 0023

## 2021-06-03 ENCOUNTER — APPOINTMENT (OUTPATIENT)
Dept: GENERAL RADIOLOGY | Age: 26
End: 2021-06-03
Payer: MEDICAID

## 2021-06-03 ENCOUNTER — HOSPITAL ENCOUNTER (EMERGENCY)
Age: 26
Discharge: HOME | End: 2021-06-03
Attending: EMERGENCY MEDICINE
Payer: MEDICAID

## 2021-06-03 VITALS
SYSTOLIC BLOOD PRESSURE: 98 MMHG | HEIGHT: 64 IN | RESPIRATION RATE: 14 BRPM | BODY MASS INDEX: 20.49 KG/M2 | OXYGEN SATURATION: 96 % | HEART RATE: 62 BPM | TEMPERATURE: 97.7 F | WEIGHT: 120 LBS | DIASTOLIC BLOOD PRESSURE: 58 MMHG

## 2021-06-03 DIAGNOSIS — T40.1X1A ACCIDENTAL OVERDOSE OF HEROIN, INITIAL ENCOUNTER (HCC): Primary | ICD-10-CM

## 2021-06-03 LAB
ABSOLUTE EOS #: 0.27 K/UL (ref 0–0.44)
ABSOLUTE IMMATURE GRANULOCYTE: <0.03 K/UL (ref 0–0.3)
ABSOLUTE LYMPH #: 3.26 K/UL (ref 1.1–3.7)
ABSOLUTE MONO #: 0.69 K/UL (ref 0.1–1.2)
ALBUMIN SERPL-MCNC: 4.1 G/DL (ref 3.5–5.2)
ALBUMIN/GLOBULIN RATIO: 1.7 (ref 1–2.5)
ALP BLD-CCNC: 83 U/L (ref 35–104)
ALT SERPL-CCNC: 25 U/L (ref 5–33)
ANION GAP SERPL CALCULATED.3IONS-SCNC: 7 MMOL/L (ref 9–17)
AST SERPL-CCNC: 27 U/L
BASOPHILS # BLD: 1 % (ref 0–2)
BASOPHILS ABSOLUTE: 0.1 K/UL (ref 0–0.2)
BILIRUB SERPL-MCNC: 0.21 MG/DL (ref 0.3–1.2)
BUN BLDV-MCNC: 8 MG/DL (ref 6–20)
BUN/CREAT BLD: ABNORMAL (ref 9–20)
C-REACTIVE PROTEIN: <3 MG/L (ref 0–5)
CALCIUM SERPL-MCNC: 8.7 MG/DL (ref 8.6–10.4)
CHLORIDE BLD-SCNC: 107 MMOL/L (ref 98–107)
CO2: 29 MMOL/L (ref 20–31)
CREAT SERPL-MCNC: 0.89 MG/DL (ref 0.5–0.9)
DIFFERENTIAL TYPE: NORMAL
EOSINOPHILS RELATIVE PERCENT: 4 % (ref 1–4)
GFR AFRICAN AMERICAN: >60 ML/MIN
GFR NON-AFRICAN AMERICAN: >60 ML/MIN
GFR SERPL CREATININE-BSD FRML MDRD: ABNORMAL ML/MIN/{1.73_M2}
GFR SERPL CREATININE-BSD FRML MDRD: ABNORMAL ML/MIN/{1.73_M2}
GLUCOSE BLD-MCNC: 64 MG/DL (ref 70–99)
HCT VFR BLD CALC: 38.5 % (ref 36.3–47.1)
HEMOGLOBIN: 12.4 G/DL (ref 11.9–15.1)
IMMATURE GRANULOCYTES: 0 %
LYMPHOCYTES # BLD: 43 % (ref 24–43)
MCH RBC QN AUTO: 29.5 PG (ref 25.2–33.5)
MCHC RBC AUTO-ENTMCNC: 32.2 G/DL (ref 28.4–34.8)
MCV RBC AUTO: 91.4 FL (ref 82.6–102.9)
MONOCYTES # BLD: 9 % (ref 3–12)
NRBC AUTOMATED: 0 PER 100 WBC
PDW BLD-RTO: 14.3 % (ref 11.8–14.4)
PLATELET # BLD: 220 K/UL (ref 138–453)
PLATELET ESTIMATE: NORMAL
PMV BLD AUTO: 11.5 FL (ref 8.1–13.5)
POTASSIUM SERPL-SCNC: 3.6 MMOL/L (ref 3.7–5.3)
RBC # BLD: 4.21 M/UL (ref 3.95–5.11)
RBC # BLD: NORMAL 10*6/UL
SEDIMENTATION RATE, ERYTHROCYTE: 3 MM (ref 0–20)
SEG NEUTROPHILS: 43 % (ref 36–65)
SEGMENTED NEUTROPHILS ABSOLUTE COUNT: 3.32 K/UL (ref 1.5–8.1)
SODIUM BLD-SCNC: 143 MMOL/L (ref 135–144)
TOTAL PROTEIN: 6.5 G/DL (ref 6.4–8.3)
WBC # BLD: 7.7 K/UL (ref 3.5–11.3)
WBC # BLD: NORMAL 10*3/UL

## 2021-06-03 PROCEDURE — 73620 X-RAY EXAM OF FOOT: CPT

## 2021-06-03 PROCEDURE — 85025 COMPLETE CBC W/AUTO DIFF WBC: CPT

## 2021-06-03 PROCEDURE — 85652 RBC SED RATE AUTOMATED: CPT

## 2021-06-03 PROCEDURE — 80053 COMPREHEN METABOLIC PANEL: CPT

## 2021-06-03 PROCEDURE — 86140 C-REACTIVE PROTEIN: CPT

## 2021-06-03 PROCEDURE — 73630 X-RAY EXAM OF FOOT: CPT

## 2021-06-03 PROCEDURE — 99285 EMERGENCY DEPT VISIT HI MDM: CPT

## 2021-06-03 ASSESSMENT — ENCOUNTER SYMPTOMS
COUGH: 0
RHINORRHEA: 0
SHORTNESS OF BREATH: 0
ABDOMINAL PAIN: 0
BACK PAIN: 0
VOMITING: 0
NAUSEA: 0
DIARRHEA: 0

## 2021-06-03 ASSESSMENT — PAIN DESCRIPTION - PAIN TYPE: TYPE: ACUTE PAIN

## 2021-06-03 ASSESSMENT — PAIN DESCRIPTION - LOCATION: LOCATION: FOOT

## 2021-06-03 ASSESSMENT — PAIN SCALES - GENERAL: PAINLEVEL_OUTOF10: 3

## 2021-06-03 NOTE — ED NOTES
Pt ambulated to RR with steady gait, is now aox4.       [facility]  06/03/21 1581

## 2021-06-03 NOTE — ED NOTES
Pt sleeping on cart, RR even and unlabored. Vitals stable. Will continue to monitor.       Lady Leblanc RN  06/03/21 9483

## 2021-06-03 NOTE — CONSULTS
Consultation Note  Podiatric Medicine and Surgery     Subjective     Chief Complaint: Left foot pain    HPI:  Corona Gutierrez is a 27 y.o. female seen at [facility] ED for pain in the left foot. HPI taken from chart review and medical team as patient is not responding to questions. Patient initially presented to the ED via ambulance after being found down in front of her house. Patient has been using heroin daily in addition to other drugs. During her intake patient relayed that 2 months ago she \"broke her foot\" but was never evaluated for the injury. Today she reports it being painful. No injuries/pain elsewhere. PCP is [facility] L[facility]pez    ROS:   Review of Systems   Unable to perform ROS: Patient unresponsive       Past Medical History   has a past medical history of ADHD (attention deficit hyperactivity disorder), ADHD (attention deficit hyperactivity disorder), Anxiety, ANDRE (generalized anxiety disorder), MDD (major depressive disorder), and Restless leg syndrome. Past Surgical History   has no past surgical history on file. Medications  Prior to Admission medications    Medication Sig Start Date End Date Taking? Authorizing Provider   ibuprofen (ADVIL;MOTRIN) 400 MG tablet Take 1 tablet by mouth every 6 hours as needed for Pain 10/7/20 10/17/20  Raine Reyez MD   QUEtiapine (SEROQUEL) 50 MG tablet Take 3 tablets by mouth 2 times daily 7/15/20   Padmini Marie MD   escitalopram (LEXAPRO) 10 MG tablet Take 10 mg by mouth daily    Historical Provider, MD   traZODone (DESYREL) 100 MG tablet Take 100 mg by mouth nightly    Historical Provider, MD   buprenorphine-naloxone (SUBOXONE) 8-2 MG FILM SL film Place 1 Film under the tongue 2 times daily.     Historical Provider, MD   lithium (ESKALITH) 450 MG extended release tablet Take 450 mg by mouth 2 times daily    Historical Provider, MD   busPIRone (BUSPAR) 10 MG tablet Take 10 mg by mouth 3 times daily    Historical Provider, MD [place] (SEROQUEL) 100 MG tablet Take 100 mg by mouth 2 times daily    Historical Provider, MD   gabapentin (NEURONTIN) 300 MG capsule Take 300 mg by mouth 3 times daily. Historical Provider, MD   rOPINIRole (REQUIP) 5 MG tablet Take 5 mg by mouth 3 times daily    Historical Provider, MD    Scheduled Meds:  Continuous Infusions:  PRN Meds:. Allergies  is allergic to nuts [macadamia nut oil]. Family History  family history is not on file. Social History   reports that she has been smoking cigarettes and e-cigarettes. She started smoking about 6 years ago. She has been smoking about 1.00 pack per day. She has never used smokeless tobacco.   reports previous alcohol use of about 4.0 standard drinks of alcohol per week. reports current drug use. Frequency: 2.00 times per week. Drugs: Opiates  and Marijuana. Objective     Vitals:  Patient Vitals for the past 8 hrs:   BP Temp Pulse Resp SpO2 Height Weight   21 0712 -- -- 56 -- -- -- --   21 0709 (!) 94/53 -- 56 14 99 % -- --   21 0635 (!) 94/55 -- 67 -- 98 % -- --   21 0458 (!) 116/100 -- -- -- -- -- --   21 0457 -- 97.7 °F (36.5 °C) 78 16 97 % 5' 4\" (1.626 m) 120 lb (54.4 kg)     Average, Min, and Max for last 24 hours Vitals:  TEMPERATURE:  Temp  Av.7 °F (36.5 °C)  Min: 97.7 °F (36.5 °C)  Max: 97.7 °F (36.5 °C)    RESPIRATIONS RANGE: Resp  Avg: 15  Min: 14  Max: 16    PULSE RANGE: Pulse  Av.3  Min: 56  Max: 78    BLOOD PRESSURE RANGE:  Systolic (85OEP), HDM:775 , Min:94 , CRP:576   ; Diastolic (42PHV), NUD:13, Min:53, Max:100      PULSE OXIMETRY RANGE: SpO2  Av %  Min: 97 %  Max: 99 %  I&O:  No intake/output data recorded.     CBC:  Recent Labs     21  0638   WBC 7.7   HGB 12.4   HCT 38.5      CRP <3.0        BMP:  Recent Labs     21  0638      K 3.6*      CO2 29   BUN 8   CREATININE 0.89   GLUCOSE 64*   CALCIUM 8.7        Coags:  Recent Labs     21  0638   PROT 6.5       No results found for: LABA1C  Lab Results   Component Value Date    SEDRATE 3 06/03/2021     Lab Results   Component Value Date    CRP <3.0 06/03/2021         Bilateral Lower Extremity Physical Exam:    Vascular: DP and PT pulses are Palpable . CFT <3 seconds to all digits. Hair growth is present to the level of the digits. Moderate non-pitting edema to the left foot. No edema to RLE. Neuro: Withdrawing limbs when stimulated but not responding to verbal cues of sensation. Musculoskeletal: Gross deformity is absent. Compartments soft and compressible. Dermatologic: Superficial abrasion with well-adhered eschar overlying dorsal hallux. No drainage or malodor. Mild erythema to dorsal foot, left. No associated increase in warmth. Does not probe, sinus tract, or undermine. No fluctuance, crepitus, or induration. Interdigital maceration absent. Clinical:          Imaging:   XR FOOT RIGHT (2 VIEWS)   Preliminary Result   1. Limited single lateral view of the right foot without acute osseous   abnormality within the limitations of this study. 2. Mild soft tissue swelling of the dorsal forefoot. XR FOOT LEFT (MIN 3 VIEWS)   Final Result   Asymmetric increased density of the mid to proximal 1st metatarsal bone with   suggestion of mild periosteal reaction. Finding concerning for presence of   osteomyelitis. Recommend MRI left foot with contrast examination for further evaluation. Assessment     Constantino Santos is a 27 y.o. female with   1. Left foot pain  2. Periosteal reaction to 1st metatarsal shaft, left   3. Drug abuse     Active Problems:    * No active hospital problems. *  Resolved Problems:    * No resolved hospital problems. *        Plan     · Patient examined and evaluated at bedside. · Patient would not wake up, unable to discuss findings and treatment plan with patient.   · Radiographs reviewed:  · Suspicious for previous fracture healing vs stress fracture vs osseous pathology   · Contralateral right foot x-ray ordered, patient refused--was only able to obtain a lateral right foot. · Surgical shoe ordered for left foot. WBAT in surgical shoe. · LLE stable and unimpressive for gross infection at this time. Okay to discharge from podiatry standpoint. Patient to follow-up with Dr. Andre Bullock in the office. · Discussed with  Dr. Rufina Noriega.        Electronically signed by Kenn Castle DPM on 6/3/2021 at 8:38 AM

## 2021-06-03 NOTE — ED NOTES
Writer walked into pt room to obtain labs. Shaving razor on pt bed. Writer asked pt why razor was on bed. Pt somnolent. Pt required writer to yell her name loudly multiple times for a response. Pt denies using drugs while in the ED. Pt stated she is \"very tired. \" Resident and attending called to bedside to assess pt.       Irina Lemus RN  06/03/21 4551

## 2021-06-03 NOTE — ED PROVIDER NOTES
[facility] HANDOFF       Handoff taken on the following patient from prior Attending Physician:  Pt Name: Judy Morel  PCP:  [id number] [address]  I was available and discussed any additional care issues that arose and coordinated the management plans with the resident(s) caring for the patient during my duty period. Any areas of disagreement with resident's documentation of care or procedures are noted on the chart. I was personally present for the key portions of any/all procedures during my duty period. I have documented in the chart those procedures where I was not present during the key portions. CHIEF COMPLAINT       Chief Complaint   Patient presents with    Drug Overdose         CURRENT MEDICATIONS     Previous Medications  Previous Medications    BUPRENORPHINE-NALOXONE (SUBOXONE) 8-2 MG FILM SL FILM    Place 1 Film under the tongue 2 times daily. BUSPIRONE (BUSPAR) 10 MG TABLET    Take 10 mg by mouth 3 times daily    ESCITALOPRAM (LEXAPRO) 10 MG TABLET    Take 10 mg by mouth daily    GABAPENTIN (NEURONTIN) 300 MG CAPSULE    Take 300 mg by mouth 3 times daily. IBUPROFEN (ADVIL;MOTRIN) 400 MG TABLET    Take 1 tablet by mouth every 6 hours as needed for Pain    LITHIUM (ESKALITH) 450 MG EXTENDED RELEASE TABLET    Take 450 mg by mouth 2 times daily    QUETIAPINE (SEROQUEL) 100 MG TABLET    Take 100 mg by mouth 2 times daily    QUETIAPINE (SEROQUEL) 50 MG TABLET    Take 3 tablets by mouth 2 times daily    ROPINIROLE (REQUIP) 5 MG TABLET    Take 5 mg by mouth 3 times daily    TRAZODONE (DESYREL) 100 MG TABLET    Take 100 mg by mouth nightly       Encounter Medications  No orders of the defined types were placed in this encounter. ALLERGIES     is allergic to nuts [macadamia nut oil].       RECENT VITALS:   Temp: 97.7 °F (36.5 °C),  Pulse: 67, Resp: 16, BP: (!) 94/55    RADIOLOGY:   XR FOOT LEFT (MIN 3 VIEWS)   Final Result   Asymmetric

## 2021-06-03 NOTE — ED NOTES
Xray at bedside. Pt uncooperative with imaging. She states that she is cold and will not extend her legs for xray.        Jon Ball RN  06/03/21 1402

## 2021-06-03 NOTE — ED PROVIDER NOTES
[address]  ED  Emergency Department Encounter  Emergency Medicine Resident     Pt Name: Rob Watts  MRN: [id number]  [place] 1995  Date of evaluation: 6/3/21  PCP:  [address]       Chief Complaint   Patient presents with    Drug Overdose       HISTORY [facility]  (Location/Symptom, Timing/Onset, Context/Setting, Quality, Duration, Modifying Factors,Severity.)      Rob Watts is a 27 y.o. female who presents with TFD for suspected overdose. Patient was found on the road outside of her house. She was altered at that time although somewhat alert. She received 2 mg IN Narcan with some improvement in mental status. On arrival here to the ED, patient ambulates in from the ambulance bay, and so she does not remember what happened. She reports use of multiple drugs, including heroin daily, occasionally marijuana and [place] white. She remembers using heroin today, but does not remember much after that. Patient does not believe she fell or injured herself in any way. She does note an old left foot injury that she never had evaluated. The dorsal side of the arch of her left foot is swollen and painful, and she has been walking on it for 2 months now. No headache or vision changes. No abdominal pain, nausea, vomiting. Patient was previously in rehab, and receiving Suboxone. She discontinued Suboxone 2 weeks ago, then relapsed and used heroin. She has been using daily since. PAST MEDICAL / SURGICAL / SOCIAL / FAMILY HISTORY      has a past medical history of ADHD (attention deficit hyperactivity disorder), ADHD (attention deficit hyperactivity disorder), Anxiety, ANDRE (generalized anxiety disorder), MDD (major depressive disorder), and Restless leg syndrome. has no past surgical history on file.      Social History     Socioeconomic History    Marital status: Single     Spouse name: Not on file    Number of children: Not on file    Years of education: Not on file    Highest education level: Not on file   Occupational History    Not on file   Tobacco Use    Smoking status: Current Every Day Smoker     Packs/day: 1.00     Types: Cigarettes, E-Cigarettes     Start date: 1/1/2015    Smokeless tobacco: Never Used   Vaping Use    Vaping Use: Never used   Substance and Sexual Activity    Alcohol use: Not Currently     Alcohol/week: 4.0 standard drinks     Types: 4 Cans of beer per week     Comment: social    Drug use: Yes     Frequency: 2.0 times per week     Types: Opiates , Marijuana     Comment: uses heroin everyday she states     Sexual activity: Yes     Partners: Male   Other Topics Concern    Not on file   Social History Narrative    ** Merged History Encounter **          Social Determinants of Health     Financial Resource Strain:     Difficulty of Paying Living Expenses:    Food Insecurity:     Worried About Running Out of Food in the Last Year:     Ran Out of Food in the Last Year:    Transportation Needs:     Lack of Transportation (Medical):  Lack of Transportation (Non-Medical):    Physical Activity:     Days of Exercise per Week:     Minutes of Exercise per Session:    Stress:     Feeling of Stress :    Social Connections:     Frequency of Communication with Friends and Family:     Frequency of Social Gatherings with Friends and Family:     Attends Islam Services:     Active Member of Clubs or Organizations:     Attends Club or Organization Meetings:     Marital Status:    Intimate Partner Violence:     Fear of Current or Ex-Partner:     Emotionally Abused:     Physically Abused:     Sexually Abused:        No family history on file. Allergies:  Nuts [macadamia nut oil]    Home Medications:  Prior to Admission medications    Medication Sig Start Date End Date Taking?  Authorizing Provider   ibuprofen (ADVIL;MOTRIN) 400 MG tablet Take 1 tablet by mouth every 6 hours as needed for Pain 10/7/20 10/17/20 Exam  Constitutional:       General: She is not in acute distress. Appearance: Normal appearance. She is normal weight. She is not ill-appearing, toxic-appearing or diaphoretic. HENT:      Head: Normocephalic and atraumatic. Nose: Nose normal.      Mouth/Throat:      Mouth: Mucous membranes are moist.      Pharynx: Oropharynx is clear. Eyes:      Extraocular Movements: Extraocular movements intact. Pupils: Pupils are equal, round, and reactive to light. Cardiovascular:      Rate and Rhythm: Normal rate and regular rhythm. Heart sounds: Normal heart sounds. No murmur heard. Pulmonary:      Effort: Pulmonary effort is normal. No respiratory distress. Breath sounds: Normal breath sounds. No wheezing, rhonchi or rales. Abdominal:      General: There is no distension. Palpations: Abdomen is soft. Tenderness: There is no abdominal tenderness. There is no guarding or rebound. Musculoskeletal:         General: Normal range of motion. Cervical back: Normal range of motion and neck supple. Right lower leg: No edema. Left lower leg: No edema. Comments: Swelling, mild erythema, and tenderness over the proximal first metatarsal of the left foot. Patient also has some tenderness in the first toe on the left. Possible deformity in the first metatarsal.  No significant ankle tenderness. Patient was able to ambulate into the ED without difficulty. Skin:     General: Skin is warm and dry. Neurological:      General: No focal deficit present. Mental Status: She is alert and oriented to person, place, and time. Sensory: No sensory deficit. Motor: No weakness.    Psychiatric:         Mood and Affect: Mood normal.         DIFFERENTIAL  DIAGNOSIS     PLAN (LABS / IMAGING / EKG):  Orders Placed This Encounter   Procedures    Foot/Ankle Post Surgical Boot    XR FOOT LEFT (MIN 3 VIEWS)    XR FOOT RIGHT (2 VIEWS)    Sedimentation Rate    C-Reactive Protein    CBC WITH AUTO DIFFERENTIAL    COMPREHENSIVE METABOLIC PANEL    Inpatient consult to Podiatry       MEDICATIONS ORDERED:  No orders of the defined types were placed in this encounter. Initial MDM/Plan/ED COURSE:    27 y.o. female who presents with likely drug overdose. Patient was found on the road outside of her house confused and somnolent. She did respond to 2 mg IN Narcan. On arrival, she is ambulatory, alert and oriented. Patient admits to heroin use today and does not recall if she used anything else. Occasional [place] white and marijuana use as well. On exam, patient in no acute distress. Vitals are stable, patient only complaining of chronic pain in the left foot. She has tenderness palpation along the proximal first metatarsal with swelling, mild redness and possible deformity in this area. Strength and sensation intact. Will observe patient for at least 1 hour to ensure vital signs stay stable. Will do x-ray to evaluate left foot injury. ED Course as of Jun 03 0820   Thu Jun 03, 2021   0600 IMPRESSION:  Asymmetric increased density of the mid to proximal 1st metatarsal bone with  suggestion of mild periosteal reaction. Finding concerning for presence of  osteomyelitis.     Recommend MRI left foot with contrast examination for further evaluation   XR FOOT LEFT (MIN 3 VIEWS) [JS]   4366 Spoke with podiatry who will come see the patient. [JS]   7908 Podiatry order blood work which is unremarkable for signs of infection or osteomyelitis. They recommend follow-up outpatient and information was placed in discharge instructions. [JS]   3553 On reevaluation, patient much more somnolent and difficult to arouse than when she arrived. Vitals remained stable although blood pressure slightly hypotensive with map of 65. Patient continues to saturate well and is breathing normally. Will continue to monitor. Patient signed out to Dr. Dany Jimenez pending improvement in alertness. [JS]      ED Course User Index  [JS] Julius Jey DO    :     DIAGNOSTIC RESULTS / EMERGENCYDEPARTMENT COURSE / MDM     LABS:  Labs Reviewed   COMPREHENSIVE METABOLIC PANEL - Abnormal; Notable for the following components:       Result Value    Glucose 64 (*)     Potassium 3.6 (*)     Anion Gap 7 (*)     Total Bilirubin 0.21 (*)     All other components within normal limits   SEDIMENTATION RATE   C-REACTIVE PROTEIN   CBC WITH AUTO DIFFERENTIAL           XR FOOT RIGHT (2 VIEWS)    Result Date: 6/3/2021  EXAMINATION: TWO XRAY VIEWS OF THE RIGHT FOOT 6/3/2021 7:30 am COMPARISON: Left foot plain radiographs from 6/3/2021 HISTORY: ORDERING SYSTEM PROVIDED HISTORY: Contralateral film to evaluate 1st ray in comparison to the left. TECHNOLOGIST PROVIDED HISTORY: Contralateral film to evaluate 1st ray in comparison to the left. 82-year-old female with contralateral film to evaluate 1st ray as comparison to the left FINDINGS: Single lateral view of the right foot is provided for review. Mild soft tissue swelling at the dorsal forefoot. No tibiotalar joint effusion. Osseous alignment is normal.  Joint space is well maintained. No acute fracture or dislocation. Boehler's angle is maintained. 1. Limited single lateral view of the right foot without acute osseous abnormality within the limitations of this study. 2. Mild soft tissue swelling of the dorsal forefoot. XR FOOT LEFT (MIN 3 VIEWS)    Result Date: 6/3/2021  EXAMINATION: THREE XRAY VIEWS OF THE LEFT FOOT 6/3/2021 5:12 am COMPARISON: None. HISTORY: ORDERING SYSTEM PROVIDED HISTORY: pain/swelling first metatarsal TECHNOLOGIST PROVIDED HISTORY: pain/swelling first metatarsal Acuity: Unknown FINDINGS: Bones demonstrate normal alignment. Asymmetric increased density is seen within the mid to proximal 1st metatarsal bone with suspected periosteal reaction. Joint spaces are preserved. Soft tissues are unremarkable.      Asymmetric increased density of the mid to proximal 1st metatarsal bone with suggestion of mild periosteal reaction. Finding concerning for presence of osteomyelitis. Recommend MRI left foot with contrast examination for further evaluation. EKG  Not indicated at this time. All EKG's are interpreted by the Emergency Department Physicianwho either signs or Co-signs this chart in the absence of a cardiologist.      PROCEDURES:  None    CONSULTS:  IP CONSULT TO PODIATRY    CRITICAL CARE:  Please see attending note    FINAL IMPRESSION      1.  Accidental overdose of heroin, initial encounter [facility])          DISPOSITION / PLAN     DISPOSITION        PATIENT RE[facility]:  [facility]  [phone]    Schedule an appointment as soon as possible for a visit in 1 week  Follow up from ED       DISCHARGE MEDICATIONS:  New Prescriptions    No medications on file       Char Vazquez DO  Emergency Medicine Resident    (Please note that portions of this note were completed with a voice recognition program.Efforts were made to edit the dictations but occasionally words are mis-transcribed.)       Char Vazquez DO  Resident  06/03/21 4847

## 2021-06-03 NOTE — ED PROVIDER NOTES
[facility] ED  Emergency Department  Emergency Medicine Resident Sign-out     Care of Jimy Gore was assumed from Dr. Maggie Dow and is being seen for Drug Overdose  . The patient's initial evaluation and plan have been discussed with the prior provider who initially evaluated the patient. EMERGENCY DEPARTMENT COURSE / MEDICAL DECISION MAKING:       MEDICATIONS GIVEN:  No orders of the defined types were placed in this encounter. LABS / RADIOLOGY:     Labs Reviewed   COMPREHENSIVE METABOLIC PANEL - Abnormal; Notable for the following components:       Result Value    Glucose 64 (*)     Potassium 3.6 (*)     Anion Gap 7 (*)     Total Bilirubin 0.21 (*)     All other components within normal limits   SEDIMENTATION RATE   C-REACTIVE PROTEIN   CBC WITH AUTO DIFFERENTIAL       XR FOOT RIGHT (2 VIEWS)    Result Date: 6/3/2021  1. Limited single lateral view of the right foot without acute osseous abnormality within the limitations of this study. 2. Mild soft tissue swelling of the dorsal forefoot. XR FOOT LEFT (MIN 3 VIEWS)    Result Date: 6/3/2021  EXAMINATION: THREE XRAY VIEWS OF THE LEFT FOOT 6/3/2021 5:12 am COMPARISON: None. HISTORY: ORDERING SYSTEM PROVIDED HISTORY: pain/swelling first metatarsal TECHNOLOGIST PROVIDED HISTORY: pain/swelling first metatarsal Acuity: Unknown FINDINGS: Bones demonstrate normal alignment. Asymmetric increased density is seen within the mid to proximal 1st metatarsal bone with suspected periosteal reaction. Joint spaces are preserved. Soft tissues are unremarkable. Asymmetric increased density of the mid to proximal 1st metatarsal bone with suggestion of mild periosteal reaction. Finding concerning for presence of osteomyelitis. Recommend MRI left foot with contrast examination for further evaluation. RECENT VITALS:     Temp: 97.7 °F (36.5 °C),  Pulse: 62, Resp: 14, BP: (!) 98/58, SpO2: 96 %    This patient is a 27 y.o.  Female with drug overdose responsive to Narcan. Patient resting comfortably, no acute distress, awaiting reevaluation. Patient reeval, tolerating p.o., able to ambulate out difficulty. Discharge with podiatry follow-up for chronic foot pain. OUTSTANDING TASKS / RECOMMENDATIONS:    1. Re-eval  2. dispo     FINAL IMPRESSION:     1.  Accidental overdose of heroin, initial encounter [facility])        DISPOSITION:         DISPOSITION:  []  Discharge   []  Transfer -    []  Admission -     []  Against Medical Advice   []  Eloped   FOLLOW-UP: [facility] 3  [phone]    Schedule an appointment as soon as possible for a visit in 1 week  Follow up from [address] [place] [address] Ave  [phone]    Go to   If symptoms worsen    [facility] ED  [address] [place] [address]  [phone]  Go in 3 days       DISCHARGE MEDICATIONS: New Prescriptions    No medications on file           Arlean Schilder, DO  Emergency Medicine Resident  [address] [facility]     Arlean Schilder, [place]  Resident  06/03/21 1541

## 2021-06-03 NOTE — ED TRIAGE NOTES
Pt came in from [facility] pt was found in road in front of house. Pt was given 2mg IN narcan and became responsive. Pt alert and oriented at this time. Pt vitals completed.  at bedside.

## 2021-06-03 NOTE — ED NOTES
Writer to bedside to encourage her to wake up and eat. Pt wakes again to verbal stimuli. Pt states that she will eat and drink. She takes a sip of nydia mist and pokes the sandwich. She states, \"this sandwich is hard as fuck\" then goes back to sleep.       Mirian Cerda RN  06/03/21 5627

## 2021-06-03 NOTE — ED NOTES
Pt wakes to voice. Pt encouraged to drink and eat. She shouts \"ok\" and takes a sip of nydia mist, then goes back to sleep. Dr. Schrader Cough at bedside. Will continue to monitor.       Manisha Garsia RN  06/03/21 3446

## 2021-06-03 NOTE — ED NOTES
[facility] Pt is resting on cart, RR even and unlabored. Pt on monitor. Vitals stable. Pt awakes to verbal stimuli.         Prieto Simon RN  06/03/21 0192

## 2021-06-07 NOTE — ED PROVIDER NOTES
[facility] ED  Emergency Department  Faculty Attestation       I performed a history and physical examination of the patient and discussed management with the resident. I reviewed the residents note and agree with the documented findings including all diagnostic interpretations and plan of care. Any areas of disagreement are noted on the chart. I was personally present for the key portions of any procedures. I have documented in the chart those procedures where I was not present during the key portions. I have reviewed the emergency nurses triage note. I agree with the chief complaint, past medical history, past surgical history, allergies, medications, social and family history as documented unless otherwise noted below. Documentation of the HPI, Physical Exam and Medical Decision Making performed by [email or web address] is based on my personal performance of the HPI, PE and MDM. For Physician Assistant/ Nurse Practitioner cases/documentation I have personally evaluated this patient and have completed at least one if not all key elements of the E/M (history, physical exam, and MDM). Additional findings are as noted. Pertinent Comments     Primary Care Physician: Jared Blue    ED Triage Vitals   BP Temp Temp src Pulse Resp SpO2 Height Weight   06/03/21 0458 06/03/21 0457 -- 06/03/21 0457 06/03/21 0457 06/03/21 0457 06/03/21 0457 06/03/21 0457   (!) 116/100 97.7 °F (36.5 °C)  78 16 97 % 5' 4\" (1.626 m) 120 lb (54.4 kg)        History/Physical: This is a 27 y.o. female who presents to the Emergency Department with complaint of possible overdose. Patient was found at the road outside of her house and they gave her 2 of intranasal Narcan, but she had reportedly started to wake up a little bit prior to getting the Narcan. Patient admits to using multiple drugs, but does not know what she used tonight. States that she typically uses [place] white.   She also states that she broke her foot about a month ago, never got it checked out and has been hurting. She has no other medical planes at this time. On exam, patient is slightly drowsy but awakens to voice. She is normocephalic. Pupils are 3 mm equal and reactive. Heart sounds regular with good pulses throughout both lower extremities. Lung clear to auscultation. Abdomen soft nontender nondistended. Left foot with some swelling and tenderness over the dorsal portion of the arch. Decreased range of motion secondary to pain, but no erythema or warmth. Normal sensation.     MDM/Plan:   Drug overdose  Monitor for at least 1 hour post Narcan  X-ray for her foot    X-ray shows questionable findings, podiatry consult      CRITICAL CARE: None     Evangelina Platt MD  Attending Emergency Physician        Evangelina Platt MD  06/07/21 0159

## 2021-06-21 ENCOUNTER — HOSPITAL ENCOUNTER (EMERGENCY)
Age: 26
Discharge: HOME | End: 2021-06-21
Attending: EMERGENCY MEDICINE
Payer: MEDICAID

## 2021-06-21 VITALS
DIASTOLIC BLOOD PRESSURE: 61 MMHG | SYSTOLIC BLOOD PRESSURE: 83 MMHG | RESPIRATION RATE: 14 BRPM | OXYGEN SATURATION: 98 % | TEMPERATURE: 97.2 F | HEART RATE: 42 BPM

## 2021-06-21 DIAGNOSIS — T40.1X1A ACCIDENTAL OVERDOSE OF HEROIN, INITIAL ENCOUNTER (HCC): Primary | ICD-10-CM

## 2021-06-21 PROCEDURE — 99283 EMERGENCY DEPT VISIT LOW MDM: CPT

## 2021-06-21 NOTE — ED TRIAGE NOTES
Pt brought to to ED by [facility]. Pt was found unresponsive, give IV Narcan 1mg. Pt is aoX4 upon arrival.  Pt has pinpoint pupils. Pt admits to using heroin today. Pt denies SI/HI.

## 2021-06-21 NOTE — ED PROVIDER NOTES
[address]  ED  Emergency Department Encounter  EmergencyMedicine Resident     Pt Name:Marry Ibarra  MRN: [id number]  Juvegfjean pierre 1995  Date of evaluation: 6/21/21  PCP:  [facility]       Chief Complaint   Patient presents with    Drug Overdose       HISTORY OF PRESENT ILLNESS  (Location/Symptom, Timing/Onset, Context/Setting, Quality, Duration, Modifying Factors, Severity.)      Asia Vasquez is a 27 y.o. female who presents found down after heroin overdose. Received 1 mg of heroin IV. Patient admits to daily heroin use, denies any other coingestion. Denies attempt of self harm. She is not interested in quitting. She was transferred from the behavioral health unit to a regular room for observed hypotension and bradycardia without any episodes of desaturation. PAST MEDICAL / SURGICAL / SOCIAL / FAMILY HISTORY      has a past medical history of ADHD (attention deficit hyperactivity disorder), ADHD (attention deficit hyperactivity disorder), Anxiety, ANDRE (generalized anxiety disorder), MDD (major depressive disorder), and Restless leg syndrome. has no past surgical history on file.       Social History     Socioeconomic History    Marital status: Single     Spouse name: Not on file    Number of children: Not on file    Years of education: Not on file    Highest education level: Not on file   Occupational History    Not on file   Tobacco Use    Smoking status: Current Every Day Smoker     Packs/day: 1.00     Types: Cigarettes, E-Cigarettes     Start date: 1/1/2015    Smokeless tobacco: Never Used   Vaping Use    Vaping Use: Never used   Substance and Sexual Activity    Alcohol use: Not Currently     Alcohol/week: 4.0 standard drinks     Types: 4 Cans of beer per week     Comment: social    Drug use: Yes     Frequency: 2.0 times per week     Types: Opiates , Marijuana     Comment: uses heroin everyday she states     Sexual activity: Yes Partners: Male   Other Topics Concern    Not on file   Social History Narrative    ** Merged History Encounter **          Social Determinants of Health     Financial Resource Strain:     Difficulty of Paying Living Expenses:    Food Insecurity:     Worried About Running Out of Food in the Last Year:     Ran Out of Food in the Last Year:    Transportation Needs:     Lack of Transportation (Medical):  Lack of Transportation (Non-Medical):    Physical Activity:     Days of Exercise per Week:     Minutes of Exercise per Session:    Stress:     Feeling of Stress :    Social Connections:     Frequency of Communication with Friends and Family:     Frequency of Social Gatherings with Friends and Family:     Attends Jewish Services:     Active Member of Clubs or Organizations:     Attends Club or Organization Meetings:     Marital Status:    Intimate Partner Violence:     Fear of Current or Ex-Partner:     Emotionally Abused:     Physically Abused:     Sexually Abused:        No family history on file. Allergies:  Nuts [macadamia nut oil]    Home Medications:  Prior to Admission medications    Medication Sig Start Date End Date Taking? Authorizing Provider   ibuprofen (ADVIL;MOTRIN) 400 MG tablet Take 1 tablet by mouth every 6 hours as needed for Pain 10/7/20 10/17/20  Toño Lechuga MD   QUEtiapine (SEROQUEL) 50 MG tablet Take 3 tablets by mouth 2 times daily 7/15/20   Macarena Melgar MD   escitalopram (LEXAPRO) 10 MG tablet Take 10 mg by mouth daily    Historical Provider, MD   traZODone (DESYREL) 100 MG tablet Take 100 mg by mouth nightly    Historical Provider, MD   buprenorphine-naloxone (SUBOXONE) 8-2 MG FILM SL film Place 1 Film under the tongue 2 times daily.     Historical Provider, MD   lithium (ESKALITH) 450 MG extended release tablet Take 450 mg by mouth 2 times daily    Historical Provider, MD   busPIRone (BUSPAR) 10 MG tablet Take 10 mg by mouth 3 times daily    Historical Provider, MD   QUEtiapine (SEROQUEL) 100 MG tablet Take 100 mg by mouth 2 times daily    Historical Provider, MD   gabapentin (NEURONTIN) 300 MG capsule Take 300 mg by mouth 3 times daily. Historical Provider, MD   rOPINIRole (REQUIP) 5 MG tablet Take 5 mg by mouth 3 times daily    Historical Provider, MD       REVIEW OF SYSTEMS    (2-9 systems for level 4, 10 or more for level 5)      Review of Systems   Constitutional: Negative for activity change, chills and fever. Drowsy, but easy to awake,    HENT: Negative for congestion, sinus pressure, sinus pain and sore throat. Eyes: Negative for pain and itching. Respiratory: Negative for cough and shortness of breath. Cardiovascular: Negative for chest pain. Gastrointestinal: Negative for abdominal distention, abdominal pain, constipation, diarrhea and nausea. Endocrine: Negative for polyuria. Genitourinary: Negative for dysuria and frequency. Musculoskeletal: Negative for arthralgias. Skin: Negative for rash. Neurological: Negative for light-headedness and headaches. PHYSICAL EXAM   (up to 7 for level 4, 8 or more for level 5)      INITIAL VITALS:   BP 83/61   Pulse (!) 42   Temp 97.2 °F (36.2 °C) (Oral)   Resp 14   SpO2 98%     Physical Exam  Vitals reviewed. Constitutional:       General: She is not in acute distress. Comments: Drowsy, easily alerted   HENT:      Head: Normocephalic and atraumatic. Ears:      Comments: Hearing grossly normal     Nose: Nose normal.      Mouth/Throat:      Mouth: Mucous membranes are moist.      Pharynx: Oropharynx is clear. Eyes:      General: No scleral icterus. Conjunctiva/sclera: Conjunctivae normal.      Pupils: Pupils are equal, round, and reactive to light. Cardiovascular:      Rate and Rhythm: Regular rhythm. Bradycardia present. Pulses: Normal pulses. Comments: Bradycardic to 50's  Pulmonary:      Effort: Pulmonary effort is normal. No respiratory distress.       Breath sounds: Normal breath sounds. Abdominal:      General: Abdomen is flat. There is no distension. Palpations: Abdomen is soft. Tenderness: There is no abdominal tenderness. There is no guarding. Musculoskeletal:      Cervical back: Neck supple. No muscular tenderness. Right lower leg: No edema. Left lower leg: No edema. Comments: No evidence of trauma   Skin:     General: Skin is warm and dry. Capillary Refill: Capillary refill takes less than 2 seconds. Neurological:      General: No focal deficit present. Mental Status: She is alert and oriented to person, place, and time. Mental status is at baseline. Psychiatric:         Mood and Affect: Mood normal.         DIFFERENTIAL  DIAGNOSIS     PLAN (LABS / IMAGING / EKG):  No orders of the defined types were placed in this encounter. MEDICATIONS ORDERED:  No orders of the defined types were placed in this encounter. DIAGNOSTIC RESULTS / EMERGENCY DEPARTMENT COURSE / MDM   LAB RESULTS:  No results found for this visit on 06/21/21. IMPRESSION: Bailee Calvert is a 27 y. o. woman presenting for heroin overdose. 1mg narcan PTA. Admits to daily use, no interest in quitting. Denies SI. She is easy to awake, alert and oriented and answering questions appropriately. Able to participate in exam. No episodes of desaturations. Denies coingestion. She is otherwise stable and mentating appropriately, will observe, walk and ensure able to tolerate PO prior to DC. EMERGENCY DEPARTMENT COURSE:  Patient seen and evaluated, VSS and nontoxic in appearance. 1mg narcan given PTA. Observed for a time. Then was observed ambulating and was able to tolerate PO intake wihtout difficulty. Recommended for discharge. PROCEDURES:  none    CONSULTS:  None    CRITICAL CARE:  See attending note    FINAL IMPRESSION      1.  Accidental overdose of heroin, [facility]          DISPOSITION / PLAN     DISPOSITION Decision To Discharge 06/21/2021 07:12:53 PM      PATIENT REFERRED TO:  Ashish Curry  [facility] [address]  [phone]      As needed    [facility]  [address] [place] [address]  [phone]    As needed      DISCHARGE MEDICATIONS:  Discharge Medication List as of 6/21/2021  6:03 PM          Miguelangel Romero DO  Emergency Medicine Resident    (Please note that portions of thisnote were completed with a voice recognition program.  Efforts were made to edit the dictations but occasionally words are mis-transcribed.)       Miguelangel Romero DO  Resident  06/22/21 7033

## 2021-06-21 NOTE — ED PROVIDER NOTES
[address]  ED  Emergency Department  Emergency Medicine Resident Sign-out     Care of Renee Giraldo was assumed from Dr. Juan Manuel Barreto and is being seen for Drug Overdose  . The patient's initial evaluation and plan have been discussed with the prior provider who initially evaluated the patient. EMERGENCY DEPARTMENT COURSE / MEDICAL DECISION MAKING:       MEDICATIONS GIVEN:  No orders of the defined types were placed in this encounter. LABS / RADIOLOGY:     Labs Reviewed - No data to display    XR FOOT RIGHT (2 VIEWS)    Result Date: 6/3/2021  EXAMINATION: TWO XRAY VIEWS OF THE RIGHT FOOT 6/3/2021 7:30 am COMPARISON: Left foot plain radiographs from 6/3/2021 HISTORY: ORDERING SYSTEM PROVIDED HISTORY: Contralateral film to evaluate 1st ray in comparison to the left. TECHNOLOGIST PROVIDED HISTORY: Contralateral film to evaluate 1st ray in comparison to the left. 28-year-old female with contralateral film to evaluate 1st ray as comparison to the left FINDINGS: Single lateral view of the right foot is provided for review. Mild soft tissue swelling at the dorsal forefoot. No tibiotalar joint effusion. Osseous alignment is normal.  Joint space is well maintained. No acute fracture or dislocation. Boehler's angle is maintained. 1. Limited single lateral view of the right foot without acute osseous abnormality within the limitations of this study. 2. Mild soft tissue swelling of the dorsal forefoot. XR FOOT LEFT (MIN 3 VIEWS)    Result Date: 6/3/2021  EXAMINATION: THREE XRAY VIEWS OF THE LEFT FOOT 6/3/2021 5:12 am COMPARISON: None. HISTORY: ORDERING SYSTEM PROVIDED HISTORY: pain/swelling first metatarsal TECHNOLOGIST PROVIDED HISTORY: pain/swelling first metatarsal Acuity: Unknown FINDINGS: Bones demonstrate normal alignment. Asymmetric increased density is seen within the mid to proximal 1st metatarsal bone with suspected periosteal reaction. Joint spaces are preserved.  Soft tissues are unremarkable. Asymmetric increased density of the mid to proximal 1st metatarsal bone with suggestion of mild periosteal reaction. Finding concerning for presence of osteomyelitis. Recommend MRI left foot with contrast examination for further evaluation. RECENT VITALS:     Temp: 97.2 °F (36.2 °C),  Pulse: (!) 42, Resp: 14, BP: 83/61, SpO2: 98 %    This patient is a 27 y.o. Female with overdose of heroin. Accidental overdose. Patient was provided with Narcan 1 mg IV. Patient was in the I although has been moved to room 27 due to hypotension and bradycardia. Patient has been saturating well. Vital signs otherwise stable. Has been able to tolerate oral intake of crackers. OUTSTANDING TASKS / RECOMMENDATIONS:    1. Continue to observe for clinical improvement. 2. Reassess for clinical improvement  3. Patient reassessed in does have clinical improvement, alert, answering all questions appropriately. Patient did eat full box lunch and tolerated, no emesis. Patient ambulated around the room with steady gait. Patient discharged in stable condition after meeting vitally stable throughout emergency department stay upon my take over patient. FINAL IMPRESSION:     1.  Accidental overdose of heroin, initial encounter [facility])        DISPOSITION:         DISPOSITION:  [x]  Discharge   []  Transfer -    []  Admission -     []  Against Medical Advice   []  Eloped   FOLLOW-UP: Trey Rodriguez  [place] [address]  [phone]      As needed    [facility]  [facility]  [phone]    As needed     [address]: New Prescriptions    No medications on file           Brianna Cannon DO  Emergency Medicine Resident  [facility]       Brianna Cannon [place]  Resident  06/21/21 9040

## 2021-06-21 NOTE — ED NOTES
Pt ate boxed lunch. Pt ambulated in pichardo, ambulation tolerated well. NAD noted. Pt states, \"why do I have to leave now, I just want to sleep for a while. \"     Pablo Snyder RN  06/21/21 2603

## 2021-06-21 NOTE — ED PROVIDER NOTES
[facility] [facility]     Emergency Department     Faculty Attestation    I performed a history and physical examination of the patient and discussed management with the resident. I have reviewed and agree with the residents findings including all diagnostic interpretations, and treatment plans as written. Any areas of disagreement are noted on the chart. I was personally present for the key portions of any procedures. I have documented in the chart those procedures where I was not present during the key portions. I have reviewed the emergency nurses triage note. I agree with the chief complaint, past medical history, past surgical history, allergies, medications, social and family history as documented unless otherwise noted below. Documentation of the HPI, Physical Exam and Medical Decision Making performed by [email or web address] is based on my personal performance of the HPI, PE and MDM. For Physician Assistant/ Nurse Practitioner cases/documentation I have personally evaluated this patient and have completed at least one if not all key elements of the E/M (history, physical exam, and MDM). Additional findings are as noted. Patient with overdose. Received 1 mg IV by EMS. Arrives awake. But wanting to sleep. Denies suicidal intent. Reports daily use of heroin. She does not want to stop using. She is moving all extremities equally answering questions. Generally uncooperative with exam.    We will monitor for additional need for Narcan. Anticipate discharge.   Diagnosis overdose heroin    Karine Najera D.O, M.P.H  Attending Emergency Medicine Physician         Karine Najera,   06/21/21 6468

## 2021-06-21 NOTE — CARE COORDINATION
Social work: woke pt up and asked if she had been anywhere for drug treatment she nodded yes. She would not advise which treatment centers she has tried. Pt was asked directly do you want drug treatment. She responded \"NO\". Advised RN of same. Will follow as pt will allow.   Sanjeev roper

## 2021-06-22 ASSESSMENT — ENCOUNTER SYMPTOMS
ABDOMINAL DISTENTION: 0
CONSTIPATION: 0
SHORTNESS OF BREATH: 0
NAUSEA: 0
ABDOMINAL PAIN: 0
SINUS PRESSURE: 0
DIARRHEA: 0
COUGH: 0
EYE ITCHING: 0
SORE THROAT: 0
SINUS PAIN: 0
EYE PAIN: 0

## 2021-07-25 ENCOUNTER — HOSPITAL ENCOUNTER (INPATIENT)
Age: 26
LOS: 1 days | Discharge: HOME | DRG: 816 | End: 2021-07-26
Attending: EMERGENCY MEDICINE | Admitting: INTERNAL MEDICINE
Payer: MEDICAID

## 2021-07-25 ENCOUNTER — APPOINTMENT (OUTPATIENT)
Dept: GENERAL RADIOLOGY | Age: 26
DRG: 816 | End: 2021-07-25
Payer: MEDICAID

## 2021-07-25 ENCOUNTER — APPOINTMENT (OUTPATIENT)
Dept: CT IMAGING | Age: 26
DRG: 816 | End: 2021-07-25
Payer: MEDICAID

## 2021-07-25 DIAGNOSIS — T50.901A ACCIDENTAL DRUG OVERDOSE, INITIAL ENCOUNTER: Primary | ICD-10-CM

## 2021-07-25 DIAGNOSIS — R41.82 ALTERED MENTAL STATUS, UNSPECIFIED ALTERED MENTAL STATUS TYPE: ICD-10-CM

## 2021-07-25 PROBLEM — J96.91 RESPIRATORY FAILURE WITH HYPOXIA (HCC): Status: ACTIVE | Noted: 2021-07-25

## 2021-07-25 LAB
ABSOLUTE EOS #: 0.06 K/UL (ref 0–0.44)
ABSOLUTE IMMATURE GRANULOCYTE: 0.05 K/UL (ref 0–0.3)
ABSOLUTE LYMPH #: 1.82 K/UL (ref 1.1–3.7)
ABSOLUTE MONO #: 0.91 K/UL (ref 0.1–1.2)
ACETAMINOPHEN LEVEL: <5 UG/ML (ref 10–30)
ALBUMIN SERPL-MCNC: 4.4 G/DL (ref 3.5–5.2)
ALBUMIN/GLOBULIN RATIO: 1.3 (ref 1–2.5)
ALLEN TEST: POSITIVE
ALP BLD-CCNC: 82 U/L (ref 35–104)
ALT SERPL-CCNC: 15 U/L (ref 5–33)
AMPHETAMINE SCREEN URINE: NEGATIVE
ANION GAP SERPL CALCULATED.3IONS-SCNC: 10 MMOL/L (ref 9–17)
AST SERPL-CCNC: 40 U/L
BARBITURATE SCREEN URINE: NEGATIVE
BASOPHILS # BLD: 1 % (ref 0–2)
BASOPHILS ABSOLUTE: 0.1 K/UL (ref 0–0.2)
BENZODIAZEPINE SCREEN, URINE: NEGATIVE
BILIRUB SERPL-MCNC: 0.39 MG/DL (ref 0.3–1.2)
BILIRUBIN URINE: NEGATIVE
BUN BLDV-MCNC: 9 MG/DL (ref 6–20)
BUN/CREAT BLD: ABNORMAL (ref 9–20)
BUPRENORPHINE URINE: ABNORMAL
CALCIUM SERPL-MCNC: 9.5 MG/DL (ref 8.6–10.4)
CANNABINOID SCREEN URINE: NEGATIVE
CHLORIDE BLD-SCNC: 103 MMOL/L (ref 98–107)
CO2: 26 MMOL/L (ref 20–31)
COCAINE METABOLITE, URINE: POSITIVE
COLOR: YELLOW
COMMENT UA: ABNORMAL
CREAT SERPL-MCNC: 0.63 MG/DL (ref 0.5–0.9)
DIFFERENTIAL TYPE: ABNORMAL
EOSINOPHILS RELATIVE PERCENT: 1 % (ref 1–4)
ETHANOL PERCENT: <0.01 %
ETHANOL: <10 MG/DL
FIO2: 100
GFR AFRICAN AMERICAN: >60 ML/MIN
GFR NON-AFRICAN AMERICAN: >60 ML/MIN
GFR SERPL CREATININE-BSD FRML MDRD: ABNORMAL ML/MIN/{1.73_M2}
GFR SERPL CREATININE-BSD FRML MDRD: ABNORMAL ML/MIN/{1.73_M2}
GLUCOSE BLD-MCNC: 103 MG/DL (ref 65–105)
GLUCOSE BLD-MCNC: 120 MG/DL (ref 74–100)
GLUCOSE BLD-MCNC: 95 MG/DL (ref 70–99)
GLUCOSE URINE: NEGATIVE
HCG QUALITATIVE: NEGATIVE
HCT VFR BLD CALC: 43.3 % (ref 36.3–47.1)
HEMOGLOBIN: 13.8 G/DL (ref 11.9–15.1)
IMMATURE GRANULOCYTES: 1 %
KETONES, URINE: NEGATIVE
LEUKOCYTE ESTERASE, URINE: NEGATIVE
LITHIUM DATE LAST DOSE: ABNORMAL
LITHIUM DOSE AMOUNT: ABNORMAL
LITHIUM DOSE TIME: ABNORMAL
LITHIUM LEVEL: <0.1 MMOL/L (ref 0.6–1.2)
LYMPHOCYTES # BLD: 17 % (ref 24–43)
MCH RBC QN AUTO: 28.8 PG (ref 25.2–33.5)
MCHC RBC AUTO-ENTMCNC: 31.9 G/DL (ref 28.4–34.8)
MCV RBC AUTO: 90.2 FL (ref 82.6–102.9)
MDMA URINE: ABNORMAL
METHADONE SCREEN, URINE: NEGATIVE
METHAMPHETAMINE, URINE: ABNORMAL
MODE: ABNORMAL
MONOCYTES # BLD: 9 % (ref 3–12)
NEGATIVE BASE EXCESS, ART: ABNORMAL (ref 0–2)
NITRITE, URINE: NEGATIVE
NRBC AUTOMATED: 0 PER 100 WBC
O2 DEVICE/FLOW/%: ABNORMAL
OPIATES, URINE: NEGATIVE
OXYCODONE SCREEN URINE: NEGATIVE
PATIENT TEMP: ABNORMAL
PDW BLD-RTO: 14.4 % (ref 11.8–14.4)
PH UA: 7.5 (ref 5–8)
PHENCYCLIDINE, URINE: NEGATIVE
PLATELET # BLD: 335 K/UL (ref 138–453)
PLATELET ESTIMATE: ABNORMAL
PMV BLD AUTO: 12.4 FL (ref 8.1–13.5)
POC HCO3: 25.8 MMOL/L (ref 21–28)
POC O2 SATURATION: 100 % (ref 94–98)
POC PCO2 TEMP: ABNORMAL MM HG
POC PCO2: 42.3 MM HG (ref 35–48)
POC PH TEMP: ABNORMAL
POC PH: 7.39 (ref 7.35–7.45)
POC PO2 TEMP: ABNORMAL MM HG
POC PO2: 549.6 MM HG (ref 83–108)
POSITIVE BASE EXCESS, ART: 1 (ref 0–3)
POTASSIUM SERPL-SCNC: 4.7 MMOL/L (ref 3.7–5.3)
PROPOXYPHENE, URINE: ABNORMAL
PROTEIN UA: NEGATIVE
RBC # BLD: 4.8 M/UL (ref 3.95–5.11)
RBC # BLD: ABNORMAL 10*6/UL
SALICYLATE LEVEL: <1 MG/DL (ref 3–10)
SAMPLE SITE: ABNORMAL
SARS-COV-2, RAPID: NOT DETECTED
SEG NEUTROPHILS: 71 % (ref 36–65)
SEGMENTED NEUTROPHILS ABSOLUTE COUNT: 7.77 K/UL (ref 1.5–8.1)
SODIUM BLD-SCNC: 139 MMOL/L (ref 135–144)
SPECIFIC GRAVITY UA: 1 (ref 1–1.03)
SPECIMEN DESCRIPTION: NORMAL
TCO2 (CALC), ART: ABNORMAL MMOL/L (ref 22–29)
TEST INFORMATION: ABNORMAL
TOTAL PROTEIN: 7.7 G/DL (ref 6.4–8.3)
TOXIC TRICYCLIC SC,BLOOD: NEGATIVE
TRICYCLIC ANTIDEPRESSANTS, UR: ABNORMAL
TURBIDITY: CLEAR
URINE HGB: NEGATIVE
UROBILINOGEN, URINE: NORMAL
WBC # BLD: 10.7 K/UL (ref 3.5–11.3)
WBC # BLD: ABNORMAL 10*3/UL

## 2021-07-25 PROCEDURE — 2000000000 HC ICU R&B

## 2021-07-25 PROCEDURE — 2500000003 HC RX 250 WO HCPCS

## 2021-07-25 PROCEDURE — 85025 COMPLETE CBC W/AUTO DIFF WBC: CPT

## 2021-07-25 PROCEDURE — 80053 COMPREHEN METABOLIC PANEL: CPT

## 2021-07-25 PROCEDURE — 96374 THER/PROPH/DIAG INJ IV PUSH: CPT

## 2021-07-25 PROCEDURE — 2580000003 HC RX 258: Performed by: EMERGENCY MEDICINE

## 2021-07-25 PROCEDURE — 6360000002 HC RX W HCPCS: Performed by: EMERGENCY MEDICINE

## 2021-07-25 PROCEDURE — 96372 THER/PROPH/DIAG INJ SC/IM: CPT

## 2021-07-25 PROCEDURE — 2580000003 HC RX 258: Performed by: STUDENT IN AN ORGANIZED HEALTH CARE EDUCATION/TRAINING PROGRAM

## 2021-07-25 PROCEDURE — 2500000003 HC RX 250 WO HCPCS: Performed by: STUDENT IN AN ORGANIZED HEALTH CARE EDUCATION/TRAINING PROGRAM

## 2021-07-25 PROCEDURE — 2580000003 HC RX 258

## 2021-07-25 PROCEDURE — 87641 MR-STAPH DNA AMP PROBE: CPT

## 2021-07-25 PROCEDURE — BW28ZZZ COMPUTERIZED TOMOGRAPHY (CT SCAN) OF HEAD: ICD-10-PCS | Performed by: EMERGENCY MEDICINE

## 2021-07-25 PROCEDURE — 70450 CT HEAD/BRAIN W/O DYE: CPT

## 2021-07-25 PROCEDURE — 6370000000 HC RX 637 (ALT 250 FOR IP): Performed by: STUDENT IN AN ORGANIZED HEALTH CARE EDUCATION/TRAINING PROGRAM

## 2021-07-25 PROCEDURE — 6360000002 HC RX W HCPCS

## 2021-07-25 PROCEDURE — 5A1935Z RESPIRATORY VENTILATION, LESS THAN 24 CONSECUTIVE HOURS: ICD-10-PCS | Performed by: EMERGENCY MEDICINE

## 2021-07-25 PROCEDURE — 87635 SARS-COV-2 COVID-19 AMP PRB: CPT

## 2021-07-25 PROCEDURE — 72125 CT NECK SPINE W/O DYE: CPT

## 2021-07-25 PROCEDURE — 80307 DRUG TEST PRSMV CHEM ANLYZR: CPT

## 2021-07-25 PROCEDURE — 73630 X-RAY EXAM OF FOOT: CPT

## 2021-07-25 PROCEDURE — 84703 CHORIONIC GONADOTROPIN ASSAY: CPT

## 2021-07-25 PROCEDURE — G0480 DRUG TEST DEF 1-7 CLASSES: HCPCS

## 2021-07-25 PROCEDURE — 0BH18EZ INSERTION OF ENDOTRACHEAL AIRWAY INTO TRACHEA, VIA NATURAL OR ARTIFICIAL OPENING ENDOSCOPIC: ICD-10-PCS | Performed by: EMERGENCY MEDICINE

## 2021-07-25 PROCEDURE — 94002 VENT MGMT INPAT INIT DAY: CPT

## 2021-07-25 PROCEDURE — 82947 ASSAY GLUCOSE BLOOD QUANT: CPT

## 2021-07-25 PROCEDURE — 96375 TX/PRO/DX INJ NEW DRUG ADDON: CPT

## 2021-07-25 PROCEDURE — 93005 ELECTROCARDIOGRAM TRACING: CPT | Performed by: STUDENT IN AN ORGANIZED HEALTH CARE EDUCATION/TRAINING PROGRAM

## 2021-07-25 PROCEDURE — 6360000002 HC RX W HCPCS: Performed by: STUDENT IN AN ORGANIZED HEALTH CARE EDUCATION/TRAINING PROGRAM

## 2021-07-25 PROCEDURE — 99285 EMERGENCY DEPT VISIT HI MDM: CPT

## 2021-07-25 PROCEDURE — 82803 BLOOD GASES ANY COMBINATION: CPT

## 2021-07-25 PROCEDURE — 80178 ASSAY OF LITHIUM: CPT

## 2021-07-25 PROCEDURE — 51702 INSERT TEMP BLADDER CATH: CPT

## 2021-07-25 PROCEDURE — 99291 CRITICAL CARE FIRST HOUR: CPT | Performed by: INTERNAL MEDICINE

## 2021-07-25 PROCEDURE — BR20ZZZ COMPUTERIZED TOMOGRAPHY (CT SCAN) OF CERVICAL SPINE: ICD-10-PCS | Performed by: EMERGENCY MEDICINE

## 2021-07-25 PROCEDURE — 80179 DRUG ASSAY SALICYLATE: CPT

## 2021-07-25 PROCEDURE — 80143 DRUG ASSAY ACETAMINOPHEN: CPT

## 2021-07-25 PROCEDURE — 81003 URINALYSIS AUTO W/O SCOPE: CPT

## 2021-07-25 PROCEDURE — 2700000000 HC OXYGEN THERAPY PER DAY

## 2021-07-25 PROCEDURE — 36600 WITHDRAWAL OF ARTERIAL BLOOD: CPT

## 2021-07-25 PROCEDURE — 94761 N-INVAS EAR/PLS OXIMETRY MLT: CPT

## 2021-07-25 PROCEDURE — 87086 URINE CULTURE/COLONY COUNT: CPT

## 2021-07-25 PROCEDURE — 71045 X-RAY EXAM CHEST 1 VIEW: CPT

## 2021-07-25 RX ORDER — NALOXONE HYDROCHLORIDE 1 MG/ML
1 INJECTION INTRAMUSCULAR; INTRAVENOUS; SUBCUTANEOUS ONCE
Status: COMPLETED | OUTPATIENT
Start: 2021-07-25 | End: 2021-07-25

## 2021-07-25 RX ORDER — ONDANSETRON 4 MG/1
4 TABLET, ORALLY DISINTEGRATING ORAL EVERY 8 HOURS PRN
Status: DISCONTINUED | OUTPATIENT
Start: 2021-07-25 | End: 2021-07-26 | Stop reason: HOSPADM

## 2021-07-25 RX ORDER — LORAZEPAM 2 MG/ML
2 INJECTION INTRAMUSCULAR ONCE
Status: COMPLETED | OUTPATIENT
Start: 2021-07-25 | End: 2021-07-25

## 2021-07-25 RX ORDER — ROPINIROLE 1 MG/1
5 TABLET, FILM COATED ORAL 3 TIMES DAILY
Status: DISCONTINUED | OUTPATIENT
Start: 2021-07-25 | End: 2021-07-26 | Stop reason: HOSPADM

## 2021-07-25 RX ORDER — FENTANYL CITRATE 50 UG/ML
100 INJECTION, SOLUTION INTRAMUSCULAR; INTRAVENOUS ONCE
Status: COMPLETED | OUTPATIENT
Start: 2021-07-25 | End: 2021-07-25

## 2021-07-25 RX ORDER — SODIUM CHLORIDE 0.9 % (FLUSH) 0.9 %
5-40 SYRINGE (ML) INJECTION PRN
Status: DISCONTINUED | OUTPATIENT
Start: 2021-07-25 | End: 2021-07-26 | Stop reason: HOSPADM

## 2021-07-25 RX ORDER — BUSPIRONE HYDROCHLORIDE 10 MG/1
10 TABLET ORAL 3 TIMES DAILY
Status: DISCONTINUED | OUTPATIENT
Start: 2021-07-25 | End: 2021-07-26 | Stop reason: HOSPADM

## 2021-07-25 RX ORDER — ONDANSETRON 2 MG/ML
4 INJECTION INTRAMUSCULAR; INTRAVENOUS EVERY 6 HOURS PRN
Status: DISCONTINUED | OUTPATIENT
Start: 2021-07-25 | End: 2021-07-26 | Stop reason: HOSPADM

## 2021-07-25 RX ORDER — POLYETHYLENE GLYCOL 3350 17 G/17G
17 POWDER, FOR SOLUTION ORAL DAILY PRN
Status: DISCONTINUED | OUTPATIENT
Start: 2021-07-25 | End: 2021-07-26 | Stop reason: HOSPADM

## 2021-07-25 RX ORDER — OLANZAPINE 10 MG/1
INJECTION, POWDER, LYOPHILIZED, FOR SOLUTION INTRAMUSCULAR
Status: COMPLETED
Start: 2021-07-25 | End: 2021-07-25

## 2021-07-25 RX ORDER — TRAZODONE HYDROCHLORIDE 100 MG/1
100 TABLET ORAL NIGHTLY
Status: DISCONTINUED | OUTPATIENT
Start: 2021-07-25 | End: 2021-07-26 | Stop reason: HOSPADM

## 2021-07-25 RX ORDER — NALOXONE HYDROCHLORIDE 1 MG/ML
2 INJECTION INTRAMUSCULAR; INTRAVENOUS; SUBCUTANEOUS
Status: ACTIVE | OUTPATIENT
Start: 2021-07-25 | End: 2021-07-25

## 2021-07-25 RX ORDER — OLANZAPINE 10 MG/1
10 INJECTION, POWDER, LYOPHILIZED, FOR SOLUTION INTRAMUSCULAR ONCE
Status: COMPLETED | OUTPATIENT
Start: 2021-07-25 | End: 2021-07-25

## 2021-07-25 RX ORDER — ACETAMINOPHEN 650 MG/1
650 SUPPOSITORY RECTAL EVERY 6 HOURS PRN
Status: DISCONTINUED | OUTPATIENT
Start: 2021-07-25 | End: 2021-07-26 | Stop reason: HOSPADM

## 2021-07-25 RX ORDER — NALOXONE HYDROCHLORIDE 1 MG/ML
INJECTION INTRAMUSCULAR; INTRAVENOUS; SUBCUTANEOUS
Status: COMPLETED
Start: 2021-07-25 | End: 2021-07-25

## 2021-07-25 RX ORDER — 0.9 % SODIUM CHLORIDE 0.9 %
1000 INTRAVENOUS SOLUTION INTRAVENOUS ONCE
Status: COMPLETED | OUTPATIENT
Start: 2021-07-25 | End: 2021-07-25

## 2021-07-25 RX ORDER — CHLORHEXIDINE GLUCONATE 0.12 MG/ML
15 RINSE ORAL 2 TIMES DAILY
Status: DISCONTINUED | OUTPATIENT
Start: 2021-07-25 | End: 2021-07-26

## 2021-07-25 RX ORDER — ACETAMINOPHEN 325 MG/1
650 TABLET ORAL EVERY 6 HOURS PRN
Status: DISCONTINUED | OUTPATIENT
Start: 2021-07-25 | End: 2021-07-26 | Stop reason: HOSPADM

## 2021-07-25 RX ORDER — POTASSIUM CHLORIDE 7.45 MG/ML
10 INJECTION INTRAVENOUS PRN
Status: DISCONTINUED | OUTPATIENT
Start: 2021-07-25 | End: 2021-07-26 | Stop reason: HOSPADM

## 2021-07-25 RX ORDER — PROPOFOL 10 MG/ML
5-50 INJECTION, EMULSION INTRAVENOUS
Status: DISCONTINUED | OUTPATIENT
Start: 2021-07-25 | End: 2021-07-26

## 2021-07-25 RX ORDER — FENTANYL CITRATE 50 UG/ML
50 INJECTION, SOLUTION INTRAMUSCULAR; INTRAVENOUS
Status: DISCONTINUED | OUTPATIENT
Start: 2021-07-25 | End: 2021-07-26 | Stop reason: HOSPADM

## 2021-07-25 RX ORDER — NICOTINE 21 MG/24HR
1 PATCH, TRANSDERMAL 24 HOURS TRANSDERMAL DAILY
Status: DISCONTINUED | OUTPATIENT
Start: 2021-07-25 | End: 2021-07-26 | Stop reason: HOSPADM

## 2021-07-25 RX ORDER — PROPOFOL 10 MG/ML
INJECTION, EMULSION INTRAVENOUS
Status: COMPLETED
Start: 2021-07-25 | End: 2021-07-25

## 2021-07-25 RX ORDER — ESCITALOPRAM OXALATE 10 MG/1
10 TABLET ORAL DAILY
Status: DISCONTINUED | OUTPATIENT
Start: 2021-07-25 | End: 2021-07-26 | Stop reason: HOSPADM

## 2021-07-25 RX ORDER — SODIUM CHLORIDE 0.9 % (FLUSH) 0.9 %
5-40 SYRINGE (ML) INJECTION EVERY 12 HOURS SCHEDULED
Status: DISCONTINUED | OUTPATIENT
Start: 2021-07-25 | End: 2021-07-26 | Stop reason: HOSPADM

## 2021-07-25 RX ORDER — PROPOFOL 10 MG/ML
5-50 INJECTION, EMULSION INTRAVENOUS ONCE
Status: COMPLETED | OUTPATIENT
Start: 2021-07-25 | End: 2021-07-25

## 2021-07-25 RX ORDER — SODIUM CHLORIDE 9 MG/ML
25 INJECTION, SOLUTION INTRAVENOUS PRN
Status: DISCONTINUED | OUTPATIENT
Start: 2021-07-25 | End: 2021-07-26 | Stop reason: HOSPADM

## 2021-07-25 RX ORDER — SODIUM CHLORIDE 9 MG/ML
INJECTION, SOLUTION INTRAVENOUS CONTINUOUS
Status: DISCONTINUED | OUTPATIENT
Start: 2021-07-25 | End: 2021-07-26 | Stop reason: HOSPADM

## 2021-07-25 RX ADMIN — LORAZEPAM 2 MG: 2 INJECTION INTRAMUSCULAR; INTRAVENOUS at 08:51

## 2021-07-25 RX ADMIN — PROPOFOL 50 MCG/KG/MIN: 10 INJECTION, EMULSION INTRAVENOUS at 13:19

## 2021-07-25 RX ADMIN — NALOXONE HYDROCHLORIDE 4 MG: 1 INJECTION INTRAMUSCULAR; INTRAVENOUS; SUBCUTANEOUS at 07:20

## 2021-07-25 RX ADMIN — SODIUM CHLORIDE 25 ML: 0.9 INJECTION, SOLUTION INTRAVENOUS at 13:20

## 2021-07-25 RX ADMIN — SODIUM CHLORIDE, PRESERVATIVE FREE 10 ML: 5 INJECTION INTRAVENOUS at 22:12

## 2021-07-25 RX ADMIN — OLANZAPINE 10 MG: 10 INJECTION, POWDER, LYOPHILIZED, FOR SOLUTION INTRAMUSCULAR at 07:58

## 2021-07-25 RX ADMIN — LORAZEPAM 2 MG: 2 INJECTION INTRAMUSCULAR; INTRAVENOUS at 08:58

## 2021-07-25 RX ADMIN — WATER 2.1 ML: 1 INJECTION INTRAMUSCULAR; INTRAVENOUS; SUBCUTANEOUS at 07:58

## 2021-07-25 RX ADMIN — NALOXONE HYDROCHLORIDE 6 MG: 1 INJECTION INTRAMUSCULAR; INTRAVENOUS; SUBCUTANEOUS at 07:54

## 2021-07-25 RX ADMIN — PROPOFOL 20 MCG/KG/MIN: 10 INJECTION, EMULSION INTRAVENOUS at 15:23

## 2021-07-25 RX ADMIN — NALOXONE HYDROCHLORIDE 1 MG: 1 INJECTION PARENTERAL at 06:05

## 2021-07-25 RX ADMIN — NALOXONE HYDROCHLORIDE 1 MG: 1 INJECTION INTRAMUSCULAR; INTRAVENOUS; SUBCUTANEOUS at 06:05

## 2021-07-25 RX ADMIN — SODIUM CHLORIDE: 9 INJECTION, SOLUTION INTRAVENOUS at 21:41

## 2021-07-25 RX ADMIN — NALOXONE HYDROCHLORIDE 6 MG: 1 INJECTION, SOLUTION INTRAMUSCULAR; INTRAVENOUS; SUBCUTANEOUS at 07:54

## 2021-07-25 RX ADMIN — WATER 2.1 ML: 1 INJECTION INTRAMUSCULAR; INTRAVENOUS; SUBCUTANEOUS at 08:36

## 2021-07-25 RX ADMIN — FENTANYL CITRATE 100 MCG: 50 INJECTION, SOLUTION INTRAMUSCULAR; INTRAVENOUS at 11:56

## 2021-07-25 RX ADMIN — PROPOFOL 30 MCG/KG/MIN: 10 INJECTION, EMULSION INTRAVENOUS at 09:22

## 2021-07-25 RX ADMIN — SODIUM CHLORIDE 1000 ML: 9 INJECTION, SOLUTION INTRAVENOUS at 06:24

## 2021-07-25 RX ADMIN — FENTANYL CITRATE 50 MCG: 50 INJECTION, SOLUTION INTRAMUSCULAR; INTRAVENOUS at 14:12

## 2021-07-25 RX ADMIN — CHLORHEXIDINE GLUCONATE 0.12% ORAL RINSE 15 ML: 1.2 LIQUID ORAL at 13:28

## 2021-07-25 RX ADMIN — FENTANYL CITRATE 100 MCG: 50 INJECTION, SOLUTION INTRAMUSCULAR; INTRAVENOUS at 10:12

## 2021-07-25 RX ADMIN — FAMOTIDINE 20 MG: 10 INJECTION INTRAVENOUS at 22:13

## 2021-07-25 RX ADMIN — OLANZAPINE 10 MG: 10 INJECTION, POWDER, LYOPHILIZED, FOR SOLUTION INTRAMUSCULAR at 08:36

## 2021-07-25 RX ADMIN — NALOXONE HYDROCHLORIDE 4 MG: 1 INJECTION, SOLUTION INTRAMUSCULAR; INTRAVENOUS; SUBCUTANEOUS at 07:20

## 2021-07-25 RX ADMIN — Medication 2.1 ML: at 07:58

## 2021-07-25 RX ADMIN — FENTANYL CITRATE 100 MCG: 50 INJECTION, SOLUTION INTRAMUSCULAR; INTRAVENOUS at 10:46

## 2021-07-25 ASSESSMENT — PULMONARY FUNCTION TESTS
PIF_VALUE: 16
PIF_VALUE: 14
PIF_VALUE: 15
PIF_VALUE: 15
PIF_VALUE: 17
PIF_VALUE: 14
PIF_VALUE: 16
PIF_VALUE: 15

## 2021-07-25 ASSESSMENT — PAIN SCALES - GENERAL
PAINLEVEL_OUTOF10: 0
PAINLEVEL_OUTOF10: 2
PAINLEVEL_OUTOF10: 4
PAINLEVEL_OUTOF10: 4
PAINLEVEL_OUTOF10: 0
PAINLEVEL_OUTOF10: 0

## 2021-07-25 NOTE — ED NOTES
Pt placed on bedpan, large amount of dark green stool contaminated urine. Pt complete bed linens changed out, brief applied to pt at this time. Pt given warm blankets for comfort.         Diaz Pierre RN  07/25/21 2658

## 2021-07-25 NOTE — ED NOTES
Pt altered, not following commands, combative, security at bedside.       Eve Smith RN  07/25/21 2726

## 2021-07-25 NOTE — ED NOTES
Patient coughing and moving around, not redirectable, 10 mg bolus of propofol given per Dr. Nikunj Dejesus, propofol infusion increased to 40mcg/kg/min See Mar for documentation.       Liss Strong RN  07/25/21 5005

## 2021-07-25 NOTE — ED PROVIDER NOTES
[facility]     Emergency Department     Faculty Attestation    I performed a history and physical examination of the patient and discussed management with the resident. I have reviewed and agree with the residents findings including all diagnostic interpretations, and treatment plans as written. Any areas of disagreement are noted on the chart. I was personally present for the key portions of any procedures. I have documented in the chart those procedures where I was not present during the key portions. I have reviewed the emergency nurses triage note. I agree with the chief complaint, past medical history, past surgical history, allergies, medications, social and family history as documented unless otherwise noted below. Documentation of the HPI, Physical Exam and Medical Decision Making performed by [email or web address] is based on my personal performance of the HPI, PE and MDM. For Physician Assistant/ Nurse Practitioner cases/documentation I have personally evaluated this patient and have completed at least one if not all key elements of the E/M (history, physical exam, and MDM). Additional findings are as noted. 28 yo F found wandering around strip club, pt told ems possible overdose on heroin & gabapentin,   pe airway intact, vss, responds to touch, r clint orbital hematoma, frontal hematoma, jose, no cervical tenderness, crepitus or deformity, chest symmetric, abdomen non tender, no distension, no rigidity, extremities nv intact,     Lab \\ ct pending, pt needing re check, social service consult, care turned over to day shift    EKG Interpretation    Interpreted by me  As bradycardia, heart rate 56, no ischemia, normal axis, QT corrected 420 (compared with ekg from October 2020, stable)    CRITICAL CARE: There was a high probability of clinically significant/life threatening deterioration in this patient's condition which required my urgent intervention.   Total

## 2021-07-25 NOTE — ED NOTES
10mg of zyprexa given IM right quad per verbal order Dr. Vu Hernandez.       Uriel Freeman RN  07/25/21 0946

## 2021-07-25 NOTE — ED NOTES
100 mg of Rocuronium given IVP by Abhilash Clinton RN per verbal order Dr. Renaldo Vega.       [place] Warner, VINCENT  07/25/21 8198

## 2021-07-25 NOTE — ED NOTES
Pt intubated with 7.5 ETT 22 at the lip, bilateral breath sounds, fogging of the tube. Intubated by Dr. Jose Beavers.      Marlene Cat RN  07/25/21 1456

## 2021-07-25 NOTE — CARE COORDINATION
Transitional planning:  Met with YUDITH RN, pt found wandering, possible heroin and gabapentin OD. Called mother, Larry Diaz at [phone], left [facility] call back number. Called sister, Estuardo Cotter at [phone] and left [place] call back number.

## 2021-07-25 NOTE — PLAN OF CARE
Problem: Nutrition  Goal: Optimal nutrition therapy  Outcome: Ongoing  Note: Nutrition Problem #1: Inadequate oral intake  Intervention: Food and/or Nutrient Delivery: Continue NPO  Nutritional Goals: Start Nutrition within 24-48 hrs as medically appropriate

## 2021-07-25 NOTE — PLAN OF CARE
BRONCHOSPASM/BRONCHOCONSTRICTION     [x]         IMPROVE AERATION/BREATH SOUNDS  [x]   ADMINISTER BRONCHODILATOR THERAPY AS APPROPRIATE  [x]   ASSESS BREATH SOUNDS  [x]   IMPLEMENT AEROSOL/MDI PROTOCOL  [x]   PATIENT EDUCATION AS NEEDED   PROVIDE ADEQUATE OXYGENATION WITH ACCEPTABLE SP02/ABG'S    [x]  IDENTIFY APPROPRIATE OXYGEN THERAPY  [x]   MONITOR SP02/ABG'S AS NEEDED   [x]   PATIENT EDUCATION AS NEEDED   MECHANICAL VENTILATION     [x]  PROVIDE OPTIMAL VENTILATION  [x]   ASSESS FOR EXTUBATION READINESS  [x]   ASSESS FOR WEANING READINESS  [x]  EXTUBATE AS TOLERATED  [x]  IMPLEMENT ADULT MECHANICAL VENTILATION PROTOCOL  [x]  MAINTAIN ADEQUATE OXYGENATION    Problem: Non-Violent Restraints  Goal: Removal from restraints as soon as assessed to be safe  Outcome: Ongoing  Goal: No harm/injury to patient while restraints in use  Outcome: Ongoing  Goal: Patient's dignity will be maintained  Outcome: Ongoing     PERFORM SPONTANEOUS WEANING TRIAL AS TOLERATED
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
## 2021-07-25 NOTE — ED NOTES
Pt has intermittent bouts of bradycardia in the 40s, Dr. Farzaneh Vanegas at bedside to eval patient.      Agustin Pavon, RN  07/25/21 8082

## 2021-07-25 NOTE — CONSULTS
impaired respiratory function as evidenced by NPO or clear liquid status due to medical condition, intubation      Nutrition Interventions:   Food and/or Nutrient Delivery:  Continue NPO  Nutrition Education/Counseling:  No recommendation at this time   Coordination of Nutrition Care:  Continue to monitor while inpatient    Goals:  Start Nutrition within 24-48 hrs as medically appropriate       Nutrition Monitoring and Evaluation:   Behavioral-Environmental Outcomes:  None Identified   Food/Nutrient Intake Outcomes:  Diet Advancement/Tolerance  Physical Signs/Symptoms Outcomes:  Biochemical Data, Nutrition Focused Physical Findings, Skin, Weight     Discharge Planning:     Too soon to determine     Electronically signed by Jonah Newman MS, RD, LD on 7/25/21 at 3:46 PM EDT    Contact: [address]

## 2021-07-25 NOTE — ED NOTES
20 mg of etomidate given IVP by Ayana Bradley RN per verbal order Dr. Binh Nelson.       Liss Strong, VINCENT  07/25/21 0160

## 2021-07-25 NOTE — ED NOTES
Dr. Gregorio Perkins notified patient condition remains altered, not following commands, moving all extremities while restrained, not redirectable.      Marlene Cat RN  07/25/21 2106

## 2021-07-25 NOTE — Clinical Note
Patient Class: Inpatient [101]   REQUIRED: Diagnosis: Respiratory failure with hypoxia ([facility] [149214]   Estimated Length of Stay: Estimated stay of more than 2 midnights   Admitting Provider: Caroline Maria [[id number]]   Preferred Department: micu   Telemetry/Cardiac Monitoring Required?: Yes
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
## 2021-07-25 NOTE — ED NOTES
Patient remains altered, not following commands, twitching in bed, not redirectable or communicating, withdrawling, moving all extremities freely. Dr. Richard Bautista notified.      Patrice Bradley RN  07/25/21 3822
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
## 2021-07-25 NOTE — ED NOTES
RT, Dr. Fiorella Uriarte, Dr. Albert Pizano, Dr. Jeremy Sampson at bedside for intubation.      Berta Byrne RN  07/25/21 1356

## 2021-07-25 NOTE — ED NOTES
Report given to [facility] Questions answered.       Charly Story RN  07/25/21 8305

## 2021-07-25 NOTE — ED NOTES
Bed: 13  Expected date:   Expected time:   Means of arrival:   Comments:  [id number] Luzma Kebede RN  07/25/21 6826

## 2021-07-25 NOTE — PROGRESS NOTES
Date: 7/25/2021  Time: 1010  Patient identity confirmed:  Yes  Indications: Airway Protection  Preoxygenation: yes    Laryngoscope size and type Glidescope  Airway introducer used: No  Evac: No  ETT size:a 7.5 cuffed  Number of attempts:1   Cords visualized:  [x] Clearly  [] Poorly  Breath sounds present bilaterally: Yes   ETCO2   [x] Positive   ETT secured at  23    ETT secured with Denise  Chest x-ray ordered: Yes     Difficult airway:    No       If yes, was red tape placed around ETT:   no    Was this a Code Situation:    No             BP: 129/84        Procedure performed by: Dr. Gianna Otero with Dr. Selvin Hyman and Dr. Denise Dudley at bedside      [facility], [facility], [place]  10:37 AM

## 2021-07-25 NOTE — ED PROVIDER NOTES
[facility]  Emergency Department Encounter  EmergencyMedicine Resident     Pt Name:Marry Díaz  MRN: [id number]  [place] 1995  Date of evaluation: 7/25/21  PCP:  Beula Baumgarten    This patient was evaluated in the Emergency Department for symptoms described in the history of present illness. The patient was evaluated in the context of the global COVID-19 pandemic, which necessitated consideration that the patient might be at risk for infection with the SARS-CoV-2 virus that causes COVID-19. Institutional protocols and algorithms that pertain to the evaluation of patients at risk for COVID-19 are in a state of rapid change based on information released by regulatory bodies including the CDC and federal and state organizations. These policies and algorithms were followed during the patient's care in the ED. CHIEF COMPLAINT       Chief Complaint   Patient presents with    Drug Overdose       HISTORY OF PRESENT ILLNESS  (Location/Symptom, Timing/Onset, Context/Setting, Quality, Duration, Modifying Factors, Severity.)      Aidee Greenberg is a 27 y.o. female who presents with altered mental status. Patient with a history of substance abuse, presents to the emergency department being found altered by EMS. Patient is arousable, but not participating in HPI or ROS. On EMR review, patient has history of drug overdoses responding to Narcan, coming to the emergency department, declining social work evaluation and treatment, discharged in stable condition. EMS reported that she told somebody that she took some gabapentin and heroin, EMS administered 2 mg Narcan. PAST MEDICAL / SURGICAL / SOCIAL / FAMILY HISTORY      has a past medical history of ADHD (attention deficit hyperactivity disorder), ADHD (attention deficit hyperactivity disorder), Anxiety, ANDRE (generalized anxiety disorder), MDD (major depressive disorder), and Restless leg syndrome.        has no past surgical history on file.      Social History     Socioeconomic History    Marital status: Single     Spouse name: Not on file    Number of children: Not on file    Years of education: Not on file    Highest education level: Not on file   Occupational History    Not on file   Tobacco Use    Smoking status: Current Every Day Smoker     Packs/day: 1.00     Types: Cigarettes, E-Cigarettes     Start date: 1/1/2015    Smokeless tobacco: Never Used   Vaping Use    Vaping Use: Never used   Substance and Sexual Activity    Alcohol use: Not Currently     Alcohol/week: 4.0 standard drinks     Types: 4 Cans of beer per week     Comment: social    Drug use: Yes     Frequency: 2.0 times per week     Types: Opiates , Marijuana     Comment: uses heroin everyday she states     Sexual activity: Yes     Partners: Male   Other Topics Concern    Not on file   Social History Narrative    ** Merged History Encounter **          Social Determinants of Health     Financial Resource Strain:     Difficulty of Paying Living Expenses:    Food Insecurity:     Worried About Running Out of Food in the Last Year:     Ran Out of Food in the Last Year:    Transportation Needs:     Lack of Transportation (Medical):  Lack of Transportation (Non-Medical):    Physical Activity:     Days of Exercise per Week:     Minutes of Exercise per Session:    Stress:     Feeling of Stress :    Social Connections:     Frequency of Communication with Friends and Family:     Frequency of Social Gatherings with Friends and Family:     Attends [employer] Services:     Active Member of Clubs or Organizations:     Attends Club or Organization Meetings:     Marital Status:    Intimate Partner Violence:     Fear of Current or Ex-Partner:     Emotionally Abused:     Physically Abused:     Sexually Abused:        No family history on file.     Allergies:  Nuts [macadamia nut oil]    Home Medications:  Prior to Admission medications    Medication Sig Start Date End Date Taking? Authorizing Provider   ibuprofen (ADVIL;MOTRIN) 400 MG tablet Take 1 tablet by mouth every 6 hours as needed for Pain 10/7/20 10/17/20  Caroline Cifuentes MD   QUEtiapine (SEROQUEL) 50 MG tablet Take 3 tablets by mouth 2 times daily 7/15/20   Timmothy Dedra, MD   escitalopram (LEXAPRO) 10 MG tablet Take 10 mg by mouth daily    Historical Provider, MD   traZODone (DESYREL) 100 MG tablet Take 100 mg by mouth nightly    Historical Provider, MD   buprenorphine-naloxone (SUBOXONE) 8-2 MG FILM SL film Place 1 Film under the tongue 2 times daily. Historical Provider, MD   lithium (ESKALITH) 450 MG extended release tablet Take 450 mg by mouth 2 times daily    Historical Provider, MD   busPIRone (BUSPAR) 10 MG tablet Take 10 mg by mouth 3 times daily    Historical Provider, MD   QUEtiapine (SEROQUEL) 100 MG tablet Take 100 mg by mouth 2 times daily    Historical Provider, MD   gabapentin (NEURONTIN) 300 MG capsule Take 300 mg by mouth 3 times daily.     Historical Provider, MD   rOPINIRole (REQUIP) 5 MG tablet Take 5 mg by mouth 3 times daily    Historical Provider, MD       REVIEW OF SYSTEMS    (2-9 systems for level 4, 10 or more for level 5)      Review of Systems   Unable to obtain secondary to condition    PHYSICAL EXAM   (up to 7 for level 4, 8 or more for level 5)      INITIAL VITALS:   /87   Pulse 50   Temp 98.1 °F (36.7 °C) (Bladder)   Resp 16   Ht 5' 4\" (1.626 m)   Wt 106 lb 11.2 oz (48.4 kg)   SpO2 98%   BMI 18.32 kg/m²     Physical Exam   Patient is not alert, but arousable to voice and pain, patient has 1 forehead hematoma, right periorbital hematoma, minor abrasions on face, pupils 3 mm and equally reactive bilaterally, no proptosis, no eye discharge, no conjunctival injection, no lopez signs, no raccoon eyes, ears are clear bilaterally, TMs pearly gray, no hemotympanum, no tenderness to cervical spine, no step-offs or deviations to the spine, CTAB, regular rate and rhythm, no extra heart sounds, no evidence of trauma to the chest, abdomen is soft, nondistended, no ecchymosis, does not appear to be tender to palpation, legs appear atraumatic, distal pulses intact equal bilaterally, patient is moving all extremities equally to pain.     DIFFERENTIAL  DIAGNOSIS     PLAN (LABS / IMAGING / EKG):  Orders Placed This Encounter   Procedures    Culture, Urine    COVID-19, Rapid    C DIFF TOXIN/ANTIGEN    MRSA DNA Probe, Nasal    XR CHEST PORTABLE    CT HEAD WO CONTRAST    CT CERVICAL SPINE WO CONTRAST    XR FOOT LEFT (MIN 3 VIEWS)    CBC Auto Differential    Comprehensive Metabolic Panel w/ Reflex to MG    TOX SCR, BLD, ED    Lithium Level    DRUG SCREEN MULTI URINE    HCG Qualitative, Serum    Urinalysis    Blood gas, arterial    Blood gases    Basic Metabolic Panel w/ Reflex to MG    CBC    Diet NPO    Elevate Head of Bed    Spontaneous Awakening Trial (SAT)    Telemetry monitoring - continuous duration    Vital signs per unit routine    Daily weights    Intake and output    Place intermittent pneumatic compression device    Telemetry monitoring - continuous duration    Notify physician    Up with assistance    Monitor for signs/symptoms of urinary retention    Full Code    Inpatient consult to Critical Care    Inpatient consult to Dietitian    Inpatient consult to Social Work    Inpatient consult to Trauma Surgery    C diff contact isolation    Capnography    Pulse oximetry, continuous    Post Extubation Oxygen Therapy    Spontaneous Breathing Trial (SBT)    Manual Mechanical Ventilation    ABG draw    Initiate Oxygen Therapy Protocol    Respiratory care evaluation only    Respiratory Care Evaluation and Treat    POC Glucose Fingerstick    Arterial Blood Gas, POC    POCT Glucose    EKG 12 Lead    PATIENT STATUS (FROM ED OR OR/PROCEDURAL) Inpatient    Restraints non-violent or non-self-destructive       MEDICATIONS ORDERED:  Orders Placed This Encounter   Medications    naloxone (NARCAN) 2 MG/2ML injection     Evone Pitch: cabinet override    naloxone (NARCAN) injection 1 mg    0.9 % sodium chloride bolus    naloxone (NARCAN) 2 MG/2ML injection     YOHANA DE: cabinet override    naloxone (NARCAN) 2 MG/2ML injection     Orpah Dear: cabinet override    OLANZapine (ZYPREXA) 10 MG injection     Orpah Dear: cabinet override    sterile water injection     Orpah Dear: cabinet override    naloxone (NARCAN) injection 2 mg    AND Linked Order Group     OLANZapine (ZYPREXA) injection 10 mg     sterile water injection 2.1 mL    AND Linked Order Group     OLANZapine (ZYPREXA) injection 10 mg     sterile water injection 2.1 mL    LORazepam (ATIVAN) injection 2 mg    LORazepam (ATIVAN) injection 2 mg    propofol 1000 MG/100ML injection     Sung Page: cabinet override    fentaNYL (SUBLIMAZE) injection 100 mcg    propofol injection    chlorhexidine (PERIDEX) 0.12 % solution 15 mL    famotidine (PEPCID) injection 20 mg    fentaNYL (SUBLIMAZE) injection 100 mcg    escitalopram (LEXAPRO) tablet 10 mg    QUEtiapine (SEROQUEL) tablet 150 mg    rOPINIRole (REQUIP) tablet 5 mg    traZODone (DESYREL) tablet 100 mg    busPIRone (BUSPAR) tablet 10 mg    sodium chloride flush 0.9 % injection 5-40 mL    sodium chloride flush 0.9 % injection 5-40 mL    0.9 % sodium chloride infusion    enoxaparin (LOVENOX) injection 40 mg    OR Linked Order Group     ondansetron (ZOFRAN-ODT) disintegrating tablet 4 mg     ondansetron (ZOFRAN) injection 4 mg    polyethylene glycol (GLYCOLAX) packet 17 g    OR Linked Order Group     acetaminophen (TYLENOL) tablet 650 mg     acetaminophen (TYLENOL) suppository 650 mg    potassium chloride 10 mEq/100 mL IVPB (Peripheral Line)    nicotine (NICODERM CQ) 21 MG/24HR 1 patch    fentaNYL (SUBLIMAZE) injection 100 mcg    propofol injection    fentaNYL (SUBLIMAZE) injection 50 mcg DDX: DIAGNOSTIC RESULTS / EMERGENCY DEPARTMENT COURSE / MDM   LAB RESULTS:  Results for orders placed or performed during the hospital encounter of 07/25/21   COVID-19, Rapid    Specimen: Nasopharyngeal Swab   Result Value Ref Range    Specimen Description . NASOPHARYNGEAL SWAB     SARS-CoV-2, Rapid Not Detected Not Detected   CBC Auto Differential   Result Value Ref Range    WBC 10.7 3.5 - 11.3 k/uL    RBC 4.80 3.95 - 5.11 m/uL    Hemoglobin 13.8 11.9 - 15.1 g/dL    Hematocrit 43.3 36.3 - 47.1 %    MCV 90.2 82.6 - 102.9 fL    MCH 28.8 25.2 - 33.5 pg    MCHC 31.9 28.4 - 34.8 g/dL    RDW 14.4 11.8 - 14.4 %    Platelets 228 846 - 967 k/uL    MPV 12.4 8.1 - 13.5 fL    NRBC Automated 0.0 0.0 per 100 WBC    Differential Type NOT REPORTED     Seg Neutrophils 71 (H) 36 - 65 %    Lymphocytes 17 (L) 24 - 43 %    Monocytes 9 3 - 12 %    Eosinophils % 1 1 - 4 %    Basophils 1 0 - 2 %    Immature Granulocytes 1 (H) 0 %    Segs Absolute 7.77 1.50 - 8.10 k/uL    Absolute Lymph # 1.82 1.10 - 3.70 k/uL    Absolute Mono # 0.91 0.10 - 1.20 k/uL    Absolute Eos # 0.06 0.00 - 0.44 k/uL    Basophils Absolute 0.10 0.00 - 0.20 k/uL    Absolute Immature Granulocyte 0.05 0.00 - 0.30 k/uL    WBC Morphology NOT REPORTED     RBC Morphology NOT REPORTED     Platelet Estimate NOT REPORTED    Comprehensive Metabolic Panel w/ Reflex to MG   Result Value Ref Range    Glucose 95 70 - 99 mg/dL    BUN 9 6 - 20 mg/dL    CREATININE 0.63 0.50 - 0.90 mg/dL    Bun/Cre Ratio NOT REPORTED 9 - 20    Calcium 9.5 8.6 - 10.4 mg/dL    Sodium 139 135 - 144 mmol/L    Potassium 4.7 3.7 - 5.3 mmol/L    Chloride 103 98 - 107 mmol/L    CO2 26 20 - 31 mmol/L    Anion Gap 10 9 - 17 mmol/L    Alkaline Phosphatase 82 35 - 104 U/L    ALT 15 5 - 33 U/L    AST 40 (H) <32 U/L    Total Bilirubin 0.39 0.3 - 1.2 mg/dL    Total Protein 7.7 6.4 - 8.3 g/dL    Albumin 4.4 3.5 - 5.2 g/dL    Albumin/Globulin Ratio 1.3 1.0 - 2.5    GFR Non-African American >60 >60 mL/min    GFR  >60 >60 mL/min    GFR Comment          GFR Staging NOT REPORTED    TOX SCR, BLD, ED   Result Value Ref Range    Acetaminophen Level <5 (L) 10 - 30 ug/mL    Ethanol <10 <10 mg/dL    Ethanol percent <8.754 <3.322 %    Salicylate Lvl <1 (L) 3 - 10 mg/dL    Toxic Tricyclic Sc,Blood NEGATIVE NEGATIVE   Lithium Level   Result Value Ref Range    Lithium Lvl <0.1 (L) 0.6 - 1.2 mmol/L    Lithium Dose Amount NOT REPORTED     Lithium Date Last Dose NOT REPORTED     Lithium Dose Time NOT REPORTED    DRUG SCREEN MULTI URINE   Result Value Ref Range    Amphetamine Screen, Ur NEGATIVE NEGATIVE    Barbiturate Screen, Ur NEGATIVE NEGATIVE    Benzodiazepine Screen, Urine NEGATIVE NEGATIVE    Cocaine Metabolite, Urine POSITIVE (A) NEGATIVE    Methadone Screen, Urine NEGATIVE NEGATIVE    Opiates, Urine NEGATIVE NEGATIVE    Phencyclidine, Urine NEGATIVE NEGATIVE    Propoxyphene, Urine NOT REPORTED NEGATIVE    Cannabinoid Scrn, Ur NEGATIVE NEGATIVE    Oxycodone Screen, Ur NEGATIVE NEGATIVE    Methamphetamine, Urine NOT REPORTED NEGATIVE    Tricyclic Antidepressants, Urine NOT REPORTED NEGATIVE    MDMA, Urine NOT REPORTED NEGATIVE    Buprenorphine Urine NOT REPORTED NEGATIVE    Test Information       Assay provides medical screening only. The absence of expected drug(s) and/or metabolite(s) may indicate diluted or adulterated urine, limitations of testing or timing of collection.    HCG Qualitative, Serum   Result Value Ref Range    hCG Qual NEGATIVE NEGATIVE   Urinalysis   Result Value Ref Range    Color, UA YELLOW YELLOW    Turbidity UA CLEAR CLEAR    Glucose, Ur NEGATIVE NEGATIVE    Bilirubin Urine NEGATIVE NEGATIVE    Ketones, Urine NEGATIVE NEGATIVE    Specific Gravity, UA 1.003 (L) 1.005 - 1.030    Urine Hgb NEGATIVE NEGATIVE    pH, UA 7.5 5.0 - 8.0    Protein, UA NEGATIVE NEGATIVE    Urobilinogen, Urine Normal Normal    Nitrite, Urine NEGATIVE NEGATIVE    Leukocyte Esterase, Urine NEGATIVE NEGATIVE    Urinalysis Comments       Microscopic exam not performed based on chemical results unless requested in original order. POC Glucose Fingerstick   Result Value Ref Range    POC Glucose 103 65 - 105 mg/dL   Arterial Blood Gas, POC   Result Value Ref Range    POC pH 7.394 7.350 - 7.450    POC pCO2 42.3 35.0 - 48.0 mm Hg    POC PO2 549.6 (H) 83.0 - 108.0 mm Hg    POC HCO3 25.8 21.0 - 28.0 mmol/L    TCO2 (calc), Art NOT REPORTED 22.0 - 29.0 mmol/L    Negative Base Excess, Art NOT REPORTED 0.0 - 2.0    Positive Base Excess, Art 1 0.0 - 3.0    POC O2  (H) 94.0 - 98.0 %    O2 Device/Flow/% Adult Ventilator     Lele Test POSITIVE     Sample Site Left Radial Artery     Mode PRVC     FIO2 100.0     Pt Temp NOT REPORTED     POC pH Temp NOT REPORTED     POC pCO2 Temp NOT REPORTED mm Hg    POC pO2 Temp NOT REPORTED mm Hg   POCT Glucose   Result Value Ref Range    POC Glucose 120 (H) 74 - 100 mg/dL   EKG 12 Lead   Result Value Ref Range    Ventricular Rate 56 BPM    Atrial Rate 56 BPM    P-R Interval 160 ms    QRS Duration 96 ms    Q-T Interval 436 ms    QTc Calculation (Bazett) 420 ms    P Axis 56 degrees    R Axis 83 degrees    T Axis 71 degrees       IMPRESSION: 82-year-old female with history of drug overdose presenting to the emergency department with altered mental status, reported heroin and gabapentin use prior to arrival.  Patient does have signs of trauma on her forehead, will obtain CT head and CT cervical spine for further evaluation. Patient has no other evidence of trauma, no indication for other imaging at this time. Will obtain basic labs to evaluate for anemia, electrolyte abnormality, liver dysfunction, kidney dysfunction. Will obtain EKG to evaluate for dysrhythmia, interval abnormalities. Will obtain lithium level, ED tox, urine tox, pregnancy test.  With improvement of mentation, will have social work consult, provide resources if desired.   Will ensure patient has Narcan kit prior to SPINE WITHOUT CONTRAST 7/25/2021 9:58 am TECHNIQUE: CT of the cervical spine was performed without the administration of intravenous contrast. Multiplanar reformatted images are provided for review. Dose modulation, iterative reconstruction, and/or weight based adjustment of the mA/kV was utilized to reduce the radiation dose to as low as reasonably achievable. COMPARISON: 10/07/2020 HISTORY: ORDERING SYSTEM PROVIDED HISTORY: AMS, facial abrasion TECHNOLOGIST PROVIDED HISTORY: AMS, facial abrasion Decision Support Exception - unselect if not a suspected or confirmed emergency medical condition->Emergency Medical Condition (MA) Is the patient pregnant?->No Reason for Exam: facial abrasion; overdose Acuity: Unknown Type of Exam: Unknown FINDINGS: BONES/ALIGNMENT: There is no acute fracture or traumatic malalignment. DEGENERATIVE CHANGES: No significant degenerative changes. SOFT TISSUES: Endotracheal and enteric tubes are present, coursing off the field of view. The lung apices are clear. No acute abnormality of the cervical spine. XR CHEST PORTABLE    Result Date: 7/25/2021  EXAMINATION: ONE XRAY VIEW OF THE CHEST 7/25/2021 9:31 am COMPARISON: 10/07/2020 HISTORY: ORDERING SYSTEM PROVIDED HISTORY: AMS TECHNOLOGIST PROVIDED HISTORY: AMS Reason for Exam: portable, supine FINDINGS: The endotracheal tube terminates 2.7 cm above the jaleel. The enteric tube courses off the field of view in the upper abdomen. The cardiac and mediastinal contours appear unchanged. No acute airspace disease, pneumothorax or effusion. 1. Endotracheal tube terminates in appropriate position. 2. The enteric tube courses off the field of view in the upper abdomen. 3.  No focal airspace disease identified.        EKG  EKG Interpretation    Interpreted by me    Rhythm: sinus bradycardia  Rate: 56  Axis: normal  Ectopy: none  Conduction: normal  ST Segments: no acute change  T Waves: no acute change  Q Waves: none    Clinical Impression: no acute changes and normal EKG, similar to prior EKG, normal intervals    All EKG's are interpreted by the Emergency Department Physician who either signs or Co-signs this chart in the absence of a cardiologist.    EMERGENCY DEPARTMENT COURSE:  Patient came to emergency department, HPI and physical exam were conducted. All nursing notes were reviewed. Administered 13 mg of Narcan total, patient continued to be altered. Patient continues to move around, administered 20 mg of Zyprexa, unable to calm patient enough to get scan. Administered 4 mg Ativan, still unable to call patient enough to get scan. At this point, decision was made to intubate patient for airway protection and in order to scan patient to assess for her persistent altered mental status. CT head and neck found no acute abnormality. Will admit patient to the ICU for further assessment and management. PROCEDURES:      CONSULTS:  IP CONSULT TO CRITICAL CARE  IP CONSULT TO DIETITIAN  IP CONSULT TO SOCIAL WORK  IP CONSULT TO TRAUMA SURGERY    CRITICAL CARE:      FINAL IMPRESSION      1. Accidental drug overdose, initial encounter    2. Altered mental status, unspecified altered mental status type          DISPOSITION / PLAN     DISPOSITION admitted      PATIENT REFERRED TO:  No follow-up provider specified.     DISCHARGE MEDICATIONS:  Current Discharge Medication List          Darion Villatoro MD  Emergency Medicine Resident    (Please note that portions of thisnote were completed with a voice recognition program.  Efforts were made to edit the dictations but occasionally words are mis-transcribed.)        Darion Villatoro MD  Resident  07/25/21 1328

## 2021-07-26 VITALS
HEIGHT: 64 IN | SYSTOLIC BLOOD PRESSURE: 126 MMHG | HEART RATE: 49 BPM | OXYGEN SATURATION: 100 % | WEIGHT: 106.04 LBS | DIASTOLIC BLOOD PRESSURE: 88 MMHG | RESPIRATION RATE: 24 BRPM | TEMPERATURE: 99.5 F | BODY MASS INDEX: 18.1 KG/M2

## 2021-07-26 LAB
ALLEN TEST: POSITIVE
ANION GAP SERPL CALCULATED.3IONS-SCNC: 11 MMOL/L (ref 9–17)
BUN BLDV-MCNC: 10 MG/DL (ref 6–20)
BUN/CREAT BLD: ABNORMAL (ref 9–20)
CALCIUM SERPL-MCNC: 8.6 MG/DL (ref 8.6–10.4)
CHLORIDE BLD-SCNC: 113 MMOL/L (ref 98–107)
CO2: 21 MMOL/L (ref 20–31)
CREAT SERPL-MCNC: 0.63 MG/DL (ref 0.5–0.9)
CULTURE: NO GROWTH
EKG ATRIAL RATE: 56 BPM
EKG P AXIS: 56 DEGREES
EKG P-R INTERVAL: 160 MS
EKG Q-T INTERVAL: 436 MS
EKG QRS DURATION: 96 MS
EKG QTC CALCULATION (BAZETT): 420 MS
EKG R AXIS: 83 DEGREES
EKG T AXIS: 71 DEGREES
EKG VENTRICULAR RATE: 56 BPM
FIO2: 40
GFR AFRICAN AMERICAN: >60 ML/MIN
GFR NON-AFRICAN AMERICAN: >60 ML/MIN
GFR SERPL CREATININE-BSD FRML MDRD: ABNORMAL ML/MIN/{1.73_M2}
GFR SERPL CREATININE-BSD FRML MDRD: ABNORMAL ML/MIN/{1.73_M2}
GLUCOSE BLD-MCNC: 67 MG/DL (ref 65–105)
GLUCOSE BLD-MCNC: 74 MG/DL (ref 70–99)
GLUCOSE BLD-MCNC: 91 MG/DL (ref 65–105)
HCT VFR BLD CALC: 36.8 % (ref 36.3–47.1)
HEMOGLOBIN: 11.8 G/DL (ref 11.9–15.1)
Lab: NORMAL
MCH RBC QN AUTO: 28.9 PG (ref 25.2–33.5)
MCHC RBC AUTO-ENTMCNC: 32.1 G/DL (ref 28.4–34.8)
MCV RBC AUTO: 90.2 FL (ref 82.6–102.9)
MODE: ABNORMAL
MRSA, DNA, NASAL: ABNORMAL
NEGATIVE BASE EXCESS, ART: 1 (ref 0–2)
NRBC AUTOMATED: 0 PER 100 WBC
O2 DEVICE/FLOW/%: ABNORMAL
PATIENT TEMP: ABNORMAL
PDW BLD-RTO: 14.6 % (ref 11.8–14.4)
PLATELET # BLD: 194 K/UL (ref 138–453)
PMV BLD AUTO: 12.3 FL (ref 8.1–13.5)
POC HCO3: 22.7 MMOL/L (ref 21–28)
POC O2 SATURATION: 100 % (ref 94–98)
POC PCO2 TEMP: ABNORMAL MM HG
POC PCO2: 34.7 MM HG (ref 35–48)
POC PH TEMP: ABNORMAL
POC PH: 7.42 (ref 7.35–7.45)
POC PO2 TEMP: ABNORMAL MM HG
POC PO2: 162.2 MM HG (ref 83–108)
POSITIVE BASE EXCESS, ART: ABNORMAL (ref 0–3)
POTASSIUM SERPL-SCNC: 3.8 MMOL/L (ref 3.7–5.3)
RBC # BLD: 4.08 M/UL (ref 3.95–5.11)
SAMPLE SITE: ABNORMAL
SODIUM BLD-SCNC: 145 MMOL/L (ref 135–144)
SPECIMEN DESCRIPTION: ABNORMAL
SPECIMEN DESCRIPTION: NORMAL
TCO2 (CALC), ART: ABNORMAL MMOL/L (ref 22–29)
WBC # BLD: 14.8 K/UL (ref 3.5–11.3)

## 2021-07-26 PROCEDURE — 36600 WITHDRAWAL OF ARTERIAL BLOOD: CPT

## 2021-07-26 PROCEDURE — 6360000002 HC RX W HCPCS: Performed by: EMERGENCY MEDICINE

## 2021-07-26 PROCEDURE — 80048 BASIC METABOLIC PNL TOTAL CA: CPT

## 2021-07-26 PROCEDURE — 82947 ASSAY GLUCOSE BLOOD QUANT: CPT

## 2021-07-26 PROCEDURE — 93010 ELECTROCARDIOGRAM REPORT: CPT | Performed by: INTERNAL MEDICINE

## 2021-07-26 PROCEDURE — 2700000000 HC OXYGEN THERAPY PER DAY

## 2021-07-26 PROCEDURE — 36415 COLL VENOUS BLD VENIPUNCTURE: CPT

## 2021-07-26 PROCEDURE — 82803 BLOOD GASES ANY COMBINATION: CPT

## 2021-07-26 PROCEDURE — 94003 VENT MGMT INPAT SUBQ DAY: CPT

## 2021-07-26 PROCEDURE — 2580000003 HC RX 258: Performed by: EMERGENCY MEDICINE

## 2021-07-26 PROCEDURE — 2580000003 HC RX 258

## 2021-07-26 PROCEDURE — 85027 COMPLETE CBC AUTOMATED: CPT

## 2021-07-26 PROCEDURE — 99291 CRITICAL CARE FIRST HOUR: CPT | Performed by: INTERNAL MEDICINE

## 2021-07-26 PROCEDURE — 94761 N-INVAS EAR/PLS OXIMETRY MLT: CPT

## 2021-07-26 PROCEDURE — 6370000000 HC RX 637 (ALT 250 FOR IP): Performed by: EMERGENCY MEDICINE

## 2021-07-26 PROCEDURE — 2500000003 HC RX 250 WO HCPCS: Performed by: STUDENT IN AN ORGANIZED HEALTH CARE EDUCATION/TRAINING PROGRAM

## 2021-07-26 RX ORDER — FLUTICASONE PROPIONATE 50 MCG
1 SPRAY, SUSPENSION (ML) NASAL DAILY
Status: DISCONTINUED | OUTPATIENT
Start: 2021-07-26 | End: 2021-07-26 | Stop reason: HOSPADM

## 2021-07-26 RX ADMIN — FAMOTIDINE 20 MG: 10 INJECTION INTRAVENOUS at 08:26

## 2021-07-26 RX ADMIN — FENTANYL CITRATE 50 MCG: 50 INJECTION, SOLUTION INTRAMUSCULAR; INTRAVENOUS at 02:28

## 2021-07-26 RX ADMIN — PROPOFOL 40 MCG/KG/MIN: 10 INJECTION, EMULSION INTRAVENOUS at 08:48

## 2021-07-26 RX ADMIN — SODIUM CHLORIDE: 9 INJECTION, SOLUTION INTRAVENOUS at 13:30

## 2021-07-26 RX ADMIN — ONDANSETRON 4 MG: 2 INJECTION INTRAMUSCULAR; INTRAVENOUS at 08:14

## 2021-07-26 RX ADMIN — FENTANYL CITRATE 50 MCG: 50 INJECTION, SOLUTION INTRAMUSCULAR; INTRAVENOUS at 09:22

## 2021-07-26 RX ADMIN — ENOXAPARIN SODIUM 40 MG: 40 INJECTION SUBCUTANEOUS at 08:26

## 2021-07-26 RX ADMIN — PROPOFOL 30 MCG/KG/MIN: 10 INJECTION, EMULSION INTRAVENOUS at 05:10

## 2021-07-26 RX ADMIN — SODIUM CHLORIDE: 9 INJECTION, SOLUTION INTRAVENOUS at 01:16

## 2021-07-26 RX ADMIN — SODIUM CHLORIDE, PRESERVATIVE FREE 10 ML: 5 INJECTION INTRAVENOUS at 08:29

## 2021-07-26 ASSESSMENT — PULMONARY FUNCTION TESTS
PIF_VALUE: 15
PIF_VALUE: 16
PIF_VALUE: 19

## 2021-07-26 NOTE — PLAN OF CARE
Problem: Non-Violent Restraints  Goal: Removal from restraints as soon as assessed to be safe  7/26/2021 1037 by Nilton Jackson RN  Outcome: Completed  7/25/2021 2236 by Petrona Kellogg RN  Outcome: Ongoing     Problem: Non-Violent Restraints  Goal: No harm/injury to patient while restraints in use  7/26/2021 1037 by Nilton Jackson RN  Outcome: Completed  7/25/2021 2236 by Petrona Kellogg RN  Outcome: Ongoing     Problem: Non-Violent Restraints  Goal: Patient's dignity will be maintained  7/26/2021 1037 by Nilton Jackson RN  Outcome: Completed  7/25/2021 2236 by Petrona Kellogg RN  Outcome: Ongoing

## 2021-07-26 NOTE — PLAN OF CARE
Problem: Non-Violent Restraints  Goal: Removal from restraints as soon as assessed to be safe  7/25/2021 2009 by Missy Aguilar RN  Outcome: Ongoing  7/25/2021 1036 by José Antonio Moreno RCP  Outcome: Ongoing  Goal: No harm/injury to patient while restraints in use  7/25/2021 2009 by Missy Aguilar RN  Outcome: Ongoing  7/25/2021 1036 by José Antonio Moreno RCP  Outcome: Ongoing  Goal: Patient's dignity will be maintained  7/25/2021 2009 by Missy Aguilar RN  Outcome: Ongoing  7/25/2021 1036 by José Antonio Moreno RCP  Outcome: Ongoing

## 2021-07-26 NOTE — PROGRESS NOTES
Trauma Tertiary Survey    Admit Date: 7/25/2021  Hospital day 1    Other Respiratory Failure secondary to overdose        Past Medical History:   Diagnosis Date    ADHD (attention deficit hyperactivity disorder)     ADHD (attention deficit hyperactivity disorder)     Anxiety     ANDRE (generalized anxiety disorder)     MDD (major depressive disorder)     Restless leg syndrome        Scheduled Meds:   fluticasone  1 spray Each Nostril Daily    famotidine (PEPCID) injection  20 mg Intravenous BID    escitalopram  10 mg Oral Daily    QUEtiapine  150 mg Oral BID    rOPINIRole  5 mg Oral TID    traZODone  100 mg Oral Nightly    busPIRone  10 mg Oral TID    sodium chloride flush  5-40 mL Intravenous 2 times per day    enoxaparin  40 mg Subcutaneous Daily    nicotine  1 patch Transdermal Daily     Continuous Infusions:   sodium chloride 25 mL (07/25/21 1320)    sodium chloride 75 mL/hr at 07/26/21 0116     PRN Meds:sodium chloride flush, sodium chloride, ondansetron **OR** ondansetron, polyethylene glycol, acetaminophen **OR** acetaminophen, potassium chloride, fentanNYL    Subjective:     Patient has no complaint of pain. There is not associated numbness, tingling, weakness.     Objective:     Patient Vitals for the past 8 hrs:   BP Temp Temp src Pulse Resp SpO2 Weight   07/26/21 1130 (!) 132/111 -- -- (!) 38 16 100 % --   07/26/21 1100 121/88 -- -- 56 14 100 % --   07/26/21 1030 126/77 -- -- (!) 41 24 100 % --   07/26/21 1000 (!) 146/86 -- -- (!) 41 23 100 % --   07/26/21 0930 (!) 149/83 -- -- (!) 34 23 100 % --   07/26/21 0900 (!) 150/85 -- -- (!) 35 21 100 % --   07/26/21 0830 (!) 145/91 -- -- (!) 37 21 100 % --   07/26/21 0800 (!) 148/89 97.5 °F (36.4 °C) Bladder 60 19 100 % --   07/26/21 0743 -- -- -- (!) 40 16 100 % --   07/26/21 0730 117/80 -- -- (!) 41 16 100 % --   07/26/21 0700 115/74 -- -- (!) 44 16 100 % --   07/26/21 0630 (!) 145/82 -- -- 60 18 100 % --   07/26/21 0600 (!) 154/97 98.4 °F (36.9 °C) CORE 56 18 100 % 106 lb 0.7 oz (48.1 kg)   07/26/21 0530 (!) 126/96 -- -- (!) 47 16 100 % --   07/26/21 0500 (!) 146/80 -- -- (!) 34 16 100 % --       I/O last 3 completed shifts: In: 2252 [I.V.:1252; IV Piggyback:1000]  Out: 18 [Urine:510]  I/O this shift:  In: 363 [I.V.:363]  Out: 26 [Urine:120; Emesis/NG output:350]    Radiology:    CT HEAD WO CONTRAST   Final Result   Unchanged appearance of the brain without acute CT abnormality identified. Paranasal sinus mucosal disease. CT CERVICAL SPINE WO CONTRAST   Final Result   No acute abnormality of the cervical spine. XR CHEST PORTABLE   Final Result   1. Endotracheal tube terminates in appropriate position. 2. The enteric tube courses off the field of view in the upper abdomen. 3.  No focal airspace disease identified. XR FOOT LEFT (MIN 3 VIEWS)   Final Result   Periosteal reaction 1st metatarsal unchanged. Chronic osteomyelitis not   excluded. PHYSICAL EXAM:   GCS:  4 - Opens eyes on own   6 - Follows simple motor commands  5 - Alert and oriented    Pupil size:  Left 3 mm Right 3 mm  Pupil reaction: Yes  Wiggles fingers: Left Yes Right Yes  Hand grasp:   Left normal   Right normal  Wiggles toes: Left Yes    Right Yes  Plantar flexion: Left normal  Right normal    BP (!) 132/111   Pulse (!) 38   Temp 97.5 °F (36.4 °C) (Bladder)   Resp 16   Ht 5' 4\" (1.626 m)   Wt 106 lb 0.7 oz (48.1 kg)   SpO2 100%   BMI 18.20 kg/m²   General appearance: alert, appears stated age and cooperative  Head: no racoon eyes no lopez signs   Eyes: conjunctivae/corneas clear. PERRL, EOM's intact. Fundi benign. Nose: Nares normal. Septum midline. Mucosa normal. No drainage or sinus tenderness.   Throat: lips, mucosa, and tongue normal; teeth and gums normal  Neck: no adenopathy, no carotid bruit, no JVD, supple, symmetrical, trachea midline and thyroid not enlarged, symmetric, no tenderness/mass/nodules  Lungs: clear to auscultation bilaterally  Heart: regular rate and rhythm, S1, S2 normal, no murmur, click, rub or gallop  Abdomen: soft, non-tender; bowel sounds normal; no masses,  no organomegaly  Extremities: extremities normal, atraumatic, no cyanosis or edema  Pulses: 2+ and symmetric  Skin: Skin color, texture, turgor normal. No rashes or lesions  Neurologic: Grossly normal    Spine:     Spine Tenderness ROM   Cervical 0 /10 Normal   Thoracic 0 /10 Normal   Lumbar 0 /10 Normal     Musculoskeletal    Joint Tenderness Swelling ROM   Right shoulder absent absent normal   Left shoulder absent absent normal   Right elbow absent absent normal   Left elbow absent absent normal   Right wrist absent absent normal   Left wrist absent absent normal   Right hand grasp absent absent normal   Left hand grasp absent absent normal   Right hip absent absent normal   Left hip absent absent normal   Right knee absent absent normal   Left knee absent absent normal   Right ankle absent absent normal   Left ankle absent absent normal   Right foot absent absent normal   Left foot absent absent normal       INJURIES:    Abrasions to left knee    Patient Active Problem List   Diagnosis    Opioid intoxication, with delirium ([address]    Toxic metabolic encephalopathy    Polysubstance abuse ([address]    Acute kidney injury (STEVE) with [facility] tubular necrosis (ATN) ([facility])    Lactic acidosis    Accidental overdose of heroin ([address]    Opioid use disorder (HCC)    Opioid intoxication with delirium ([address]    Bipolar 1 disorder ([address]    Posttraumatic stress disorder    Depression with suicidal ideation    T12 compression fracture, initial encounter ([address]    Respiratory failure with hypoxia ([address]    Accidental drug overdose    Drug abuse and dependence ([address]    Traumatic ecchymosis of multiple sites         Assessment/Plan:     Plan:   Trauma service will sign off at this time. Thank you for the consult.   Please call/page us if you have any questions/concerns. We appreciate being involved in the care of your patient.         Electronically signed by Catalina Le MD on 7/26/2021 at 12:01 PM

## 2021-07-26 NOTE — CARE COORDINATION
Transitional planning. Eddie Ashby RN, pt's nurse called CM, pt needs transportation. She will not be going home asking for transportation to [address], pt has no Contact information- she does not have her phone on her. Per Eddie Ashby RN, pt does not need HC or any other services. Pt is ready to leave and has already been given DC instructions.    [employer] cab scheduled for 1615p, confirmation number [id number]

## 2021-07-26 NOTE — PROGRESS NOTES
Pt left unit at 1610 for discharge via cab to preferred destination. At [phone] writer entered patient room and recognized that patient left Naloxone she asked for due to prior opioid use. Attempted to call family to establish contact about forgotten item. No answer to Mother's phone number in chart, message left.

## 2021-07-26 NOTE — PROGRESS NOTES
Pt was extubated today at 1827 with no complications. Respiratory was at bedside. Pt was placed on room air post extubation, SpO2 remains at 100%. Pt tolerated Extubation well.
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
## 2021-07-26 NOTE — PLAN OF CARE
to baseline  Description: Mental status will be restored to baseline  Outcome: Ongoing     Problem: Discharge Planning:  Goal: Ability to perform activities of daily living will improve  Description: Ability to perform activities of daily living will improve  Outcome: Ongoing     Problem: Discharge Planning:  Goal: Participates in care planning  Description: Participates in care planning  Outcome: Ongoing     Problem: Injury - Risk of, Physical Injury:  Goal: Absence of physical injury  Description: Absence of physical injury  Outcome: Ongoing     Problem: Injury - Risk of, Physical Injury:  Goal: Will remain free from falls  Description: Will remain free from falls  Outcome: Ongoing     Problem: Mood - Altered:  Goal: Mood stable  Description: Mood stable  Outcome: Ongoing     Problem: Mood - Altered:  Goal: Absence of abusive behavior  Description: Absence of abusive behavior  Outcome: Ongoing     Problem: Mood - Altered:  Goal: Verbalizations of feeling emotionally comfortable while being cared for will increase  Description: Verbalizations of feeling emotionally comfortable while being cared for will increase  Outcome: Ongoing     Problem: Psychomotor Activity - Altered:  Goal: Absence of psychomotor disturbance signs and symptoms  Description: Absence of psychomotor disturbance signs and symptoms  Outcome: Ongoing     Problem: Sensory Perception - Impaired:  Goal: Demonstrations of improved sensory functioning will increase  Description: Demonstrations of improved sensory functioning will increase  Outcome: Ongoing     Problem: Sensory Perception - Impaired:  Goal: Decrease in sensory misperception frequency  Description: Decrease in sensory misperception frequency  Outcome: Ongoing     Problem: Sensory Perception - Impaired:  Goal: Able to refrain from responding to false sensory perceptions  Description: Able to refrain from responding to false sensory perceptions  Outcome: Ongoing     Problem: Sensory Perception - Impaired:  Goal: Demonstrates accurate environmental perceptions  Description: Demonstrates accurate environmental perceptions  Outcome: Ongoing     Problem: Sensory Perception - Impaired:  Goal: Able to distinguish between reality-based and nonreality-based thinking  Description: Able to distinguish between reality-based and nonreality-based thinking  Outcome: Ongoing     Problem: Sensory Perception - Impaired:  Goal: Able to interrupt nonreality-based thinking  Description: Able to interrupt nonreality-based thinking  Outcome: Ongoing     Problem: Sleep Pattern Disturbance:  Goal: Appears well-rested  Description: Appears well-rested  Outcome: Ongoing     Problem: Falls - Risk of:  Goal: Absence of physical injury  Description: Absence of physical injury  Outcome: Ongoing     Problem: Falls - Risk of:  Goal: Will remain free from falls  Description: Will remain free from falls  Outcome: Ongoing     Problem: Skin Integrity:  Goal: Will show no infection signs and symptoms  Description: Will show no infection signs and symptoms  Outcome: Ongoing     Problem: Skin Integrity:  Goal: Absence of new skin breakdown  Description: Absence of new skin breakdown  Outcome: Ongoing     Problem: Nutrition  Goal: Optimal nutrition therapy  7/25/2021 2236 by Darrell Garcia RN  Outcome: Ongoing

## 2021-07-26 NOTE — PROGRESS NOTES
PROGRESS NOTE      PATIENT NAME: Ani Abebe RECORD NO. [id number]  DATE: 7/26/2021  SURGEON: Juanjose Nicole  PRIMARY CARE PHYSICIAN: Facundo Cesar    HD: # 1    ASSESSMENT    Patient Active Problem List   Diagnosis    Opioid intoxication, with delirium ([address]    Toxic metabolic encephalopathy    Polysubstance abuse ([address]    Acute kidney injury (STEVE) with acute tubular necrosis (ATN) (HCC)    Lactic acidosis    Accidental overdose of heroin ([address]    Opioid use disorder ([address]    Opioid intoxication with delirium ([address]    Bipolar 1 disorder ([address]    Posttraumatic stress disorder    Depression with suicidal ideation    [facility]    Respiratory failure with hypoxia ([address]    Accidental drug overdose    Drug abuse and dependence ([facility]    Traumatic ecchymosis of multiple sites       [address] [facility]  26F AMS intubated due to resp failure s/p gabapentin and heroin abuse, unresponsive to narcan, bruising in multiple stages of healing noted     Plan:  · Admit to MICU under Medicine service. We will continue to follow once patient is more awake we'll perform a tertiary exam.     1. Neurological  1. CTLS - Ct head and CT cervical 7/25/18 negative   2. Intubated and sedated: Propofol 30mcg   3. Pain- withdraws to pain in all four extremities  4. Positive for cocaine on urine tox screen  5. Psych: BuSpar 10 mg 3 times daily, Lexapro 10 mg daily, Seroquel 150 mg twice daily, ropinirole 5 mg 3 times daily, trazodone 100 mg nightly     2. Cardiovascular   1. HR 34-60  2. MAP      3. Respiratory  1. Intubated  2. Mode: PRVC  3. 16/440/5/40     4. Gastrointestinal  1. GI Prophylaxis Pepcid 20 mg twice daily  2. Alkaline phosphatase 82, ALT 15, AST 40, bilirubin 0.39     5. FEN/Renal  1. Urine Output aprox 750 since arrival  2. Sodium 145, potassium 3.8, chloride 113, CO2 21  3. BUN/creatinine: 10/0.63  4.  IV Fluid 75 ml/hr     6. Heme/ID  1. Afebrile  2. WBC 14.8 from 10.7  3. Hemoglobin 11.8 from 13.8  4. Platelets 285     7. MSK  1. PT/OT when appropriate      8. DVT Prophylaxis -Lovenox 40 mg daily     9. Dispo - continue with ICU care      10. Lines  1. ETT  2. PIV x2  3. OG  4. Priest         SUBJECTIVE    Adriana Pike was seen and examined at bedside. Patient is intubated and sedated. Nurse reports that patient is alert during suction and will occasionally initiate her own breaths. OBJECTIVE  VITALS: Temp: Temp: 98.4 °F (36.9 °C)Temp  Av °F (37.2 °C)  Min: 98.1 °F (36.7 °C)  Max: 99.9 °F (64.7 °C) BP Systolic (37WLV), SZ , Min:105 , PRT:415   Diastolic (90TGM), WRP:00, Min:67, Max:149   Pulse Pulse  Av.2  Min: 34  Max: 84 Resp Resp  Av.3  Min: 16  Max: 25 Pulse ox SpO2  Av.6 %  Min: 97 %  Max: 100 %      Physical Exam    General: Intubated  Skin: Ecchymosis throughout the lower extremities and the face at different stages of healing. Head: Old abrasions to the chin as well as an ecchymosis to the forehead, no raccoon eyes no lopez signs  Eyes: Pupils are 2 mm bilaterally equal and responsive to light, no scleral icterus  ENT:nose without major deformity  Neck: supple and without mass, no thyromegaly or thyroid nodules, no cervical lymphadenopathy  Pulmonary/Chest: Clear to auscultation bilaterally, no chest wall crepitus, no chest wall deformity no bruising on the chest, no wheezing, no rhonchi no rales  Cardiovascular: Bradycardic rate, regular rhythm, no murmur noted  Abdomen: soft, nontender, non-distended  Pelvis:  Stable  Back:  No abrasions/lesions. No obvious deformities.   No step-offs  Extremities: palpable radial, femoral, and pedal pulses b/l  Musculoskeletal: normal range of motion, no joint swelling, deformity   Neurologic: Withdraws from painful stimulation in all 4 extremities, moves bilateral lower extremities to curl up in a fetal position    Ins and outs: I/O last 3
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
## 2021-07-26 NOTE — PROGRESS NOTES
Naloxone kit and education provided to patient prior to discharge per request of Dr. Jen Castellanos.      Gillian Mejias PharmD BCPS [facility]  7/26/2021 3:33 PM

## 2021-07-26 NOTE — PROGRESS NOTES
KRIS [facility]ENG, [facility] Respiratory failure with hypoxia ([facility] [address] [J96.91] . Vitals:    07/26/21 0900   BP: (!) 150/85   Pulse: (!) 35   Resp: 21   Temp:    SpO2: 100%   . Patients home meds are   Prior to Admission medications    Medication Sig Start Date End Date Taking? Authorizing Provider   ibuprofen (ADVIL;MOTRIN) 400 MG tablet Take 1 tablet by mouth every 6 hours as needed for Pain 10/7/20 10/17/20  Shadi Douglas MD   QUEtiapine (SEROQUEL) 50 MG tablet Take 3 tablets by mouth 2 times daily 7/15/20   Silke Downs MD   escitalopram (LEXAPRO) 10 MG tablet Take 10 mg by mouth daily    Historical Provider, MD   traZODone (DESYREL) 100 MG tablet Take 100 mg by mouth nightly    Historical Provider, MD   buprenorphine-naloxone (SUBOXONE) 8-2 MG FILM SL film Place 1 Film under the tongue 2 times daily. Historical Provider, MD   lithium (ESKALITH) 450 MG extended release tablet Take 450 mg by mouth 2 times daily    Historical Provider, MD   busPIRone (BUSPAR) 10 MG tablet Take 10 mg by mouth 3 times daily    Historical Provider, MD   QUEtiapine (SEROQUEL) 100 MG tablet Take 100 mg by mouth 2 times daily    Historical Provider, MD   gabapentin (NEURONTIN) 300 MG capsule Take 300 mg by mouth 3 times daily.     Historical Provider, MD   rOPINIRole (REQUIP) 5 MG tablet Take 5 mg by mouth 3 times daily    Historical Provider, MD   .  Recent Surgical History: None = 0     Assessment     Peak Flow (asthma only)    Predicted: na  Personal Best: na  PEF na  % Predicted na  Peak Flow : not applicable = 0    WVC8/OZI    FEV1 Predicted na      FEV1 na    FEV1 % Predicted na  FVC na  IS volume na  IBW na    RR 17  Breath Sounds: clear      Bronchodilator assessment at level  1  Hyperinflation assessment at level   Secretion Management assessment at level      []    Bronchodilator Assessment  BRONCHODILATOR ASSESSMENT SCORE  Score 0 1 2 3 4 5   Breath Sounds   []  Patient Baseline [x]  No Wheeze good aeration []  Faint, scattered wheezing, good aeration []  Expiratory Wheezing and or moderately diminished []  Insp/Exp wheeze and/or very diminished []  Insp/Exp and/ or marked distress   Respiratory Rate   []  Patient Baseline [x]  Less than 20 [x]  Less than 20 []  20-25 []  Greater than 25 []  Greater than 25   Peak flow % of Pred or PB [x]  NA   []  Greater than 90%  []  81-90% []  71-80% []  Less than or equal to 70%  or unable to perform []  Unable due to Respiratory Distress   Dyspnea re []  Patient Baseline [x]  No SOB [x]  No SOB []  SOB on exertion []  SOB min activity []  At rest/acute   e FEV% Predicted       [x]  NA []  Above 69%  []  Unable []  Above 60-69%  []  Unable []  Above 50-59%  []  Unable []  Above 35-49%  []  Unable []  Less than 35%  []  Unable                 []  Hyperinflation Assessment  Score 1 2 3   CXR and Breath Sounds   []  Clear []  No atelectasis  Basilar aeration []  Atelectasis or absent basilar breath sounds   Incentive Spirometry Volume  (Per IBW)   []  Greater than or equal to 15ml/Kg []  less than 15ml/Kg []  less than 15ml/Kg   Surgery within last 2 weeks []  None or general   []  Abdominal or thoracic surgery  []  Abdominal or thoracic   Chronic Pulmonary Historyre []  No []  Yes []  Yes     []  Secretion Management Assessment  Score 1 2 3   Bilateral Breath Sounds   []  Occasional Rhonchi []  Scattered Rhonchi []  Course Rhonchi and/or poor aeration   Sputum    []  Small amount of thin secretions []  Moderate amount of viscous secretions []  Copius, Viscious Yellow/ Secretions   CXR as reported by physician []  clear  []  Unavailable []  Infiltrates and/or consolidation  []  Unavailable []  Mucus Plugging and or lobar consolidation  []  Unavailable   Cough []  Strong, productive cough []  Weak productive cough []  No cough or weak non-productive cough   KRIS ZENG RCP  10:25 AM                            FEMALE                                  MALE FEV1 Predicted Normal Values                        FEV1 Predicted Normal Values          Age                                     Height in Feet and Inches       Age                                     Height in Feet and Inches       4' 11\" 5' 1\" 5' 3\" 5' 5\" 5' 7\" 5' 9\" 5' 11\" 6' 1\"  4' 11\" 5' 1\" 5' 3\" 5' 5\" 5' 7\" 5' 9\" 5' 11\" 6' 1\"   43 - 45 2.49 2.66 2.84 3.03 3.22 3.42 3.62 3.83 42 - 45 2.82 3.03 3.26 3.49 3.72 3.96 4.22 4.47   46 - 49 2.40 2.57 2.76 2.94 3.14 3.33 3.54 3.75 46 - 49 2.70 2.92 3.14 3.37 3.61 3.85 4.10 4.36   50 - 53 2.31 2.48 2.66 2.85 3.04 3.24 3.45 3.66 50 - 53 2.58 2.80 3.02 3.25 3.49 3.73 3.98 4.24   54 - 57 2.21 2.38 2.57 2.75 2.95 3.14 3.35 3.56 54 - 57 2.46 2.67 2.89 3.12 3.36 3.60 3.85 4.11   58 - 61 2.10 2.28 2.46 2.65 2.84 3.04 3.24 3.45 58 - 61 2.32 2.54 2.76 2.99 3.23 3.47 3.72 3.98   62 - 65 1.99 2.17 2.35 2.54 2.73 2.93 3.13 3.34 62 - 65 2.19 2.40 2.62 2.85 3.09 3.33 3.58 3.84   66 - 69 1.88 2.05 2.23 2.42 2.61 2.81 3.02 3.23 66 - 69 2.04 2.26 2.48 2.71 2.95 3.19 3.44 3.70   70+ 1.82 1.99 2.17 2.36 2.55 2.75 2.95 3.16 70+ 1.97 2.19 2.41 2.64 2.87 3.12 3.37 3.62             Predicted Peak Expiratory Flow Rate                                       Height (in)  Female       Height (in) Male           Age 10 94 72 31 83 91 78 74 Age            20 829 398 593 643 866 429 757 061  44 09 26 41 80 79 26 62 53   25 337 352 366 381 396 411 426 441 25 447 476 505 533 [phone] 677   30 329 344 359 374 389 404 419 434 30 437 466 494 523 552 580 609 638 667   35 322 337 351 366 381 396 411 426 35 426 455 484 512 541 570 598 627 657   40 314 329 344 359 374 389 404 419 40 416 445 473 502 531 559 588 617 647   45 307 322 336 351 366 381 396 411 45 405 434 463 491 520 549 577 606 636   50 299 314 329 344 359 374 389 404 50 395 424 452 481 510 538 567 596 625   55 292 307 321 336 351 366 381 396 55 384 413 442 470 499 528 556 585 615   60 284 299 314 329 344 359 374 389 60 374 403 431 460 489 517 546 575 605   65 277 292 306 321 336 351 366 381 65 363 392 421 449 478 507 535 564 594   70 269 284 299 314 329 344 359 374 70 353 382 410 439 468 496 525 554 583   75 261 274 289 305 319 334 348 364 75 344 372 400 429 458 487 515 544 573   80 253 266 282 296 312 327 342 356 80 335 362 390 419 448 [phone] 562

## 2021-07-26 NOTE — PLAN OF CARE
Problem: OXYGENATION/RESPIRATORY FUNCTION  Goal: Patient will maintain patent airway  7/26/2021 1038 by Nelda Meeks RN  Outcome: Ongoing  7/25/2021 2236 by Lisa Bishop RN  Outcome: Ongoing  Goal: Patient will achieve/maintain normal respiratory rate/effort  Description: Respiratory rate and effort will be within normal limits for the patient  7/26/2021 1038 by Nelda Meeks RN  Outcome: Ongoing  7/25/2021 2236 by Lisa Bishop RN  Outcome: Ongoing     Problem: SKIN INTEGRITY  Goal: Skin integrity is maintained or improved  7/26/2021 1038 by Nelda Meeks RN  Outcome: Ongoing  7/25/2021 2236 by Lisa Bishop RN  Outcome: Ongoing     Problem: Confusion - Acute:  Goal: Absence of continued neurological deterioration signs and symptoms  Description: Absence of continued neurological deterioration signs and symptoms  7/26/2021 1038 by Nelda Meeks RN  Outcome: Ongoing  7/25/2021 2236 by Lisa Bishop RN  Outcome: Ongoing  Goal: Mental status will be restored to baseline  Description: Mental status will be restored to baseline  7/26/2021 1038 by Nelda Meeks RN  Outcome: Ongoing  7/25/2021 2236 by Lisa Bishop RN  Outcome: Ongoing     Problem: Discharge Planning:  Goal: Ability to perform activities of daily living will improve  Description: Ability to perform activities of daily living will improve  7/26/2021 1038 by Nelda Meeks RN  Outcome: Ongoing  7/25/2021 2236 by Lisa Bishop RN  Outcome: Ongoing  Goal: Participates in care planning  Description: Participates in care planning  7/26/2021 1038 by Nelda Meeks RN  Outcome: Ongoing  7/25/2021 2236 by Lisa Bishop RN  Outcome: Ongoing     Problem: Injury - Risk of, Physical Injury:  Goal: Absence of physical injury  Description: Absence of physical injury  7/26/2021 1038 by Nelda Meeks RN  Outcome: Ongoing  7/25/2021 2236 by Lisa Bishop RN  Outcome: Ongoing  Goal: Will remain free from falls  Description: Will remain free from falls  7/26/2021 1038 by Bharat Leonard RN  Outcome: Ongoing  7/25/2021 2236 by Darrell Garcia RN  Outcome: Ongoing     Problem: Mood - Altered:  Goal: Mood stable  Description: Mood stable  7/26/2021 1038 by Bharat Leonard RN  Outcome: Ongoing  7/25/2021 2236 by Darrell Garcia RN  Outcome: Ongoing  Goal: Absence of abusive behavior  Description: Absence of abusive behavior  7/26/2021 1038 by Bharat Leonard RN  Outcome: Ongoing  7/25/2021 2236 by Darrell Garcia RN  Outcome: Ongoing  Goal: Verbalizations of feeling emotionally comfortable while being cared for will increase  Description: Verbalizations of feeling emotionally comfortable while being cared for will increase  7/26/2021 1038 by Bharat Leonard RN  Outcome: Ongoing  7/25/2021 2236 by Darrell Garcia RN  Outcome: Ongoing     Problem: Psychomotor Activity - Altered:  Goal: Absence of psychomotor disturbance signs and symptoms  Description: Absence of psychomotor disturbance signs and symptoms  7/26/2021 1038 by Bharat Leonard RN  Outcome: Ongoing  7/25/2021 2236 by Darrell Garcia RN  Outcome: Ongoing     Problem: Sensory Perception - Impaired:  Goal: Demonstrations of improved sensory functioning will increase  Description: Demonstrations of improved sensory functioning will increase  7/26/2021 1038 by Bharat Leonard RN  Outcome: Ongoing  7/25/2021 2236 by Darrell Garcia RN  Outcome: Ongoing  Goal: Decrease in sensory misperception frequency  Description: Decrease in sensory misperception frequency  7/26/2021 1038 by Bharat Leonard RN  Outcome: Ongoing  7/25/2021 2236 by Darrell Garcia RN  Outcome: Ongoing  Goal: Able to refrain from responding to false sensory perceptions  Description: Able to refrain from responding to false sensory perceptions  7/26/2021 1038 by Bharat Leonard RN  Outcome: Ongoing  7/25/2021 2236 by Darrell Garcia RN  Outcome: Ongoing  Goal: Demonstrates accurate environmental perceptions  Description: Demonstrates accurate environmental perceptions  7/26/2021 1038 by Gary Tang RN  Outcome: Ongoing  7/25/2021 2236 by Tin Mensah RN  Outcome: Ongoing  Goal: Able to distinguish between reality-based and nonreality-based thinking  Description: Able to distinguish between reality-based and nonreality-based thinking  7/26/2021 1038 by Gary Tang RN  Outcome: Ongoing  7/25/2021 2236 by Tin Mensah RN  Outcome: Ongoing  Goal: Able to interrupt nonreality-based thinking  Description: Able to interrupt nonreality-based thinking  7/26/2021 1038 by Gary Tang RN  Outcome: Ongoing  7/25/2021 2236 by Tin Mensah RN  Outcome: Ongoing     Problem: Sleep Pattern Disturbance:  Goal: Appears well-rested  Description: Appears well-rested  7/26/2021 1038 by Gary Tang RN  Outcome: Ongoing  7/25/2021 2236 by Tin Mensah RN  Outcome: Ongoing     Problem: Falls - Risk of:  Goal: Absence of physical injury  Description: Absence of physical injury  7/26/2021 1038 by Gary Tang RN  Outcome: Ongoing  7/25/2021 2236 by Tin Mensah RN  Outcome: Ongoing  Goal: Will remain free from falls  Description: Will remain free from falls  7/26/2021 1038 by Gary Tang RN  Outcome: Ongoing  7/25/2021 2236 by Tin Mensah RN  Outcome: Ongoing     Problem: Skin Integrity:  Goal: Will show no infection signs and symptoms  Description: Will show no infection signs and symptoms  7/26/2021 1038 by Gary Tang RN  Outcome: Ongoing  7/25/2021 2236 by Tin Mensah RN  Outcome: Ongoing  Goal: Absence of new skin breakdown  Description: Absence of new skin breakdown  7/26/2021 1038 by Gary Tang RN  Outcome: Ongoing  7/25/2021 2236 by Tin Mensah RN  Outcome: Ongoing     Problem: Nutrition  Goal: Optimal nutrition therapy  7/26/2021 1038 by Gary Tang RN  Outcome: Ongoing  7/25/2021 2236 by Tin Mensah RN  Outcome: Ongoing     Problem: Non-Violent Restraints  Goal: Removal from restraints as soon as assessed to be safe  7/26/2021 1037 by Carlos Taylor RN  Outcome: Completed  7/25/2021 2236 by Delores Penaloza RN  Outcome: Ongoing  Goal: No harm/injury to patient while restraints in use  7/26/2021 1037 by Carlos Taylor RN  Outcome: Completed  7/25/2021 2236 by Delores Penaloza RN  Outcome: Ongoing  Goal: Patient's dignity will be maintained  7/26/2021 1037 by Carlos Taylor RN  Outcome: Completed  7/25/2021 2236 by Delores Penaloza RN  Outcome: Ongoing     Problem: MECHANICAL VENTILATION  Goal: Patient will maintain patent airway  7/26/2021 1038 by Carlos Taylor RN  Outcome: Completed  7/25/2021 2236 by Delores Penaloza RN  Outcome: Ongoing  Goal: Oral health is maintained or improved  7/26/2021 1038 by Carlos Taylor RN  Outcome: Completed  7/25/2021 2236 by Delores Penaloza RN  Outcome: Ongoing  Goal: ET tube will be managed safely  7/26/2021 1038 by Carlos Taylor RN  Outcome: Completed  7/25/2021 2236 by Delores Penaloza RN  Outcome: Ongoing  Goal: Ability to express needs and understand communication  7/26/2021 1038 by Carlos Taylor RN  Outcome: Completed  7/25/2021 2236 by Delores Penaloza RN  Outcome: Ongoing  Goal: Mobility/activity is maintained at optimum level for patient  7/26/2021 1038 by Carlos Taylor RN  Outcome: Completed  7/25/2021 2236 by Delores Penaloza RN  Outcome: Ongoing

## 2021-07-27 ENCOUNTER — HOSPITAL ENCOUNTER (EMERGENCY)
Age: 26
Discharge: HOME | End: 2021-07-28
Attending: EMERGENCY MEDICINE
Payer: MEDICAID

## 2021-07-27 DIAGNOSIS — T40.1X4A HEROIN OVERDOSE, UNDETERMINED INTENT, INITIAL ENCOUNTER (HCC): Primary | ICD-10-CM

## 2021-07-27 PROCEDURE — 87591 N.GONORRHOEAE DNA AMP PROB: CPT

## 2021-07-27 PROCEDURE — H0050 ALCOHOL/DRUG SERVICE 15 MIN: HCPCS | Performed by: SOCIAL WORKER

## 2021-07-27 PROCEDURE — 6370000000 HC RX 637 (ALT 250 FOR IP): Performed by: PEDIATRICS

## 2021-07-27 PROCEDURE — 87491 CHLMYD TRACH DNA AMP PROBE: CPT

## 2021-07-27 PROCEDURE — 99285 EMERGENCY DEPT VISIT HI MDM: CPT

## 2021-07-27 RX ORDER — NICOTINE 21 MG/24HR
1 PATCH, TRANSDERMAL 24 HOURS TRANSDERMAL DAILY
Status: DISCONTINUED | OUTPATIENT
Start: 2021-07-27 | End: 2021-07-28 | Stop reason: HOSPADM

## 2021-07-27 ASSESSMENT — ENCOUNTER SYMPTOMS
RHINORRHEA: 0
EYE DISCHARGE: 0
PHOTOPHOBIA: 0
CONSTIPATION: 0
EYE PAIN: 0
COUGH: 0
SHORTNESS OF BREATH: 0
BACK PAIN: 0
DIARRHEA: 0
VOMITING: 0

## 2021-07-27 NOTE — ED PROVIDER NOTES
FACULTY SIGN-OUT  ADDENDUM       Patient: Blanca Navarro   MRN: [id number]  PCP:  [address] [place] [address]  I was available and discussed any additional care issues that arose and coordinated the management plans with the resident(s) caring for the patient during my duty period. Any areas of disagreement with resident's documentation of care or procedures are noted on the chart. I was personally present for the key portions of any/all procedures during my duty period. I have documented in the chart those procedures where I was not present during the key portions. The patient's initial evaluation and plan have been discussed with the prior provider who initially evaluated the patient. Pertinent Comments: The patient is a 27 y.o. female taken in signout with history of heroin use and denies that today no required Narcan use. No suicidal or homicidal ideation but wants help with drug use.     We are awaiting outpatient treatment referral    ED COURSE      The patient was given the following medications:  Orders Placed This Encounter   Medications    nicotine (NICODERM CQ) 14 MG/24HR 1 patch       RECENT VITALS:   BP: (!) 95/55  Pulse: 60  Resp: 16  Temp: 96.8 °F (36 °C) SpO2: 97 %    (Please note that portions of this note were completed with a voice recognition program.  Efforts were made to edit the dictations but occasionally words are mis-transcribed.)    Jenna Ca MD [facility]  Attending Emergency Medicine Physician       Jenna Ca MD  07/27/21 3585

## 2021-07-27 NOTE — ED NOTES
[] Stephanie    [] [address]    [x]  [facility]      Y  N     [x] [] In the past two weeks have you had thoughts of hurting yourself in any way? [x] [] In the past two weeks have you had thoughts that you would be better off dead? [] [x] Have you made a suicide attempt in the past two months? [x] [] Do you have a plan for hurting yourself or suicide? [] [] Presence of hallucinations/voices related to hurting himself or herself or someone else. SUICIDE/SECURITY WATCH PRECAUTION CHECKLIST     Orders    [x]  Suicide/Security Watch Precautions initiated as checked below:   7/27/21 3:18 PM EDT BH31/BH31A    [x] Notified physician:  Alem Terrazas MD  7/27/21 3:18 PM EDT    [x] Orders obtained as appropriate:     [x] 1:1 Observer     [] Psych Consult     [] Psych Consult    Name:  Date:  Time:    [x] 1:1 Observer, Notified by:  Michelle Resendiz RN    Contact Nurse Supervisor    [x] Remove all personal clothes from room and place in snap/paper gown/pants. Slipper only    [x] Remove all personal belongings from room and secured away from patient. Documentation    [x] Initiate Suicide/Security Watch Precaution Flow Sheet    [x] Initiate individualized Care Plan/Problem    [x] Document why precautions initiated on flow sheet (Initiate Nursing Care Plan/Problem)    [x] 1:1 Observer in place; instructions provided. Suicide precautions require observer be within arms length. [x] Nurse-Observer Communication Hand-off initiated by RN, reviewed with Observer. Subsequently used as Hand Off between Observers. [x] Initiate every 15 minute observations per observer as delegated by the RN.     [x] Initiate RN assessment and documentation    Environmental Scan  Search Criteria and Process: OPTIONAL, see Search Policy    [x] Reason for search: SI    [] Nursing in presence of second person to search patient    [] Patient notified of reason for body assessment and belongings search:     Persons present during search:   Results of search and disposition:       Searchers Name: security    These items or items similar should be removed from the room:   [x] Chairs   [x] Telephone   [x] Trash cans and liners   [x] Plastic utensils (order Patient Safety tray)   [x] Empty or remove Sharps containers   [x] All personal clothing/belongings removed   [x] All unnecessary lead wires, electrical cords, draw cords, etc.   [x] Flowers and plants   [x] Double check for lighters, matches, razors, any glass items etc that can be used as weapons. Person completing Checklist: Ronny Restrepo RN       GENERAL INFORMATION     Y  N     [x] [] Has the patient been informed that they are on a watch and what that means? [x] [] Can the patient get out of Bed without nursing assistance? [x] [] Can the patient use the restroom without nursing assistance? [x] [] Can the patient walk the halls to [place] their legs? \"   [] [x] Does the patient have metal utensils? [x] [] Have the patient's belongings been placed out of control of the patient? [x] [] Have the patient and his/her belongings been checked for contraband? [x] [] Is the patient under any visitor restrictions? If Yes, explain: Suicidal ideation/ no visitors in [facility] AN [facility]FFIL[facility]ATE [facility]   [] [x] Is the patient under an alias? [place] [id number] Name:   Authorized visitors (no more than two are to be on the list)   Name/Relationship:   Name/Relationship:    Name of Staff member that you  Received this information from?:     General Description:    Johnny Escudero BH31/BH31A female 27 y.o. Admission weight: 108 lb (49 kg) Height: 5' 4\" (162.6 cm)  Race: [x]  [] Black  []   []   [] [place] [] Other  Facial Hair:  [] Yes  [x] No  If yes, please describe: Identifying Marks (i.e. Visible tattoos, scars, etc... ):     NURSING CARE PLAN    Nursing Diagnosis: Risk of Self Directed Harm  [] Actual  [x] Potential  Date Started: 7/27/21 Etiological Factors: (related to)  [x] Expressed or implied suicidal ideation/behavior  [] Depression  [] Suicide attempt      [] Low self-esteem  [] Hallucinations      [x] Feeling of Hopelessness  [x] Substance abuse or withdrawal    [] Dysfunctional family  [] Major traumatic event, eg., divorce, etc   [] Excessive stress/anxiety    7/27/21    Expected Outcomes    Patient will:   [x] Patient will remain safe for the duration of their stay   [x] Patient's environment will be safe, eg. Free of potential suicide weapons   [] Verbalize Recovery from suicidal episode and improvement in self-worth   [x] Discuss feeling that precipitated suicide attempt/thoughts/behavior   [] Will describe available resources for crisis prevention and management   [] Will verbalize positive coping skills     Nursing Intervention   [x] Assessment and Observations hourly   [x] Suicide Precautions implemented with patient, should be 1:1 observation   [x] Document observation [id number] and RN assessment hourly   [] Consult physician for:    [] Psychiatric consult    [] Pharmacological therapy    [] Other:    [x] Patient search completed by security   [x] Initiated appropriate safety protocols by removing from the patient's environment anything that could be used to inflict self injury, eg. Order safe tray, snap gown, etc   [x] Maintain open, warm, caring, non-judgmental attitude/manner towards patient   [] Discuss advantages and disadvantages of existing coping methods/skills   [x] Assist and educate patient with identifying present strengths and coping skills   [x] Keep patient informed regarding plan of care and provide clear concise explanations. Provide the patient/family education information as well as telephone numbers and other information about crisis centers, hot lines, and counselors.     Discharge Planning:   [] Referral  [] Groups [] Health agencies  [] Other:          Westley Carnes RN  07/27/21 6971

## 2021-07-27 NOTE — ED NOTES
Writer met with patient at bedside to complete the SBIRT assessment. The results can be located in the ED navigator under screening questions. Patient scored as following:    AUDIT (Alcohol Screening Questionnaire): Patient was screened using the initial screening questions and her score was 0, which did not indicate a full assessment be complete. DAST (Drug Screening Questionnaire):10  PHQ-9 (Depression Screening Questionnaire): Patient was screened using the initial screening questions and her score was 0, which did not indicate a full assessment be complete. The patient was provided with the following resources/interventions:  Patient is declining need for resources or linkage to treatment at this time. [employer] officers also met with patient in an attempt to link her with treatment, patient declined.      Start Time 1415  End Time 1429  Diagnosis Z71.51     LIZZ Fabian  07/27/21 1735

## 2021-07-27 NOTE — ED NOTES
Writer present when writer arrived to the [facility]. Patient initially not compliant with changing into paper scrubs, upset regarding her items being removed and locked up by [email or web address]. Patient does became physically upset, hitting arms on the wall and counter in the bathroom of [facility], screaming and taking off her clothing. Patient does respond to redirection and eventually able to change into paper scrubs. Patient reports that she uses heroin and crack daily. She reports recent overdose with admission with her heroin was laced with something. Patient states last use was today. She reports that she uses upwards of $200 in heroin day by inhalation. In addition, patient reports that she uses $100 of crack daily. Patient denies thoughts of harm to self and others. She does report that she came voluntarily for help with her substance use but since has been refusing resources or linkage to inpatient treatment. Writer did contact [employer] as they follow with patient. CHERYL officer will be out to meet with patient to attempt linkage to treatment. [employer] arrived, patient was not willing to speak with officers. She reported to them that she is safe and does not want assistance with linkage to treatment. Officer states that if patient decides she wants treatment, [facility] has a female bed available in their detox unit. Writer will attempt to meet with patient in a bit to see if she has changed her mind. CHERYL will also explore getting patient into drug court as they feel she is at high risk and possibly could benefit from court ordered treatment.       LIZZ Ahn  07/27/21 6427

## 2021-07-27 NOTE — ED PROVIDER NOTES
[address] ED  Emergency Department  Emergency Medicine Resident Sign-out     Care of Zuly Bach was assumed from Dr. Kimberlee Cronin and is being seen for Other (Pt picked up by [facility] found wanding in park, they were able to coax pt in for \"help\" reports SI with plan of overdosing on drugs)  . The patient's initial evaluation and plan have been discussed with the prior provider who initially evaluated the patient. EMERGENCY DEPARTMENT COURSE / MEDICAL DECISION MAKING:       MEDICATIONS GIVEN:  Orders Placed This Encounter   Medications    nicotine (NICODERM CQ) 14 MG/24HR 1 patch       LABS / RADIOLOGY:     Labs Reviewed   C.TRACHOMATIS N.GONORRHOEAE DNA, URINE       XR FOOT LEFT (MIN 3 VIEWS)    Result Date: 7/25/2021  EXAMINATION: THREE XRAY VIEWS OF THE LEFT FOOT 7/25/2021 3:20 pm COMPARISON: Danielle 3, 2021 HISTORY: ORDERING SYSTEM PROVIDED HISTORY: previous concern for osteomyelitis with current deformity TECHNOLOGIST PROVIDED HISTORY: previous concern for osteomyelitis with current deformity FINDINGS: Periosteal reaction 1st metatarsal unchanged. Cortical margins intact. Alignment anatomic. Soft tissues unremarkable. Periosteal reaction 1st metatarsal unchanged. Chronic osteomyelitis not excluded. CT HEAD WO CONTRAST    Result Date: 7/25/2021  EXAMINATION: CT OF THE HEAD WITHOUT CONTRAST  7/25/2021 9:58 am TECHNIQUE: CT of the head was performed without the administration of intravenous contrast. Dose modulation, iterative reconstruction, and/or weight based adjustment of the mA/kV was utilized to reduce the radiation dose to as low as reasonably achievable.  COMPARISON: 10/07/2020 HISTORY: ORDERING SYSTEM PROVIDED HISTORY: AMS TECHNOLOGIST PROVIDED HISTORY: AMS Decision Support Exception - unselect if not a suspected or confirmed emergency medical condition->Emergency Medical Condition (MA) Is the patient pregnant?->No Reason for Exam: overdose Acuity: Unknown Type of Exam: Exam: portable, supine FINDINGS: The endotracheal tube terminates 2.7 cm above the jaleel. The enteric tube courses off the field of view in the upper abdomen. The cardiac and mediastinal contours appear unchanged. No acute airspace disease, pneumothorax or effusion. 1. Endotracheal tube terminates in appropriate position. 2. The enteric tube courses off the field of view in the upper abdomen. 3.  No focal airspace disease identified. RECENT VITALS:     Temp: 96.8 °F (36 °C),  Pulse: 60, Resp: 16, BP: (!) 95/55, SpO2: 97 %      This patient is a 27 y.o. Female with heroin and crack intoxication. Patient initially states that she was suicidal when questioned by . However she denied it after patient was seen by resident. Patient denied pelvic exam.  However was able to send out gonorrhea and chlamydia test.  Plan is for patient to reach sobriety, and she will be reassessed to see if she can go for inpatient rehab. ED Course as of Jul 27 2124   Tue Jul 27, 2021 2104 Reassessed, she is requesting for another turkey sandwich. Food given to her. We will have social work evaluate patient. Plan is if she is suicidal, patient will be [facility] rehab facility, and transported with [facility]. (Please see  note for further detail)     [AN]      ED Course User Index  [AN] Grupo Martin MD       OUTSTANDING TASKS / RECOMMENDATIONS:    1. Sobriety (2-3 hours)  2. Patient to be reassessed for inpatient rehab     FINAL IMPRESSION:     1. Heroin overdose, undetermined intent, initial encounter ([facility] [address]        DISPOSITION:         DISPOSITION:  []  Discharge   []  Transfer -    []  Admission -     []  Against Medical Advice   []  Swati Trevino is pending social work reassessment   FOLLOW-UP: No follow-up provider specified.    DISCHARGE MEDICATIONS: New Prescriptions    No medications on file          Grupo Martin MD  Emergency Medicine Resident  Leydi Hugh Srinivasan MD  Resident  07/27/21 2043

## 2021-07-27 NOTE — ED NOTES
Pt tearing off clothes and screaming in restroom in [facility] AN AFFILIATE OF [facility], resident called.      Lloyd Bach RN  07/27/21 5943
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
## 2021-07-28 VITALS
BODY MASS INDEX: 18.44 KG/M2 | WEIGHT: 108 LBS | TEMPERATURE: 97 F | HEIGHT: 64 IN | HEART RATE: 88 BPM | RESPIRATION RATE: 14 BRPM | SYSTOLIC BLOOD PRESSURE: 100 MMHG | DIASTOLIC BLOOD PRESSURE: 62 MMHG | OXYGEN SATURATION: 99 %

## 2021-07-28 NOTE — ED NOTES
met with patient who reports she would be willing to go inpatient at [address] [place] [address] for AOD treatment. Patient has long history of heroin use. Patient was asking to go tomorrow, but SW informed her of the risk of relapsing and using and advised to go straight into treatment. Patient agreed. Court Saletyr from [address] [place] [address] Scar Hopson is arranging phone assessment with [facility]. Patient appeared annoyed and may change her mind, but ELZBIETA will start process.        JOY Christiansen  07/27/21 6186

## 2021-07-28 NOTE — ED PROVIDER NOTES
Handoff taken on the following patient from prior Attending Physician:    Pt Name: Alireza Rodriguez    PCP:  Alex Mcmillan    I was available and discussed any additional care issues that arose and coordinated the management plans with the resident(s) caring for the patient during my duty period. Any areas of disagreement with residents documentation of care or procedures are noted on the chart. I was personally present for the key portions of any/all procedures during my duty period. I have documented in the chart those procedures where I was not present during the key portions.     3 day hx of wandering around confused arrives again today after discharge similar confusion Emery the pt is willing to go to [facility] drug program needs a phone assessment at 0800 awaiting social work discussion      Krys Richard DO  07/29/21 4156

## 2021-07-28 NOTE — ED NOTES
was at bedside when patient completed assessment on phone. What appeared to be a few questions from the end of the phone interview patient said, she was done with phone assessment and answering all the questions. SW called [facility] back and asked if they would still accept patient since most of the assessment was already completed, but they reported no record of assessment. Patient was brought  phone a second time and she could not be awakened and appeared in a deep sleep.  called [facility] officer and discussed what happened. It was suggested that if possible patient wait till 8:00 A. M when the day shift is open and an out of state representative is not the one doing an assessment to possibly take former questions and merge with a few new ones so patient can be admitted. [facility] officer reported if patient can't stay and she is medically cleared then it is ok to discharge as at this point her treatment is  not court ordered.        Sduha Smith, JOY  07/27/21 8375

## 2021-07-28 NOTE — ED NOTES
Writer received call from [facility] ([phone]) who stated patient needs to complete intake assessment. Writer explained patient already did so & that notes indicate [place] then stated they have no record of assessment. [facility] stated patient was there before, they'll create another record for which patient needs to answer more questions. Writer contacted [facility], relayed status with Jarred Joseph stated he'll contact daytime [facility] for alternative options, will return writer's call.       LIZZ Kelley  07/28/21 2074

## 2021-07-28 NOTE — ED NOTES
Pt continues to rest on cot, NAD noted. Guard at bedside. Will cont to monitor.       Korin Kurtz RN  07/27/21 2044

## 2021-07-28 NOTE — ED PROVIDER NOTES
Faculty Sign-Out Attestation  Handoff taken on the following patient from prior Attending Physician: Sury Ceballos     I was available and discussed any additional care issues that arose and coordinated the management plans with the resident(s) caring for the patient during my duty period. Any areas of disagreement with residents documentation of care or procedures are noted on the chart. I was personally present for the key portions of any/all procedures during my duty period. I have documented in the chart those procedures where I was not present during the key portions.     Drug issue, not suicidal, has placement at drug tx tomorrow    Emilio March, DO  Attending Physician     Emilio March, DO 07/28/21 021    Pt will go to [place] drug tx if still choosing to do so, vss  No suicidal ideation,   Care turned over to day shift     Emilio March, DO 07/28/21 2709

## 2021-07-28 NOTE — ED PROVIDER NOTES
[facility] ED  Emergency Department  Emergency Medicine Resident Sign-out     Care of Adriana Pike was assumed from Dr. Zenon Elena and is being seen for Other (Pt picked up by [employer] found wanding in [place], they were able to coax pt in for \"help\" reports SI with plan of overdosing on drugs)  . The patient's initial evaluation and plan have been discussed with the prior provider who initially evaluated the patient. EMERGENCY DEPARTMENT COURSE / MEDICAL DECISION MAKING:       MEDICATIONS GIVEN:  Orders Placed This Encounter   Medications    nicotine (NICODERM CQ) 14 MG/24HR 1 patch       LABS / RADIOLOGY:     Labs Reviewed   C.TRACHOMATIS N.GONORRHOEAE DNA, URINE       XR FOOT LEFT (MIN 3 VIEWS)    Result Date: 7/25/2021  EXAMINATION: THREE XRAY VIEWS OF THE LEFT FOOT 7/25/2021 3:20 pm COMPARISON: Danielle 3, 2021 HISTORY: ORDERING SYSTEM PROVIDED HISTORY: previous concern for osteomyelitis with current deformity TECHNOLOGIST PROVIDED HISTORY: previous concern for osteomyelitis with current deformity FINDINGS: Periosteal reaction 1st metatarsal unchanged. Cortical margins intact. Alignment anatomic. Soft tissues unremarkable. Periosteal reaction 1st metatarsal unchanged. Chronic osteomyelitis not excluded. CT HEAD WO CONTRAST    Result Date: 7/25/2021  EXAMINATION: CT OF THE HEAD WITHOUT CONTRAST  7/25/2021 9:58 am TECHNIQUE: CT of the head was performed without the administration of intravenous contrast. Dose modulation, iterative reconstruction, and/or weight based adjustment of the mA/kV was utilized to reduce the radiation dose to as low as reasonably achievable.  COMPARISON: 10/07/2020 HISTORY: ORDERING SYSTEM PROVIDED HISTORY: AMS TECHNOLOGIST PROVIDED HISTORY: AMS Decision Support Exception - unselect if not a suspected or confirmed emergency medical condition->Emergency Medical Condition (MA) Is the patient pregnant?->No Reason for Exam: overdose Acuity: Unknown Type of Exam: Unknown FINDINGS: BRAIN/VENTRICLES: There is no acute intracranial hemorrhage, mass effect or midline shift. No abnormal extra-axial fluid collection. The brain is unchanged in appearance. ORBITS: The visualized portion of the orbits demonstrate no acute abnormality. SINUSES: Mucosal thickening in the ethmoid air cells and right sphenoid sinus. Nasal mucosal thickening also noted. SOFT TISSUES/SKULL:  No acute abnormality of the visualized skull or soft tissues. Unchanged appearance of the brain without acute CT abnormality identified. Paranasal sinus mucosal disease. CT CERVICAL SPINE WO CONTRAST    Result Date: 7/25/2021  EXAMINATION: CT OF THE CERVICAL SPINE WITHOUT CONTRAST 7/25/2021 9:58 am TECHNIQUE: CT of the cervical spine was performed without the administration of intravenous contrast. Multiplanar reformatted images are provided for review. Dose modulation, iterative reconstruction, and/or weight based adjustment of the mA/kV was utilized to reduce the radiation dose to as low as reasonably achievable. COMPARISON: 10/07/2020 HISTORY: ORDERING SYSTEM PROVIDED HISTORY: AMS, facial abrasion TECHNOLOGIST PROVIDED HISTORY: AMS, facial abrasion Decision Support Exception - unselect if not a suspected or confirmed emergency medical condition->Emergency Medical Condition (MA) Is the patient pregnant?->No Reason for Exam: facial abrasion; overdose Acuity: Unknown Type of Exam: Unknown FINDINGS: BONES/ALIGNMENT: There is no acute fracture or traumatic malalignment. DEGENERATIVE CHANGES: No significant degenerative changes. SOFT TISSUES: Endotracheal and enteric tubes are present, coursing off the field of view. The lung apices are clear. No acute abnormality of the cervical spine.      XR CHEST PORTABLE    Result Date: 7/25/2021  EXAMINATION: ONE XRAY VIEW OF THE CHEST 7/25/2021 9:31 am COMPARISON: 10/07/2020 HISTORY: ORDERING SYSTEM PROVIDED HISTORY: AMS TECHNOLOGIST PROVIDED HISTORY: AMS Reason for Exam: portable, supine FINDINGS: The endotracheal tube terminates 2.7 cm above the jaleel. The enteric tube courses off the field of view in the upper abdomen. The cardiac and mediastinal contours appear unchanged. No acute airspace disease, pneumothorax or effusion. 1. Endotracheal tube terminates in appropriate position. 2. The enteric tube courses off the field of view in the upper abdomen. 3.  No focal airspace disease identified. RECENT VITALS:     Temp: 96.8 °F (36 °C),  Pulse: 60, Resp: 16, BP: (!) 95/55, SpO2: 97 %      This patient is a 27 y.o. Female with heroin and crack use. Patient was [place] by [employer] [place] park. Initially complained of suicidal ideation, however when social work evaluated her, she states that she was not suicidal.  Social work to reassess patient once sober. If she is still suicidal, she will be going to [facility] rehab. If denies suicidal ideation, she will be discharged. Patient still endorsing suicidality. Accepted to [place] if she accepts upon awakening. ED Course as of Jul 27 2121   Tue Jul 27, 2021 2104 Reassessed, she is requesting for another turkey sandwich. Food given to her. We will have social work evaluate patient. Plan is if she is suicidal, patient will be transferred to [facility] rehab facility, and transported with [facility]. (Please see  note for further detail)     [AN]      ED Course User Index  [AN] Josefa Luna MD       OUTSTANDING TASKS / RECOMMENDATIONS:    1. Reassess  2. Dispo pending reassessment     FINAL IMPRESSION:     1. Heroin overdose, undetermined intent, initial encounter ([facility] [address]        DISPOSITION:         DISPOSITION:  []  Discharge   [x]  Transfer - [place] rehab  []  Admission -     []  Against Medical Advice   []  Eloped   FOLLOW-UP: No follow-up provider specified.    DISCHARGE MEDICATIONS: New Prescriptions    No medications on file          Alyson Guerra MD  Emergency Medicine Resident  [facility] [address] Melvi Baltazar MD  Resident  07/28/21 7015

## 2021-07-28 NOTE — ED NOTES
Pt continues to rest on cot, guard at bedside. No change in status, will cont to monitor.      Korin Kurtz RN  07/28/21 6733

## 2021-07-29 LAB
C. TRACHOMATIS DNA ,URINE: ABNORMAL
N. GONORRHOEAE DNA, URINE: ABNORMAL
SPECIMEN DESCRIPTION: ABNORMAL

## 2021-08-02 ENCOUNTER — TELEPHONE (OUTPATIENT)
Dept: PHARMACY | Age: 26
End: 2021-08-02

## 2021-08-02 NOTE — TELEPHONE ENCOUNTER
CLINICAL PHARMACY NOTE:  Follow-up for STD Test    At the time of Daralene Bosworth visit to [facility] on 7/27/2021 STD testing was performed. DNA testing was resulted as indeterminate for Chlamydia and Gonorrhea and Syphillis. No documented infection. No STD treatment needed at this time. [address] [place] [address]  Ph., CACP, Clinical Pharmacist  Anticoagulation Services, [address] [place] [address] Coumadin [facility]  8/2/2021  9:45 AM      For Pharmacy Admin Tracking Only     Intervention Detail:    Total # of Interventions Recommended: 0   Total # of Interventions Accepted: 0   Time Spent (min): 5

## 2022-01-03 ENCOUNTER — HOSPITAL ENCOUNTER (OUTPATIENT)
Age: 27
Setting detail: SPECIMEN
Discharge: HOME | End: 2022-01-03

## 2022-01-03 LAB
ALBUMIN SERPL-MCNC: 4.8 G/DL (ref 3.5–5.2)
ALBUMIN/GLOBULIN RATIO: 1.8 (ref 1–2.5)
ALP BLD-CCNC: 76 U/L (ref 35–104)
ALT SERPL-CCNC: 11 U/L (ref 5–33)
ANION GAP SERPL CALCULATED.3IONS-SCNC: 12 MMOL/L (ref 9–17)
AST SERPL-CCNC: 18 U/L
BILIRUB SERPL-MCNC: 0.26 MG/DL (ref 0.3–1.2)
BUN BLDV-MCNC: 14 MG/DL (ref 6–20)
BUN/CREAT BLD: ABNORMAL (ref 9–20)
CALCIUM SERPL-MCNC: 9.7 MG/DL (ref 8.6–10.4)
CHLORIDE BLD-SCNC: 103 MMOL/L (ref 98–107)
CHOLESTEROL/HDL RATIO: 4.7
CHOLESTEROL: 231 MG/DL
CO2: 23 MMOL/L (ref 20–31)
CREAT SERPL-MCNC: 0.78 MG/DL (ref 0.5–0.9)
GFR AFRICAN AMERICAN: >60 ML/MIN
GFR NON-AFRICAN AMERICAN: >60 ML/MIN
GFR SERPL CREATININE-BSD FRML MDRD: ABNORMAL ML/MIN/{1.73_M2}
GFR SERPL CREATININE-BSD FRML MDRD: ABNORMAL ML/MIN/{1.73_M2}
GLUCOSE BLD-MCNC: 82 MG/DL (ref 70–99)
HCT VFR BLD CALC: 43.8 % (ref 36.3–47.1)
HDLC SERPL-MCNC: 49 MG/DL
HEMOGLOBIN: 13.7 G/DL (ref 11.9–15.1)
HIV AG/AB: NONREACTIVE
LDL CHOLESTEROL: 159 MG/DL (ref 0–130)
MCH RBC QN AUTO: 29.5 PG (ref 25.2–33.5)
MCHC RBC AUTO-ENTMCNC: 31.3 G/DL (ref 28.4–34.8)
MCV RBC AUTO: 94.2 FL (ref 82.6–102.9)
NRBC AUTOMATED: 0 PER 100 WBC
PDW BLD-RTO: 13 % (ref 11.8–14.4)
PLATELET # BLD: 313 K/UL (ref 138–453)
PMV BLD AUTO: 11.9 FL (ref 8.1–13.5)
POTASSIUM SERPL-SCNC: 4.7 MMOL/L (ref 3.7–5.3)
RBC # BLD: 4.65 M/UL (ref 3.95–5.11)
SODIUM BLD-SCNC: 138 MMOL/L (ref 135–144)
THYROXINE, FREE: 1 NG/DL (ref 0.93–1.7)
TOTAL PROTEIN: 7.4 G/DL (ref 6.4–8.3)
TRIGL SERPL-MCNC: 113 MG/DL
TSH SERPL DL<=0.05 MIU/L-ACNC: 1.05 MIU/L (ref 0.3–5)
VLDLC SERPL CALC-MCNC: ABNORMAL MG/DL (ref 1–30)
WBC # BLD: 7.1 K/UL (ref 3.5–11.3)

## 2022-01-04 LAB
C TRACH DNA GENITAL QL NAA+PROBE: NEGATIVE
DIRECT EXAM: NORMAL
HPV SAMPLE: ABNORMAL
HPV, GENOTYPE 16: NOT DETECTED
HPV, GENOTYPE 18: NOT DETECTED
HPV, HIGH RISK OTHER: DETECTED
HPV, INTERPRETATION: ABNORMAL
Lab: NORMAL
N. GONORRHOEAE DNA: NEGATIVE
SPECIMEN DESCRIPTION: ABNORMAL
SPECIMEN DESCRIPTION: NORMAL
SPECIMEN DESCRIPTION: NORMAL

## 2022-01-10 LAB — CYTOLOGY REPORT: NORMAL

## 2022-06-07 ENCOUNTER — HOSPITAL ENCOUNTER (OUTPATIENT)
Age: 27
Discharge: HOME | End: 2022-06-07
Payer: MEDICAID

## 2022-06-07 LAB
ABSOLUTE EOS #: 0.17 K/UL (ref 0–0.44)
ABSOLUTE IMMATURE GRANULOCYTE: <0.03 K/UL (ref 0–0.3)
ABSOLUTE LYMPH #: 1.51 K/UL (ref 1.1–3.7)
ABSOLUTE MONO #: 0.5 K/UL (ref 0.1–1.2)
ALBUMIN SERPL-MCNC: 4.1 G/DL (ref 3.5–5.2)
ALBUMIN/GLOBULIN RATIO: 1.7 (ref 1–2.5)
ALP BLD-CCNC: 63 U/L (ref 35–104)
ALT SERPL-CCNC: 25 U/L (ref 5–33)
ANION GAP SERPL CALCULATED.3IONS-SCNC: 11 MMOL/L (ref 9–17)
AST SERPL-CCNC: 22 U/L
BASOPHILS # BLD: 1 % (ref 0–2)
BASOPHILS ABSOLUTE: 0.07 K/UL (ref 0–0.2)
BILIRUB SERPL-MCNC: 0.17 MG/DL (ref 0.3–1.2)
BUN BLDV-MCNC: 9 MG/DL (ref 6–20)
CALCIUM SERPL-MCNC: 9.4 MG/DL (ref 8.6–10.4)
CHLORIDE BLD-SCNC: 105 MMOL/L (ref 98–107)
CO2: 23 MMOL/L (ref 20–31)
CREAT SERPL-MCNC: 0.95 MG/DL (ref 0.5–0.9)
EOSINOPHILS RELATIVE PERCENT: 3 % (ref 1–4)
GFR AFRICAN AMERICAN: >60 ML/MIN
GFR NON-AFRICAN AMERICAN: >60 ML/MIN
GFR SERPL CREATININE-BSD FRML MDRD: ABNORMAL ML/MIN/{1.73_M2}
GLUCOSE BLD-MCNC: 110 MG/DL (ref 70–99)
HCT VFR BLD CALC: 38 % (ref 36.3–47.1)
HEMOGLOBIN: 12.3 G/DL (ref 11.9–15.1)
IMMATURE GRANULOCYTES: 0 %
LAMOTRIGINE LEVEL: 3.2 UG/ML (ref 3–15)
LYMPHOCYTES # BLD: 29 % (ref 24–43)
MCH RBC QN AUTO: 30.4 PG (ref 25.2–33.5)
MCHC RBC AUTO-ENTMCNC: 32.4 G/DL (ref 28.4–34.8)
MCV RBC AUTO: 94.1 FL (ref 82.6–102.9)
MONOCYTES # BLD: 10 % (ref 3–12)
NRBC AUTOMATED: 0 PER 100 WBC
PDW BLD-RTO: 13.9 % (ref 11.8–14.4)
PLATELET # BLD: 268 K/UL (ref 138–453)
PMV BLD AUTO: 11.4 FL (ref 8.1–13.5)
POTASSIUM SERPL-SCNC: 4 MMOL/L (ref 3.7–5.3)
RBC # BLD: 4.04 M/UL (ref 3.95–5.11)
SEG NEUTROPHILS: 57 % (ref 36–65)
SEGMENTED NEUTROPHILS ABSOLUTE COUNT: 2.89 K/UL (ref 1.5–8.1)
SODIUM BLD-SCNC: 139 MMOL/L (ref 135–144)
TOTAL PROTEIN: 6.5 G/DL (ref 6.4–8.3)
WBC # BLD: 5.2 K/UL (ref 3.5–11.3)

## 2022-06-07 PROCEDURE — 36415 COLL VENOUS BLD VENIPUNCTURE: CPT

## 2022-06-07 PROCEDURE — 80175 DRUG SCREEN QUAN LAMOTRIGINE: CPT

## 2022-06-07 PROCEDURE — 85025 COMPLETE CBC W/AUTO DIFF WBC: CPT

## 2022-06-07 PROCEDURE — 80053 COMPREHEN METABOLIC PANEL: CPT

## 2022-10-17 ENCOUNTER — HOSPITAL ENCOUNTER (OUTPATIENT)
Age: 27
Setting detail: SPECIMEN
Discharge: HOME | End: 2022-10-17

## 2022-10-17 LAB
CANDIDA SPECIES, DNA PROBE: POSITIVE
GARDNERELLA VAGINALIS, DNA PROBE: POSITIVE
SOURCE: ABNORMAL
TRICHOMONAS VAGINALIS DNA: NEGATIVE

## 2022-10-18 LAB
CULTURE: ABNORMAL
CULTURE: ABNORMAL
SPECIMEN DESCRIPTION: ABNORMAL

## 2022-11-17 ENCOUNTER — HOSPITAL ENCOUNTER (EMERGENCY)
Age: 27
Discharge: HOME | End: 2022-11-17
Attending: EMERGENCY MEDICINE
Payer: MEDICAID

## 2022-11-17 VITALS
BODY MASS INDEX: 21.49 KG/M2 | HEIGHT: 65 IN | DIASTOLIC BLOOD PRESSURE: 74 MMHG | RESPIRATION RATE: 19 BRPM | SYSTOLIC BLOOD PRESSURE: 108 MMHG | HEART RATE: 79 BPM | TEMPERATURE: 98.2 F | OXYGEN SATURATION: 99 % | WEIGHT: 129 LBS

## 2022-11-17 DIAGNOSIS — I95.1 ORTHOSTATIC HYPOTENSION: Primary | ICD-10-CM

## 2022-11-17 DIAGNOSIS — E86.0 DEHYDRATION: ICD-10-CM

## 2022-11-17 LAB
ABSOLUTE EOS #: 0.07 K/UL (ref 0–0.4)
ABSOLUTE IMMATURE GRANULOCYTE: 0 K/UL (ref 0–0.3)
ABSOLUTE LYMPH #: 0.15 K/UL (ref 1–4.8)
ABSOLUTE MONO #: 0.37 K/UL (ref 0.2–0.8)
ACETAMINOPHEN LEVEL: <10 UG/ML (ref 10–30)
ALBUMIN SERPL-MCNC: 4.1 G/DL (ref 3.5–5.2)
ALP BLD-CCNC: 56 U/L (ref 35–104)
ALT SERPL-CCNC: 11 U/L (ref 5–33)
AMPHETAMINE SCREEN URINE: POSITIVE
ANION GAP SERPL CALCULATED.3IONS-SCNC: 10 MMOL/L (ref 9–17)
AST SERPL-CCNC: 19 U/L
BACTERIA: ABNORMAL
BARBITURATE SCREEN URINE: NEGATIVE
BASOPHILS # BLD: 0 %
BASOPHILS ABSOLUTE: 0 K/UL (ref 0–0.2)
BENZODIAZEPINE SCREEN, URINE: NEGATIVE
BILIRUB SERPL-MCNC: 0.4 MG/DL (ref 0.3–1.2)
BILIRUBIN URINE: NEGATIVE
BUN BLDV-MCNC: 16 MG/DL (ref 6–20)
BUN/CREAT BLD: 19 (ref 9–20)
CALCIUM SERPL-MCNC: 8.8 MG/DL (ref 8.6–10.4)
CANNABINOID SCREEN URINE: NEGATIVE
CASTS UA: ABNORMAL /LPF
CASTS UA: ABNORMAL /LPF
CHLORIDE BLD-SCNC: 106 MMOL/L (ref 98–107)
CO2: 23 MMOL/L (ref 20–31)
COCAINE METABOLITE, URINE: NEGATIVE
COLOR: YELLOW
CREAT SERPL-MCNC: 0.83 MG/DL (ref 0.5–0.9)
EOSINOPHILS RELATIVE PERCENT: 1 % (ref 1–4)
EPITHELIAL CELLS UA: ABNORMAL /HPF (ref 0–5)
ETHANOL PERCENT: <0.01 %
ETHANOL: <10 MG/DL
FENTANYL URINE: NEGATIVE
GFR SERPL CREATININE-BSD FRML MDRD: >60 ML/MIN/1.73M2
GLUCOSE BLD-MCNC: 115 MG/DL (ref 70–99)
GLUCOSE URINE: NEGATIVE
HCG QUALITATIVE: NEGATIVE
HCT VFR BLD CALC: 39.2 % (ref 36.3–47.1)
HEMOGLOBIN: 12.1 G/DL (ref 11.9–15.1)
IMMATURE GRANULOCYTES: 0 %
KETONES, URINE: NEGATIVE
LEUKOCYTE ESTERASE, URINE: NEGATIVE
LYMPHOCYTES # BLD: 2 % (ref 24–44)
MCH RBC QN AUTO: 29.6 PG (ref 25.2–33.5)
MCHC RBC AUTO-ENTMCNC: 30.9 G/DL (ref 28.4–34.8)
MCV RBC AUTO: 95.8 FL (ref 82.6–102.9)
METHADONE SCREEN, URINE: NEGATIVE
MONOCYTES # BLD: 5 % (ref 1–7)
MORPHOLOGY: ABNORMAL
MORPHOLOGY: ABNORMAL
MUCUS: ABNORMAL
NITRITE, URINE: NEGATIVE
NRBC AUTOMATED: 0 PER 100 WBC
OPIATES, URINE: NEGATIVE
OXYCODONE SCREEN URINE: NEGATIVE
PDW BLD-RTO: 13.2 % (ref 11.8–14.4)
PH UA: 6 (ref 5–8)
PHENCYCLIDINE, URINE: NEGATIVE
PLATELET # BLD: 217 K/UL (ref 138–453)
PMV BLD AUTO: 11.1 FL (ref 8.1–13.5)
POTASSIUM SERPL-SCNC: 4.9 MMOL/L (ref 3.7–5.3)
PROTEIN UA: NEGATIVE
RBC # BLD: 4.09 M/UL (ref 3.95–5.11)
RBC UA: ABNORMAL /HPF (ref 0–2)
SALICYLATE LEVEL: <1 MG/DL (ref 3–10)
SEG NEUTROPHILS: 92 % (ref 36–66)
SEGMENTED NEUTROPHILS ABSOLUTE COUNT: 6.71 K/UL (ref 1.8–7.7)
SODIUM BLD-SCNC: 139 MMOL/L (ref 135–144)
SPECIFIC GRAVITY UA: 1.02 (ref 1–1.03)
TEST INFORMATION: ABNORMAL
THYROXINE, FREE: 0.99 NG/DL (ref 0.93–1.7)
TOTAL PROTEIN: 6.3 G/DL (ref 6.4–8.3)
TOXIC TRICYCLIC SC,BLOOD: NEGATIVE
TSH SERPL DL<=0.05 MIU/L-ACNC: 0.24 UIU/ML (ref 0.3–5)
TURBIDITY: CLEAR
URINE HGB: NEGATIVE
UROBILINOGEN, URINE: NORMAL
WBC # BLD: 7.3 K/UL (ref 3.5–11.3)
WBC UA: ABNORMAL /HPF (ref 0–5)

## 2022-11-17 PROCEDURE — 6360000002 HC RX W HCPCS: Performed by: EMERGENCY MEDICINE

## 2022-11-17 PROCEDURE — 80307 DRUG TEST PRSMV CHEM ANLYZR: CPT

## 2022-11-17 PROCEDURE — 99284 EMERGENCY DEPT VISIT MOD MDM: CPT

## 2022-11-17 PROCEDURE — 84439 ASSAY OF FREE THYROXINE: CPT

## 2022-11-17 PROCEDURE — 84703 CHORIONIC GONADOTROPIN ASSAY: CPT

## 2022-11-17 PROCEDURE — 85025 COMPLETE CBC W/AUTO DIFF WBC: CPT

## 2022-11-17 PROCEDURE — 80143 DRUG ASSAY ACETAMINOPHEN: CPT

## 2022-11-17 PROCEDURE — 80053 COMPREHEN METABOLIC PANEL: CPT

## 2022-11-17 PROCEDURE — G0480 DRUG TEST DEF 1-7 CLASSES: HCPCS

## 2022-11-17 PROCEDURE — 2580000003 HC RX 258: Performed by: EMERGENCY MEDICINE

## 2022-11-17 PROCEDURE — 81001 URINALYSIS AUTO W/SCOPE: CPT

## 2022-11-17 PROCEDURE — 96361 HYDRATE IV INFUSION ADD-ON: CPT

## 2022-11-17 PROCEDURE — 96374 THER/PROPH/DIAG INJ IV PUSH: CPT

## 2022-11-17 PROCEDURE — 80179 DRUG ASSAY SALICYLATE: CPT

## 2022-11-17 PROCEDURE — 84443 ASSAY THYROID STIM HORMONE: CPT

## 2022-11-17 PROCEDURE — 96372 THER/PROPH/DIAG INJ SC/IM: CPT

## 2022-11-17 RX ORDER — 0.9 % SODIUM CHLORIDE 0.9 %
2000 INTRAVENOUS SOLUTION INTRAVENOUS ONCE
Status: COMPLETED | OUTPATIENT
Start: 2022-11-17 | End: 2022-11-17

## 2022-11-17 RX ORDER — DICYCLOMINE HYDROCHLORIDE 10 MG/ML
20 INJECTION INTRAMUSCULAR ONCE
Status: COMPLETED | OUTPATIENT
Start: 2022-11-17 | End: 2022-11-17

## 2022-11-17 RX ORDER — ONDANSETRON 2 MG/ML
4 INJECTION INTRAMUSCULAR; INTRAVENOUS ONCE
Status: COMPLETED | OUTPATIENT
Start: 2022-11-17 | End: 2022-11-17

## 2022-11-17 RX ORDER — ONDANSETRON 4 MG/1
4 TABLET, ORALLY DISINTEGRATING ORAL EVERY 8 HOURS PRN
Qty: 10 TABLET | Refills: 0 | Status: SHIPPED | OUTPATIENT
Start: 2022-11-17

## 2022-11-17 RX ADMIN — DICYCLOMINE HYDROCHLORIDE 20 MG: 10 INJECTION, SOLUTION INTRAMUSCULAR at 14:52

## 2022-11-17 RX ADMIN — ONDANSETRON 4 MG: 2 INJECTION INTRAMUSCULAR; INTRAVENOUS at 14:52

## 2022-11-17 RX ADMIN — SODIUM CHLORIDE 2000 ML: 9 INJECTION, SOLUTION INTRAVENOUS at 14:50

## 2022-11-17 ASSESSMENT — ENCOUNTER SYMPTOMS
NAUSEA: 1
BACK PAIN: 0
DIARRHEA: 1
VOMITING: 0
ABDOMINAL PAIN: 1
SHORTNESS OF BREATH: 0

## 2022-11-17 ASSESSMENT — PAIN - FUNCTIONAL ASSESSMENT: PAIN_FUNCTIONAL_ASSESSMENT: 0-10

## 2022-11-17 ASSESSMENT — PAIN SCALES - GENERAL: PAINLEVEL_OUTOF10: 0

## 2022-11-17 NOTE — ED PROVIDER NOTES
[facility]  Emergency Department Encounter     Pt Name: Alexandria Clements  MRN: [id number]  [place] 1995  Date of evaluation: 11/17/22  PCP:  Isabelle Hua, [facility]       Chief Complaint   Patient presents with    Hypotension       HISTORY OF PRESENT ILLNESS  (Location/Symptom, Timing/Onset, Context/Setting, Quality, Duration, Modifying Factors, Severity.)    Alexandria Clements is a 24 y.o. female who presents with lightheadedness and dizziness, diaphoresis, nausea but no vomiting, persistent multiple watery stools between yesterday and today. Patient states that she has had issues with IBS and constipation has been taking Linzess and then between yesterday and today she has had multiple episodes of watery nonbloody bowel movements and has been unable to get off the toilet frequently. She was going to do an activity today with her group when she was in the bathroom became very lightheaded and dizzy, nausea was present but she is unable to vomit and then got very sweaty. She states that she was kind of confused and accidentally shot her leg in the door and was trying to hand one of her friends her purse but was unaware really what she was doing. She states that she feels like her mouth is really dry. PAST MEDICAL / SURGICAL / SOCIAL / FAMILY HISTORY    has a past medical history of ADHD (attention deficit hyperactivity disorder), ADHD (attention deficit hyperactivity disorder), Anxiety, ANDRE (generalized anxiety disorder), MDD (major depressive disorder), and Restless leg syndrome. has no past surgical history on file.     Social History     Socioeconomic History    Marital status: Single     Spouse name: Not on file    Number of children: Not on file    Years of education: Not on file    Highest education level: Not on file   Occupational History    Not on file   Tobacco Use    Smoking status: Every Day     Packs/day: 1.00     Types: Cigarettes, E-Cigarettes     Start date: 1/1/2015    Smokeless tobacco: Never   Vaping Use    Vaping Use: Never used   Substance and Sexual Activity    Alcohol use: Not Currently     Alcohol/week: 4.0 standard drinks     Types: 4 Cans of beer per week     Comment: social    Drug use: Yes     Frequency: 2.0 times per week     Types: Opiates , Marijuana (Weed)     Comment: uses heroin everyday she states     Sexual activity: Yes     Partners: Male   Other Topics Concern    Not on file   Social History Narrative    ** Merged History Encounter **          Social Determinants of Health     Financial Resource Strain: Not on file   Food Insecurity: Not on file   Transportation Needs: Not on file   Physical Activity: Not on file   Stress: Not on file   Social Connections: Not on file   Intimate Partner Violence: Not on file   Housing Stability: Not on file       No family history on file. Allergies:    Nuts [macadamia nut oil]    Home Medications:  Prior to Admission medications    Medication Sig Start Date End Date Taking? Authorizing Provider   ondansetron (ZOFRAN ODT) 4 MG disintegrating tablet Take 1 tablet by mouth every 8 hours as needed for Nausea or Vomiting 11/17/22  Yes Adal Joy DO   escitalopram (LEXAPRO) 10 MG tablet Take 10 mg by mouth daily  Patient not taking: Reported on 11/17/2022    Historical Provider, MD   traZODone (DESYREL) 100 MG tablet Take 100 mg by mouth nightly  Patient not taking: Reported on 11/17/2022    Historical Provider, MD   buprenorphine-naloxone (SUBOXONE) 8-2 MG FILM SL film Place 1 Film under the tongue 2 times daily.   Patient not taking: Reported on 11/17/2022    Historical Provider, MD   lithium (ESKALITH) 450 MG extended release tablet Take 450 mg by mouth 2 times daily  Patient not taking: Reported on 11/17/2022    Historical Provider, MD   busPIRone (BUSPAR) 10 MG tablet Take 10 mg by mouth 3 times daily  Patient not taking: Reported on 11/17/2022    Historical Provider, MD QUEtiapine (SEROQUEL) 100 MG tablet Take 100 mg by mouth 2 times daily  Patient not taking: Reported on 11/17/2022    Historical Provider, MD   gabapentin (NEURONTIN) 300 MG capsule Take 300 mg by mouth 3 times daily. Patient not taking: Reported on 11/17/2022    Historical Provider, MD   rOPINIRole (REQUIP) 5 MG tablet Take 5 mg by mouth 3 times daily  Patient not taking: Reported on 11/17/2022    Historical Provider, MD       REVIEW OF SYSTEMS    (2-9 systems for level 4, 10 or more for level 5)    Review of Systems   Constitutional:  Positive for diaphoresis. HENT:  Negative for tinnitus. Eyes:  Positive for visual disturbance. Respiratory:  Negative for shortness of breath. Cardiovascular:  Negative for chest pain and palpitations. Gastrointestinal:  Positive for abdominal pain, diarrhea and nausea. Negative for vomiting. Musculoskeletal:  Negative for back pain and neck pain. Neurological:  Positive for dizziness, weakness and light-headedness. Negative for syncope. Psychiatric/Behavioral:  Positive for confusion. All other systems reviewed and are negative. PHYSICAL EXAM   (up to 7 for level 4, 8 or more for level 5)    VITALS:   Vitals:    11/17/22 1358 11/17/22 1800   BP: 81/66 108/74   Pulse: 79    Resp: 19    Temp: 98.2 °F (36.8 °C)    TempSrc: Oral    SpO2: 99%    Weight: 129 lb (58.5 kg)    Height: 5' 5\" (1.651 m)        Physical Exam  Vitals and nursing note reviewed. Constitutional:       General: She is not in acute distress. Appearance: She is well-developed. She is not diaphoretic. HENT:      Head: Normocephalic and atraumatic. Mouth/Throat:      Mouth: Mucous membranes are dry. Eyes:      Conjunctiva/sclera: Conjunctivae normal.   Cardiovascular:      Rate and Rhythm: Normal rate and regular rhythm. Heart sounds: Normal heart sounds. No murmur heard. Pulmonary:      Effort: Pulmonary effort is normal. No respiratory distress.       Breath sounds: Normal breath sounds. No wheezing, rhonchi or rales. Abdominal:      General: There is no distension. Palpations: Abdomen is soft. Tenderness: There is no abdominal tenderness. There is no guarding or rebound. Musculoskeletal:         General: Normal range of motion. Cervical back: Normal range of motion. Right lower leg: No edema. Left lower leg: No edema. Skin:     General: Skin is warm and dry. Coloration: Skin is not pale. Neurological:      General: No focal deficit present. Mental Status: She is alert and oriented to person, place, and time. GCS: GCS eye subscore is 4. GCS verbal subscore is 5. GCS motor subscore is 6. Cranial Nerves: Cranial nerves 2-12 are intact. Sensory: Sensation is intact. Motor: Motor function is intact. Coordination: Coordination is intact.    Psychiatric:         Behavior: Behavior normal.       DIFFERENTIAL  DIAGNOSIS   PLAN (LABS / IMAGING / EKG):  Orders Placed This Encounter   Procedures    CBC with Auto Differential    Comprehensive Metabolic Panel w/ Reflex to MG    TSH w/reflex to FT4    HCG Qualitative, Serum    TOX SCR, BLD, ED    Urine Drug Screen    Urinalysis with Microscopic    T4, Free    Telemetry monitoring - 72 hour duration    Orthostatic blood pressure and pulse    EKG 12 Lead    Insert peripheral IV       MEDICATIONS ORDERED:  Orders Placed This Encounter   Medications    0.9 % sodium chloride bolus    ondansetron (ZOFRAN) injection 4 mg    dicyclomine (BENTYL) injection 20 mg    ondansetron (ZOFRAN ODT) 4 MG disintegrating tablet     Sig: Take 1 tablet by mouth every 8 hours as needed for Nausea or Vomiting     Dispense:  10 tablet     Refill:  0     DIAGNOSTIC RESULTS / EMERGENCYDEPARTMENT COURSE / MDM   LABS:  Labs Reviewed   CBC WITH AUTO DIFFERENTIAL - Abnormal; Notable for the following components:       Result Value    Seg Neutrophils 92 (*)     Lymphocytes 2 (*)     Absolute Lymph # 0.15 (*) All other components within normal limits   COMPREHENSIVE METABOLIC PANEL W/ REFLEX TO MG FOR LOW K - Abnormal; Notable for the following components:    Glucose 115 (*)     Total Protein 6.3 (*)     All other components within normal limits   TSH WITH REFLEX - Abnormal; Notable for the following components:    TSH 0.24 (*)     All other components within normal limits   TOX SCR, BLD, ED - Abnormal; Notable for the following components:    Acetaminophen Level <40 (*)     Salicylate Lvl <1 (*)     All other components within normal limits   URINE DRUG SCREEN - Abnormal; Notable for the following components:    Amphetamine Screen, Ur POSITIVE (*)     All other components within normal limits   URINALYSIS WITH MICROSCOPIC - Abnormal; Notable for the following components:    Bacteria, UA MODERATE (*)     Mucus, UA 2+ (*)     All other components within normal limits   HCG, SERUM, QUALITATIVE   T4, FREE       RADIOLOGY:  No results found.     EKG    EKG Interpretation    Interpreted by emergency department physician    Rhythm: normal sinus   Rate: normal  Axis: normal  Ectopy: none  Conduction: normal  ST Segments: no acute change  T Waves: no acute change  Q Waves: none    Clinical Impression: non-specific EKG    All EKG's are interpreted by the Emergency Department Physician whoeither signs or Co-signs this chart in the absence of a cardiologist.    EMERGENCY DEPARTMENT COURSE:  ED Course as of 11/17/22 1911   Thu Nov 17, 2022   1457 CBC with Auto Differential:    WBC 7.3   RBC 4.09   Hemoglobin Quant 12.1   Hematocrit 39.2   MCV 95.8   MCH 29.6   MCHC 30.9   RDW 13.2   Platelet Count 127   MPV 11.1   NRBC Automated 0.0   Seg Neutrophils PENDING   Lymphocytes PENDING   Monocytes PENDING   Eosinophils % PENDING   Basophils PENDING   Immature Granulocytes PENDING   Segs Absolute PENDING   Absolute Lymph # PENDING   Absolute Mono # PENDING   Absolute Eos # PENDING   Basophils Absolute PENDING   Absolute Immature Granulocyte PENDING [AO]   1528 Comprehensive Metabolic Panel w/ Reflex to MG(!):    Glucose, Random 115(!)   BUN,BUNPL 16   Creatinine 0.83   Est, Glom Filt Rate >60   Bun/Cre Ratio 19   CALCIUM, SERUM, 165531 8.8   Sodium 139   Potassium 4.9   Chloride 106   CO2 23   Anion Gap 10   Alk Phos 56   ALT 11   AST 19   Bilirubin 0.4   Total Protein 6.3(!)   Albumin 4.1 [AO]   1528 TOX SCR, BLD, ED(!):    Acetaminophen Level <10(!)   ETHANOL,ETHA <10   Ethanol percent <5.848   Salicylate Lvl <1(!)   Toxic Tricyclic Sc,Blood PENDING [AO]   1528 HCG Qualitative, Serum:    hCG Qual NEGATIVE [AO]   1528 TSH w/reflex to FT4(!):    TSH 0.24(!) [AO]   1633 Thyroxine, Free: 0.99 [AO]   1735 Urine Drug Screen(!):    Amphetamine Screen, Ur POSITIVE(!)   Barbiturate Screen, Ur NEGATIVE   Benzodiazepine Screen, Urine NEGATIVE   Cocaine Metabolite, Urine NEGATIVE   METHADONE SCREEN, URINE, 59252 NEGATIVE   Opiates, Urine NEGATIVE   Phencyclidine, Urine NEGATIVE   Cannabinoid Scrn, Ur NEGATIVE   Oxycodone Screen, Ur NEGATIVE   Fentanyl, Ur NEGATIVE   TEST INFORMATION Assay provides medical screening only.   The absence of expected drug(s) and/or metabolite(s) may indicate diluted or adulterated urine, limitations of testing or timing of collection. [AO]   1825 Urinalysis with Microscopic(!):    Color, UA Yellow   Turbidity UA Clear   Glucose, UA NEGATIVE   Bilirubin, Urine NEGATIVE   Ketones, Urine NEGATIVE   Specific Ewen, UA 1.025   Urine Hgb NEGATIVE   pH, UA 6.0   Protein, UA NEGATIVE   Urobilinogen, Urine Normal   Nitrite, Urine NEGATIVE   Leukocyte Esterase, Urine NEGATIVE   WBC, UA 2 TO 5   RBC, UA 0 TO 2   Casts UA 2 TO 5   Casts UA HYALINE   Epithelial Cells, UA 20 TO 50   Bacteria, UA MODERATE(!)   Mucus, UA 2+(!) [AO]      ED Course User Index  [AO] Jhony Mcgregor DO       MDM  Number of Diagnoses or Management Options  Dehydration  Orthostatic hypotension  Diagnosis management comments: 45-year-old female who presents emergency department with lightheadedness and dizziness, nausea, diarrhea, near syncopal episode. Patient has been taking Linzess and has had watery bowel movements for the past couple of days. She is hypotensive for EMS as well as initially in the emergency department. Nonfocal neurological exam.  2 L of fluid started. Labs obtained. Not pregnant. No obvious electrolyte disturbances. No ketones in the urine. Serum tox negative. Urine tox positive for amphetamines, however she is on multiple psychiatric medications and I do believe that she told me she is also on Adderall which could be causing a false positive. She had complete resolution of symptoms and was able to ambulate on her own accord with a steady gait. Likely believe that she was suffering from orthostatic hypotension due to persistent diarrhea. Amount and/or Complexity of Data Reviewed  Clinical lab tests: ordered and reviewed  Tests in the radiology section of CPT®: ordered and reviewed  Review and summarize past medical records: yes  Independent visualization of images, tracings, or specimens: yes    Patient Progress  Patient progress: stable         PROCEDURES:  Procedures     CONSULTS:  None    CRITICAL CARE:  NONE    FINAL IMPRESSION     1. Orthostatic hypotension    2. Dehydration        DISPOSITION / PLAN   DISPOSITION Decision To Discharge 11/17/2022 06:32:02 PM      Evaluation and treatment course in the ED, and plan of care upon discharge was discussed in length with the patient. Patient had no further questions prior to being discharged and was instructed to return to the ED for new or worsening symptoms. Any changes to existing medications or new prescriptions were reviewed with patient and they expressed understanding of how to correctly take their medications and the possible side effects.     PATIENT REFERRED TO:  Lacy Hernandezma  [place] [address]  [phone]          [facility] ED  [address] [facility]  [phone]    As needed, If symptoms worsen    DISCHARGE MEDICATIONS:  New Prescriptions    ONDANSETRON (ZOFRAN ODT) 4 MG DISINTEGRATING TABLET    Take 1 tablet by mouth every 8 hours as needed for Nausea or Vomiting       Amy Aguayo,   Emergency Medicine Physician    (Please note that portions of this note were completed with a voice recognition program.  Efforts were made to edit the dictations but occasionally words are mis-transcribed.)        Chacha Mendiola 1721,   11/17/22 2370

## 2022-11-17 NOTE — ED NOTES
Pt presents to ed c/o \"feeling like she's having an out of body experience\". She is currently in treatment for opiate addiction and was in group therapy when her symptoms began. She is hypotensive upon arrival.  Alert and oriented. She is afebrile.       Zander Andrew RN  11/17/22 6272

## 2022-12-14 ENCOUNTER — APPOINTMENT (OUTPATIENT)
Dept: ULTRASOUND IMAGING | Age: 27
End: 2022-12-14
Payer: MEDICAID

## 2022-12-14 ENCOUNTER — HOSPITAL ENCOUNTER (EMERGENCY)
Age: 27
Discharge: HOME | End: 2022-12-15
Attending: EMERGENCY MEDICINE
Payer: MEDICAID

## 2022-12-14 DIAGNOSIS — R56.9 SEIZURES (HCC): ICD-10-CM

## 2022-12-14 DIAGNOSIS — R00.0 TACHYCARDIA: Primary | ICD-10-CM

## 2022-12-14 DIAGNOSIS — O36.80X0 PREGNANCY OF UNKNOWN ANATOMIC LOCATION: ICD-10-CM

## 2022-12-14 LAB
ABSOLUTE EOS #: 0.04 K/UL (ref 0–0.44)
ABSOLUTE IMMATURE GRANULOCYTE: 0.05 K/UL (ref 0–0.3)
ABSOLUTE LYMPH #: 1.78 K/UL (ref 1.1–3.7)
ABSOLUTE MONO #: 0.91 K/UL (ref 0.1–1.2)
ALBUMIN SERPL-MCNC: 4.3 G/DL (ref 3.5–5.2)
ALBUMIN/GLOBULIN RATIO: 1.9 (ref 1–2.5)
ALP BLD-CCNC: 68 U/L (ref 35–104)
ALT SERPL-CCNC: 31 U/L (ref 5–33)
ANION GAP SERPL CALCULATED.3IONS-SCNC: 13 MMOL/L (ref 9–17)
AST SERPL-CCNC: 28 U/L
BASOPHILS # BLD: 1 % (ref 0–2)
BASOPHILS ABSOLUTE: 0.07 K/UL (ref 0–0.2)
BILIRUB SERPL-MCNC: 0.3 MG/DL (ref 0.3–1.2)
BUN BLDV-MCNC: 8 MG/DL (ref 6–20)
CALCIUM SERPL-MCNC: 9.8 MG/DL (ref 8.6–10.4)
CHLORIDE BLD-SCNC: 103 MMOL/L (ref 98–107)
CO2: 18 MMOL/L (ref 20–31)
CREAT SERPL-MCNC: 0.79 MG/DL (ref 0.5–0.9)
EOSINOPHILS RELATIVE PERCENT: 0 % (ref 1–4)
GFR SERPL CREATININE-BSD FRML MDRD: >60 ML/MIN/1.73M2
GLUCOSE BLD-MCNC: 106 MG/DL (ref 70–99)
GLUCOSE BLD-MCNC: 128 MG/DL
GLUCOSE BLD-MCNC: 128 MG/DL (ref 65–105)
HCG QUALITATIVE: POSITIVE
HCG QUANTITATIVE: 1535 MIU/ML
HCT VFR BLD CALC: 35.2 % (ref 36.3–47.1)
HEMOGLOBIN: 11.9 G/DL (ref 11.9–15.1)
IMMATURE GRANULOCYTES: 1 %
LYMPHOCYTES # BLD: 18 % (ref 24–43)
MAGNESIUM: 1.9 MG/DL (ref 1.6–2.6)
MCH RBC QN AUTO: 30.3 PG (ref 25.2–33.5)
MCHC RBC AUTO-ENTMCNC: 33.8 G/DL (ref 28.4–34.8)
MCV RBC AUTO: 89.6 FL (ref 82.6–102.9)
MONOCYTES # BLD: 9 % (ref 3–12)
NRBC AUTOMATED: 0 PER 100 WBC
PDW BLD-RTO: 14.1 % (ref 11.8–14.4)
PLATELET # BLD: 326 K/UL (ref 138–453)
PMV BLD AUTO: 11.3 FL (ref 8.1–13.5)
POTASSIUM SERPL-SCNC: 3.7 MMOL/L (ref 3.7–5.3)
RBC # BLD: 3.93 M/UL (ref 3.95–5.11)
SEG NEUTROPHILS: 71 % (ref 36–65)
SEGMENTED NEUTROPHILS ABSOLUTE COUNT: 7.18 K/UL (ref 1.5–8.1)
SODIUM BLD-SCNC: 134 MMOL/L (ref 135–144)
TOTAL PROTEIN: 6.6 G/DL (ref 6.4–8.3)
WBC # BLD: 10 K/UL (ref 3.5–11.3)

## 2022-12-14 PROCEDURE — 82947 ASSAY GLUCOSE BLOOD QUANT: CPT

## 2022-12-14 PROCEDURE — 85025 COMPLETE CBC W/AUTO DIFF WBC: CPT

## 2022-12-14 PROCEDURE — 84703 CHORIONIC GONADOTROPIN ASSAY: CPT

## 2022-12-14 PROCEDURE — 84702 CHORIONIC GONADOTROPIN TEST: CPT

## 2022-12-14 PROCEDURE — 76817 TRANSVAGINAL US OBSTETRIC: CPT

## 2022-12-14 PROCEDURE — 2580000003 HC RX 258: Performed by: STUDENT IN AN ORGANIZED HEALTH CARE EDUCATION/TRAINING PROGRAM

## 2022-12-14 PROCEDURE — 99284 EMERGENCY DEPT VISIT MOD MDM: CPT

## 2022-12-14 PROCEDURE — 83735 ASSAY OF MAGNESIUM: CPT

## 2022-12-14 PROCEDURE — 80053 COMPREHEN METABOLIC PANEL: CPT

## 2022-12-14 RX ORDER — 0.9 % SODIUM CHLORIDE 0.9 %
1000 INTRAVENOUS SOLUTION INTRAVENOUS ONCE
Status: COMPLETED | OUTPATIENT
Start: 2022-12-14 | End: 2022-12-14

## 2022-12-14 RX ADMIN — SODIUM CHLORIDE 1000 ML: 9 INJECTION, SOLUTION INTRAVENOUS at 20:04

## 2022-12-14 ASSESSMENT — PAIN - FUNCTIONAL ASSESSMENT: PAIN_FUNCTIONAL_ASSESSMENT: 0-10

## 2022-12-14 ASSESSMENT — PAIN SCALES - GENERAL: PAINLEVEL_OUTOF10: 6

## 2022-12-14 ASSESSMENT — PAIN DESCRIPTION - LOCATION: LOCATION: HEAD

## 2022-12-15 VITALS
TEMPERATURE: 98.3 F | OXYGEN SATURATION: 92 % | RESPIRATION RATE: 21 BRPM | DIASTOLIC BLOOD PRESSURE: 84 MMHG | SYSTOLIC BLOOD PRESSURE: 95 MMHG | BODY MASS INDEX: 20.66 KG/M2 | WEIGHT: 124 LBS | HEIGHT: 65 IN | HEART RATE: 96 BPM

## 2022-12-15 PROBLEM — R00.0 TACHYCARDIA: Status: ACTIVE | Noted: 2022-12-15

## 2022-12-15 ASSESSMENT — ENCOUNTER SYMPTOMS
NAUSEA: 0
SHORTNESS OF BREATH: 0
EYE REDNESS: 0
ABDOMINAL PAIN: 0
COUGH: 0
BACK PAIN: 0
PHOTOPHOBIA: 0
VOMITING: 0

## 2022-12-15 NOTE — ED NOTES
Pt to ED for seizure. Pt reports she was at the store and had a seizure, hit head on left side on a shelf. Witnesses report seizure lasted 3-4 mins. Pt has hx of seizures. Pt is from a sober living house and presents to ED with mentor at bedside. Patient alert and oriented x4, talking in complete sentences. Respirations even and unlabored.  Call light in reach, all needs met at this time        Lisset Shaffer RN  12/14/22 7697

## 2022-12-15 NOTE — ED PROVIDER NOTES
[facility]  Emergency Department  Faculty Attestation     I performed a history and physical examination of the patient and discussed management with the resident. I reviewed the residents note and agree with the documented findings and plan of care. Any areas of disagreement are noted on the chart. I was personally present for the key portions of any procedures. I have documented in the chart those procedures where I was not present during the key portions. I have reviewed the emergency nurses triage note. I agree with the chief complaint, past medical history, past surgical history, allergies, medications, social and family history as documented unless otherwise noted below. For Physician Assistant/ Nurse Practitioner cases/documentation I have personally evaluated this patient and have completed at least one if not all key elements of the E/M (history, physical exam, and MDM). Additional findings are as noted. Primary Care Physician:  Faye Giron, [address]    Screenings:  [unfilled]    CHIEF COMPLAINT       Chief Complaint   Patient presents with    Seizures     Pt stated she loc and hit head against shelf on left side        RECENT VITALS:   Temp: 98.3 °F (36.8 °C),  Heart Rate: (!) 116, Resp: 16, BP: 133/79    LABS:  Labs Reviewed - No data to display    Radiology  No orders to display       CRITICAL CARE: There was a high probability of clinically significant/life threatening deterioration in this patient's condition which required my urgent intervention. Total critical care time was 5 minutes. This excludes any time for separately reportable procedures. EKG:      Attending Physician Additional  Notes  Patient has a history of seizures, previously on Topamax and Lamictal, recommended by her doctor to taper off Lamictal due to skin lesions on the face.   This was mostly tapered down from 100-50 and then off however she did not get any on the weekend and she declined to take any since Friday. She is living in a facility for sober living and does not have access to drugs or alcohol during the week. She has been more perez recently and irritable. She is no longer taking lithium. Patient states that she gets a jolt before her seizures and had 1 yesterday without a seizure then had another 1 this morning. Today she had a prolonged tonic-clonic seizure followed by postictal state and headache compatible with migraine. Total duration was likely less than 5 minutes. There is no incontinence. No strokelike symptoms. She has been having diarrhea but normal oral intake. Her PCP started her on Adderall in addition to Vyvanse. On exam she is tremulous, tachycardic, afebrile, no respiratory distress. GCS is 15. Neck is supple no midline tenderness. Normal pupils and extract movements. Cranial nerves intact. Normal motor strength. Negative drift. Normal finger-nose movements. Pulses are strong. Normal gait. Impression is breakthrough seizure, tachycardia likely related to Adderall. Plan is IV access for laboratory studies and reassess. Consider discussion with her neurologist regarding restarting Lamictal or new medication. Recommend medication adjustment regarding Vyvanse and Adderall. Karina Tang.  Gem Byrd MD, [address] [facility]  Attending Emergency  Physician                Tierney Lugo MD  12/14/22 [id number]

## 2022-12-15 NOTE — ED PROVIDER NOTES
[facility] ED  Emergency Department Encounter  Emergency Medicine Resident     Pt Name: Olga Sparks  MRN: [id number]  Armscarolyngfjean pierre 1995  Date of evaluation: 12/14/22  PCP:  Kristopher Back, [address] [place] [address]       Chief Complaint   Patient presents with    Seizures     Pt stated she loc and hit head against shelf on left side        HISTORY OFPRESENT ILLNESS  (Location/Symptom, Timing/Onset, Context/Setting, Quality, Duration, Modifying Factors,Severity.)      Olga Sparks is a 24 y. o.yo female who presents with her taker after there was concern of seizure activity. Patient reports that she does have a seizure disorder and follows with Dr. Priti Schuster. Patient reports that recently her Lamictal was discontinued due to rashes on her face. And she is only on Topamax. She reports medication compliant. Patient states that today she did have prolonged tonic-clonic seizure followed by 10-minute of postictal period unsure of how long the seizure lasted. She states she did not have any incontinence, no tongue biting. Caretaker who is in the room is concerned of patient tremulousness and feeling anxious. Patient is on Adderall and Vyvanse. PAST MEDICAL / SURGICAL / SOCIAL / FAMILY HISTORY      has a past medical history of ADHD (attention deficit hyperactivity disorder), ADHD (attention deficit hyperactivity disorder), Anxiety, ANDRE (generalized anxiety disorder), MDD (major depressive disorder), and Restless leg syndrome. has no past surgical history on file.      Social History     Socioeconomic History    Marital status: Single     Spouse name: Not on file    Number of children: Not on file    Years of education: Not on file    Highest education level: Not on file   Occupational History    Not on file   Tobacco Use    Smoking status: Every Day     Packs/day: 1.00     Types: Cigarettes, E-Cigarettes     Start date: 1/1/2015    Smokeless tobacco: Never   Vaping Use    Vaping Use: Never used   Substance and Sexual Activity    Alcohol use: Not Currently     Alcohol/week: 4.0 standard drinks     Types: 4 Cans of beer per week     Comment: social    Drug use: Yes     Frequency: 2.0 times per week     Types: Opiates , Marijuana (Weed)     Comment: uses heroin everyday she states     Sexual activity: Yes     Partners: Male   Other Topics Concern    Not on file   Social History Narrative    ** Merged History Encounter **          Social Determinants of Health     Financial Resource Strain: Not on file   Food Insecurity: Not on file   Transportation Needs: Not on file   Physical Activity: Not on file   Stress: Not on file   Social Connections: Not on file   Intimate Partner Violence: Not on file   Housing Stability: Not on file       No family history on file. Allergies:  Nuts [macadamia nut oil]    Home Medications:  Prior to Admission medications    Medication Sig Start Date End Date Taking? Authorizing Provider   ondansetron (ZOFRAN ODT) 4 MG disintegrating tablet Take 1 tablet by mouth every 8 hours as needed for Nausea or Vomiting 11/17/22   Chacha Mendiola 1721, DO   escitalopram (LEXAPRO) 10 MG tablet Take 10 mg by mouth daily  Patient not taking: Reported on 11/17/2022    Historical Provider, MD   traZODone (DESYREL) 100 MG tablet Take 100 mg by mouth nightly  Patient not taking: Reported on 11/17/2022    Historical Provider, MD   buprenorphine-naloxone (SUBOXONE) 8-2 MG FILM SL film Place 1 Film under the tongue 2 times daily.   Patient not taking: Reported on 11/17/2022    Historical Provider, MD   lithium (ESKALITH) 450 MG extended release tablet Take 450 mg by mouth 2 times daily  Patient not taking: Reported on 11/17/2022    Historical Provider, MD   busPIRone (BUSPAR) 10 MG tablet Take 10 mg by mouth 3 times daily  Patient not taking: Reported on 11/17/2022    Historical Provider, MD   QUEtiapine (SEROQUEL) 100 MG tablet Take 100 mg by mouth 2 times daily  Patient not taking: Reported on 11/17/2022    Historical Provider, MD   gabapentin (NEURONTIN) 300 MG capsule Take 300 mg by mouth 3 times daily. Patient not taking: Reported on 11/17/2022    Historical Provider, MD   rOPINIRole (REQUIP) 5 MG tablet Take 5 mg by mouth 3 times daily  Patient not taking: Reported on 11/17/2022    Historical Provider, MD       REVIEW OFSYSTEMS    (2-9 systems for level 4, 10 or more for level 5)      Review of Systems   Constitutional:  Negative for fatigue and fever. HENT:  Negative for congestion and drooling. Eyes:  Negative for photophobia and redness. Respiratory:  Negative for cough and shortness of breath. Cardiovascular:  Negative for chest pain and leg swelling. Gastrointestinal:  Negative for abdominal pain, nausea and vomiting. Genitourinary:  Negative for enuresis and flank pain. Musculoskeletal:  Negative for back pain and gait problem. Skin:  Negative for rash and wound. Neurological:  Positive for seizures. Psychiatric/Behavioral:  Negative for behavioral problems and confusion. PHYSICAL EXAM   (up to 7 for level 4, 8 or more forlevel 5)      INITIAL VITALS:   ED Triage Vitals [12/14/22 1936]   BP Temp Temp Source Heart Rate Resp SpO2 Height Weight   133/79 98.3 °F (36.8 °C) Oral (!) 116 16 95 % 5' 5\" (1.651 m) 124 lb (56.2 kg)       Physical Exam  Constitutional:       Appearance: Normal appearance. HENT:      Head: Normocephalic and atraumatic. Nose: Nose normal.      Mouth/Throat:      Mouth: Mucous membranes are moist.      Comments: No tongue laceration  Eyes:      Pupils: Pupils are equal, round, and reactive to light. Cardiovascular:      Rate and Rhythm: Tachycardia present. Pulses: Normal pulses. Pulmonary:      Effort: Pulmonary effort is normal. No respiratory distress. Abdominal:      General: There is no distension. Palpations: Abdomen is soft. Tenderness: There is no abdominal tenderness.    Musculoskeletal:         General: No swelling. Normal range of motion. Cervical back: Normal range of motion. No rigidity. Skin:     General: Skin is warm. Capillary Refill: Capillary refill takes less than 2 seconds. Coloration: Skin is not jaundiced. Neurological:      General: No focal deficit present. Mental Status: She is alert. Cranial Nerves: No cranial nerve deficit. Psychiatric:         Mood and Affect: Mood normal.         Behavior: Behavior normal.       DIFFERENTIAL  DIAGNOSIS     PLAN (LABS / IMAGING / EKG):  Orders Placed This Encounter   Procedures    US OB TRANSVAGINAL    Comprehensive Metabolic Panel    Magnesium    CBC with Auto Differential    HCG Qualitative, Serum    HCG, Quantitative, Pregnancy    hCG, Quantitative, Pregnancy    Inpatient consult to Obstetrics / Gynecology    POCT Glucose    POC Glucose Fingerstick       MEDICATIONS ORDERED:  Orders Placed This Encounter   Medications    0.9 % sodium chloride bolus         Initial MDM/Plan: 24 y.o. female who presents with what was concerning for seizures and was found to be minimally postictal for only 10 minutes after seizures. Patient on presentation she is tachycardic, tremulous. Point-of-care sugar is within normal limits. We will at this moment check pregnancy test, electrolytes, CBC. Patient given IV fluids. Her dispo is pending on labs and imaging.     DIAGNOSTIC RESULTS / EMERGENCYDEPARTMENT COURSE / MDM     LABS:  Labs Reviewed   COMPREHENSIVE METABOLIC PANEL - Abnormal; Notable for the following components:       Result Value    Glucose 106 (*)     Sodium 134 (*)     CO2 18 (*)     All other components within normal limits   CBC WITH AUTO DIFFERENTIAL - Abnormal; Notable for the following components:    RBC 3.93 (*)     Hematocrit 35.2 (*)     Seg Neutrophils 71 (*)     Lymphocytes 18 (*)     Eosinophils % 0 (*)     Immature Granulocytes 1 (*)     All other components within normal limits   HCG, SERUM, QUALITATIVE - Abnormal; Notable for the following components:    hCG Qual POSITIVE (*)     All other components within normal limits   HCG, QUANTITATIVE, PREGNANCY - Abnormal; Notable for the following components:    hCG Quant 1,535 (*)     All other components within normal limits   POC GLUCOSE FINGERSTICK - Abnormal; Notable for the following components:    POC Glucose 128 (*)     All other components within normal limits   POCT GLUCOSE - Normal   MAGNESIUM         RADIOLOGY:  US OB TRANSVAGINAL    Result Date: 12/15/2022  EXAMINATION: FIRST TRIMESTER OBSTETRIC ULTRASOUND 12/14/2022 TECHNIQUE: Transvaginal first trimester obstetric pelvic duplex ultrasound was performed with real-time imaging, color flow Doppler imaging, and spectral analysis. COMPARISON: None HISTORY: ORDERING SYSTEM PROVIDED HISTORY: r/o ectopic pregnancy TECHNOLOGIST PROVIDED HISTORY: r/o ectopic pregnancy FINDINGS: Uterus: 7.9 x 3.6 x 2.5 cm Gestational Sac(s):  There is a single 4 mm fluid collection in the endometrium. .  There is minimal fluid in the endometrium. . Yolk Sac:  Absent Fetal Pole:  Absent Crown Rump Length:  Not measured Fetal Heart Rate:  N/a Right ovary: 4.1 x 1.6 x 1.5 cm. There is a 1.9 x 1.4 x 1.6 cm complex right ovarian lesion. There is normal Doppler flow in the right ovary. Left ovary: 1.9 x 1 x 1.2 cm and normal in appearance. Normal Doppler flow. Free fluid: None visualized     1. Small presumed gestational sac in the endometrium with minimal adjacent fluid, concerning for subchorionic hemorrhage. No yolk sac or fetal pole identified. 2. 1.9 cm complex right ovarian lesion, likely the corpus luteum. Attention at follow-up is recommended.         EKG      All EKG's are interpreted by the Emergency Department Physicianwho either signs or Co-signs this chart in the absence of a cardiologist.    EMERGENCY DEPARTMENT COURSE:  ED Course as of 12/15/22 0107   Wed Dec 14, 2022   2047 hCG Qual(!): POSITIVE [AN]   2113 Patient updated of positive pregnancy test [AN]   2133 Patient also complaining of abdominal pain, positive pregnancy test.  We will at this moment get hCG quant in addition to transvaginal ultrasound.   [AN]   2350 hCG Quant(!): 1,535 [AN]   Thu Dec 15, 2022   Tracy transvaginal shows Small presumed gestational sac in the endometrium with minimal adjacent  fluid, concerning for subchorionic hemorrhage. No yolk sac or fetal pole  identified. 2. 1.9 cm complex right ovarian lesion, likely the corpus luteum. Attention  at follow-up is recommended. Will plan for OB consult due to concern for ectopic given the hcg of 1,500 [AN]   0057 Your OB evaluated patient, they believe that there was a gestational sac however it is hard to rule out ectopic pregnancy at this moment patient will be discharged and given instruction to get repeat hCG in 48 hours. Patient is also have ultrasound repeat in 7 to 10 days. Patient was updated on plan. [AN]      ED Course User Index  [AN] Sylvester Peoples MD          PROCEDURES:  None    CONSULTS:  IP CONSULT TO OB GYN    CRITICAL CARE:      FINAL IMPRESSION      1. Tachycardia    2. Seizures ([address]    3.  Pregnancy of unknown anatomic location          DISPOSITION / PLAN     DISPOSITION Decision To Discharge 12/15/2022 12:57:12 AM      PATIENT REFERRED TO:  [facility] ED  [address] [facility]  [phone]    If symptoms worsen    JyothirBian dennis [address]  [phone]      As needed    [address] [place] [address] [address] [place] [address]  [phone]  Follow up  Dr. Jose Blanco saw you in ED    DISCHARGE MEDICATIONS:  Current Discharge Medication List          Sylvester Peoples MD  Emergency Medicine Resident    (Please note that portions of this note were completed with a voice recognition program.Efforts were made to edit the dictations but occasionally words are mis-transcribed.)        Sylvester Peoples MD  Resident  12/15/22 0726

## 2022-12-15 NOTE — PROGRESS NOTES
SPIRITUAL CARE DEPARTMENT - [facility]  PROGRESS NOTE    Shift date: 12/14/22  Shift day: Wednesday   Shift # 2    Room # 33/33   Name: Alexandria Clements                Scientology:    Place of Quaker:     Referral: Routine Visit    Admit Date & Time: 12/14/2022  7:37 PM    Assessment:  Alexandria Clements is a 24 y.o. female      Intervention:  Writer introduced self and title as . Patient appeared receptive to  visit and engaged in conversation about her health and its impact. Writer offered space for patient  to express feelings, needs, and concerns and provided a ministry presence. Patient discussed recent and longer health concerns.  validated patient feelings/emotions. Outcome:  Patient continues coping with her current hospital visit and is hopeful for good health in the future. Plan:  Chaplains will remain available to offer spiritual and emotional support as needed.       Electronically signed by Airam Pineda on 12/14/2022 at 10:28 PM.  [facility]  [phone]
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
## 2022-12-15 NOTE — DISCHARGE INSTRUCTIONS
Please make sure to get a repeat hCG quant in 48 hours to make sure that it is increasing. Please make sure to get repeat ultrasound in 7 to 10 days. If it anytime you do have worsening abdominal pain, you feel lightheaded, you have any vaginal bleeding, please return to the emergency room as soon as possible. These discontinue the Adderall and Vyvanse due to the fact that it does not make you jittery and feelings of anxious. Follow-up with your neurology due to seizure activity    US OB TRANSVAGINAL   Final Result   1. Small presumed gestational sac in the endometrium with minimal adjacent   fluid, concerning for subchorionic hemorrhage. No yolk sac or fetal pole   identified. 2. 1.9 cm complex right ovarian lesion, likely the corpus luteum. Attention   at follow-up is recommended.

## 2023-01-05 ENCOUNTER — HOSPITAL ENCOUNTER (OUTPATIENT)
Age: 28
Setting detail: SPECIMEN
Discharge: HOME | End: 2023-01-05

## 2023-01-06 LAB — HCG QUANTITATIVE: ABNORMAL MIU/ML

## 2023-02-22 ENCOUNTER — HOSPITAL ENCOUNTER (OUTPATIENT)
Age: 28
Setting detail: SPECIMEN
Discharge: HOME | End: 2023-02-22

## 2023-02-23 LAB
C TRACH DNA SPEC QL PROBE+SIG AMP: NEGATIVE
CANDIDA SPECIES, DNA PROBE: NEGATIVE
GARDNERELLA VAGINALIS, DNA PROBE: NEGATIVE
N GONORRHOEA DNA SPEC QL PROBE+SIG AMP: NEGATIVE
SOURCE: NORMAL
SPECIMEN DESCRIPTION: NORMAL
TRICHOMONAS VAGINALIS DNA: NEGATIVE

## 2023-03-07 ENCOUNTER — HOSPITAL ENCOUNTER (OUTPATIENT)
Age: 28
Setting detail: SPECIMEN
Discharge: HOME | End: 2023-03-07

## 2023-03-07 LAB
HCT VFR BLD AUTO: 42.5 % (ref 36.3–47.1)
HGB BLD-MCNC: 13.6 G/DL (ref 11.9–15.1)
MCH RBC QN AUTO: 29.6 PG (ref 25.2–33.5)
MCHC RBC AUTO-ENTMCNC: 32 G/DL (ref 28.4–34.8)
MCV RBC AUTO: 92.4 FL (ref 82.6–102.9)
NRBC AUTOMATED: 0 PER 100 WBC
PDW BLD-RTO: 13.4 % (ref 11.8–14.4)
PLATELET # BLD AUTO: 319 K/UL (ref 138–453)
PMV BLD AUTO: 12 FL (ref 8.1–13.5)
RBC # BLD: 4.6 M/UL (ref 3.95–5.11)
WBC # BLD AUTO: 5.7 K/UL (ref 3.5–11.3)

## 2023-03-08 LAB
ALBUMIN SERPL-MCNC: 4.4 G/DL (ref 3.5–5.2)
ALBUMIN/GLOBULIN RATIO: 1.6 (ref 1–2.5)
ALP SERPL-CCNC: 65 U/L (ref 35–104)
ALT SERPL-CCNC: 28 U/L (ref 5–33)
ANION GAP SERPL CALCULATED.3IONS-SCNC: 15 MMOL/L (ref 9–17)
AST SERPL-CCNC: 32 U/L
BILIRUB SERPL-MCNC: 0.2 MG/DL (ref 0.3–1.2)
BUN SERPL-MCNC: 11 MG/DL (ref 6–20)
CALCIUM SERPL-MCNC: 9.6 MG/DL (ref 8.6–10.4)
CHLORIDE SERPL-SCNC: 100 MMOL/L (ref 98–107)
CO2 SERPL-SCNC: 22 MMOL/L (ref 20–31)
CREAT SERPL-MCNC: 0.66 MG/DL (ref 0.5–0.9)
FOLATE SERPL-MCNC: 14.5 NG/ML
GFR SERPL CREATININE-BSD FRML MDRD: >60 ML/MIN/1.73M2
GLUCOSE SERPL-MCNC: 105 MG/DL (ref 70–99)
HCV AB SER QL: NONREACTIVE
HIV 1+2 AB+HIV1 P24 AG SERPL QL IA: NONREACTIVE
IRON SATURATION: 46 % (ref 20–55)
IRON SERPL-MCNC: 131 UG/DL (ref 37–145)
POTASSIUM SERPL-SCNC: 4.2 MMOL/L (ref 3.7–5.3)
PROT SERPL-MCNC: 7.1 G/DL (ref 6.4–8.3)
SODIUM SERPL-SCNC: 137 MMOL/L (ref 135–144)
TIBC SERPL-MCNC: 282 UG/DL (ref 250–450)
TSH SERPL-ACNC: 2.48 UIU/ML (ref 0.3–5)
UNSATURATED IRON BINDING CAPACITY: 151 UG/DL (ref 112–347)
VIT B12 SERPL-MCNC: 449 PG/ML (ref 232–1245)

## 2023-03-09 LAB
HERPES SIMPLEX VIRUS 2 IGG: 0.85
HERPES TYPE 1/2 IGM COMBINED: 1.24
HSV1 IGG SERPL QL IA: 4.28

## 2023-03-10 LAB — VDRL SER-TITR: NONREACTIVE {TITER}

## 2023-05-20 ENCOUNTER — HOSPITAL ENCOUNTER (EMERGENCY)
Age: 28
Discharge: HOME | End: 2023-05-20
Attending: EMERGENCY MEDICINE
Payer: MEDICAID

## 2023-05-20 VITALS
HEART RATE: 48 BPM | DIASTOLIC BLOOD PRESSURE: 51 MMHG | WEIGHT: 137 LBS | OXYGEN SATURATION: 100 % | SYSTOLIC BLOOD PRESSURE: 90 MMHG | BODY MASS INDEX: 22.82 KG/M2 | RESPIRATION RATE: 16 BRPM | TEMPERATURE: 97 F | HEIGHT: 65 IN

## 2023-05-20 DIAGNOSIS — T50.901A ACCIDENTAL DRUG INGESTION, INITIAL ENCOUNTER: Primary | ICD-10-CM

## 2023-05-20 PROCEDURE — 99282 EMERGENCY DEPT VISIT SF MDM: CPT

## 2023-05-20 ASSESSMENT — ENCOUNTER SYMPTOMS
SORE THROAT: 0
VOMITING: 0
BACK PAIN: 0
SHORTNESS OF BREATH: 0
COUGH: 0
ABDOMINAL PAIN: 0

## 2023-05-20 ASSESSMENT — PAIN - FUNCTIONAL ASSESSMENT: PAIN_FUNCTIONAL_ASSESSMENT: NONE - DENIES PAIN

## 2023-08-09 ENCOUNTER — APPOINTMENT (OUTPATIENT)
Dept: GENERAL RADIOLOGY | Age: 28
DRG: 315 | End: 2023-08-09
Payer: MEDICAID

## 2023-08-09 ENCOUNTER — APPOINTMENT (OUTPATIENT)
Dept: CT IMAGING | Age: 28
DRG: 315 | End: 2023-08-09
Payer: MEDICAID

## 2023-08-09 ENCOUNTER — HOSPITAL ENCOUNTER (INPATIENT)
Age: 28
LOS: 2 days | Discharge: HOME | DRG: 315 | End: 2023-08-11
Attending: EMERGENCY MEDICINE | Admitting: SURGERY
Payer: MEDICAID

## 2023-08-09 DIAGNOSIS — S52.201A CLOSED FRACTURE OF RIGHT RADIUS AND ULNA, INITIAL ENCOUNTER: Primary | ICD-10-CM

## 2023-08-09 DIAGNOSIS — S52.91XA CLOSED FRACTURE OF RIGHT RADIUS AND ULNA, INITIAL ENCOUNTER: Primary | ICD-10-CM
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
## 2023-08-09 PROBLEM — S52.501A RADIUS AND ULNA DISTAL FRACTURE, RIGHT, CLOSED, INITIAL ENCOUNTER: Status: ACTIVE | Noted: 2023-08-09

## 2023-08-09 PROBLEM — S52.601A RADIUS AND ULNA DISTAL FRACTURE, RIGHT, CLOSED, INITIAL ENCOUNTER: Status: ACTIVE | Noted: 2023-08-09

## 2023-08-09 LAB
25(OH)D3 SERPL-MCNC: 33.9 NG/ML
ABO + RH BLD: NORMAL
AMPHET UR QL SCN: POSITIVE
ANION GAP SERPL CALCULATED.3IONS-SCNC: 13 MMOL/L (ref 9–17)
ARM BAND NUMBER: NORMAL
BARBITURATES UR QL SCN: NEGATIVE
BENZODIAZ UR QL: NEGATIVE
BILIRUB UR QL STRIP: NEGATIVE
BLOOD BANK SAMPLE EXPIRATION: NORMAL
BLOOD BANK SPECIMEN: ABNORMAL
BLOOD GROUP ANTIBODIES SERPL: NEGATIVE
BODY TEMPERATURE: 37
BUN SERPL-MCNC: 11 MG/DL (ref 6–20)
CANNABINOIDS UR QL SCN: NEGATIVE
CASTS #/AREA URNS LPF: NORMAL /LPF (ref 0–8)
CHLORIDE SERPL-SCNC: 102 MMOL/L (ref 98–107)
CLARITY UR: CLEAR
CO2 SERPL-SCNC: 21 MMOL/L (ref 20–31)
COCAINE UR QL SCN: NEGATIVE
COHGB MFR BLD: 1.6 % (ref 0–5)
COLOR UR: YELLOW
CREAT SERPL-MCNC: 0.7 MG/DL (ref 0.5–0.9)
EPI CELLS #/AREA URNS HPF: NORMAL /HPF (ref 0–5)
ERYTHROCYTE [DISTWIDTH] IN BLOOD BY AUTOMATED COUNT: 12.8 % (ref 11.8–14.4)
ETHANOL PERCENT: <0.01 %
ETHANOLAMINE SERPL-MCNC: <10 MG/DL
FENTANYL UR QL: POSITIVE
FIO2 ON VENT: ABNORMAL %
GFR SERPL CREATININE-BSD FRML MDRD: >60 ML/MIN/1.73M2
GLUCOSE SERPL-MCNC: 107 MG/DL (ref 70–99)
GLUCOSE UR STRIP-MCNC: NEGATIVE MG/DL
HCG SERPL QL: NEGATIVE
HCG SERPL QL: NEGATIVE
HCO3 VENOUS: 23.9 MMOL/L (ref 24–30)
HCT VFR BLD AUTO: 44 % (ref 36.3–47.1)
HGB BLD-MCNC: 14.2 G/DL (ref 11.9–15.1)
HGB UR QL STRIP.AUTO: NEGATIVE
INR PPP: 1
KETONES UR STRIP-MCNC: NEGATIVE MG/DL
LEUKOCYTE ESTERASE UR QL STRIP: NEGATIVE
MCH RBC QN AUTO: 28.8 PG (ref 25.2–33.5)
MCHC RBC AUTO-ENTMCNC: 32.3 G/DL (ref 28.4–34.8)
MCV RBC AUTO: 89.2 FL (ref 82.6–102.9)
METHADONE UR QL: NEGATIVE
NEGATIVE BASE EXCESS, VEN: 2.5 MMOL/L (ref 0–2)
NITRITE UR QL STRIP: NEGATIVE
NRBC BLD-RTO: 0 PER 100 WBC
O2 SAT, VEN: 85.8 % (ref 60–85)
OPIATES UR QL SCN: NEGATIVE
OXYCODONE UR QL SCN: NEGATIVE
PARTIAL THROMBOPLASTIN TIME: 27.8 SEC (ref 23–36.5)
PCO2, VEN: 49.7 MM HG (ref 39–55)
PCP UR QL SCN: NEGATIVE
PH UR STRIP: 7 [PH] (ref 5–8)
PH VENOUS: 7.3 (ref 7.32–7.42)
PLATELET # BLD AUTO: 368 K/UL (ref 138–453)
PMV BLD AUTO: 11.4 FL (ref 8.1–13.5)
PO2, VEN: 52.9 MM HG (ref 30–50)
POTASSIUM SERPL-SCNC: 4.5 MMOL/L (ref 3.7–5.3)
PROT UR STRIP-MCNC: NEGATIVE MG/DL
PROTHROMBIN TIME: 12.5 SEC (ref 11.7–14.9)
RBC # BLD AUTO: 4.93 M/UL (ref 3.95–5.11)
RBC #/AREA URNS HPF: NORMAL /HPF (ref 0–4)
SODIUM SERPL-SCNC: 136 MMOL/L (ref 135–144)
SP GR UR STRIP: 1.02 (ref 1–1.03)
TEST INFORMATION: ABNORMAL
UROBILINOGEN UR STRIP-ACNC: NORMAL EU/DL (ref 0–1)
WBC #/AREA URNS HPF: NORMAL /HPF (ref 0–5)
WBC OTHER # BLD: 12.4 K/UL (ref 3.5–11.3)

## 2023-08-09 PROCEDURE — 70450 CT HEAD/BRAIN W/O DYE: CPT

## 2023-08-09 PROCEDURE — 73080 X-RAY EXAM OF ELBOW: CPT

## 2023-08-09 PROCEDURE — 72125 CT NECK SPINE W/O DYE: CPT

## 2023-08-09 PROCEDURE — 82306 VITAMIN D 25 HYDROXY: CPT

## 2023-08-09 PROCEDURE — 6360000002 HC RX W HCPCS: Performed by: STUDENT IN AN ORGANIZED HEALTH CARE EDUCATION/TRAINING PROGRAM

## 2023-08-09 PROCEDURE — 84520 ASSAY OF UREA NITROGEN: CPT

## 2023-08-09 PROCEDURE — 99222 1ST HOSP IP/OBS MODERATE 55: CPT | Performed by: SURGERY

## 2023-08-09 PROCEDURE — 6360000002 HC RX W HCPCS

## 2023-08-09 PROCEDURE — 73090 X-RAY EXAM OF FOREARM: CPT

## 2023-08-09 PROCEDURE — 86900 BLOOD TYPING SEROLOGIC ABO: CPT

## 2023-08-09 PROCEDURE — 96374 THER/PROPH/DIAG INJ IV PUSH: CPT

## 2023-08-09 PROCEDURE — 82565 ASSAY OF CREATININE: CPT

## 2023-08-09 PROCEDURE — 73110 X-RAY EXAM OF WRIST: CPT

## 2023-08-09 PROCEDURE — 6360000002 HC RX W HCPCS: Performed by: CHIROPRACTOR

## 2023-08-09 PROCEDURE — 96375 TX/PRO/DX INJ NEW DRUG ADDON: CPT

## 2023-08-09 PROCEDURE — 85027 COMPLETE CBC AUTOMATED: CPT

## 2023-08-09 PROCEDURE — 73060 X-RAY EXAM OF HUMERUS: CPT

## 2023-08-09 PROCEDURE — 81001 URINALYSIS AUTO W/SCOPE: CPT

## 2023-08-09 PROCEDURE — 84703 CHORIONIC GONADOTROPIN ASSAY: CPT

## 2023-08-09 PROCEDURE — G0378 HOSPITAL OBSERVATION PER HR: HCPCS

## 2023-08-09 PROCEDURE — 82805 BLOOD GASES W/O2 SATURATION: CPT

## 2023-08-09 PROCEDURE — 86850 RBC ANTIBODY SCREEN: CPT

## 2023-08-09 PROCEDURE — 6360000002 HC RX W HCPCS: Performed by: SURGERY

## 2023-08-09 PROCEDURE — 96372 THER/PROPH/DIAG INJ SC/IM: CPT

## 2023-08-09 PROCEDURE — 4A03X5D MEASUREMENT OF ARTERIAL FLOW, INTRACRANIAL, EXTERNAL APPROACH: ICD-10-PCS | Performed by: RADIOLOGY

## 2023-08-09 PROCEDURE — 99222 1ST HOSP IP/OBS MODERATE 55: CPT | Performed by: STUDENT IN AN ORGANIZED HEALTH CARE EDUCATION/TRAINING PROGRAM

## 2023-08-09 PROCEDURE — 6370000000 HC RX 637 (ALT 250 FOR IP)

## 2023-08-09 PROCEDURE — 71046 X-RAY EXAM CHEST 2 VIEWS: CPT

## 2023-08-09 PROCEDURE — 1200000000 HC SEMI PRIVATE

## 2023-08-09 PROCEDURE — 82947 ASSAY GLUCOSE BLOOD QUANT: CPT

## 2023-08-09 PROCEDURE — G0480 DRUG TEST DEF 1-7 CLASSES: HCPCS

## 2023-08-09 PROCEDURE — 72040 X-RAY EXAM NECK SPINE 2-3 VW: CPT

## 2023-08-09 PROCEDURE — 85730 THROMBOPLASTIN TIME PARTIAL: CPT

## 2023-08-09 PROCEDURE — 80307 DRUG TEST PRSMV CHEM ANLYZR: CPT

## 2023-08-09 PROCEDURE — 99285 EMERGENCY DEPT VISIT HI MDM: CPT

## 2023-08-09 PROCEDURE — 73130 X-RAY EXAM OF HAND: CPT

## 2023-08-09 PROCEDURE — 80051 ELECTROLYTE PANEL: CPT

## 2023-08-09 PROCEDURE — 85610 PROTHROMBIN TIME: CPT

## 2023-08-09 PROCEDURE — 86901 BLOOD TYPING SEROLOGIC RH(D): CPT

## 2023-08-09 RX ORDER — OXYCODONE HYDROCHLORIDE 5 MG/1
5 TABLET ORAL EVERY 4 HOURS PRN
Status: DISCONTINUED | OUTPATIENT
Start: 2023-08-09 | End: 2023-08-09

## 2023-08-09 RX ORDER — OXYCODONE HYDROCHLORIDE 5 MG/1
5 TABLET ORAL EVERY 4 HOURS PRN
Status: DISCONTINUED | OUTPATIENT
Start: 2023-08-09 | End: 2023-08-11 | Stop reason: HOSPADM

## 2023-08-09 RX ORDER — ONDANSETRON 2 MG/ML
4 INJECTION INTRAMUSCULAR; INTRAVENOUS EVERY 6 HOURS PRN
Status: DISCONTINUED | OUTPATIENT
Start: 2023-08-09 | End: 2023-08-11 | Stop reason: HOSPADM

## 2023-08-09 RX ORDER — GABAPENTIN 300 MG/1
300 CAPSULE ORAL EVERY 8 HOURS
Status: DISCONTINUED | OUTPATIENT
Start: 2023-08-09 | End: 2023-08-11 | Stop reason: HOSPADM

## 2023-08-09 RX ORDER — SODIUM CHLORIDE 0.9 % (FLUSH) 0.9 %
5-40 SYRINGE (ML) INJECTION EVERY 12 HOURS SCHEDULED
Status: DISCONTINUED | OUTPATIENT
Start: 2023-08-09 | End: 2023-08-11 | Stop reason: HOSPADM

## 2023-08-09 RX ORDER — SENNOSIDES A AND B 8.6 MG/1
1 TABLET, FILM COATED ORAL DAILY PRN
Status: DISCONTINUED | OUTPATIENT
Start: 2023-08-09 | End: 2023-08-11 | Stop reason: HOSPADM

## 2023-08-09 RX ORDER — POLYETHYLENE GLYCOL 3350 17 G/17G
17 POWDER, FOR SOLUTION ORAL DAILY PRN
Status: DISCONTINUED | OUTPATIENT
Start: 2023-08-09 | End: 2023-08-11 | Stop reason: HOSPADM

## 2023-08-09 RX ORDER — FENTANYL CITRATE 50 UG/ML
100 INJECTION, SOLUTION INTRAMUSCULAR; INTRAVENOUS ONCE
Status: COMPLETED | OUTPATIENT
Start: 2023-08-09 | End: 2023-08-09

## 2023-08-09 RX ORDER — ONDANSETRON 4 MG/1
4 TABLET, ORALLY DISINTEGRATING ORAL EVERY 6 HOURS PRN
Status: DISCONTINUED | OUTPATIENT
Start: 2023-08-09 | End: 2023-08-11 | Stop reason: HOSPADM

## 2023-08-09 RX ORDER — SODIUM CHLORIDE 0.9 % (FLUSH) 0.9 %
5-40 SYRINGE (ML) INJECTION PRN
Status: DISCONTINUED | OUTPATIENT
Start: 2023-08-09 | End: 2023-08-11 | Stop reason: HOSPADM

## 2023-08-09 RX ORDER — FENTANYL CITRATE 50 UG/ML
50 INJECTION, SOLUTION INTRAMUSCULAR; INTRAVENOUS ONCE
Status: DISCONTINUED | OUTPATIENT
Start: 2023-08-09 | End: 2023-08-09

## 2023-08-09 RX ORDER — FENTANYL CITRATE 50 UG/ML
50 INJECTION, SOLUTION INTRAMUSCULAR; INTRAVENOUS ONCE
Status: COMPLETED | OUTPATIENT
Start: 2023-08-09 | End: 2023-08-09

## 2023-08-09 RX ORDER — SODIUM CHLORIDE 9 MG/ML
INJECTION, SOLUTION INTRAVENOUS PRN
Status: DISCONTINUED | OUTPATIENT
Start: 2023-08-09 | End: 2023-08-11 | Stop reason: HOSPADM

## 2023-08-09 RX ORDER — ACETAMINOPHEN 500 MG
1000 TABLET ORAL EVERY 8 HOURS SCHEDULED
Status: DISCONTINUED | OUTPATIENT
Start: 2023-08-09 | End: 2023-08-11 | Stop reason: HOSPADM

## 2023-08-09 RX ORDER — OXYCODONE HYDROCHLORIDE 5 MG/1
10 TABLET ORAL EVERY 4 HOURS PRN
Status: DISCONTINUED | OUTPATIENT
Start: 2023-08-09 | End: 2023-08-11

## 2023-08-09 RX ORDER — METHOCARBAMOL 750 MG/1
750 TABLET, FILM COATED ORAL EVERY 6 HOURS
Status: DISCONTINUED | OUTPATIENT
Start: 2023-08-09 | End: 2023-08-11 | Stop reason: HOSPADM

## 2023-08-09 RX ORDER — SODIUM CHLORIDE 9 MG/ML
INJECTION, SOLUTION INTRAVENOUS CONTINUOUS
Status: DISCONTINUED | OUTPATIENT
Start: 2023-08-10 | End: 2023-08-11

## 2023-08-09 RX ADMIN — ONDANSETRON 4 MG: 2 INJECTION INTRAMUSCULAR; INTRAVENOUS at 20:56

## 2023-08-09 RX ADMIN — FENTANYL CITRATE 100 MCG: 50 INJECTION, SOLUTION INTRAMUSCULAR; INTRAVENOUS at 18:02

## 2023-08-09 RX ADMIN — HYDROMORPHONE HYDROCHLORIDE 0.5 MG: 1 INJECTION, SOLUTION INTRAMUSCULAR; INTRAVENOUS; SUBCUTANEOUS at 20:56

## 2023-08-09 RX ADMIN — METHOCARBAMOL TABLETS 750 MG: 750 TABLET, COATED ORAL at 20:31

## 2023-08-09 RX ADMIN — OXYCODONE HYDROCHLORIDE 5 MG: 5 TABLET ORAL at 20:31

## 2023-08-09 RX ADMIN — GABAPENTIN 300 MG: 300 CAPSULE ORAL at 20:31

## 2023-08-09 RX ADMIN — ACETAMINOPHEN 1000 MG: 500 TABLET ORAL at 20:31

## 2023-08-09 RX ADMIN — FENTANYL CITRATE 50 MCG: 50 INJECTION, SOLUTION INTRAMUSCULAR; INTRAVENOUS at 13:54

## 2023-08-09 RX ADMIN — FENTANYL CITRATE 50 MCG: 50 INJECTION, SOLUTION INTRAMUSCULAR; INTRAVENOUS at 17:35

## 2023-08-09 RX ADMIN — FENTANYL CITRATE 100 MCG: 50 INJECTION, SOLUTION INTRAMUSCULAR; INTRAVENOUS at 14:36

## 2023-08-09 ASSESSMENT — ENCOUNTER SYMPTOMS
EYES NEGATIVE: 1
SHORTNESS OF BREATH: 0
VOMITING: 0
RHINORRHEA: 0
COUGH: 0
SORE THROAT: 0
NAUSEA: 0
ABDOMINAL PAIN: 0
DIARRHEA: 0
CHEST TIGHTNESS: 0

## 2023-08-09 ASSESSMENT — PAIN SCALES - GENERAL
PAINLEVEL_OUTOF10: 10
PAINLEVEL_OUTOF10: 10
PAINLEVEL_OUTOF10: 8
PAINLEVEL_OUTOF10: 8
PAINLEVEL_OUTOF10: 9
PAINLEVEL_OUTOF10: 10

## 2023-08-09 ASSESSMENT — PAIN DESCRIPTION - DESCRIPTORS: DESCRIPTORS: SHARP

## 2023-08-09 ASSESSMENT — PAIN DESCRIPTION - LOCATION
LOCATION: ARM

## 2023-08-09 ASSESSMENT — PAIN DESCRIPTION - ORIENTATION
ORIENTATION: RIGHT

## 2023-08-09 ASSESSMENT — PAIN DESCRIPTION - PAIN TYPE: TYPE: ACUTE PAIN

## 2023-08-09 ASSESSMENT — PAIN DESCRIPTION - FREQUENCY: FREQUENCY: CONTINUOUS

## 2023-08-09 ASSESSMENT — PAIN - FUNCTIONAL ASSESSMENT
PAIN_FUNCTIONAL_ASSESSMENT: 0-10
PAIN_FUNCTIONAL_ASSESSMENT: 0-10
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
## 2023-08-09 NOTE — ED PROVIDER NOTES
[address] HANDOFF       Handoff taken on the following patient from prior Attending Physician:  Pt Name: Abdulkadir Barillas  PCP:  Radha Delgado, [facility]  I was available and discussed any additional care issues that arose and coordinated the management plans with the resident(s) caring for the patient during my duty period. Any areas of disagreement with resident's documentation of care or procedures are noted on the chart. I was personally present for the key portions of any/all procedures during my duty period. I have documented in the chart those procedures where I was not present during the key portions.            Lindsey Sunshine MD  08/09/23 [id number]
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
## 2023-08-09 NOTE — ED NOTES
Patient ambulated to the restroom with steady gait. Urine specimen cup provided.      Aidan Tillman RN  08/09/23 0197
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
## 2023-08-09 NOTE — ED NOTES
Patient given warm blanket     Earl Boast, RN  08/09/23 3891 [facility], RN  08/09/23 9899

## 2023-08-10 ENCOUNTER — ANESTHESIA (OUTPATIENT)
Dept: OPERATING ROOM | Age: 28
End: 2023-08-10
Payer: OTHER MISCELLANEOUS

## 2023-08-10 ENCOUNTER — APPOINTMENT (OUTPATIENT)
Dept: GENERAL RADIOLOGY | Age: 28
DRG: 315 | End: 2023-08-10
Payer: MEDICAID

## 2023-08-10 ENCOUNTER — ANESTHESIA EVENT (OUTPATIENT)
Dept: OPERATING ROOM | Age: 28
End: 2023-08-10
Payer: OTHER MISCELLANEOUS

## 2023-08-10 PROBLEM — S52.201A CLOSED FRACTURE OF RIGHT RADIUS AND ULNA: Status: ACTIVE | Noted: 2023-08-09

## 2023-08-10 PROBLEM — S52.91XA CLOSED FRACTURE OF RIGHT RADIUS AND ULNA: Status: ACTIVE | Noted: 2023-08-09

## 2023-08-10 LAB
ANION GAP SERPL CALCULATED.3IONS-SCNC: 14 MMOL/L (ref 9–17)
BASOPHILS # BLD: 0.09 K/UL (ref 0–0.2)
BASOPHILS NFR BLD: 1 % (ref 0–2)
BUN SERPL-MCNC: 11 MG/DL (ref 6–20)
CA-I BLD-SCNC: 1.08 MMOL/L (ref 1.13–1.33)
CALCIUM SERPL-MCNC: 8.9 MG/DL (ref 8.6–10.4)
CHLORIDE SERPL-SCNC: 107 MMOL/L (ref 98–107)
CO2 SERPL-SCNC: 21 MMOL/L (ref 20–31)
CREAT SERPL-MCNC: 0.6 MG/DL (ref 0.5–0.9)
EOSINOPHIL # BLD: 0.17 K/UL (ref 0–0.44)
EOSINOPHILS RELATIVE PERCENT: 2 % (ref 1–4)
ERYTHROCYTE [DISTWIDTH] IN BLOOD BY AUTOMATED COUNT: 13.1 % (ref 11.8–14.4)
GFR SERPL CREATININE-BSD FRML MDRD: >60 ML/MIN/1.73M2
GLUCOSE SERPL-MCNC: 132 MG/DL (ref 70–99)
HCT VFR BLD AUTO: 42.6 % (ref 36.3–47.1)
HGB BLD-MCNC: 12.9 G/DL (ref 11.9–15.1)
IMM GRANULOCYTES # BLD AUTO: 0.03 K/UL (ref 0–0.3)
IMM GRANULOCYTES NFR BLD: 0 %
LYMPHOCYTES NFR BLD: 2.24 K/UL (ref 1.1–3.7)
LYMPHOCYTES RELATIVE PERCENT: 28 % (ref 24–43)
MAGNESIUM SERPL-MCNC: 1.8 MG/DL (ref 1.6–2.6)
MCH RBC QN AUTO: 28.7 PG (ref 25.2–33.5)
MCHC RBC AUTO-ENTMCNC: 30.3 G/DL (ref 28.4–34.8)
MCV RBC AUTO: 94.9 FL (ref 82.6–102.9)
MONOCYTES NFR BLD: 0.79 K/UL (ref 0.1–1.2)
MONOCYTES NFR BLD: 10 % (ref 3–12)
NEUTROPHILS NFR BLD: 58 % (ref 36–65)
NEUTS SEG NFR BLD: 4.59 K/UL (ref 1.5–8.1)
NRBC BLD-RTO: 0 PER 100 WBC
PHOSPHATE SERPL-MCNC: 3.8 MG/DL (ref 2.6–4.5)
PLATELET # BLD AUTO: 320 K/UL (ref 138–453)
PMV BLD AUTO: 11.2 FL (ref 8.1–13.5)
POTASSIUM SERPL-SCNC: 3.9 MMOL/L (ref 3.7–5.3)
RBC # BLD AUTO: 4.49 M/UL (ref 3.95–5.11)
SODIUM SERPL-SCNC: 142 MMOL/L (ref 135–144)
WBC OTHER # BLD: 7.9 K/UL (ref 3.5–11.3)

## 2023-08-10 PROCEDURE — 2720000010 HC SURG SUPPLY STERILE: Performed by: STUDENT IN AN ORGANIZED HEALTH CARE EDUCATION/TRAINING PROGRAM

## 2023-08-10 PROCEDURE — 2580000003 HC RX 258

## 2023-08-10 PROCEDURE — 36415 COLL VENOUS BLD VENIPUNCTURE: CPT

## 2023-08-10 PROCEDURE — 2580000003 HC RX 258: Performed by: NURSE ANESTHETIST, CERTIFIED REGISTERED

## 2023-08-10 PROCEDURE — 83735 ASSAY OF MAGNESIUM: CPT

## 2023-08-10 PROCEDURE — 6360000002 HC RX W HCPCS

## 2023-08-10 PROCEDURE — 3600000014 HC SURGERY LEVEL 4 ADDTL 15MIN: Performed by: STUDENT IN AN ORGANIZED HEALTH CARE EDUCATION/TRAINING PROGRAM

## 2023-08-10 PROCEDURE — 6360000002 HC RX W HCPCS: Performed by: STUDENT IN AN ORGANIZED HEALTH CARE EDUCATION/TRAINING PROGRAM

## 2023-08-10 PROCEDURE — 0PSH04Z REPOSITION RIGHT RADIUS WITH INTERNAL FIXATION DEVICE, OPEN APPROACH: ICD-10-PCS | Performed by: STUDENT IN AN ORGANIZED HEALTH CARE EDUCATION/TRAINING PROGRAM

## 2023-08-10 PROCEDURE — 6360000002 HC RX W HCPCS: Performed by: ANESTHESIOLOGY

## 2023-08-10 PROCEDURE — 82330 ASSAY OF CALCIUM: CPT

## 2023-08-10 PROCEDURE — 2500000003 HC RX 250 WO HCPCS: Performed by: NURSE ANESTHETIST, CERTIFIED REGISTERED

## 2023-08-10 PROCEDURE — 85025 COMPLETE CBC W/AUTO DIFF WBC: CPT

## 2023-08-10 PROCEDURE — 99232 SBSQ HOSP IP/OBS MODERATE 35: CPT | Performed by: SURGERY

## 2023-08-10 PROCEDURE — 3600000004 HC SURGERY LEVEL 4 BASE: Performed by: STUDENT IN AN ORGANIZED HEALTH CARE EDUCATION/TRAINING PROGRAM

## 2023-08-10 PROCEDURE — C9399 UNCLASSIFIED DRUGS OR BIOLOG: HCPCS | Performed by: NURSE ANESTHETIST, CERTIFIED REGISTERED

## 2023-08-10 PROCEDURE — 3700000000 HC ANESTHESIA ATTENDED CARE: Performed by: STUDENT IN AN ORGANIZED HEALTH CARE EDUCATION/TRAINING PROGRAM

## 2023-08-10 PROCEDURE — C1713 ANCHOR/SCREW BN/BN,TIS/BN: HCPCS | Performed by: STUDENT IN AN ORGANIZED HEALTH CARE EDUCATION/TRAINING PROGRAM

## 2023-08-10 PROCEDURE — G0378 HOSPITAL OBSERVATION PER HR: HCPCS

## 2023-08-10 PROCEDURE — 84100 ASSAY OF PHOSPHORUS: CPT

## 2023-08-10 PROCEDURE — 96376 TX/PRO/DX INJ SAME DRUG ADON: CPT

## 2023-08-10 PROCEDURE — 6360000002 HC RX W HCPCS: Performed by: NURSE ANESTHETIST, CERTIFIED REGISTERED

## 2023-08-10 PROCEDURE — 3700000001 HC ADD 15 MINUTES (ANESTHESIA): Performed by: STUDENT IN AN ORGANIZED HEALTH CARE EDUCATION/TRAINING PROGRAM

## 2023-08-10 PROCEDURE — 64415 NJX AA&/STRD BRCH PLXS IMG: CPT | Performed by: ANESTHESIOLOGY

## 2023-08-10 PROCEDURE — 1200000000 HC SEMI PRIVATE

## 2023-08-10 PROCEDURE — 2709999900 HC NON-CHARGEABLE SUPPLY: Performed by: STUDENT IN AN ORGANIZED HEALTH CARE EDUCATION/TRAINING PROGRAM

## 2023-08-10 PROCEDURE — 0PSK04Z REPOSITION RIGHT ULNA WITH INTERNAL FIXATION DEVICE, OPEN APPROACH: ICD-10-PCS | Performed by: STUDENT IN AN ORGANIZED HEALTH CARE EDUCATION/TRAINING PROGRAM

## 2023-08-10 PROCEDURE — 2580000003 HC RX 258: Performed by: STUDENT IN AN ORGANIZED HEALTH CARE EDUCATION/TRAINING PROGRAM

## 2023-08-10 PROCEDURE — 6370000000 HC RX 637 (ALT 250 FOR IP)

## 2023-08-10 PROCEDURE — 80048 BASIC METABOLIC PNL TOTAL CA: CPT

## 2023-08-10 PROCEDURE — 6370000000 HC RX 637 (ALT 250 FOR IP): Performed by: SURGERY

## 2023-08-10 PROCEDURE — 25575 OPTX RDL&ULN SHFT FX RDS&ULN: CPT | Performed by: STUDENT IN AN ORGANIZED HEALTH CARE EDUCATION/TRAINING PROGRAM

## 2023-08-10 PROCEDURE — 73090 X-RAY EXAM OF FOREARM: CPT

## 2023-08-10 PROCEDURE — 7100000000 HC PACU RECOVERY - FIRST 15 MIN: Performed by: STUDENT IN AN ORGANIZED HEALTH CARE EDUCATION/TRAINING PROGRAM

## 2023-08-10 PROCEDURE — 7100000001 HC PACU RECOVERY - ADDTL 15 MIN: Performed by: STUDENT IN AN ORGANIZED HEALTH CARE EDUCATION/TRAINING PROGRAM

## 2023-08-10 PROCEDURE — 6360000002 HC RX W HCPCS: Performed by: SURGERY

## 2023-08-10 DEVICE — PLATE BNE L85MM THK3.4MM 6 H BILAT S STL STR LOK COMPR FOR: Type: IMPLANTABLE DEVICE | Site: RADIUS | Status: FUNCTIONAL

## 2023-08-10 DEVICE — SCREW BNE L12MM DIA3.5MM CORT S STL ST NONCANNULATED LOK: Type: IMPLANTABLE DEVICE | Site: ULNA | Status: FUNCTIONAL

## 2023-08-10 DEVICE — SCREW BNE L14MM DIA3.5MM CORT S STL ST NONCANNULATED LOK: Type: IMPLANTABLE DEVICE | Site: RADIUS | Status: FUNCTIONAL

## 2023-08-10 RX ORDER — LAMOTRIGINE 100 MG/1
100 TABLET ORAL DAILY
Status: DISCONTINUED | OUTPATIENT
Start: 2023-08-10 | End: 2023-08-11 | Stop reason: HOSPADM

## 2023-08-10 RX ORDER — DIPHENHYDRAMINE HYDROCHLORIDE 50 MG/ML
12.5 INJECTION INTRAMUSCULAR; INTRAVENOUS
Status: DISCONTINUED | OUTPATIENT
Start: 2023-08-10 | End: 2023-08-10 | Stop reason: HOSPADM

## 2023-08-10 RX ORDER — SODIUM CHLORIDE, SODIUM LACTATE, POTASSIUM CHLORIDE, CALCIUM CHLORIDE 600; 310; 30; 20 MG/100ML; MG/100ML; MG/100ML; MG/100ML
INJECTION, SOLUTION INTRAVENOUS CONTINUOUS PRN
Status: DISCONTINUED | OUTPATIENT
Start: 2023-08-10 | End: 2023-08-10 | Stop reason: SDUPTHER

## 2023-08-10 RX ORDER — ONDANSETRON 2 MG/ML
INJECTION INTRAMUSCULAR; INTRAVENOUS PRN
Status: DISCONTINUED | OUTPATIENT
Start: 2023-08-10 | End: 2023-08-10 | Stop reason: SDUPTHER

## 2023-08-10 RX ORDER — ONDANSETRON 2 MG/ML
4 INJECTION INTRAMUSCULAR; INTRAVENOUS
Status: DISCONTINUED | OUTPATIENT
Start: 2023-08-10 | End: 2023-08-10 | Stop reason: HOSPADM

## 2023-08-10 RX ORDER — FENTANYL CITRATE 50 UG/ML
50 INJECTION, SOLUTION INTRAMUSCULAR; INTRAVENOUS PRN
Status: DISCONTINUED | OUTPATIENT
Start: 2023-08-10 | End: 2023-08-11

## 2023-08-10 RX ORDER — MORPHINE SULFATE 2 MG/ML
2 INJECTION, SOLUTION INTRAMUSCULAR; INTRAVENOUS EVERY 5 MIN PRN
Status: DISCONTINUED | OUTPATIENT
Start: 2023-08-10 | End: 2023-08-10 | Stop reason: HOSPADM

## 2023-08-10 RX ORDER — PROPOFOL 10 MG/ML
INJECTION, EMULSION INTRAVENOUS PRN
Status: DISCONTINUED | OUTPATIENT
Start: 2023-08-10 | End: 2023-08-10 | Stop reason: SDUPTHER

## 2023-08-10 RX ORDER — LIDOCAINE HYDROCHLORIDE 20 MG/ML
INJECTION, SOLUTION INFILTRATION; PERINEURAL PRN
Status: DISCONTINUED | OUTPATIENT
Start: 2023-08-10 | End: 2023-08-10 | Stop reason: SDUPTHER

## 2023-08-10 RX ORDER — SODIUM CHLORIDE 9 MG/ML
INJECTION, SOLUTION INTRAVENOUS PRN
Status: DISCONTINUED | OUTPATIENT
Start: 2023-08-10 | End: 2023-08-10 | Stop reason: HOSPADM

## 2023-08-10 RX ORDER — CLONIDINE HYDROCHLORIDE 0.2 MG/1
0.2 TABLET ORAL DAILY
Status: DISCONTINUED | OUTPATIENT
Start: 2023-08-11 | End: 2023-08-11 | Stop reason: HOSPADM

## 2023-08-10 RX ORDER — MAGNESIUM HYDROXIDE 1200 MG/15ML
LIQUID ORAL CONTINUOUS PRN
Status: COMPLETED | OUTPATIENT
Start: 2023-08-10 | End: 2023-08-10

## 2023-08-10 RX ORDER — ROCURONIUM BROMIDE 10 MG/ML
INJECTION, SOLUTION INTRAVENOUS PRN
Status: DISCONTINUED | OUTPATIENT
Start: 2023-08-10 | End: 2023-08-10 | Stop reason: SDUPTHER

## 2023-08-10 RX ORDER — TOBRAMYCIN 1.2 G/30ML
INJECTION, POWDER, LYOPHILIZED, FOR SOLUTION INTRAVENOUS PRN
Status: DISCONTINUED | OUTPATIENT
Start: 2023-08-10 | End: 2023-08-10 | Stop reason: ALTCHOICE

## 2023-08-10 RX ORDER — MIDAZOLAM HYDROCHLORIDE 1 MG/ML
INJECTION INTRAMUSCULAR; INTRAVENOUS PRN
Status: DISCONTINUED | OUTPATIENT
Start: 2023-08-10 | End: 2023-08-10 | Stop reason: SDUPTHER

## 2023-08-10 RX ORDER — MIDAZOLAM HYDROCHLORIDE 2 MG/2ML
0.5 INJECTION, SOLUTION INTRAMUSCULAR; INTRAVENOUS 4 TIMES DAILY PRN
Status: DISCONTINUED | OUTPATIENT
Start: 2023-08-10 | End: 2023-08-11

## 2023-08-10 RX ORDER — TRANEXAMIC ACID 10 MG/ML
INJECTION, SOLUTION INTRAVENOUS PRN
Status: DISCONTINUED | OUTPATIENT
Start: 2023-08-10 | End: 2023-08-10 | Stop reason: SDUPTHER

## 2023-08-10 RX ORDER — VANCOMYCIN HYDROCHLORIDE 1 G/20ML
INJECTION, POWDER, LYOPHILIZED, FOR SOLUTION INTRAVENOUS PRN
Status: DISCONTINUED | OUTPATIENT
Start: 2023-08-10 | End: 2023-08-10 | Stop reason: ALTCHOICE

## 2023-08-10 RX ORDER — SODIUM CHLORIDE 0.9 % (FLUSH) 0.9 %
5-40 SYRINGE (ML) INJECTION EVERY 12 HOURS SCHEDULED
Status: DISCONTINUED | OUTPATIENT
Start: 2023-08-10 | End: 2023-08-10 | Stop reason: HOSPADM

## 2023-08-10 RX ORDER — DEXAMETHASONE SODIUM PHOSPHATE 10 MG/ML
INJECTION INTRAMUSCULAR; INTRAVENOUS PRN
Status: DISCONTINUED | OUTPATIENT
Start: 2023-08-10 | End: 2023-08-10 | Stop reason: SDUPTHER

## 2023-08-10 RX ORDER — FENTANYL CITRATE 50 UG/ML
50 INJECTION, SOLUTION INTRAMUSCULAR; INTRAVENOUS
Status: DISCONTINUED | OUTPATIENT
Start: 2023-08-10 | End: 2023-08-10

## 2023-08-10 RX ORDER — FENTANYL CITRATE 50 UG/ML
25 INJECTION, SOLUTION INTRAMUSCULAR; INTRAVENOUS EVERY 5 MIN PRN
Status: DISCONTINUED | OUTPATIENT
Start: 2023-08-10 | End: 2023-08-10 | Stop reason: HOSPADM

## 2023-08-10 RX ORDER — FENTANYL CITRATE 50 UG/ML
INJECTION, SOLUTION INTRAMUSCULAR; INTRAVENOUS PRN
Status: DISCONTINUED | OUTPATIENT
Start: 2023-08-10 | End: 2023-08-10 | Stop reason: SDUPTHER

## 2023-08-10 RX ORDER — SODIUM CHLORIDE 0.9 % (FLUSH) 0.9 %
5-40 SYRINGE (ML) INJECTION PRN
Status: DISCONTINUED | OUTPATIENT
Start: 2023-08-10 | End: 2023-08-10 | Stop reason: HOSPADM

## 2023-08-10 RX ADMIN — HYDROMORPHONE HYDROCHLORIDE 0.5 MG: 1 INJECTION, SOLUTION INTRAMUSCULAR; INTRAVENOUS; SUBCUTANEOUS at 08:17

## 2023-08-10 RX ADMIN — OXYCODONE HYDROCHLORIDE 10 MG: 5 TABLET ORAL at 06:12

## 2023-08-10 RX ADMIN — SODIUM CHLORIDE: 9 INJECTION, SOLUTION INTRAVENOUS at 02:40

## 2023-08-10 RX ADMIN — FENTANYL CITRATE 150 MCG: 0.05 INJECTION, SOLUTION INTRAMUSCULAR; INTRAVENOUS at 10:26

## 2023-08-10 RX ADMIN — HYDROMORPHONE HYDROCHLORIDE 0.5 MG: 1 INJECTION, SOLUTION INTRAMUSCULAR; INTRAVENOUS; SUBCUTANEOUS at 12:32

## 2023-08-10 RX ADMIN — METHOCARBAMOL TABLETS 750 MG: 750 TABLET, COATED ORAL at 06:13

## 2023-08-10 RX ADMIN — ACETAMINOPHEN 1000 MG: 500 TABLET ORAL at 13:40

## 2023-08-10 RX ADMIN — SODIUM CHLORIDE: 9 INJECTION, SOLUTION INTRAVENOUS at 13:46

## 2023-08-10 RX ADMIN — HYDROMORPHONE HYDROCHLORIDE 0.5 MG: 1 INJECTION, SOLUTION INTRAMUSCULAR; INTRAVENOUS; SUBCUTANEOUS at 20:27

## 2023-08-10 RX ADMIN — FENTANYL CITRATE 100 MCG: 0.05 INJECTION, SOLUTION INTRAMUSCULAR; INTRAVENOUS at 10:34

## 2023-08-10 RX ADMIN — ROCURONIUM BROMIDE 50 MG: 10 INJECTION INTRAVENOUS at 10:26

## 2023-08-10 RX ADMIN — HYDROMORPHONE HYDROCHLORIDE 0.5 MG: 1 INJECTION, SOLUTION INTRAMUSCULAR; INTRAVENOUS; SUBCUTANEOUS at 00:16

## 2023-08-10 RX ADMIN — DEXAMETHASONE SODIUM PHOSPHATE 10 MG: 10 INJECTION INTRAMUSCULAR; INTRAVENOUS at 10:28

## 2023-08-10 RX ADMIN — GABAPENTIN 300 MG: 300 CAPSULE ORAL at 20:26

## 2023-08-10 RX ADMIN — OXYCODONE HYDROCHLORIDE 10 MG: 5 TABLET ORAL at 13:39

## 2023-08-10 RX ADMIN — SODIUM CHLORIDE, PRESERVATIVE FREE 10 ML: 5 INJECTION INTRAVENOUS at 20:26

## 2023-08-10 RX ADMIN — HYDROMORPHONE HYDROCHLORIDE 1 MG: 1 INJECTION, SOLUTION INTRAMUSCULAR; INTRAVENOUS; SUBCUTANEOUS at 10:39

## 2023-08-10 RX ADMIN — TRANEXAMIC ACID 1 G: 10 INJECTION, SOLUTION INTRAVENOUS at 10:32

## 2023-08-10 RX ADMIN — HYDROMORPHONE HYDROCHLORIDE 0.5 MG: 1 INJECTION, SOLUTION INTRAMUSCULAR; INTRAVENOUS; SUBCUTANEOUS at 17:58

## 2023-08-10 RX ADMIN — FENTANYL CITRATE 50 MCG: 50 INJECTION, SOLUTION INTRAMUSCULAR; INTRAVENOUS at 10:11

## 2023-08-10 RX ADMIN — SODIUM CHLORIDE, POTASSIUM CHLORIDE, SODIUM LACTATE AND CALCIUM CHLORIDE: 600; 310; 30; 20 INJECTION, SOLUTION INTRAVENOUS at 10:15

## 2023-08-10 RX ADMIN — ACETAMINOPHEN 1000 MG: 500 TABLET ORAL at 20:26

## 2023-08-10 RX ADMIN — METHOCARBAMOL TABLETS 750 MG: 750 TABLET, COATED ORAL at 20:26

## 2023-08-10 RX ADMIN — SODIUM CHLORIDE, PRESERVATIVE FREE 10 ML: 5 INJECTION INTRAVENOUS at 00:16

## 2023-08-10 RX ADMIN — PROPOFOL 200 MG: 10 INJECTION, EMULSION INTRAVENOUS at 10:26

## 2023-08-10 RX ADMIN — HYDROMORPHONE HYDROCHLORIDE 0.5 MG: 1 INJECTION, SOLUTION INTRAMUSCULAR; INTRAVENOUS; SUBCUTANEOUS at 15:54

## 2023-08-10 RX ADMIN — SUGAMMADEX 200 MG: 100 INJECTION, SOLUTION INTRAVENOUS at 11:41

## 2023-08-10 RX ADMIN — Medication 2 G: at 10:34

## 2023-08-10 RX ADMIN — GABAPENTIN 300 MG: 300 CAPSULE ORAL at 06:13

## 2023-08-10 RX ADMIN — LIDOCAINE HYDROCHLORIDE 60 MG: 20 INJECTION, SOLUTION INFILTRATION; PERINEURAL at 10:26

## 2023-08-10 RX ADMIN — OXYCODONE HYDROCHLORIDE 10 MG: 5 TABLET ORAL at 19:08

## 2023-08-10 RX ADMIN — GABAPENTIN 300 MG: 300 CAPSULE ORAL at 13:41

## 2023-08-10 RX ADMIN — METHOCARBAMOL TABLETS 750 MG: 750 TABLET, COATED ORAL at 13:40

## 2023-08-10 RX ADMIN — ROCURONIUM BROMIDE 20 MG: 10 INJECTION INTRAVENOUS at 10:53

## 2023-08-10 RX ADMIN — SODIUM CHLORIDE, POTASSIUM CHLORIDE, SODIUM LACTATE AND CALCIUM CHLORIDE: 600; 310; 30; 20 INJECTION, SOLUTION INTRAVENOUS at 11:18

## 2023-08-10 RX ADMIN — ONDANSETRON 4 MG: 2 INJECTION INTRAMUSCULAR; INTRAVENOUS at 11:26

## 2023-08-10 RX ADMIN — Medication 2000 MG: at 20:38

## 2023-08-10 RX ADMIN — MIDAZOLAM 2 MG: 1 INJECTION INTRAMUSCULAR; INTRAVENOUS at 10:26

## 2023-08-10 ASSESSMENT — PAIN DESCRIPTION - FREQUENCY: FREQUENCY: INTERMITTENT

## 2023-08-10 ASSESSMENT — PAIN - FUNCTIONAL ASSESSMENT
PAIN_FUNCTIONAL_ASSESSMENT: ACTIVITIES ARE NOT PREVENTED

## 2023-08-10 ASSESSMENT — PAIN DESCRIPTION - PAIN TYPE
TYPE: ACUTE PAIN
TYPE: ACUTE PAIN

## 2023-08-10 ASSESSMENT — PAIN SCALES - GENERAL
PAINLEVEL_OUTOF10: 7
PAINLEVEL_OUTOF10: 6
PAINLEVEL_OUTOF10: 3
PAINLEVEL_OUTOF10: 7
PAINLEVEL_OUTOF10: 8
PAINLEVEL_OUTOF10: 10
PAINLEVEL_OUTOF10: 10
PAINLEVEL_OUTOF10: 7
PAINLEVEL_OUTOF10: 7
PAINLEVEL_OUTOF10: 5
PAINLEVEL_OUTOF10: 9
PAINLEVEL_OUTOF10: 9
PAINLEVEL_OUTOF10: 10
PAINLEVEL_OUTOF10: 5

## 2023-08-10 ASSESSMENT — PAIN DESCRIPTION - LOCATION
LOCATION: ARM

## 2023-08-10 ASSESSMENT — PAIN SCALES - WONG BAKER: WONGBAKER_NUMERICALRESPONSE: 0

## 2023-08-10 ASSESSMENT — PAIN DESCRIPTION - ORIENTATION
ORIENTATION: RIGHT

## 2023-08-10 ASSESSMENT — PAIN DESCRIPTION - DESCRIPTORS
DESCRIPTORS: ACHING
DESCRIPTORS: ACHING;PRESSURE;NUMBNESS
DESCRIPTORS: SHARP
DESCRIPTORS: ACHING;SHARP;THROBBING
DESCRIPTORS: ACHING;THROBBING

## 2023-08-10 ASSESSMENT — PAIN DESCRIPTION - ONSET: ONSET: ON-GOING

## 2023-08-10 ASSESSMENT — LIFESTYLE VARIABLES: SMOKING_STATUS: 1

## 2023-08-10 NOTE — ANESTHESIA PRE PROCEDURE
Department of Anesthesiology  Preprocedure Note       Name:  Comfort Mercado   Age:  34 y.o.  :  1995                                          MRN:  [id number]         Date:  8/10/2023      Surgeon: Aliyah Dunne):  Darron Martinez DO    Procedure: Procedure(s):  OPEN REDUCTION INTERNAL FIXATION OF BOTH BONE FOREARM FRACTURES (30[address] W/HAND TBL, GEOVANNA, SYNTHES SM FRAG)    Medications prior to admission:   Prior to Admission medications    Medication Sig Start Date End Date Taking? Authorizing Provider   ondansetron (ZOFRAN ODT) 4 MG disintegrating tablet Take 1 tablet by mouth every 8 hours as needed for Nausea or Vomiting 22   Whitney Lucas DO   escitalopram (LEXAPRO) 10 MG tablet Take 10 mg by mouth daily  Patient not taking: Reported on 2022    Historical Provider, MD   traZODone (DESYREL) 100 MG tablet Take 100 mg by mouth nightly  Patient not taking: Reported on 2022    Historical Provider, MD   buprenorphine-naloxone (SUBOXONE) 8-2 MG FILM SL film Place 1 Film under the tongue 2 times daily. Patient not taking: Reported on 2022    Historical Provider, MD   lithium (ESKALITH) 450 MG extended release tablet Take 450 mg by mouth 2 times daily  Patient not taking: Reported on 2022    Historical Provider, MD   busPIRone (BUSPAR) 10 MG tablet Take 10 mg by mouth 3 times daily  Patient not taking: Reported on 2022    Historical Provider, MD   QUEtiapine (SEROQUEL) 100 MG tablet Take 100 mg by mouth 2 times daily  Patient not taking: Reported on 2022    Historical Provider, MD   gabapentin (NEURONTIN) 300 MG capsule Take 300 mg by mouth 3 times daily.   Patient not taking: Reported on 2022    Historical Provider, MD   rOPINIRole (REQUIP) 5 MG tablet Take 5 mg by mouth 3 times daily  Patient not taking: Reported on 2022    Historical Provider, MD       Current medications:    Current Facility-Administered Medications   Medication Dose Route Frequency

## 2023-08-10 NOTE — PLAN OF CARE
Problem: Discharge Planning  Goal: Discharge to home or other facility with appropriate resources  8/10/2023 1738 by Emeka Tobias RN  Outcome: Progressing  8/10/2023 0612 by Scottie White RN  Outcome: Progressing     Problem: Pain  Goal: Verbalizes/displays adequate comfort level or baseline comfort level  8/10/2023 1738 by Emeka Tobias RN  Outcome: Progressing  8/10/2023 0612 by Scottie White RN  Outcome: Progressing     Problem: Safety - Adult  Goal: Free from fall injury  8/10/2023 1738 by Emeka Tobias RN  Outcome: Progressing  8/10/2023 0612 by Scottie White RN  Outcome: Progressing

## 2023-08-10 NOTE — ANESTHESIA PROCEDURE NOTES
Peripheral Block    Patient location during procedure: PACU  Reason for block: post-op pain management and at surgeon's request  Start time: 8/10/2023 12:15 PM  End time: 8/10/2023 12:21 PM  Staffing  Anesthesiologist: Mary Doe MD  Preanesthetic Checklist  Completed: patient identified, IV checked, site marked, risks and benefits discussed, surgical/procedural consents, pre-op evaluation, timeout performed, anesthesia consent given, oxygen available and monitors applied/VS acknowledged  Peripheral Block   Patient position: sitting  Prep: ChloraPrep  Provider prep: mask and sterile gloves  Patient monitoring: cardiac monitor, continuous pulse ox, frequent blood pressure checks, IV access, responsive to questions and oxygen  Block type: Brachial plexus  Supraclavicular  Laterality: right  Injection technique: single-shot  Guidance: ultrasound guided  Local infiltration: lidocaine  Infiltration strength: 1 %  Local infiltration: lidocaine  Dose: 2 mL    Needle   Needle type: short-bevel   Needle gauge: 20 G  Needle localization: ultrasound guidance  Test dose: negative  Needle length: 10 cm  Assessment   Injection assessment: negative aspiration for heme, no paresthesia on injection, local visualized surrounding nerve on ultrasound and no intravascular symptoms  Paresthesia pain: none  Slow fractionated injection: yes  Hemodynamics: stable  Outcomes: patient tolerated procedure well    Additional Notes  40 cc PF  .5% marcaine DD,neg asp

## 2023-08-10 NOTE — PROGRESS NOTES
Occupational [address]  Occupational Therapy Not Seen Note    DATE: 8/10/2023    NAME: Yasmin Brantley  MRN: [id number]   : 1995      Patient not seen this date for Occupational Therapy due to:      Surgery/Procedure: Pt to OR with orthosx at this time for surgical fixation of R radius/ulna fracture       Next Scheduled Treatment: Will check back 2023 as able     Electronically signed by Tam Levi OT on 8/10/2023 at 10:01 AM'

## 2023-08-10 NOTE — PLAN OF CARE
--  NO ACUTE NEUROSURGICAL INTERVENTION AT THIS TIME    NEUROSURGERY TO SIGN OFF     Please contact Neurosurgery with any questions. PATIENT TO FOLLOW UP IN CLINIC: 1 month with CT cervical imaging   ---  Follow-up with Neurosurgery  [facility] [place] [facility]  [address]  Call [phone] for an appointment.   --

## 2023-08-10 NOTE — DISCHARGE INSTRUCTIONS
Orthopaedic Instructions:  -Weight bearing status: Partial weight bearing with the right arm. No pushing, pulling, lifting anything more than 10lbs. -Starting three days after surgery, okay for daily dressing changes until wound/surgical incision site is dry. Dressing changes can be performed with simple Band-aids or gauze pads secured with tape/ace bandages. Once you no longer see drainage from your wounds on your dressings, it is okay to shower. Do not scrub vigorously, just let water run over wound/surgical sites. Additionally, at this point one no longer needs to change dressings daily. It is important that you do not soak the wound/incision site underwater, though. This includes baths, hot tubs, swimming pools, etc. Do not apply lotions or creams over the incision sites.  -Always look for signs of compartment syndrome: pain out of proportion to the injury, pain not controlled with pain medication, numbness in digits, changing of color of digits (paleness). If these signs occur return to ED immediately for reassessment.  -Always work on finger , wrist motion to decrease swelling.  -Ice (20 minutes on and off 1 hour) and elevate to reduce swelling and throbbing pain.  -Should urinate within 8 hours of surgery.  -Call the office or come to Emergency Room if signs of infection appear (hot, swollen, red, draining pus, fever)  -Take medications as prescribed.  -Wean off narcotics (percocet/norco) as soon as possible. Do not take tylenol if still taking narcotics.  -Follow up with Dr. Amanda Caal in his office 8/28/23 11:30am . Call [phone]  to schedule/confirm or with any questions/concerns.

## 2023-08-10 NOTE — OP NOTE
Operative Note    Abdulkadir Barillas  YOB: 1995  [id number]      Pre-operative Diagnosis: Right radius/ulna fractures    Post-operative Diagnosis: Right radius/ulna fractures    Procedure: Open reduction internal fixation Right radius and ulna    Anesthesia: General    Surgeons: Anh Arriaga DO    Assistants: James    Estimated Blood Loss: 25cc    IVF: 1100cc crystalloid    TT: 60 min    Complications: None    Specimens: Was Not Obtained    Findings: Displaced Right radius/fractures    Implants: Synthes 3.5mm LCP plate x 2    Indications: This is a 33 yo Female who presents for operative intervention of their Right radius/ulna. All treatment options, including conservative and operative, were discussed with the patient in detail including the risks and benefits of each. Risks including but not limited to  bleeding, blood clots, infection, damage to nearby tissues, vessels and nerves, wound healing complications, failed procedure, stiffness, loss of motion, malunion, nonunion, anesthesia risk, loss of life were all discussed with the patient. Knowing these risks, the patient wished to proceed with surgery as indicated. Consent was obtained. Site Marked. All questions answered to the patient's satisfaction. Operative: The patient was taken to the operative suite, placed under general anesthesia without any complications. Patient was secured to the operative table and all bony prominences were well padded. Ancef 2 gm was given prior to the procedure. At this time, all team members paused to identify proper patient name, indications, site and allergies. All team members were in agreeance. The Right upper extremity was prepped and draped in normal sterile fashion. Using intraoperative fluoroscopy, and using a marking pen, we mapped out our incision directly over the radius and ulnar shaft fractures.   We began with the radius, and after palpating the FCR tendon, we mapped our Monocryl, the skin using 2-0 nylon in a horizontal mattress fashion. We then dressed the wound using Xeroform, fluffs, ABD, Webril, and an Ace wrap. Tourniquet was deflated without any complication. The patient was then awoken general anesthesia and transferred the PACU in stable condition. I was present for all aspects procedure. Meticulous dissection was used, and thorough hemostasis was achieved. Patient will be allowed range of motion as tolerated to the operative extremity, with a 10 pound weight restriction. Patient will follow up in my clinic in 2 weeks with repeat radiographs.       Lisbeth Jennings DO  12:19 PM 8/10/2023

## 2023-08-10 NOTE — BRIEF OP NOTE
Brief Postoperative Note      Patient: Pham Nguyen  YOB: 1995  MRN: [id number]    Date of Procedure: 8/10/2023    Pre-Op Diagnosis Codes:   1. Right closed both bone forearm fracture. Post-Op Diagnosis:   1. Right closed both bone forearm fracture. Procedure(s):  Open reduction internal fixation of radius and ulna. Surgeon(s):  Daisy Torres DO    Assistant:  Resident: Slava Kellogg DO; Barak Marcus DO    Anesthesia: General    Estimated Blood Loss (mL): 25cc    Fluids: 1100cc crystalloid    TT: 60 min    Complications: None    Specimens:   * No specimens in log *    Implants:  Implant Name Type Inv. Item Serial No.  Lot No. LRB No. Used Action   PLATE BNE [id number] THK3. 4MM 6 H BILAT S STL STR FELIBERTO COMPR FOR - [id number]  PLATE BNE [id number] THK3. 4MM 6 H BILAT S STL STR FELIBERTO COMPR FOR  DEPUY SYNTHES [employer]  Right 1 Implanted   SCREW BNE L14MM DIA3.5MM DEION S STL ST NONCANNULATED FELIBERTO - [id number]  SCREW BNE L14MM DIA3.5MM DEION S STL ST NONCANNULATED FELIBERTO  DEPUY SYNTHES [employer]WD  Right 6 Implanted   PLATE BNE [id number] THK3. 4MM 6 H BILAT S STL STR FELIBERTO COMPR FOR - [id number]  PLATE BNE [id number] THK3. 4MM 6 H BILAT S STL STR FELIBERTO COMPR FOR  DEPUY SYNTH[employer]WD  Right 1 Implanted   SCREW BNE L12MM DIA3.5MM DEION S STL ST NONCANNULATED FELIBERTO - [id number]  SCREW BNE L12MM DIA3.5MM DEION S STL ST NONCANNULATED FELIBERTO  DEPUY SYNTHES [employer]-WD  Right 6 Implanted         Drains: * No LDAs found *    Findings: See op note      Electronically signed by Daisy Torres DO on 8/10/2023 at 4:26 PM

## 2023-08-11 VITALS
RESPIRATION RATE: 16 BRPM | TEMPERATURE: 98.4 F | OXYGEN SATURATION: 98 % | HEART RATE: 68 BPM | BODY MASS INDEX: 24.84 KG/M2 | DIASTOLIC BLOOD PRESSURE: 92 MMHG | SYSTOLIC BLOOD PRESSURE: 127 MMHG | HEIGHT: 64 IN | WEIGHT: 145.5 LBS

## 2023-08-11 PROBLEM — J96.91 RESPIRATORY FAILURE WITH HYPOXIA (HCC): Status: RESOLVED | Noted: 2021-07-25 | Resolved: 2023-08-11

## 2023-08-11 PROBLEM — N17.0 ACUTE KIDNEY INJURY (AKI) WITH ACUTE TUBULAR NECROSIS (ATN) (HCC): Status: RESOLVED | Noted: 2020-07-08 | Resolved: 2023-08-11

## 2023-08-11 PROBLEM — E87.20 LACTIC ACIDOSIS: Status: RESOLVED | Noted: 2020-07-08 | Resolved: 2023-08-11

## 2023-08-11 PROBLEM — S22.080A T12 COMPRESSION FRACTURE, INITIAL ENCOUNTER (HCC): Status: RESOLVED | Noted: 2020-10-07 | Resolved: 2023-08-11

## 2023-08-11 PROBLEM — F11.921: Status: RESOLVED | Noted: 2020-07-10 | Resolved: 2023-08-11

## 2023-08-11 PROBLEM — G92.8 TOXIC METABOLIC ENCEPHALOPATHY: Status: RESOLVED | Noted: 2020-07-08 | Resolved: 2023-08-11

## 2023-08-11 PROBLEM — F11.921: Status: RESOLVED | Noted: 2020-07-07 | Resolved: 2023-08-11

## 2023-08-11 PROCEDURE — 6360000002 HC RX W HCPCS

## 2023-08-11 PROCEDURE — 6370000000 HC RX 637 (ALT 250 FOR IP)

## 2023-08-11 PROCEDURE — 6370000000 HC RX 637 (ALT 250 FOR IP): Performed by: STUDENT IN AN ORGANIZED HEALTH CARE EDUCATION/TRAINING PROGRAM

## 2023-08-11 PROCEDURE — G0378 HOSPITAL OBSERVATION PER HR: HCPCS

## 2023-08-11 PROCEDURE — 99238 HOSP IP/OBS DSCHRG MGMT 30/<: CPT | Performed by: NURSE PRACTITIONER

## 2023-08-11 PROCEDURE — 97530 THERAPEUTIC ACTIVITIES: CPT

## 2023-08-11 PROCEDURE — 97116 GAIT TRAINING THERAPY: CPT

## 2023-08-11 PROCEDURE — 97162 PT EVAL MOD COMPLEX 30 MIN: CPT

## 2023-08-11 RX ORDER — GABAPENTIN 300 MG/1
300 CAPSULE ORAL EVERY 8 HOURS
Qty: 21 CAPSULE | Refills: 0 | Status: SHIPPED | OUTPATIENT
Start: 2023-08-11 | End: 2023-08-18

## 2023-08-11 RX ORDER — METHOCARBAMOL 750 MG/1
750 TABLET, FILM COATED ORAL EVERY 6 HOURS
Qty: 40 TABLET | Refills: 0 | Status: SHIPPED | OUTPATIENT
Start: 2023-08-11 | End: 2023-08-11 | Stop reason: SDUPTHER

## 2023-08-11 RX ORDER — OXYCODONE HYDROCHLORIDE 5 MG/1
5 TABLET ORAL EVERY 6 HOURS PRN
Qty: 15 TABLET | Refills: 0 | Status: SHIPPED | OUTPATIENT
Start: 2023-08-11 | End: 2023-08-11 | Stop reason: SDUPTHER

## 2023-08-11 RX ORDER — OXYCODONE HYDROCHLORIDE 5 MG/1
5 TABLET ORAL EVERY 6 HOURS PRN
Qty: 15 TABLET | Refills: 0 | Status: SHIPPED | OUTPATIENT
Start: 2023-08-11 | End: 2023-08-18

## 2023-08-11 RX ORDER — METHOCARBAMOL 750 MG/1
750 TABLET, FILM COATED ORAL EVERY 6 HOURS
Qty: 40 TABLET | Refills: 0 | Status: SHIPPED | OUTPATIENT
Start: 2023-08-11 | End: 2023-08-21

## 2023-08-11 RX ORDER — GABAPENTIN 300 MG/1
300 CAPSULE ORAL EVERY 8 HOURS
Qty: 21 CAPSULE | Refills: 0 | Status: SHIPPED | OUTPATIENT
Start: 2023-08-11 | End: 2023-08-11 | Stop reason: SDUPTHER

## 2023-08-11 RX ADMIN — ACETAMINOPHEN 1000 MG: 500 TABLET ORAL at 07:05

## 2023-08-11 RX ADMIN — OXYCODONE HYDROCHLORIDE 10 MG: 5 TABLET ORAL at 12:44

## 2023-08-11 RX ADMIN — METHOCARBAMOL TABLETS 750 MG: 750 TABLET, COATED ORAL at 06:23

## 2023-08-11 RX ADMIN — OXYCODONE HYDROCHLORIDE 10 MG: 5 TABLET ORAL at 07:05

## 2023-08-11 RX ADMIN — ACETAMINOPHEN 1000 MG: 500 TABLET ORAL at 13:50

## 2023-08-11 RX ADMIN — HYDROMORPHONE HYDROCHLORIDE 0.5 MG: 1 INJECTION, SOLUTION INTRAMUSCULAR; INTRAVENOUS; SUBCUTANEOUS at 10:06

## 2023-08-11 RX ADMIN — METHOCARBAMOL TABLETS 750 MG: 750 TABLET, COATED ORAL at 12:43

## 2023-08-11 RX ADMIN — GABAPENTIN 300 MG: 300 CAPSULE ORAL at 13:50

## 2023-08-11 RX ADMIN — Medication 2000 MG: at 03:52

## 2023-08-11 ASSESSMENT — PAIN DESCRIPTION - LOCATION
LOCATION: ARM
LOCATION: ARM

## 2023-08-11 ASSESSMENT — PAIN SCALES - GENERAL
PAINLEVEL_OUTOF10: 4
PAINLEVEL_OUTOF10: 3
PAINLEVEL_OUTOF10: 0
PAINLEVEL_OUTOF10: 0
PAINLEVEL_OUTOF10: 7
PAINLEVEL_OUTOF10: 7
PAINLEVEL_OUTOF10: 0

## 2023-08-11 ASSESSMENT — PATIENT HEALTH QUESTIONNAIRE - PHQ9: SUM OF ALL RESPONSES TO PHQ QUESTIONS 1-9: 6

## 2023-08-11 ASSESSMENT — PAIN DESCRIPTION - ORIENTATION: ORIENTATION: RIGHT
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
## 2023-08-11 NOTE — CARE COORDINATION
Discharge [address] Case Management Department  Written by: Bunny Gonzalez RN    Patient Name: Hayder Caba  Attending Provider: Rebecca Allison MD  Admit Date: 2023  1:24 PM  MRN: [id number]  Account: [de-identified]                     : 1995  Discharge Date: 23  Given clothes for dc    Disposition: home    Bunny Gonzalez RN
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
## 2023-09-06 ENCOUNTER — OFFICE VISIT (OUTPATIENT)
Dept: ORTHOPEDIC SURGERY | Age: 28
End: 2023-09-06

## 2023-09-06 ENCOUNTER — TELEPHONE (OUTPATIENT)
Dept: ORTHOPEDIC SURGERY | Age: 28
End: 2023-09-06

## 2023-09-06 VITALS — BODY MASS INDEX: 24.75 KG/M2 | HEIGHT: 64 IN | WEIGHT: 145 LBS

## 2023-09-06 DIAGNOSIS — S52.91XD CLOSED FRACTURE OF RIGHT RADIUS AND ULNA WITH ROUTINE HEALING, SUBSEQUENT ENCOUNTER: Primary | ICD-10-CM

## 2023-09-06 DIAGNOSIS — S52.201D CLOSED FRACTURE OF RIGHT RADIUS AND ULNA WITH ROUTINE HEALING, SUBSEQUENT ENCOUNTER: Primary | ICD-10-CM

## 2023-09-06 PROCEDURE — 99024 POSTOP FOLLOW-UP VISIT: CPT | Performed by: STUDENT IN AN ORGANIZED HEALTH CARE EDUCATION/TRAINING PROGRAM

## 2023-09-06 NOTE — TELEPHONE ENCOUNTER
Pt missed post op appt scheduled on 8/28/23. First available appt was scheduled for pt on 9/25/23. Please call pt to schedule sooner if appropriate.

## 2023-09-12 ENCOUNTER — HOSPITAL ENCOUNTER (OUTPATIENT)
Dept: OCCUPATIONAL THERAPY | Age: 28
Setting detail: THERAPIES SERIES
Discharge: HOME | End: 2023-09-12

## 2023-09-12 NOTE — FLOWSHEET NOTE
[x] [address] [facility]. Occupational Therapy       [address]       Phone: [phone]       Fax: [phone] [] [facility]       Phone: [phone]       Fax: [phone]          Occupational Therapy Cancel/No Show note    Date: 2023  Patient: Regine Hoksins  : 1995  MRN: [id number]  Cancels/No Shows to date:     For today's appointment patient:  [x]  Cancelled  [x]  Rescheduled appointment  []  No-show     Reason given by patient:  []  Patient ill  []  Conflicting appointment  []  No transportation    []  Conflict with work  []  No reason given  [x]  Other: Late Arrival   Comments: OT evaluation cancelled this date. Patient spoke with Zoltan Price at ~3:20pm stating her ride was running behind and would be at [facility]. V's before 3:45pm for her 4:00pm appointment.  Patient never showed for OT evaluation, called patient who rescheduled for 9/15/23 at 7:00am.     Electronically signed by: DAVIS Boudreaux/BILL

## 2023-11-02 ENCOUNTER — HOSPITAL ENCOUNTER (OUTPATIENT)
Age: 28
Setting detail: SPECIMEN
Discharge: HOME | End: 2023-11-02

## 2023-11-03 LAB
CANDIDA SPECIES: NEGATIVE
GARDNERELLA VAGINALIS: NEGATIVE
SOURCE: NORMAL
TRICHOMONAS: NEGATIVE

## 2024-03-21 ENCOUNTER — TELEPHONE (OUTPATIENT)
Dept: UROLOGY | Age: 29
End: 2024-03-21

## 2024-08-22 PROBLEM — G43.009 MIGRAINE WITHOUT AURA AND WITHOUT STATUS MIGRAINOSUS, NOT INTRACTABLE: Chronic | Status: ACTIVE | Noted: 2022-12-19

## 2024-08-22 PROBLEM — N94.10 DYSPAREUNIA, FEMALE: Status: ACTIVE | Noted: 2023-10-05

## 2024-08-22 PROBLEM — F17.200 TOBACCO DEPENDENCE SYNDROME: Status: ACTIVE | Noted: 2020-12-30

## 2024-08-22 PROBLEM — G40.909 EPILEPSY (HCC): Status: ACTIVE | Noted: 2020-12-30

## 2024-08-22 PROBLEM — N31.8 LOW BLADDER COMPLIANCE: Status: ACTIVE | Noted: 2023-03-02

## 2024-08-22 PROBLEM — G47.10 HYPERSOMNIA: Status: ACTIVE | Noted: 2023-04-19

## 2024-08-22 PROBLEM — R56.9 SEIZURE (HCC): Status: ACTIVE | Noted: 2021-10-16

## 2024-08-22 PROBLEM — F33.2 MODERATELY SEVERE RECURRENT MAJOR DEPRESSION (HCC): Status: ACTIVE | Noted: 2020-12-31

## 2024-08-22 PROBLEM — F51.3 SLEEP WALKING DISORDER: Status: ACTIVE | Noted: 2021-02-11

## 2024-08-22 PROBLEM — T50.901A DRUG OVERDOSE: Status: ACTIVE | Noted: 2020-12-14

## 2024-08-22 PROBLEM — F90.2 ATTENTION DEFICIT HYPERACTIVITY DISORDER (ADHD), COMBINED TYPE: Status: ACTIVE | Noted: 2020-12-30

## 2024-08-22 PROBLEM — B97.7 HPV IN FEMALE: Status: ACTIVE | Noted: 2022-02-17

## 2024-08-22 PROBLEM — R35.0 URINARY FREQUENCY: Status: ACTIVE | Noted: 2022-05-31

## 2024-08-22 PROBLEM — F31.4 SEVERE DEPRESSED BIPOLAR I DISORDER WITHOUT PSYCHOTIC FEATURES (HCC): Status: ACTIVE | Noted: 2020-12-17

## 2024-08-29 ENCOUNTER — FOLLOWUP TELEPHONE ENCOUNTER (OUTPATIENT)
Dept: OBGYN | Age: 29
End: 2024-08-29

## 2024-08-29 ENCOUNTER — INITIAL PRENATAL (OUTPATIENT)
Dept: OBGYN | Age: 29
End: 2024-08-29
Payer: MEDICAID

## 2024-08-29 ENCOUNTER — HOSPITAL ENCOUNTER (OUTPATIENT)
Age: 29
Setting detail: SPECIMEN
Discharge: HOME | End: 2024-08-29

## 2024-08-29 VITALS
SYSTOLIC BLOOD PRESSURE: 96 MMHG | HEART RATE: 71 BPM | DIASTOLIC BLOOD PRESSURE: 64 MMHG | BODY MASS INDEX: 26.61 KG/M2 | WEIGHT: 155 LBS

## 2024-08-29 DIAGNOSIS — R35.0 URINARY FREQUENCY: ICD-10-CM

## 2024-08-29 DIAGNOSIS — G40.909 NONINTRACTABLE EPILEPSY WITHOUT STATUS EPILEPTICUS, UNSPECIFIED EPILEPSY TYPE (HCC): Primary | ICD-10-CM

## 2024-08-29 DIAGNOSIS — F19.10 POLYSUBSTANCE ABUSE (HCC): ICD-10-CM

## 2024-08-29 DIAGNOSIS — F31.9 BIPOLAR 1 DISORDER (HCC): ICD-10-CM

## 2024-08-29 DIAGNOSIS — F90.2 ATTENTION DEFICIT HYPERACTIVITY DISORDER (ADHD), COMBINED TYPE: ICD-10-CM

## 2024-08-29 PROBLEM — F32.A DEPRESSION WITH SUICIDAL IDEATION: Status: RESOLVED | Noted: 2020-07-15 | Resolved: 2024-08-29

## 2024-08-29 PROBLEM — S52.201A CLOSED FRACTURE OF RIGHT RADIUS AND ULNA: Status: RESOLVED | Noted: 2023-08-09 | Resolved: 2024-08-29

## 2024-08-29 PROBLEM — R00.0 TACHYCARDIA: Status: RESOLVED | Noted: 2022-12-15 | Resolved: 2024-08-29

## 2024-08-29 PROBLEM — F31.4 SEVERE DEPRESSED BIPOLAR I DISORDER WITHOUT PSYCHOTIC FEATURES (HCC): Status: RESOLVED | Noted: 2020-12-17 | Resolved: 2024-08-29

## 2024-08-29 PROBLEM — F33.2 MODERATELY SEVERE RECURRENT MAJOR DEPRESSION (HCC): Status: RESOLVED | Noted: 2020-12-31 | Resolved: 2024-08-29

## 2024-08-29 PROBLEM — R56.9 SEIZURE (HCC): Status: RESOLVED | Noted: 2021-10-16 | Resolved: 2024-08-29

## 2024-08-29 PROBLEM — T50.901A DRUG OVERDOSE: Status: RESOLVED | Noted: 2020-12-14 | Resolved: 2024-08-29

## 2024-08-29 PROBLEM — N31.8 LOW BLADDER COMPLIANCE: Status: RESOLVED | Noted: 2023-03-02 | Resolved: 2024-08-29

## 2024-08-29 PROBLEM — N94.10 DYSPAREUNIA, FEMALE: Status: RESOLVED | Noted: 2023-10-05 | Resolved: 2024-08-29

## 2024-08-29 PROBLEM — R45.851 DEPRESSION WITH SUICIDAL IDEATION: Status: RESOLVED | Noted: 2020-07-15 | Resolved: 2024-08-29

## 2024-08-29 PROBLEM — S52.91XA CLOSED FRACTURE OF RIGHT RADIUS AND ULNA: Status: RESOLVED | Noted: 2023-08-09 | Resolved: 2024-08-29

## 2024-08-29 PROCEDURE — 99212 OFFICE O/P EST SF 10 MIN: CPT | Performed by: OBSTETRICS & GYNECOLOGY

## 2024-08-29 RX ORDER — VITAMIN E 268 MG
400 CAPSULE ORAL DAILY
COMMUNITY

## 2024-08-29 NOTE — ADDENDUM NOTE
Addended by: SUKUMAR TORRES on: 8/29/2024 11:50 AM     Modules accepted: Orders, Level of Service

## 2024-08-29 NOTE — TELEPHONE ENCOUNTER
SW met with Pt to discuss issues with getting baby and pregnancy items.   SW was able to assist with obtaining recourses tor these areas and completed a referral for [facility]. Pt will follow up as needed.
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
## 2024-08-30 LAB
MICROORGANISM SPEC CULT: ABNORMAL
SERVICE CMNT-IMP: ABNORMAL
SPECIMEN DESCRIPTION: ABNORMAL

## 2024-09-03 PROBLEM — R82.71 GBS BACTERIURIA: Status: ACTIVE | Noted: 2024-09-03

## 2024-09-03 RX ORDER — CEPHALEXIN 500 MG/1
500 CAPSULE ORAL 4 TIMES DAILY
Qty: 28 CAPSULE | Refills: 0 | Status: SHIPPED | OUTPATIENT
Start: 2024-09-03 | End: 2024-09-10

## 2024-09-13 ENCOUNTER — TELEPHONE (OUTPATIENT)
Dept: OBGYN | Age: 29
End: 2024-09-13

## 2024-09-24 ENCOUNTER — TELEPHONE (OUTPATIENT)
Dept: OBGYN | Age: 29
End: 2024-09-24

## 2024-10-18 PROBLEM — G40.309 NONINTRACTABLE GENERALIZED IDIOPATHIC EPILEPSY WITHOUT STATUS EPILEPTICUS (HCC): Status: ACTIVE | Noted: 2020-12-30

## 2024-10-21 ENCOUNTER — SCHEDULED TELEPHONE ENCOUNTER (OUTPATIENT)
Dept: OBGYN | Age: 29
End: 2024-10-21
Payer: MEDICAID

## 2024-10-21 DIAGNOSIS — F19.10 POLYSUBSTANCE ABUSE (HCC): ICD-10-CM

## 2024-10-21 DIAGNOSIS — A60.00 HERPES SIMPLEX INFECTION OF GENITOURINARY SYSTEM: ICD-10-CM

## 2024-10-21 DIAGNOSIS — F51.3 SLEEP WALKING DISORDER: ICD-10-CM

## 2024-10-21 DIAGNOSIS — O09.92 HIGH-RISK PREGNANCY IN SECOND TRIMESTER: ICD-10-CM

## 2024-10-21 DIAGNOSIS — R82.71 GBS BACTERIURIA: ICD-10-CM

## 2024-10-21 DIAGNOSIS — R35.0 URINARY FREQUENCY: ICD-10-CM

## 2024-10-21 DIAGNOSIS — R87.611 ATYPICAL SQUAMOUS CELLS CANNOT EXCLUDE HIGH GRADE SQUAMOUS INTRAEPITHELIAL LESION ON CYTOLOGIC SMEAR OF CERVIX (ASC-H): ICD-10-CM

## 2024-10-21 DIAGNOSIS — Z72.0 VAPES NICOTINE CONTAINING SUBSTANCE: ICD-10-CM

## 2024-10-21 DIAGNOSIS — Z86.14 HX MRSA INFECTION: ICD-10-CM

## 2024-10-21 DIAGNOSIS — G47.10 HYPERSOMNIA: ICD-10-CM

## 2024-10-21 DIAGNOSIS — O09.32 INSUFFICIENT PRENATAL CARE IN SECOND TRIMESTER: ICD-10-CM

## 2024-10-21 DIAGNOSIS — G40.309 NONINTRACTABLE GENERALIZED IDIOPATHIC EPILEPSY WITHOUT STATUS EPILEPTICUS (HCC): Primary | ICD-10-CM

## 2024-10-21 DIAGNOSIS — Z87.891 FORMER SMOKER: ICD-10-CM

## 2024-10-21 DIAGNOSIS — F12.90 MARIJUANA USE: ICD-10-CM

## 2024-10-21 DIAGNOSIS — Z28.21 COVID-19 VACCINATION DECLINED: ICD-10-CM

## 2024-10-21 DIAGNOSIS — F90.2 ATTENTION DEFICIT HYPERACTIVITY DISORDER (ADHD), COMBINED TYPE: ICD-10-CM

## 2024-10-21 DIAGNOSIS — E55.9 VITAMIN D DEFICIENCY: ICD-10-CM

## 2024-10-21 DIAGNOSIS — F41.1 GENERALIZED ANXIETY DISORDER: ICD-10-CM

## 2024-10-21 DIAGNOSIS — F31.9 BIPOLAR 1 DISORDER (HCC): ICD-10-CM

## 2024-10-21 DIAGNOSIS — E03.8 OTHER SPECIFIED HYPOTHYROIDISM: ICD-10-CM

## 2024-10-21 PROBLEM — R87.610 ATYPICAL SQUAMOUS CELL CHANGES OF UNDETERMINED SIGNIFICANCE (ASCUS) ON CERVICAL CYTOLOGY WITH POSITIVE HIGH RISK HUMAN PAPILLOMA VIRUS (HPV): Status: ACTIVE | Noted: 2022-02-17

## 2024-10-21 PROBLEM — R87.810 ATYPICAL SQUAMOUS CELL CHANGES OF UNDETERMINED SIGNIFICANCE (ASCUS) ON CERVICAL CYTOLOGY WITH POSITIVE HIGH RISK HUMAN PAPILLOMA VIRUS (HPV): Status: ACTIVE | Noted: 2022-02-17

## 2024-10-21 PROCEDURE — H1000 PRENATAL CARE ATRISK ASSESSM: HCPCS | Performed by: OBSTETRICS & GYNECOLOGY

## 2024-10-21 RX ORDER — BUPRENORPHINE AND NALOXONE 8; 2 MG/1; MG/1
1 FILM, SOLUBLE BUCCAL; SUBLINGUAL 2 TIMES DAILY
COMMUNITY
Start: 2024-08-12

## 2024-10-21 RX ORDER — TOPIRAMATE 50 MG/1
50 CAPSULE, EXTENDED RELEASE ORAL DAILY
COMMUNITY
Start: 2024-03-26

## 2024-10-21 RX ORDER — LEVOTHYROXINE SODIUM 50 UG/1
50 TABLET ORAL DAILY
COMMUNITY
Start: 2024-08-02

## 2024-10-21 RX ORDER — SWAB
1 SWAB, NON-MEDICATED MISCELLANEOUS DAILY
COMMUNITY
Start: 2024-09-30

## 2024-10-21 RX ORDER — ACETAMINOPHEN 160 MG
2000 TABLET,DISINTEGRATING ORAL DAILY
COMMUNITY
Start: 2024-09-30

## 2024-10-21 RX ORDER — ASPIRIN 81 MG/1
162 TABLET, CHEWABLE ORAL DAILY
Qty: 60 TABLET | Refills: 5 | Status: CANCELLED | OUTPATIENT
Start: 2024-10-21

## 2024-10-21 NOTE — PROGRESS NOTES
Documentation:  I communicated with the patient and/or health care decision maker about the OB intake.   Details of this discussion including any medical advice provided: see notes    Total Time: minutes: 21-30 minutes    Marry Tenorio was evaluated through a synchronous (real-time) audio encounter. Patient identification was verified at the start of the visit. She (or guardian if applicable) is aware that this is a billable service, which includes applicable co-pays. This visit was conducted with the patient's (and/or legal guardian's) verbal consent. She has not had a related appointment within my department in the past 7 days or scheduled within the next 24 hours.   The patient was located at Home: [address] [place] [address].  The provider was located at Facility (Appt Dept): [address] [place] [phone].  Confirm you are appropriately licensed, registered, or certified to deliver care in the state where the patient is located as indicated above. If you are not or unsure, please re-schedule the visit: Yes, I confirm.     Note: not billable if this call serves to triage the patient into an appointment for the relevant concern    Marry Tenorio is a 29 y.o. female evaluated via telephone on 10/21/2024 for Telephone Appointment Visit (OB intake)  .        Renu Juarez RN    Risk factors for current pregnancy: Fetal drug exposures; polysubstance use; genital herpes; ADHD; depression; bipolar I; ANDRE; PTSD; former smoker; idiopathic generalized epilepsy; GBS bactiuria; insufficiency prenatal care; pregravid BMI 26; vapes nicotine/flavoring; marijuana use    Patient occupation: Unemployed  Patient lives with:   Primary Care Provider: [facility]  Recent ER visits: No 3/12/24 urinary frequency  Planned/unplanned pregnancy: unplanned  Father of baby: FOB#1               LMP: Approximate   5/16/24  Menses monthly: Yes  BCP at conception: No  Dating ultrasound:

## 2024-10-22 PROBLEM — F41.1 GENERALIZED ANXIETY DISORDER: Status: ACTIVE | Noted: 2024-10-22

## 2024-10-22 PROBLEM — A60.00 HERPES SIMPLEX INFECTION OF GENITOURINARY SYSTEM: Status: ACTIVE | Noted: 2024-10-22

## 2024-10-22 PROBLEM — E55.9 VITAMIN D DEFICIENCY: Status: ACTIVE | Noted: 2024-10-22

## 2024-10-22 PROBLEM — F32.9 MAJOR DEPRESSIVE DISORDER: Status: ACTIVE | Noted: 2020-07-15

## 2024-10-22 PROBLEM — F12.90 MARIJUANA USE: Status: ACTIVE | Noted: 2024-10-22

## 2024-10-22 PROBLEM — Z72.0 VAPES NICOTINE CONTAINING SUBSTANCE: Status: ACTIVE | Noted: 2024-10-22

## 2024-10-22 PROBLEM — Z28.21 COVID-19 VACCINATION DECLINED: Status: ACTIVE | Noted: 2024-10-22

## 2024-10-23 ENCOUNTER — HOSPITAL ENCOUNTER (OUTPATIENT)
Age: 29
Setting detail: SPECIMEN
Discharge: HOME | End: 2024-10-23
Payer: MEDICAID

## 2024-10-23 DIAGNOSIS — O09.92 HIGH-RISK PREGNANCY IN SECOND TRIMESTER: ICD-10-CM

## 2024-10-23 DIAGNOSIS — R82.71 GBS BACTERIURIA: ICD-10-CM

## 2024-10-23 LAB
ABO + RH BLD: NORMAL
AMPHET UR QL SCN: NEGATIVE
BACTERIA URNS QL MICRO: ABNORMAL
BARBITURATES UR QL SCN: NEGATIVE
BASOPHILS # BLD: 0.05 K/UL (ref 0–0.2)
BASOPHILS NFR BLD: 1 % (ref 0–2)
BENZODIAZ UR QL: NEGATIVE
BILIRUB UR QL STRIP: NEGATIVE
BLOOD GROUP ANTIBODIES SERPL: NEGATIVE
CANNABINOIDS UR QL SCN: POSITIVE
CASTS #/AREA URNS LPF: ABNORMAL /LPF (ref 0–8)
CLARITY UR: ABNORMAL
COCAINE UR QL SCN: NEGATIVE
COLOR UR: ABNORMAL
EOSINOPHIL # BLD: 0.23 K/UL (ref 0–0.44)
EOSINOPHILS RELATIVE PERCENT: 4 % (ref 1–4)
EPI CELLS #/AREA URNS HPF: ABNORMAL /HPF (ref 0–5)
ERYTHROCYTE [DISTWIDTH] IN BLOOD BY AUTOMATED COUNT: 14.1 % (ref 11.8–14.4)
FENTANYL UR QL: NEGATIVE
GLUCOSE UR STRIP-MCNC: NEGATIVE MG/DL
HBV SURFACE AG SERPL QL IA: NONREACTIVE
HCT VFR BLD AUTO: 36.8 % (ref 36.3–47.1)
HCV AB SERPL QL IA: NONREACTIVE
HGB BLD-MCNC: 12.2 G/DL (ref 11.9–15.1)
HGB UR QL STRIP.AUTO: NEGATIVE
HIV 1+2 AB+HIV1 P24 AG SERPL QL IA: NONREACTIVE
IMM GRANULOCYTES # BLD AUTO: 0.03 K/UL (ref 0–0.3)
IMM GRANULOCYTES NFR BLD: 1 %
KETONES UR STRIP-MCNC: NEGATIVE MG/DL
LEUKOCYTE ESTERASE UR QL STRIP: ABNORMAL
LYMPHOCYTES NFR BLD: 1.71 K/UL (ref 1.1–3.7)
LYMPHOCYTES RELATIVE PERCENT: 27 % (ref 24–43)
MCH RBC QN AUTO: 29.4 PG (ref 25.2–33.5)
MCHC RBC AUTO-ENTMCNC: 33.2 G/DL (ref 28.4–34.8)
MCV RBC AUTO: 88.7 FL (ref 82.6–102.9)
METHADONE UR QL: NEGATIVE
MONOCYTES NFR BLD: 0.42 K/UL (ref 0.1–1.2)
MONOCYTES NFR BLD: 7 % (ref 3–12)
NEUTROPHILS NFR BLD: 62 % (ref 36–65)
NEUTS SEG NFR BLD: 3.93 K/UL (ref 1.5–8.1)
NITRITE UR QL STRIP: NEGATIVE
NRBC BLD-RTO: 0 PER 100 WBC
OPIATES UR QL SCN: NEGATIVE
OXYCODONE UR QL SCN: NEGATIVE
PCP UR QL SCN: NEGATIVE
PH UR STRIP: 7.5 [PH] (ref 5–8)
PLATELET # BLD AUTO: ABNORMAL K/UL (ref 138–453)
PLATELET, FLUORESCENCE: 201 K/UL (ref 138–453)
PLATELETS.RETICULATED NFR BLD AUTO: 12.4 % (ref 1.1–10.3)
PROT UR STRIP-MCNC: ABNORMAL MG/DL
RBC # BLD AUTO: 4.15 M/UL (ref 3.95–5.11)
RBC #/AREA URNS HPF: ABNORMAL /HPF (ref 0–4)
RUBV IGG SERPL QL IA: 13.7 IU/ML
SP GR UR STRIP: 1.02 (ref 1–1.03)
T PALLIDUM AB SER QL IA: NONREACTIVE
TEST INFORMATION: ABNORMAL
UROBILINOGEN UR STRIP-ACNC: NORMAL EU/DL (ref 0–1)
WBC #/AREA URNS HPF: ABNORMAL /HPF (ref 0–5)
WBC OTHER # BLD: 6.4 K/UL (ref 3.5–11.3)

## 2024-10-23 PROCEDURE — 81001 URINALYSIS AUTO W/SCOPE: CPT

## 2024-10-23 PROCEDURE — 87086 URINE CULTURE/COLONY COUNT: CPT

## 2024-10-23 PROCEDURE — 85025 COMPLETE CBC W/AUTO DIFF WBC: CPT

## 2024-10-23 PROCEDURE — 85055 RETICULATED PLATELET ASSAY: CPT

## 2024-10-23 PROCEDURE — 86850 RBC ANTIBODY SCREEN: CPT

## 2024-10-23 PROCEDURE — 87389 HIV-1 AG W/HIV-1&-2 AB AG IA: CPT

## 2024-10-23 PROCEDURE — 86403 PARTICLE AGGLUT ANTBDY SCRN: CPT

## 2024-10-23 PROCEDURE — 86900 BLOOD TYPING SEROLOGIC ABO: CPT

## 2024-10-23 PROCEDURE — 86803 HEPATITIS C AB TEST: CPT

## 2024-10-23 PROCEDURE — 80307 DRUG TEST PRSMV CHEM ANLYZR: CPT

## 2024-10-23 PROCEDURE — 86780 TREPONEMA PALLIDUM: CPT

## 2024-10-23 PROCEDURE — 86901 BLOOD TYPING SEROLOGIC RH(D): CPT

## 2024-10-23 PROCEDURE — 86762 RUBELLA ANTIBODY: CPT

## 2024-10-23 PROCEDURE — 87340 HEPATITIS B SURFACE AG IA: CPT

## 2024-10-23 PROCEDURE — 36415 COLL VENOUS BLD VENIPUNCTURE: CPT

## 2024-10-24 LAB
MICROORGANISM SPEC CULT: ABNORMAL
SERVICE CMNT-IMP: ABNORMAL
SPECIMEN DESCRIPTION: ABNORMAL

## 2024-10-26 ENCOUNTER — HOSPITAL ENCOUNTER (EMERGENCY)
Age: 29
Discharge: HOME | End: 2024-10-27
Attending: EMERGENCY MEDICINE
Payer: MEDICAID

## 2024-10-26 VITALS
RESPIRATION RATE: 18 BRPM | OXYGEN SATURATION: 95 % | TEMPERATURE: 97.8 F | HEART RATE: 63 BPM | SYSTOLIC BLOOD PRESSURE: 124 MMHG | DIASTOLIC BLOOD PRESSURE: 67 MMHG

## 2024-10-26 DIAGNOSIS — O21.9 NAUSEA AND VOMITING IN PREGNANCY: Primary | ICD-10-CM

## 2024-10-26 DIAGNOSIS — I49.3 PVC (PREMATURE VENTRICULAR CONTRACTION): ICD-10-CM

## 2024-10-26 LAB
ANION GAP SERPL CALCULATED.3IONS-SCNC: 16 MMOL/L (ref 9–16)
BASOPHILS # BLD: 0.1 K/UL (ref 0–0.2)
BASOPHILS NFR BLD: 1 % (ref 0–2)
BUN SERPL-MCNC: 7 MG/DL (ref 6–20)
CALCIUM SERPL-MCNC: 9.9 MG/DL (ref 8.6–10.4)
CHLORIDE SERPL-SCNC: 104 MMOL/L (ref 98–107)
CO2 SERPL-SCNC: 16 MMOL/L (ref 20–31)
CREAT SERPL-MCNC: 0.6 MG/DL (ref 0.5–0.9)
EOSINOPHIL # BLD: 0.1 K/UL (ref 0–0.44)
EOSINOPHILS RELATIVE PERCENT: 1 % (ref 1–4)
ERYTHROCYTE [DISTWIDTH] IN BLOOD BY AUTOMATED COUNT: 13.8 % (ref 11.8–14.4)
FLUAV AG SPEC QL: NEGATIVE
FLUBV AG SPEC QL: NEGATIVE
GFR, ESTIMATED: >90 ML/MIN/1.73M2
GLUCOSE SERPL-MCNC: 98 MG/DL (ref 74–99)
HCT VFR BLD AUTO: 39 % (ref 36.3–47.1)
HGB BLD-MCNC: 13 G/DL (ref 11.9–15.1)
IMM GRANULOCYTES # BLD AUTO: 0.1 K/UL (ref 0–0.3)
IMM GRANULOCYTES NFR BLD: 1 %
LYMPHOCYTES NFR BLD: 0.62 K/UL (ref 1.1–3.7)
LYMPHOCYTES RELATIVE PERCENT: 6 % (ref 24–43)
MCH RBC QN AUTO: 29.1 PG (ref 25.2–33.5)
MCHC RBC AUTO-ENTMCNC: 33.3 G/DL (ref 28.4–34.8)
MCV RBC AUTO: 87.4 FL (ref 82.6–102.9)
MONOCYTES NFR BLD: 0.52 K/UL (ref 0.1–1.2)
MONOCYTES NFR BLD: 5 % (ref 3–12)
MORPHOLOGY: NORMAL
NEUTROPHILS NFR BLD: 86 % (ref 36–65)
NEUTS SEG NFR BLD: 8.86 K/UL (ref 1.5–8.1)
NRBC BLD-RTO: 0 PER 100 WBC
PLATELET # BLD AUTO: ABNORMAL K/UL (ref 138–453)
PLATELET, FLUORESCENCE: 216 K/UL (ref 138–453)
PLATELETS.RETICULATED NFR BLD AUTO: 10.3 % (ref 1.1–10.3)
POTASSIUM SERPL-SCNC: 4.3 MMOL/L (ref 3.7–5.3)
RBC # BLD AUTO: 4.46 M/UL (ref 3.95–5.11)
SARS-COV-2 RDRP RESP QL NAA+PROBE: NOT DETECTED
SODIUM SERPL-SCNC: 136 MMOL/L (ref 136–145)
SPECIMEN DESCRIPTION: NORMAL
TSH SERPL DL<=0.05 MIU/L-ACNC: 3.33 UIU/ML (ref 0.27–4.2)
WBC OTHER # BLD: 10.3 K/UL (ref 3.5–11.3)

## 2024-10-26 PROCEDURE — 6360000002 HC RX W HCPCS

## 2024-10-26 PROCEDURE — 85025 COMPLETE CBC W/AUTO DIFF WBC: CPT

## 2024-10-26 PROCEDURE — 84484 ASSAY OF TROPONIN QUANT: CPT

## 2024-10-26 PROCEDURE — 96374 THER/PROPH/DIAG INJ IV PUSH: CPT

## 2024-10-26 PROCEDURE — 87804 INFLUENZA ASSAY W/OPTIC: CPT

## 2024-10-26 PROCEDURE — 93005 ELECTROCARDIOGRAM TRACING: CPT

## 2024-10-26 PROCEDURE — 87635 SARS-COV-2 COVID-19 AMP PRB: CPT

## 2024-10-26 PROCEDURE — 99284 EMERGENCY DEPT VISIT MOD MDM: CPT

## 2024-10-26 PROCEDURE — 85055 RETICULATED PLATELET ASSAY: CPT

## 2024-10-26 PROCEDURE — 6370000000 HC RX 637 (ALT 250 FOR IP)

## 2024-10-26 PROCEDURE — 84443 ASSAY THYROID STIM HORMONE: CPT

## 2024-10-26 PROCEDURE — 80048 BASIC METABOLIC PNL TOTAL CA: CPT

## 2024-10-26 RX ORDER — MAGNESIUM HYDROXIDE/ALUMINUM HYDROXICE/SIMETHICONE 120; 1200; 1200 MG/30ML; MG/30ML; MG/30ML
30 SUSPENSION ORAL ONCE
Status: COMPLETED | OUTPATIENT
Start: 2024-10-26 | End: 2024-10-26

## 2024-10-26 RX ORDER — PANTOPRAZOLE SODIUM 40 MG/1
40 TABLET, DELAYED RELEASE ORAL ONCE
Status: COMPLETED | OUTPATIENT
Start: 2024-10-26 | End: 2024-10-26

## 2024-10-26 RX ORDER — ONDANSETRON 2 MG/ML
4 INJECTION INTRAMUSCULAR; INTRAVENOUS ONCE
Status: COMPLETED | OUTPATIENT
Start: 2024-10-26 | End: 2024-10-26

## 2024-10-26 RX ORDER — ACETAMINOPHEN 500 MG
1000 TABLET ORAL ONCE
Status: COMPLETED | OUTPATIENT
Start: 2024-10-26 | End: 2024-10-26

## 2024-10-26 RX ADMIN — ONDANSETRON 4 MG: 2 INJECTION INTRAMUSCULAR; INTRAVENOUS at 22:03

## 2024-10-26 RX ADMIN — PANTOPRAZOLE SODIUM 40 MG: 40 TABLET, DELAYED RELEASE ORAL at 23:35

## 2024-10-26 RX ADMIN — ACETAMINOPHEN 1000 MG: 500 TABLET ORAL at 22:52

## 2024-10-26 RX ADMIN — ALUMINUM HYDROXIDE, MAGNESIUM HYDROXIDE, AND SIMETHICONE 30 ML: 200; 200; 20 SUSPENSION ORAL at 23:35

## 2024-10-27 LAB — TROPONIN I SERPL HS-MCNC: <6 NG/L (ref 0–14)

## 2024-10-27 RX ORDER — CALCIUM CARBONATE 500 MG/1
1 TABLET, CHEWABLE ORAL DAILY
Qty: 30 TABLET | Refills: 0 | Status: SHIPPED | OUTPATIENT
Start: 2024-10-27 | End: 2024-11-26

## 2024-10-27 RX ORDER — CALCIUM CARBONATE 500 MG/1
1 TABLET, CHEWABLE ORAL DAILY
Qty: 30 TABLET | Refills: 0 | Status: CANCELLED | OUTPATIENT
Start: 2024-10-27 | End: 2024-11-26

## 2024-10-27 RX ORDER — FAMOTIDINE 20 MG/1
20 TABLET, FILM COATED ORAL 2 TIMES DAILY
Qty: 60 TABLET | Refills: 0 | Status: SHIPPED | OUTPATIENT
Start: 2024-10-27

## 2024-10-27 NOTE — DISCHARGE INSTRUCTIONS
-You were seen and evaluated by emergency medicine physicians at [facility].    -Please follow-up with your primary care physician and/or with the referrals to specialist.    -You were diagnosed with: Nausea and vomiting in pregnancy, PVC (premature ventricular contraction)    -You had improvement in your symptoms with antinausea medication and Maalox.  You will be discharged with prescriptions of Pepcid and calcium tablets for indigestion.  You can pick these prescriptions up at the pharmacy here at [place]  -You had a shared decision-making discussion with the physician regarding an informed discharge and following up on the troponin level.  You can follow-up on your results in [facility].  If elevated, please seek care at the nearest emergency department/hospital.  It is highly recommended that you bring up the PVCs with your OB/GYN so that they can offer suggestions for cardiac monitoring/testing    -Pregnancy safe meds:  Acceptable Medications for Pregnant and Breastfeeding Women    Do not take any prescribed or over the counter medications during pregnancy unless we have prescribed them or you have checked with us first. The following medications, however, may be taken according to the package directions without checking with us.    Headaches and other Aches and Pains  Tylenol or Extra Strength Tylenol (acetaminophen)- can take up to 4000mg total in 24 hour period    Cold and Flu (Do not take any medicines with suffix D (ie. Robitussin-D, Sudafed-D, Mucinex-D) as it contains Pseudoephedrine which should be avoided in pregnancy)  Plain Robitussin  Plain Tylenol Cold and Flu  Plain Sudafed  Plain Mucinex  Benadryl    Seasonal Allergies  Benadryl   Claritin  Zyrtec    Heartburn  Tums  Rolaids  Maalox  Mylanta  Gas- Ex  Pepcid    Diarrhea  Imodium      -Please return to the Emergency Department if you are experiencing the following symptoms acutely: Headache, fever, chills, nausea, vomiting,

## 2024-10-27 NOTE — ED PROVIDER NOTES
[facility] ED     Emergency Department     Faculty Attestation    I performed a history and physical examination of the patient and discussed management with the resident. I reviewed the resident’s note and agree with the documented findings and plan of care. Any areas of disagreement are noted on the chart. I was personally present for the key portions of any procedures. I have documented in the chart those procedures where I was not present during the key portions. I have reviewed the emergency nurses triage note. I agree with the chief complaint, past medical history, past surgical history, allergies, medications, social and family history as documented unless otherwise noted below. For Physician Assistant/ Nurse Practitioner cases/documentation I have personally evaluated this patient and have completed at least one if not all key elements of the E/M (history, physical exam, and MDM). Additional findings are as noted.    Note Started: 9:31 PM EDT    Patient here with abdominal pain vomiting constitutional complaints concerned that she got a viral illness from her  who was recently sick with similar symptoms independent history from .  Patient is 18-1/2 weeks pregnant has had prenatal care throughout the pregnancy.  On exam patient peers uncomfortable but nontoxic abdomen soft nontender no rebound or guarding.  Will check labs, meds, POCUS reevaluate      Critical Care     none    Carlito Mtz MD, FACEP, FAAEM  Attending Emergency  Physician           Carlito Mtz MD  10/26/24 2240    EKG interpretation: Sinus rhythm 63 normal intervals normal axis there are 2 PVCs nonconsecutive.  No acute ST changes.     Carlito Mtz MD  10/27/24 0021

## 2024-10-27 NOTE — ED NOTES
Pt refusing 2nd trop at this time, Dr. Luciano aware.   Pt wanting to wait and see what first trop is

## 2024-10-27 NOTE — ED PROVIDER NOTES
Faculty Sign-Out Attestation  Handoff taken on the following patient from prior Attending Physician: Bibi  Note Started: 12:16 AM EDT    I was available and discussed any additional care issues that arose and coordinated the management plans with the resident(s) caring for the patient during my duty period. Any areas of disagreement with resident’s documentation of care or procedures are noted on the chart. I was personally present for the key portions of any/all procedures during my duty period. I have documented in the chart those procedures where I was not present during the key portions.    18 wk / viral symptoms, vomiting,   S/s improved, planning to discharge    Paul Quiroz DO  Attending Physician      Paul Quiroz DO  10/27/24 0016
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
## 2024-10-28 LAB
EKG ATRIAL RATE: 63 BPM
EKG P AXIS: 65 DEGREES
EKG P-R INTERVAL: 194 MS
EKG Q-T INTERVAL: 418 MS
EKG QRS DURATION: 90 MS
EKG QTC CALCULATION (BAZETT): 427 MS
EKG R AXIS: 82 DEGREES
EKG T AXIS: 61 DEGREES
EKG VENTRICULAR RATE: 63 BPM

## 2024-10-28 PROCEDURE — 93010 ELECTROCARDIOGRAM REPORT: CPT | Performed by: INTERNAL MEDICINE

## 2024-11-05 DIAGNOSIS — O09.92 HIGH-RISK PREGNANCY IN SECOND TRIMESTER: ICD-10-CM

## 2024-11-12 ENCOUNTER — TELEPHONE (OUTPATIENT)
Dept: OBGYN | Age: 29
End: 2024-11-12

## 2024-12-03 ENCOUNTER — ROUTINE PRENATAL (OUTPATIENT)
Dept: PERINATAL CARE | Age: 29
End: 2024-12-03
Payer: MEDICAID

## 2024-12-03 VITALS
TEMPERATURE: 97.9 F | HEIGHT: 64 IN | HEART RATE: 74 BPM | DIASTOLIC BLOOD PRESSURE: 61 MMHG | SYSTOLIC BLOOD PRESSURE: 105 MMHG | RESPIRATION RATE: 16 BRPM | BODY MASS INDEX: 24.59 KG/M2 | WEIGHT: 144 LBS

## 2024-12-03 DIAGNOSIS — Z87.59 HISTORY OF MIGRAINE DURING PREGNANCY: ICD-10-CM

## 2024-12-03 DIAGNOSIS — Z86.69 HISTORY OF MIGRAINE DURING PREGNANCY: ICD-10-CM

## 2024-12-03 DIAGNOSIS — O99.352 SEIZURE DISORDER DURING PREGNANCY IN SECOND TRIMESTER (HCC): Primary | ICD-10-CM

## 2024-12-03 DIAGNOSIS — Z3A.24 24 WEEKS GESTATION OF PREGNANCY: ICD-10-CM

## 2024-12-03 DIAGNOSIS — O99.322 DRUG DEPENDENCE DURING PREGNANCY IN SECOND TRIMESTER (HCC): ICD-10-CM

## 2024-12-03 DIAGNOSIS — O09.32 INSUFFICIENT PRENATAL CARE IN SECOND TRIMESTER: ICD-10-CM

## 2024-12-03 DIAGNOSIS — G40.909 SEIZURE DISORDER DURING PREGNANCY IN SECOND TRIMESTER (HCC): Primary | ICD-10-CM

## 2024-12-03 DIAGNOSIS — F19.20 DRUG DEPENDENCE DURING PREGNANCY IN SECOND TRIMESTER (HCC): ICD-10-CM

## 2024-12-03 DIAGNOSIS — O35.8XX0 SUSPECTED DAMAGE TO FETUS FROM DISEASE IN MOTHER, ANTEPARTUM CONDITION, SINGLE OR UNSPECIFIED FETUS: ICD-10-CM

## 2024-12-03 PROCEDURE — 99999 PR OFFICE/OUTPT VISIT,PROCEDURE ONLY: CPT | Performed by: OBSTETRICS & GYNECOLOGY

## 2024-12-03 PROCEDURE — 76811 OB US DETAILED SNGL FETUS: CPT | Performed by: OBSTETRICS & GYNECOLOGY

## 2024-12-03 NOTE — PROGRESS NOTES
Options with respect to offering the patient invasive genetic prenatal testing (including evaluating for fetal molecular etiologies for autism/developmental delay, fetal aneuploidy, fetal microdeletions, fetal single gene etiologies, fetal microarray, etc.), laboratory evaluation for Fragile X Syndrome testing, and MSAFP screen could not be completed today, as the patient declines a Maternal-Fetal Medicine physician consultation today.     Advise MSAFP (maternal serum alpha-feto protein level) only lab blood draw in primary OB office (if not previously completed).     Patient declines a Maternal-Fetal Medicine complete physician consultation today regarding the fetal and/or maternal medical/obstetrical complications/co-morbidities of pregnancy.      Maternal-Fetal Medicine (MFM) attending physician will defer all management for these medical/obstetrical complications of pregnancy to the primary attending obstetrical physician/provider, as a result.  Therefore, only an ultrasound evaluation was completed today in the MFM office.      Please refer to Maternal-Fetal Medicine OBGYN resident progress note in EPIC.

## 2024-12-03 NOTE — PROGRESS NOTES
Obstetric/Gynecology Maternal Fetal Medicine Resident Note      Patient declines formal consult with MFM attending physician for history of epilepsy and family history of autism.     Kary Cordova DO  OBGYMER Resident, PGY2  [facility]  12/3/2024, 9:39 AM

## 2024-12-12 NOTE — PROGRESS NOTES
[facility] OB/GYN  Initial Prenatal Visit    CC: Initial Prenatal Visit    HPI:   Marry Tenorio is a 29 y.o. female  at 25w4d  She is being seen today for her first obstetrical visit. Pregnancy history fully reviewed. This is not a planned pregnancy. Her LMP is Patient's last menstrual period was 2024 (approximate). Her obstetrical history is significant for fetal drug exposure, polysubstance use, idiopathetic generalized epilepsy, late prenatal care, hx HSV 2, ADHD, depression, bipolar 1, anxiety, PTSD, THC use, vapes, hx tobacco use.    The patient was seen and evaluated. The patient has no complains. There was positive fetal movements. She denies contractions, vaginal bleeding and leakage of fluid. She currently denies any signs or symptoms of pre-eclampsia which include headache, vision changes, RUQ pain.    The patient requested the T-Dap Vaccine (27-36 weeks) this pregnancy.  The patient is Rh + and Rhogam is not indicated in this pregnancy  The patient declined the influenza vaccine this year.   The patient declined the COVID-19 vaccine this year.     Relationship with FOB: Involved  Mother's ethnicity:   Father's ethnicity:   Family History:    - Neural tube defects: No   - Congenital birth defects (congenital heart defects, polydactyly, cleft lip/palate): No   - Intellectual disability: Yes: Patient's nephew   - Genetic disorders/chromosomal abnormalities: No   - Diabetes mellitus in first degree relatives: No  Genetic screening was discussed and patient.      OB History:  OB History    Para Term  AB Living   2 0 0 0 1 0   SAB IAB Ectopic Molar Multiple Live Births   1 0 0 0 0 0      # Outcome Date GA Lbr Smith/2nd Weight Sex Type Anes PTL Lv   2 Current            1 SAB 24     SAB         Birth Comments: D&C      Obstetric Comments   G1: SAB with D&C   G2: FOB boyfriend/spouse is involved       Past Medical History:  Past Medical History:   Diagnosis Date

## 2024-12-13 ENCOUNTER — HOSPITAL ENCOUNTER (OUTPATIENT)
Age: 29
Setting detail: SPECIMEN
Discharge: HOME | End: 2024-12-13

## 2024-12-13 ENCOUNTER — INITIAL PRENATAL (OUTPATIENT)
Dept: OBGYN | Age: 29
End: 2024-12-13

## 2024-12-13 VITALS
HEART RATE: 66 BPM | WEIGHT: 148 LBS | SYSTOLIC BLOOD PRESSURE: 99 MMHG | DIASTOLIC BLOOD PRESSURE: 67 MMHG | BODY MASS INDEX: 25.4 KG/M2

## 2024-12-13 DIAGNOSIS — Z87.891 FORMER SMOKER: ICD-10-CM

## 2024-12-13 DIAGNOSIS — Z3A.25 25 WEEKS GESTATION OF PREGNANCY: ICD-10-CM

## 2024-12-13 DIAGNOSIS — F19.10 POLYSUBSTANCE ABUSE (HCC): ICD-10-CM

## 2024-12-13 DIAGNOSIS — F12.90 MARIJUANA USE: ICD-10-CM

## 2024-12-13 DIAGNOSIS — F43.10 POSTTRAUMATIC STRESS DISORDER: ICD-10-CM

## 2024-12-13 DIAGNOSIS — F41.1 GENERALIZED ANXIETY DISORDER: ICD-10-CM

## 2024-12-13 DIAGNOSIS — E03.8 OTHER SPECIFIED HYPOTHYROIDISM: ICD-10-CM

## 2024-12-13 DIAGNOSIS — G40.309 NONINTRACTABLE GENERALIZED IDIOPATHIC EPILEPSY WITHOUT STATUS EPILEPTICUS (HCC): ICD-10-CM

## 2024-12-13 DIAGNOSIS — R87.611 ATYPICAL SQUAMOUS CELLS CANNOT EXCLUDE HIGH GRADE SQUAMOUS INTRAEPITHELIAL LESION ON CYTOLOGIC SMEAR OF CERVIX (ASC-H): ICD-10-CM

## 2024-12-13 DIAGNOSIS — F31.9 BIPOLAR 1 DISORDER (HCC): ICD-10-CM

## 2024-12-13 DIAGNOSIS — Z72.0 VAPES NICOTINE CONTAINING SUBSTANCE: ICD-10-CM

## 2024-12-13 DIAGNOSIS — F32.9 MAJOR DEPRESSIVE DISORDER, REMISSION STATUS UNSPECIFIED, UNSPECIFIED WHETHER RECURRENT: ICD-10-CM

## 2024-12-13 DIAGNOSIS — A60.00 HERPES SIMPLEX INFECTION OF GENITOURINARY SYSTEM: ICD-10-CM

## 2024-12-13 DIAGNOSIS — O09.92 HIGH-RISK PREGNANCY IN SECOND TRIMESTER: ICD-10-CM

## 2024-12-13 DIAGNOSIS — O09.92 HIGH-RISK PREGNANCY IN SECOND TRIMESTER: Primary | ICD-10-CM

## 2024-12-13 DIAGNOSIS — F90.2 ATTENTION DEFICIT HYPERACTIVITY DISORDER (ADHD), COMBINED TYPE: ICD-10-CM

## 2024-12-13 LAB
C TRACH DNA SPEC QL PROBE+SIG AMP: NORMAL
CANDIDA SPECIES: NEGATIVE
GARDNERELLA VAGINALIS: NEGATIVE
N GONORRHOEA DNA SPEC QL PROBE+SIG AMP: NORMAL
SOURCE: NORMAL
SPECIMEN DESCRIPTION: NORMAL
TRICHOMONAS: NEGATIVE

## 2024-12-13 NOTE — PROGRESS NOTES
Attending Physician Statement  I have discussed the care of Marry Tenorio, including pertinent history and exam findings, with the resident. I have seen and examined the patient and the key elements of all parts of the encounter have been performed by me. I agree with the assessment, plan and orders as documented by the resident.  (GC Modifier)    Shirley Rain [facility], [facility]  12/13/2024, 9:58 AM

## 2024-12-16 LAB
C TRACH DNA SPEC QL PROBE+SIG AMP: NEGATIVE
N GONORRHOEA DNA SPEC QL PROBE+SIG AMP: NEGATIVE
SPECIMEN DESCRIPTION: NORMAL

## 2025-01-02 LAB — CYTOLOGY REPORT: NORMAL

## 2025-01-03 LAB
HPV I/H RISK 4 DNA CVX QL NAA+PROBE: DETECTED
HPV SAMPLE: ABNORMAL
HPV, INTERPRETATION: ABNORMAL
HPV16 DNA CVX QL NAA+PROBE: NOT DETECTED
HPV18 DNA CVX QL NAA+PROBE: NOT DETECTED
SPECIMEN DESCRIPTION: ABNORMAL

## 2025-01-08 ENCOUNTER — FOLLOWUP TELEPHONE ENCOUNTER (OUTPATIENT)
Dept: OBGYN | Age: 30
End: 2025-01-08

## 2025-01-08 ENCOUNTER — ROUTINE PRENATAL (OUTPATIENT)
Dept: OBGYN | Age: 30
End: 2025-01-08
Payer: MEDICAID

## 2025-01-08 VITALS
DIASTOLIC BLOOD PRESSURE: 62 MMHG | WEIGHT: 151 LBS | BODY MASS INDEX: 25.92 KG/M2 | SYSTOLIC BLOOD PRESSURE: 100 MMHG | HEART RATE: 62 BPM

## 2025-01-08 DIAGNOSIS — Z23 NEED FOR TDAP VACCINATION: Primary | ICD-10-CM

## 2025-01-08 DIAGNOSIS — O09.93 HIGH-RISK PREGNANCY IN THIRD TRIMESTER: ICD-10-CM

## 2025-01-08 DIAGNOSIS — Z3A.29 29 WEEKS GESTATION OF PREGNANCY: ICD-10-CM

## 2025-01-08 DIAGNOSIS — F19.10 POLYSUBSTANCE ABUSE (HCC): ICD-10-CM

## 2025-01-08 PROCEDURE — 99213 OFFICE O/P EST LOW 20 MIN: CPT | Performed by: ADVANCED PRACTICE MIDWIFE

## 2025-01-08 PROCEDURE — 99211 OFF/OP EST MAY X REQ PHY/QHP: CPT | Performed by: ADVANCED PRACTICE MIDWIFE

## 2025-01-08 PROCEDURE — 1036F TOBACCO NON-USER: CPT | Performed by: ADVANCED PRACTICE MIDWIFE

## 2025-01-08 PROCEDURE — M1308 PR FLU IMMUNIZE NO ADMIN: HCPCS | Performed by: ADVANCED PRACTICE MIDWIFE

## 2025-01-08 PROCEDURE — G8419 CALC BMI OUT NRM PARAM NOF/U: HCPCS | Performed by: ADVANCED PRACTICE MIDWIFE

## 2025-01-08 PROCEDURE — 90715 TDAP VACCINE 7 YRS/> IM: CPT | Performed by: ADVANCED PRACTICE MIDWIFE

## 2025-01-08 PROCEDURE — G8427 DOCREV CUR MEDS BY ELIG CLIN: HCPCS | Performed by: ADVANCED PRACTICE MIDWIFE

## 2025-01-08 NOTE — PROGRESS NOTES
After obtaining consent and per orders of Marybel Marie Midwife, Injection of Tdap given Left deltoid.  Patient tolerated injection well.  She was instructed to remain in the clinic for 20 minutes and to report any adverse reaction immediately.    NDC#:  87370-952-91  LOT #: [id number]  Expiration # 01-22-27

## 2025-01-08 NOTE — PROGRESS NOTES
SUBJECTIVE:  Marry is here for her return OB visit.  She reports fetal movement.  She denies  vaginal bleeding.  She denies  vaginal discharge.  She denies leaking of fluid.  She denies uterine contraction activity.  She denies nausea and/or vomiting.  She denies retaining fluid in her extremities.  Had questions about HPV and abnormal Pap.   C/O pain ful sex for last year and possible reasons.  C/O multiple UTIs and has appt with urology.       OBJECTIVE:  Blood pressure 100/62, pulse 62, weight 68.5 kg (151 lb), last menstrual period 2024.    Marry has received the flu vaccine as appropriate  Marry has not received the Tdap vaccine as appropriate      ASSESSMENT/PLAN:  1. Need for Tdap vaccination    - Tdap, BOOSTRIX, (age 10 yrs+), IM    2. 29 weeks gestation of pregnancy      3. High-risk pregnancy in third trimester      4. Polysubstance abuse (HCC)        The problem list was reviewed and updated with any new issues from today's visit    Marry will monitor fetal movement daily.    28 week lab results were reviewed.    Fetal Kick Count was discussed and explained.   [place] contractions vs  labor contractions were reviewed.  Signs and symptoms of Pre-Eclampsia were were reviewed and discussed  Initial discussion regarding birth plans was begun    Marry was counseled regarding all of the above    The patient, Marry Tenorio,  was seen with a total time spent of 20 minutes for the [facility]  The time component, involved both face-to-face (counseling and education)  and non face-to-face time (care coordination), spent in determining the total time component.

## 2025-01-14 ENCOUNTER — ROUTINE PRENATAL (OUTPATIENT)
Dept: PERINATAL CARE | Age: 30
End: 2025-01-14
Payer: MEDICAID

## 2025-01-14 VITALS
TEMPERATURE: 97.6 F | DIASTOLIC BLOOD PRESSURE: 50 MMHG | BODY MASS INDEX: 26.72 KG/M2 | WEIGHT: 156.53 LBS | HEIGHT: 64 IN | SYSTOLIC BLOOD PRESSURE: 79 MMHG | HEART RATE: 94 BPM | RESPIRATION RATE: 16 BRPM

## 2025-01-14 DIAGNOSIS — Z86.69 HISTORY OF MIGRAINE DURING PREGNANCY: ICD-10-CM

## 2025-01-14 DIAGNOSIS — O99.323 DRUG DEPENDENCE DURING PREGNANCY IN THIRD TRIMESTER (HCC): ICD-10-CM

## 2025-01-14 DIAGNOSIS — Z87.59 HISTORY OF MIGRAINE DURING PREGNANCY: ICD-10-CM

## 2025-01-14 DIAGNOSIS — Z36.4 ULTRASOUND FOR ANTENATAL SCREENING FOR FETAL GROWTH RESTRICTION: ICD-10-CM

## 2025-01-14 DIAGNOSIS — O35.8XX0 SUSPECTED DAMAGE TO FETUS FROM DISEASE IN MOTHER, ANTEPARTUM CONDITION, SINGLE OR UNSPECIFIED FETUS: ICD-10-CM

## 2025-01-14 DIAGNOSIS — O99.353 SEIZURE DISORDER DURING PREGNANCY IN THIRD TRIMESTER (HCC): Primary | ICD-10-CM

## 2025-01-14 DIAGNOSIS — O09.33 INSUFFICIENT PRENATAL CARE IN THIRD TRIMESTER: ICD-10-CM

## 2025-01-14 DIAGNOSIS — Z3A.30 30 WEEKS GESTATION OF PREGNANCY: ICD-10-CM

## 2025-01-14 DIAGNOSIS — F19.20 DRUG DEPENDENCE DURING PREGNANCY IN THIRD TRIMESTER (HCC): ICD-10-CM

## 2025-01-14 DIAGNOSIS — G40.909 SEIZURE DISORDER DURING PREGNANCY IN THIRD TRIMESTER (HCC): Primary | ICD-10-CM

## 2025-01-14 PROCEDURE — 76816 OB US FOLLOW-UP PER FETUS: CPT | Performed by: OBSTETRICS & GYNECOLOGY

## 2025-01-14 PROCEDURE — 93325 DOPPLER ECHO COLOR FLOW MAPG: CPT | Performed by: OBSTETRICS & GYNECOLOGY

## 2025-01-14 PROCEDURE — 76825 ECHO EXAM OF FETAL HEART: CPT | Performed by: OBSTETRICS & GYNECOLOGY

## 2025-01-14 PROCEDURE — 76827 ECHO EXAM OF FETAL HEART: CPT | Performed by: OBSTETRICS & GYNECOLOGY

## 2025-01-14 PROCEDURE — 99999 PR OFFICE/OUTPT VISIT,PROCEDURE ONLY: CPT | Performed by: OBSTETRICS & GYNECOLOGY

## 2025-01-14 PROCEDURE — 76819 FETAL BIOPHYS PROFIL W/O NST: CPT | Performed by: OBSTETRICS & GYNECOLOGY

## 2025-01-22 ENCOUNTER — HOSPITAL ENCOUNTER (OUTPATIENT)
Age: 30
Setting detail: SPECIMEN
Discharge: HOME | End: 2025-01-22

## 2025-01-22 ENCOUNTER — ROUTINE PRENATAL (OUTPATIENT)
Dept: OBGYN | Age: 30
End: 2025-01-22
Payer: MEDICAID

## 2025-01-22 VITALS
BODY MASS INDEX: 26.61 KG/M2 | DIASTOLIC BLOOD PRESSURE: 58 MMHG | SYSTOLIC BLOOD PRESSURE: 98 MMHG | WEIGHT: 155 LBS | HEART RATE: 70 BPM

## 2025-01-22 DIAGNOSIS — R35.0 URINARY FREQUENCY: ICD-10-CM

## 2025-01-22 DIAGNOSIS — Z3A.31 31 WEEKS GESTATION OF PREGNANCY: Primary | ICD-10-CM

## 2025-01-22 DIAGNOSIS — N89.8 VAGINAL ODOR: ICD-10-CM

## 2025-01-22 DIAGNOSIS — Z34.93 PRENATAL CARE IN THIRD TRIMESTER: ICD-10-CM

## 2025-01-22 PROCEDURE — 99213 OFFICE O/P EST LOW 20 MIN: CPT | Performed by: STUDENT IN AN ORGANIZED HEALTH CARE EDUCATION/TRAINING PROGRAM

## 2025-01-22 PROCEDURE — G8419 CALC BMI OUT NRM PARAM NOF/U: HCPCS | Performed by: STUDENT IN AN ORGANIZED HEALTH CARE EDUCATION/TRAINING PROGRAM

## 2025-01-22 PROCEDURE — G8428 CUR MEDS NOT DOCUMENT: HCPCS | Performed by: STUDENT IN AN ORGANIZED HEALTH CARE EDUCATION/TRAINING PROGRAM

## 2025-01-22 PROCEDURE — 99211 OFF/OP EST MAY X REQ PHY/QHP: CPT | Performed by: STUDENT IN AN ORGANIZED HEALTH CARE EDUCATION/TRAINING PROGRAM

## 2025-01-22 PROCEDURE — 1036F TOBACCO NON-USER: CPT | Performed by: STUDENT IN AN ORGANIZED HEALTH CARE EDUCATION/TRAINING PROGRAM

## 2025-01-22 NOTE — PROGRESS NOTES
Prenatal Visit    Marry Tenorio is a 30 y.o. female  at 31w2d IUP    The patient was seen and evaluated. Patient has been feeling some decreased fetal movement and some urinary discomfort. Otherwise she has no complaints today. She reports positive fetal movements. She denies contractions, vaginal bleeding and leakage of fluid. Signs and symptoms of labor and pre-eclampsia were reviewed with the patient in detail. She is to report any of these if they occur. She currently denies any of these.    The problem list reflects the active issues addressed during today's visit    Vitals:  BP: (!) 98/58  Weight - Scale: 70.3 kg (155 lb)  Pulse: 70  Fundal Height (cm): 32 cm  Fetal HR: 150     PHYSICAL:   General appearance: no apparent distress, alert and cooperative  HEENT: head atraumatic, normocephalic, trachea midline, moist mucous membranes   Neurologic: alert, oriented, normal speech   Lungs: no increased work of breathing,   Abdomen: soft, gravid, non-tender on palpation    Musculoskeletal: no gross abnormalities, range of motion appropriate for age   Psychiatric: mood appropriate, normal affect      Assessment & Plan:  Marry Tenorio is a 30 y.o. female  at 31w2d   - VSS    - 28 week labs - encouraged to complete   - Continue taking prenatal vitamins QD    - Tdap vaccination: 25   - Rhogam: not indicated   - RSV vaccination (32-36 weeks): The patient was counseled on benefits to her baby if she receives the Pfizer RSV vaccine (Abrysvo) during pregnancy. She was informed that this vaccination is FDA approved for use during pregnancy   -BPP completed today due to decreased FM . Discussed fetal kick counts, etc   - UA w/ culture sent   Return for 2 weeks for beronica .    Counseling:   - Warning signs reviewed and recommendations when to call or present to the hospital if she experiences signs or symptoms of  labor and pre-eclampsia were reviewed.   - The patient was instructed on fetal

## 2025-01-23 DIAGNOSIS — R35.0 URINARY FREQUENCY: ICD-10-CM

## 2025-01-23 DIAGNOSIS — N89.8 VAGINAL ODOR: ICD-10-CM

## 2025-01-23 LAB
CANDIDA SPECIES: NEGATIVE
GARDNERELLA VAGINALIS: NEGATIVE
SOURCE: NORMAL
TRICHOMONAS: NEGATIVE

## 2025-01-24 LAB
MICROORGANISM SPEC CULT: ABNORMAL
MICROORGANISM SPEC CULT: ABNORMAL
SERVICE CMNT-IMP: ABNORMAL
SPECIMEN DESCRIPTION: ABNORMAL

## 2025-01-29 NOTE — TELEPHONE ENCOUNTER
SW met with Pt to discuss issues with food insecurity, baby and pregnancy items and finances. Pt was able to assist with obtaining resources tor these areas and referred pt to [place] for further assistance with financial literacy. Pt will follow up as needed.

## 2025-02-06 PROBLEM — Z28.21 COVID-19 VACCINATION DECLINED: Status: RESOLVED | Noted: 2024-10-22 | Resolved: 2025-02-06

## 2025-02-07 ENCOUNTER — HOSPITAL ENCOUNTER (OUTPATIENT)
Age: 30
Setting detail: SPECIMEN
Discharge: HOME | End: 2025-02-07

## 2025-02-07 ENCOUNTER — ROUTINE PRENATAL (OUTPATIENT)
Dept: OBGYN | Age: 30
End: 2025-02-07
Payer: MEDICAID

## 2025-02-07 VITALS
WEIGHT: 156 LBS | DIASTOLIC BLOOD PRESSURE: 74 MMHG | BODY MASS INDEX: 26.78 KG/M2 | SYSTOLIC BLOOD PRESSURE: 110 MMHG | HEART RATE: 80 BPM

## 2025-02-07 DIAGNOSIS — N89.8 VAGINAL DISCHARGE DURING PREGNANCY IN THIRD TRIMESTER: ICD-10-CM

## 2025-02-07 DIAGNOSIS — Z3A.33 33 WEEKS GESTATION OF PREGNANCY: ICD-10-CM

## 2025-02-07 DIAGNOSIS — O09.90 HIGH RISK PREGNANCY, ANTEPARTUM: ICD-10-CM

## 2025-02-07 DIAGNOSIS — O23.43 URINARY TRACT INFECTION IN MOTHER DURING THIRD TRIMESTER OF PREGNANCY: ICD-10-CM

## 2025-02-07 DIAGNOSIS — O26.893 VAGINAL DISCHARGE DURING PREGNANCY IN THIRD TRIMESTER: ICD-10-CM

## 2025-02-07 DIAGNOSIS — R30.0 DYSURIA: ICD-10-CM

## 2025-02-07 DIAGNOSIS — R35.0 URINARY FREQUENCY: ICD-10-CM

## 2025-02-07 DIAGNOSIS — O09.90 HIGH RISK PREGNANCY, ANTEPARTUM: Primary | ICD-10-CM

## 2025-02-07 PROCEDURE — 99213 OFFICE O/P EST LOW 20 MIN: CPT

## 2025-02-07 PROCEDURE — G8419 CALC BMI OUT NRM PARAM NOF/U: HCPCS

## 2025-02-07 PROCEDURE — 1036F TOBACCO NON-USER: CPT

## 2025-02-07 PROCEDURE — G8427 DOCREV CUR MEDS BY ELIG CLIN: HCPCS

## 2025-02-07 RX ORDER — CEPHALEXIN 500 MG/1
500 CAPSULE ORAL 4 TIMES DAILY
Qty: 28 CAPSULE | Refills: 0 | Status: SHIPPED | OUTPATIENT
Start: 2025-02-07 | End: 2025-02-14

## 2025-02-07 NOTE — PROGRESS NOTES
Attending Physician Statement  I have discussed the care of Marry Tenorio, including pertinent history and exam findings, with the resident. I have reviewed the key elements of all parts of the encounter with the resident.  I agree with the assessment, plan and orders as documented by the resident.  (GE Modifier)    Shirley Rain [facility]  2/7/2025, 11:04 AM

## 2025-02-07 NOTE — PROGRESS NOTES
Prenatal Visit    Marry Tenorio is a 30 y.o. female  at 33w4d    The patient was seen and evaluated. She complains of frequently needing to empty her bladder. She reports positive fetal movements. She denies contractions, vaginal bleeding and leakage of fluid. Signs and symptoms of labor and pre-eclampsia were reviewed with the patient in detail. She is to report any of these if they occur. She currently denies any of these.    The patient is Rh + and Rhogam is not indicated in this pregnancy.  The patient already received  the T-Dap Vaccine (27-36 weeks).   The patient declined the influenza vaccine this year.   The patient declined the COVID-19 vaccine this year.   The patient was counseled on benefits of receiving RSV vaccination between 32-36 weeks and was declined.    The problem list reflects the active issues addressed during today's visit    Vitals:  BP: 110/74  Weight - Scale: 70.8 kg (156 lb)  Pulse: 80  Patient Position: Sitting  Fundal Height (cm): 32 cm  Fetal HR: 135  Movement: Present     PRENATAL LAB RESULTS:   Blood Type/Rh: O pos  Antibody Screen: negative  Hemoglobin, Hematocrit, Platelets: Hgb 12.2/Hct 36.8/Plt 88.7  Rubella: immune  T. Pallidum, IgG: non-reactive  Hepatitis B Surface Antigen: non-reactive   Hepatitis C Antibody: non-reactive   HIV: non-reactive   Gonorrhea: negative  Chlamydia: negative  Urine culture:   GBS 10-50,000 CFU/mL 24  GBS <10,000 CFU/mL 10/23/24  GBS <10,000 CFU/mL 25    1 hour Glucose Tolerance Test: ordered, not done    Group B Strep: bacteruria  Cystic Fibrosis Screen: negative  Sickle Cell Screen: negative  First Trimester Screen: not done  MSAFP/Multiple Markers: not done  Non-Invasive Prenatal Testing: low risk for aneuploidy  Anatomy US (12/3/24): anterior placenta, 3 VC, male, normal anatomy    Assessment & Plan:  Marry Tenorio is a 30 y.o. female  at 33w4d   - 28 week labs not completed, encouraged to complete   - Tdap

## 2025-02-08 LAB
BACTERIA URNS QL MICRO: ABNORMAL
BILIRUB UR QL STRIP: NEGATIVE
C TRACH DNA SPEC QL PROBE+SIG AMP: NORMAL
CANDIDA SPECIES: NEGATIVE
CASTS #/AREA URNS LPF: ABNORMAL /LPF (ref 0–8)
CLARITY UR: ABNORMAL
COLOR UR: YELLOW
EPI CELLS #/AREA URNS HPF: ABNORMAL /HPF (ref 0–5)
GARDNERELLA VAGINALIS: POSITIVE
GLUCOSE UR STRIP-MCNC: NEGATIVE MG/DL
HGB UR QL STRIP.AUTO: NEGATIVE
KETONES UR STRIP-MCNC: NEGATIVE MG/DL
LEUKOCYTE ESTERASE UR QL STRIP: ABNORMAL
N GONORRHOEA DNA SPEC QL PROBE+SIG AMP: NORMAL
NITRITE UR QL STRIP: NEGATIVE
PH UR STRIP: 6 [PH] (ref 5–8)
PROT UR STRIP-MCNC: NEGATIVE MG/DL
RBC #/AREA URNS HPF: ABNORMAL /HPF (ref 0–4)
SOURCE: ABNORMAL
SP GR UR STRIP: 1.02 (ref 1–1.03)
SPECIMEN DESCRIPTION: NORMAL
TRICHOMONAS: NEGATIVE
UROBILINOGEN UR STRIP-ACNC: NORMAL EU/DL (ref 0–1)
WBC #/AREA URNS HPF: ABNORMAL /HPF (ref 0–5)

## 2025-02-09 DIAGNOSIS — N76.0 BACTERIAL VAGINOSIS: Primary | ICD-10-CM

## 2025-02-09 DIAGNOSIS — B96.89 BACTERIAL VAGINOSIS: Primary | ICD-10-CM

## 2025-02-09 DIAGNOSIS — Z3A.33 33 WEEKS GESTATION OF PREGNANCY: ICD-10-CM

## 2025-02-09 RX ORDER — METRONIDAZOLE 500 MG/1
500 TABLET ORAL 2 TIMES DAILY
Qty: 14 TABLET | Refills: 0 | Status: SHIPPED | OUTPATIENT
Start: 2025-02-09 | End: 2025-02-16

## 2025-02-25 ENCOUNTER — ROUTINE PRENATAL (OUTPATIENT)
Dept: PERINATAL CARE | Age: 30
End: 2025-02-25
Payer: MEDICAID

## 2025-02-25 VITALS
WEIGHT: 159 LBS | DIASTOLIC BLOOD PRESSURE: 60 MMHG | RESPIRATION RATE: 16 BRPM | SYSTOLIC BLOOD PRESSURE: 110 MMHG | TEMPERATURE: 97 F | HEIGHT: 64 IN | BODY MASS INDEX: 27.14 KG/M2 | HEART RATE: 70 BPM

## 2025-02-25 DIAGNOSIS — Z34.90 SECOND PREGNANCY: ICD-10-CM

## 2025-02-25 DIAGNOSIS — Z3A.36 36 WEEKS GESTATION OF PREGNANCY: ICD-10-CM

## 2025-02-25 DIAGNOSIS — N94.10 DYSPAREUNIA IN FEMALE: Primary | ICD-10-CM

## 2025-02-25 PROCEDURE — 99999 PR OFFICE/OUTPT VISIT,PROCEDURE ONLY: CPT | Performed by: OBSTETRICS & GYNECOLOGY

## 2025-02-25 PROCEDURE — 76819 FETAL BIOPHYS PROFIL W/O NST: CPT | Performed by: OBSTETRICS & GYNECOLOGY

## 2025-02-25 PROCEDURE — 76805 OB US >/= 14 WKS SNGL FETUS: CPT | Performed by: OBSTETRICS & GYNECOLOGY

## 2025-02-25 NOTE — PROGRESS NOTES
[facility]  [address]  [place] [address]  Dept: [phone]     2025    RE:  Marry Tenorio     : 1995   AGE: 30 y.o.     Dear Dr. Umaña,    Thank you for allowing me to see Marry Tenorio.  As I'm sure you will recall, Marry Tenorio is a 30 y.o. [id number]'s last menstrual period was 2024 (approximate). Estimated Date of Delivery: 3/24/25 at 36w1d seen in our office today for the following:    REASON FOR VISIT: Growth     Patient Active Problem List    Diagnosis Date Noted    Second pregnancy 2025    36 weeks gestation of pregnancy 2025    UTI x1 2025    ANDRE 10/22/2024    Marijuana use 10/22/2024    Genital herpes 10/22/2024    Fetal drug exposure (HCC) 10/22/2024    Vitamin D deficiency 10/22/2024    Pregravid BMI 26.76 10/22/2024    Vapes nicotine/flavoring 10/22/2024    Primary hypothyroid 10/21/2024    Hx MRSA 10/21/2024    Insufficient prenatal care in second trimester 10/21/2024    High-risk pregnancy in second trimester 10/21/2024    GBS bacteriuria 2024    Dyspareunia in female 10/05/2023    Hypersomnia 2023    Hx migraine 2022    Urinary frequency 2022    ASC-H, hrHPV (other) positive 2022    Sleep walking disorder 2021    ADHD, combined type 2020    Idiopathic generalized epilepsy 2020    Former smoker 2020    Major depressive disorder 07/15/2020    PTSD 2020    Bipolar 1 disorder (HCC) 2020    Polysubstance abuse (HCC) 2020        PAST HISTORY:  OB History    Para Term  AB Living   2 0 0 0 1 0   SAB IAB Ectopic Molar Multiple Live Births   1 0 0 0 0 0      # Outcome Date GA Lbr Smith/2nd Weight Sex Type Anes PTL Lv   2 Current            1 SAB 22     SAB         Birth Comments: D&C      Obstetric Comments   G1: SAB with D&C   G2: FOB boyfriend/spouse is involved

## 2025-02-26 ENCOUNTER — ROUTINE PRENATAL (OUTPATIENT)
Dept: OBGYN | Age: 30
End: 2025-02-26
Payer: MEDICAID

## 2025-02-26 VITALS
HEART RATE: 69 BPM | SYSTOLIC BLOOD PRESSURE: 100 MMHG | DIASTOLIC BLOOD PRESSURE: 60 MMHG | WEIGHT: 159 LBS | BODY MASS INDEX: 27.29 KG/M2

## 2025-02-26 DIAGNOSIS — Z34.93 PRENATAL CARE IN THIRD TRIMESTER: ICD-10-CM

## 2025-02-26 DIAGNOSIS — Z3A.36 36 WEEKS GESTATION OF PREGNANCY: Primary | ICD-10-CM

## 2025-02-26 DIAGNOSIS — O09.90 HIGH RISK PREGNANCY, ANTEPARTUM: ICD-10-CM

## 2025-02-26 PROCEDURE — 1036F TOBACCO NON-USER: CPT | Performed by: ADVANCED PRACTICE MIDWIFE

## 2025-02-26 PROCEDURE — 99211 OFF/OP EST MAY X REQ PHY/QHP: CPT | Performed by: ADVANCED PRACTICE MIDWIFE

## 2025-02-26 PROCEDURE — G8427 DOCREV CUR MEDS BY ELIG CLIN: HCPCS | Performed by: ADVANCED PRACTICE MIDWIFE

## 2025-02-26 PROCEDURE — 99213 OFFICE O/P EST LOW 20 MIN: CPT | Performed by: ADVANCED PRACTICE MIDWIFE

## 2025-02-26 PROCEDURE — G8419 CALC BMI OUT NRM PARAM NOF/U: HCPCS | Performed by: ADVANCED PRACTICE MIDWIFE

## 2025-02-26 RX ORDER — LORATADINE PSEUDOEPHEDRINE SULFATE 10; 240 MG/1; MG/1
1 TABLET, EXTENDED RELEASE ORAL DAILY
Qty: 30 TABLET | Refills: 0 | Status: SHIPPED | OUTPATIENT
Start: 2025-02-26

## 2025-02-26 ASSESSMENT — PATIENT HEALTH QUESTIONNAIRE - PHQ9
SUM OF ALL RESPONSES TO PHQ QUESTIONS 1-9: 4
SUM OF ALL RESPONSES TO PHQ QUESTIONS 1-9: 4
SUM OF ALL RESPONSES TO PHQ QUESTIONS 1-9: 22
4. FEELING TIRED OR HAVING LITTLE ENERGY: NEARLY EVERY DAY
5. POOR APPETITE OR OVEREATING: NEARLY EVERY DAY
8. MOVING OR SPEAKING SO SLOWLY THAT OTHER PEOPLE COULD HAVE NOTICED. OR THE OPPOSITE, BEING SO FIGETY OR RESTLESS THAT YOU HAVE BEEN MOVING AROUND A LOT MORE THAN USUAL: SEVERAL DAYS
9. THOUGHTS THAT YOU WOULD BE BETTER OFF DEAD, OR OF HURTING YOURSELF: NEARLY EVERY DAY
SUM OF ALL RESPONSES TO PHQ9 QUESTIONS 1 & 2: 5
SUM OF ALL RESPONSES TO PHQ QUESTIONS 1-9: 19
6. FEELING BAD ABOUT YOURSELF - OR THAT YOU ARE A FAILURE OR HAVE LET YOURSELF OR YOUR FAMILY DOWN: SEVERAL DAYS
SUM OF ALL RESPONSES TO PHQ QUESTIONS 1-9: 1
8. MOVING OR SPEAKING SO SLOWLY THAT OTHER PEOPLE COULD HAVE NOTICED. OR THE OPPOSITE, BEING SO FIGETY OR RESTLESS THAT YOU HAVE BEEN MOVING AROUND A LOT MORE THAN USUAL: SEVERAL DAYS
10. IF YOU CHECKED OFF ANY PROBLEMS, HOW DIFFICULT HAVE THESE PROBLEMS MADE IT FOR YOU TO DO YOUR WORK, TAKE CARE OF THINGS AT HOME, OR GET ALONG WITH OTHER PEOPLE: EXTREMELY DIFFICULT
SUM OF ALL RESPONSES TO PHQ QUESTIONS 1-9: 4
1. LITTLE INTEREST OR PLEASURE IN DOING THINGS: NEARLY EVERY DAY
SUM OF ALL RESPONSES TO PHQ QUESTIONS 1-9: 22
2. FEELING DOWN, DEPRESSED OR HOPELESS: MORE THAN HALF THE DAYS
7. TROUBLE CONCENTRATING ON THINGS, SUCH AS READING THE NEWSPAPER OR WATCHING TELEVISION: NEARLY EVERY DAY
9. THOUGHTS THAT YOU WOULD BE BETTER OFF DEAD, OR OF HURTING YOURSELF: NEARLY EVERY DAY
SUM OF ALL RESPONSES TO PHQ QUESTIONS 1-9: 22
3. TROUBLE FALLING OR STAYING ASLEEP: NEARLY EVERY DAY

## 2025-02-26 ASSESSMENT — COLUMBIA-SUICIDE SEVERITY RATING SCALE - C-SSRS
1. WITHIN THE PAST MONTH, HAVE YOU WISHED YOU WERE DEAD OR WISHED YOU COULD GO TO SLEEP AND NOT WAKE UP?: NO
2. HAVE YOU ACTUALLY HAD ANY THOUGHTS OF KILLING YOURSELF?: NO
6. HAVE YOU EVER DONE ANYTHING, STARTED TO DO ANYTHING, OR PREPARED TO DO ANYTHING TO END YOUR LIFE?: NO

## 2025-02-26 NOTE — PROGRESS NOTES
SUBJECTIVE:    Marry is here for her routine OB visit.  She reports  fetal movement.  She denies  vaginal bleeding.  She denies  leaking of fluid.  She denies  vaginal discharge.  She denies  uterine contraction activity.  She denies  nausea and/or vomiting.  She denies retaining fluid in her extremities.  EPDS 22, denies suicidal ideation. States she had a bad day yesterday and has URI that is making her feel crappy.      OBJECTIVE:   Blood pressure 100/60, pulse 69, weight 72.1 kg (159 lb), last menstrual period 05/16/2024.          ASSESSMENT/PLAN:  1. 36 weeks gestation of pregnancy      2. High risk pregnancy, antepartum      3. Prenatal care in third trimester        The problem list was reviewed and updated as needed.    Marry will monitor for fetal movements daily    GBS protocol and testing was discussed.  GBS culture was not obtained.  Results were not reviewed.  Signs of labor to report were discussed.  Birth preferences were discussed.  Post-dates testing and protocol were discussed  Marry was counseled regarding Post Partum Depression.  She has not decided on contraceptive choice.    Marry was counseled regarding all of the above    The patient, Marry Tenorio,  was seen with a total time spent of 20 minutes for the visit on this date [facility]  The time component, involved both face-to-face (counseling and education)  and non face-to-face time (care coordination), spent in determining the total time component.

## 2025-03-05 ENCOUNTER — HOSPITAL ENCOUNTER (OUTPATIENT)
Age: 30
Setting detail: SPECIMEN
Discharge: HOME | End: 2025-03-05

## 2025-03-05 ENCOUNTER — ROUTINE PRENATAL (OUTPATIENT)
Dept: OBGYN | Age: 30
End: 2025-03-05
Payer: MEDICAID

## 2025-03-05 VITALS
BODY MASS INDEX: 27.81 KG/M2 | WEIGHT: 162 LBS | HEART RATE: 63 BPM | DIASTOLIC BLOOD PRESSURE: 67 MMHG | SYSTOLIC BLOOD PRESSURE: 89 MMHG

## 2025-03-05 DIAGNOSIS — O09.90 HIGH RISK PREGNANCY, ANTEPARTUM: ICD-10-CM

## 2025-03-05 DIAGNOSIS — Z3A.37 37 WEEKS GESTATION OF PREGNANCY: Primary | ICD-10-CM

## 2025-03-05 DIAGNOSIS — R82.71 GBS BACTERIURIA: ICD-10-CM

## 2025-03-05 PROCEDURE — G8427 DOCREV CUR MEDS BY ELIG CLIN: HCPCS | Performed by: ADVANCED PRACTICE MIDWIFE

## 2025-03-05 PROCEDURE — G8419 CALC BMI OUT NRM PARAM NOF/U: HCPCS | Performed by: ADVANCED PRACTICE MIDWIFE

## 2025-03-05 PROCEDURE — 99213 OFFICE O/P EST LOW 20 MIN: CPT | Performed by: ADVANCED PRACTICE MIDWIFE

## 2025-03-05 PROCEDURE — 1036F TOBACCO NON-USER: CPT | Performed by: ADVANCED PRACTICE MIDWIFE

## 2025-03-05 NOTE — PROGRESS NOTES
SUBJECTIVE:    Marry is here for her routine OB visit.  She reports  fetal movement.  She denies  vaginal bleeding.  She denies  leaking of fluid.  She denies  vaginal discharge.  She denies  uterine contraction activity.  She denies  nausea and/or vomiting.  She denies retaining fluid in her extremities.  Few questions about labor.      OBJECTIVE:   Weight 73.5 kg (162 lb), last menstrual period 05/16/2024.          ASSESSMENT/PLAN:  1. 37 weeks gestation of pregnancy      2. High risk pregnancy, antepartum      3. GBS bacteriuria        The problem list was reviewed and updated as needed.    Marry will monitor for fetal movements daily    GBS protocol and testing was discussed.  GBS culture was not obtained.  Results were not reviewed. GBS bacteriuria.  Signs of labor to report were discussed.  Birth preferences were discussed.  Post-dates testing and protocol were not discussed  Marry was not counseled regarding Post Partum Depression.  She has not decided on contraceptive choice.    Marry was counseled regarding all of the above    The patient, Marry Tenorio,  was seen with a total time spent of 20 minutes for the visit on this date [facility]  The time component, involved both face-to-face (counseling and education)  and non face-to-face time (care coordination), spent in determining the total time component.

## 2025-03-12 ENCOUNTER — ROUTINE PRENATAL (OUTPATIENT)
Dept: OBGYN | Age: 30
End: 2025-03-12
Payer: MEDICAID

## 2025-03-12 VITALS
BODY MASS INDEX: 28.15 KG/M2 | WEIGHT: 164 LBS | HEART RATE: 83 BPM | DIASTOLIC BLOOD PRESSURE: 69 MMHG | SYSTOLIC BLOOD PRESSURE: 102 MMHG

## 2025-03-12 DIAGNOSIS — R82.71 GBS BACTERIURIA: ICD-10-CM

## 2025-03-12 DIAGNOSIS — Z3A.38 38 WEEKS GESTATION OF PREGNANCY: Primary | ICD-10-CM

## 2025-03-12 DIAGNOSIS — O09.90 HIGH RISK PREGNANCY, ANTEPARTUM: ICD-10-CM

## 2025-03-12 PROCEDURE — G8427 DOCREV CUR MEDS BY ELIG CLIN: HCPCS | Performed by: ADVANCED PRACTICE MIDWIFE

## 2025-03-12 PROCEDURE — 1036F TOBACCO NON-USER: CPT | Performed by: ADVANCED PRACTICE MIDWIFE

## 2025-03-12 PROCEDURE — 99213 OFFICE O/P EST LOW 20 MIN: CPT | Performed by: ADVANCED PRACTICE MIDWIFE

## 2025-03-12 PROCEDURE — G8419 CALC BMI OUT NRM PARAM NOF/U: HCPCS | Performed by: ADVANCED PRACTICE MIDWIFE

## 2025-03-12 NOTE — PROGRESS NOTES
SUBJECTIVE:    Marry is here for her routine OB visit.  She reports  fetal movement.  She denies  vaginal bleeding.  She denies  leaking of fluid.  She denies  vaginal discharge.  She denies  uterine contraction activity.  She denies  nausea and/or vomiting.  She denies retaining fluid in her extremities.  C/o chest pain, more like stretching.      OBJECTIVE:   Blood pressure 102/69, pulse 83, weight 74.4 kg (164 lb), last menstrual period 05/16/2024.          ASSESSMENT/PLAN:  There are no diagnoses linked to this encounter.    The problem list was reviewed and updated as needed.    Marry will monitor for fetal movements daily    GBS protocol and testing was discussed.  GBS culture was not obtained.  Results were reviewed.  Signs of labor to report were discussed.  Birth preferences were discussed.  Post-dates testing and protocol were not discussed  Marry was not counseled regarding Post Partum Depression.  She has not decided on contraceptive choice.    Marry was counseled regarding all of the above    The patient, Marry Tenorio,  was seen with a total time spent of 20 minutes for the visit on this date [facility] service [facility]  The time component, involved both face-to-face (counseling and education)  and non face-to-face time (care coordination), spent in determining the total time component.

## 2025-03-15 PROBLEM — Z3A.36 36 WEEKS GESTATION OF PREGNANCY: Status: RESOLVED | Noted: 2025-02-25 | Resolved: 2025-03-15

## 2025-03-18 ENCOUNTER — ROUTINE PRENATAL (OUTPATIENT)
Dept: OBGYN | Age: 30
End: 2025-03-18
Payer: MEDICAID

## 2025-03-18 VITALS
WEIGHT: 167 LBS | HEART RATE: 69 BPM | BODY MASS INDEX: 28.67 KG/M2 | SYSTOLIC BLOOD PRESSURE: 116 MMHG | DIASTOLIC BLOOD PRESSURE: 73 MMHG

## 2025-03-18 DIAGNOSIS — O09.93 HIGH-RISK PREGNANCY IN THIRD TRIMESTER: Primary | ICD-10-CM

## 2025-03-18 DIAGNOSIS — R82.71 GBS BACTERIURIA: ICD-10-CM

## 2025-03-18 DIAGNOSIS — G40.309 NONINTRACTABLE GENERALIZED IDIOPATHIC EPILEPSY WITHOUT STATUS EPILEPTICUS (HCC): ICD-10-CM

## 2025-03-18 DIAGNOSIS — F19.10 POLYSUBSTANCE ABUSE: ICD-10-CM

## 2025-03-18 DIAGNOSIS — A60.00 HERPES SIMPLEX INFECTION OF GENITOURINARY SYSTEM: ICD-10-CM

## 2025-03-18 DIAGNOSIS — E03.8 OTHER SPECIFIED HYPOTHYROIDISM: ICD-10-CM

## 2025-03-18 DIAGNOSIS — Z3A.39 39 WEEKS GESTATION OF PREGNANCY: ICD-10-CM

## 2025-03-18 PROCEDURE — G8419 CALC BMI OUT NRM PARAM NOF/U: HCPCS

## 2025-03-18 PROCEDURE — G8427 DOCREV CUR MEDS BY ELIG CLIN: HCPCS

## 2025-03-18 PROCEDURE — 99213 OFFICE O/P EST LOW 20 MIN: CPT

## 2025-03-18 PROCEDURE — 1036F TOBACCO NON-USER: CPT

## 2025-03-23 ENCOUNTER — HOSPITAL ENCOUNTER (INPATIENT)
Age: 30
LOS: 3 days | Discharge: HOME | DRG: 560 | End: 2025-03-26
Attending: STUDENT IN AN ORGANIZED HEALTH CARE EDUCATION/TRAINING PROGRAM | Admitting: STUDENT IN AN ORGANIZED HEALTH CARE EDUCATION/TRAINING PROGRAM
Payer: MEDICAID

## 2025-03-23 ENCOUNTER — APPOINTMENT (OUTPATIENT)
Dept: LABOR AND DELIVERY | Age: 30
DRG: 560 | End: 2025-03-23
Payer: MEDICAID

## 2025-03-23 DIAGNOSIS — F31.9 BIPOLAR 1 DISORDER (HCC): ICD-10-CM

## 2025-03-23 DIAGNOSIS — F32.9 MAJOR DEPRESSIVE DISORDER, REMISSION STATUS UNSPECIFIED, UNSPECIFIED WHETHER RECURRENT: ICD-10-CM

## 2025-03-23 DIAGNOSIS — F43.10 POSTTRAUMATIC STRESS DISORDER: ICD-10-CM

## 2025-03-23 DIAGNOSIS — F41.1 GENERALIZED ANXIETY DISORDER: Primary | ICD-10-CM

## 2025-03-23 PROBLEM — Z3A.39 39 WEEKS GESTATION OF PREGNANCY: Status: ACTIVE | Noted: 2025-03-23

## 2025-03-23 LAB
ABO + RH BLD: NORMAL
AMPHET UR QL SCN: NEGATIVE
BARBITURATES UR QL SCN: NEGATIVE
BASOPHILS # BLD: 0.07 K/UL (ref 0–0.2)
BASOPHILS NFR BLD: 1 % (ref 0–2)
BENZODIAZ UR QL: NEGATIVE
BLOOD BANK SAMPLE EXPIRATION: NORMAL
BLOOD GROUP ANTIBODIES SERPL: NEGATIVE
CANNABINOIDS UR QL SCN: NEGATIVE
COCAINE UR QL SCN: NEGATIVE
EOSINOPHIL # BLD: 0.18 K/UL (ref 0–0.44)
EOSINOPHILS RELATIVE PERCENT: 2 % (ref 1–4)
ERYTHROCYTE [DISTWIDTH] IN BLOOD BY AUTOMATED COUNT: 13.3 % (ref 11.8–14.4)
FENTANYL UR QL: NEGATIVE
HCT VFR BLD AUTO: 36.3 % (ref 36.3–47.1)
HGB BLD-MCNC: 12.4 G/DL (ref 11.9–15.1)
IMM GRANULOCYTES # BLD AUTO: 0.14 K/UL (ref 0–0.3)
IMM GRANULOCYTES NFR BLD: 2 %
LYMPHOCYTES NFR BLD: 1.57 K/UL (ref 1.1–3.7)
LYMPHOCYTES RELATIVE PERCENT: 18 % (ref 24–43)
MCH RBC QN AUTO: 30.5 PG (ref 25.2–33.5)
MCHC RBC AUTO-ENTMCNC: 34.2 G/DL (ref 28.4–34.8)
MCV RBC AUTO: 89.4 FL (ref 82.6–102.9)
METHADONE UR QL: NEGATIVE
MONOCYTES NFR BLD: 0.64 K/UL (ref 0.1–1.2)
MONOCYTES NFR BLD: 7 % (ref 3–12)
NEUTROPHILS NFR BLD: 70 % (ref 36–65)
NEUTS SEG NFR BLD: 6.06 K/UL (ref 1.5–8.1)
NRBC BLD-RTO: 0 PER 100 WBC
OPIATES UR QL SCN: NEGATIVE
OXYCODONE UR QL SCN: NEGATIVE
PCP UR QL SCN: NEGATIVE
PLATELET # BLD AUTO: ABNORMAL K/UL (ref 138–453)
PLATELET, FLUORESCENCE: 190 K/UL (ref 138–453)
PLATELETS.RETICULATED NFR BLD AUTO: 15 % (ref 1.1–10.3)
RBC # BLD AUTO: 4.06 M/UL (ref 3.95–5.11)
T PALLIDUM AB SER QL IA: NONREACTIVE
TEST INFORMATION: NORMAL
WBC OTHER # BLD: 8.7 K/UL (ref 3.5–11.3)

## 2025-03-23 PROCEDURE — 6360000002 HC RX W HCPCS

## 2025-03-23 PROCEDURE — 85055 RETICULATED PLATELET ASSAY: CPT

## 2025-03-23 PROCEDURE — 86850 RBC ANTIBODY SCREEN: CPT

## 2025-03-23 PROCEDURE — 80307 DRUG TEST PRSMV CHEM ANLYZR: CPT

## 2025-03-23 PROCEDURE — 86780 TREPONEMA PALLIDUM: CPT

## 2025-03-23 PROCEDURE — 86900 BLOOD TYPING SEROLOGIC ABO: CPT

## 2025-03-23 PROCEDURE — 2580000003 HC RX 258

## 2025-03-23 PROCEDURE — 85025 COMPLETE CBC W/AUTO DIFF WBC: CPT

## 2025-03-23 PROCEDURE — 1220000000 HC SEMI PRIVATE OB R&B

## 2025-03-23 PROCEDURE — 86901 BLOOD TYPING SEROLOGIC RH(D): CPT

## 2025-03-23 PROCEDURE — 6370000000 HC RX 637 (ALT 250 FOR IP)

## 2025-03-23 RX ORDER — SEVOFLURANE 250 ML/250ML
1 LIQUID RESPIRATORY (INHALATION) CONTINUOUS PRN
Status: DISCONTINUED | OUTPATIENT
Start: 2025-03-23 | End: 2025-03-24

## 2025-03-23 RX ORDER — PROCHLORPERAZINE EDISYLATE 5 MG/ML
10 INJECTION INTRAMUSCULAR; INTRAVENOUS ONCE
Status: COMPLETED | OUTPATIENT
Start: 2025-03-23 | End: 2025-03-23

## 2025-03-23 RX ORDER — LEVOTHYROXINE SODIUM 50 UG/1
50 TABLET ORAL DAILY
Status: DISCONTINUED | OUTPATIENT
Start: 2025-03-24 | End: 2025-03-25

## 2025-03-23 RX ORDER — DIPHENHYDRAMINE HYDROCHLORIDE 50 MG/ML
25 INJECTION, SOLUTION INTRAMUSCULAR; INTRAVENOUS EVERY 4 HOURS PRN
Status: DISCONTINUED | OUTPATIENT
Start: 2025-03-23 | End: 2025-03-24

## 2025-03-23 RX ORDER — DIPHENHYDRAMINE HCL 25 MG
25 TABLET ORAL EVERY 4 HOURS PRN
Status: DISCONTINUED | OUTPATIENT
Start: 2025-03-23 | End: 2025-03-24

## 2025-03-23 RX ORDER — MORPHINE SULFATE 10 MG/ML
10 INJECTION INTRAVENOUS ONCE
Status: COMPLETED | OUTPATIENT
Start: 2025-03-23 | End: 2025-03-23

## 2025-03-23 RX ORDER — SODIUM CHLORIDE, SODIUM LACTATE, POTASSIUM CHLORIDE, CALCIUM CHLORIDE 600; 310; 30; 20 MG/100ML; MG/100ML; MG/100ML; MG/100ML
INJECTION, SOLUTION INTRAVENOUS CONTINUOUS
Status: DISCONTINUED | OUTPATIENT
Start: 2025-03-23 | End: 2025-03-24

## 2025-03-23 RX ORDER — SIMETHICONE 80 MG
80 TABLET,CHEWABLE ORAL EVERY 6 HOURS PRN
Status: DISCONTINUED | OUTPATIENT
Start: 2025-03-23 | End: 2025-03-24

## 2025-03-23 RX ORDER — SODIUM CHLORIDE 0.9 % (FLUSH) 0.9 %
5-40 SYRINGE (ML) INJECTION EVERY 12 HOURS SCHEDULED
Status: DISCONTINUED | OUTPATIENT
Start: 2025-03-23 | End: 2025-03-24

## 2025-03-23 RX ORDER — HYDROXYZINE HYDROCHLORIDE 25 MG/1
25 TABLET, FILM COATED ORAL 3 TIMES DAILY PRN
Status: DISCONTINUED | OUTPATIENT
Start: 2025-03-23 | End: 2025-03-26 | Stop reason: HOSPADM

## 2025-03-23 RX ORDER — ACETAMINOPHEN 500 MG
1000 TABLET ORAL EVERY 6 HOURS PRN
Status: DISCONTINUED | OUTPATIENT
Start: 2025-03-23 | End: 2025-03-24

## 2025-03-23 RX ORDER — SODIUM CHLORIDE 9 MG/ML
INJECTION, SOLUTION INTRAVENOUS PRN
Status: DISCONTINUED | OUTPATIENT
Start: 2025-03-23 | End: 2025-03-24

## 2025-03-23 RX ADMIN — PROCHLORPERAZINE EDISYLATE 10 MG: 5 INJECTION INTRAMUSCULAR; INTRAVENOUS at 22:46

## 2025-03-23 RX ADMIN — Medication 2.5 MILLION UNITS: at 22:46

## 2025-03-23 RX ADMIN — Medication 25 MCG: at 18:33

## 2025-03-23 RX ADMIN — MORPHINE SULFATE 10 MG: 10 INJECTION INTRAVENOUS at 22:45

## 2025-03-23 RX ADMIN — PENICILLIN G POTASSIUM 5 MILLION UNITS: 5000000 INJECTION, POWDER, FOR SOLUTION INTRAMUSCULAR; INTRAVENOUS at 18:32

## 2025-03-23 RX ADMIN — SODIUM CHLORIDE, SODIUM LACTATE, POTASSIUM CHLORIDE, AND CALCIUM CHLORIDE: .6; .31; .03; .02 INJECTION, SOLUTION INTRAVENOUS at 18:30

## 2025-03-23 ASSESSMENT — PAIN SCALES - GENERAL: PAINLEVEL_OUTOF10: 8

## 2025-03-23 NOTE — H&P
OBSTETRICAL HISTORY AND PHYSICAL  [facility]    Date: 3/23/2025       Time: 6:06 PM   Patient Name: Marry Tenorio     Patient : 1995  Room/Bed: 0705/0705-01    Admission Date/Time: 3/23/2025  4:58 PM      CC: RR IOL      HPI: Marry Tenorio is a 30 y.o.  at 39w6d who presents for a RR IOL.     The patient reports fetal movement is present, denies contractions, denies loss of fluid, denies vaginal bleeding.  She denies HA, vision changes, SOB, chest pain, lightheadedness, dizziness, nausea, vomiting, dysuria, and hematuria.     DATING:  LMP: Patient's last menstrual period was 2024 (approximate).  Estimated Date of Delivery: 3/24/25   Based on: US, at 17 1/7 weeks GA    PREGNANCY RISK FACTORS:  Patient Active Problem List   Diagnosis    Polysubstance abuse (HCC)    Bipolar 1 disorder (HCC)    PTSD    Major depressive disorder    Dyspareunia in female    ADHD, combined type    Idiopathic generalized epilepsy    ASC-H, hrHPV (other) positive    Hypersomnia    Hx migraine    Former smoker    Sleep walking disorder    Urinary frequency    GBS bacteriuria    Primary hypothyroid    Hx MRSA    Insufficient prenatal care in second trimester    High-risk pregnancy in second trimester    ANDRE    Marijuana use    Genital herpes    Fetal drug exposure (HCC)    Vitamin D deficiency    Pregravid BMI 26.76    Vapes nicotine/flavoring    UTI x1    Second pregnancy    39 weeks gestation of pregnancy        Steroids Given In This Pregnancy:  no     REVIEW OF SYSTEMS:   Constitutional: negative fever, negative chills, negative weight changes   HEENT: negative visual disturbances, negative headaches, negative dizziness, negative hearing loss  Breast: Negative breast abnormalities, negative breast lumps, negative nipple discharge  Respiratory: negative dyspnea, negative cough, negative SOB  Cardiovascular: negative chest pain,  negative palpitations, negative arrhythmia, negative

## 2025-03-23 NOTE — DISCHARGE SUMMARY
10 % mucosal gel  gabapentin 300 MG capsule  hydrOXYzine HCl 25 MG tablet  ibuprofen 600 MG tablet  senna 8.6 MG tablet  sertraline 50 MG tablet        Activity: pelvic rest x 6 weeks  Diet: regular diet  Follow up: 2 weeks for  postpartum follow up     Condition on discharge: good    Discharge date: 3/26/25    Gucci Langford MD  Ob/Gyn Resident    Comments:  Home care and follow-up care were reviewed.  Pelvic rest, and birth control were reviewed. Signs and symptoms of mastitis and post partum depression were reviewed. The patient is to notify her physician if any of these occur. The patient was counseled on secondary smoke risks and the increased risk of sudden infant death syndrome and respiratory problems to her baby with exposure. She was counseled on various alternate recommendations to decrease the exposure to secondary smoke to her children.

## 2025-03-24 ENCOUNTER — ANESTHESIA (OUTPATIENT)
Dept: LABOR AND DELIVERY | Age: 30
DRG: 560 | End: 2025-03-24
Payer: MEDICAID

## 2025-03-24 ENCOUNTER — ANESTHESIA EVENT (OUTPATIENT)
Dept: LABOR AND DELIVERY | Age: 30
DRG: 560 | End: 2025-03-24
Payer: MEDICAID

## 2025-03-24 LAB
BACTERIA URNS QL MICRO: NORMAL
BILIRUB UR QL STRIP: ABNORMAL
CASTS #/AREA URNS LPF: NORMAL /LPF (ref 0–8)
CLARITY UR: ABNORMAL
COLOR UR: ABNORMAL
EPI CELLS #/AREA URNS HPF: NORMAL /HPF (ref 0–5)
GLUCOSE UR STRIP-MCNC: ABNORMAL MG/DL
HGB UR QL STRIP.AUTO: ABNORMAL
KETONES UR STRIP-MCNC: NEGATIVE MG/DL
LEUKOCYTE ESTERASE UR QL STRIP: ABNORMAL
NITRITE UR QL STRIP: NEGATIVE
PH UR STRIP: 7 [PH] (ref 5–8)
PROT UR STRIP-MCNC: ABNORMAL MG/DL
RBC #/AREA URNS HPF: NORMAL /HPF (ref 0–4)
SP GR UR STRIP: 1.01 (ref 1–1.03)
UROBILINOGEN UR STRIP-ACNC: NORMAL EU/DL (ref 0–1)
WBC #/AREA URNS HPF: NORMAL /HPF (ref 0–5)

## 2025-03-24 PROCEDURE — 59409 OBSTETRICAL CARE: CPT | Performed by: OBSTETRICS & GYNECOLOGY

## 2025-03-24 PROCEDURE — 87086 URINE CULTURE/COLONY COUNT: CPT

## 2025-03-24 PROCEDURE — 6360000002 HC RX W HCPCS

## 2025-03-24 PROCEDURE — 6370000000 HC RX 637 (ALT 250 FOR IP)

## 2025-03-24 PROCEDURE — 51798 US URINE CAPACITY MEASURE: CPT

## 2025-03-24 PROCEDURE — 6360000002 HC RX W HCPCS: Performed by: ANESTHESIOLOGY

## 2025-03-24 PROCEDURE — 1220000000 HC SEMI PRIVATE OB R&B

## 2025-03-24 PROCEDURE — 81001 URINALYSIS AUTO W/SCOPE: CPT

## 2025-03-24 PROCEDURE — 2500000003 HC RX 250 WO HCPCS: Performed by: ANESTHESIOLOGY

## 2025-03-24 PROCEDURE — 2500000003 HC RX 250 WO HCPCS

## 2025-03-24 PROCEDURE — 0KQM0ZZ REPAIR PERINEUM MUSCLE, OPEN APPROACH: ICD-10-PCS | Performed by: OBSTETRICS & GYNECOLOGY

## 2025-03-24 PROCEDURE — 7200000001 HC VAGINAL DELIVERY

## 2025-03-24 PROCEDURE — 3700000025 EPIDURAL BLOCK: Performed by: ANESTHESIOLOGY

## 2025-03-24 PROCEDURE — 0JHF3HZ INSERTION OF CONTRACEPTIVE DEVICE INTO LEFT UPPER ARM SUBCUTANEOUS TISSUE AND FASCIA, PERCUTANEOUS APPROACH: ICD-10-PCS | Performed by: OBSTETRICS & GYNECOLOGY

## 2025-03-24 PROCEDURE — 10907ZC DRAINAGE OF AMNIOTIC FLUID, THERAPEUTIC FROM PRODUCTS OF CONCEPTION, VIA NATURAL OR ARTIFICIAL OPENING: ICD-10-PCS | Performed by: OBSTETRICS & GYNECOLOGY

## 2025-03-24 RX ORDER — SODIUM CHLORIDE 0.9 % (FLUSH) 0.9 %
5-40 SYRINGE (ML) INJECTION PRN
Status: DISCONTINUED | OUTPATIENT
Start: 2025-03-24 | End: 2025-03-26 | Stop reason: HOSPADM

## 2025-03-24 RX ORDER — TAMSULOSIN HYDROCHLORIDE 0.4 MG/1
0.4 CAPSULE ORAL DAILY
Status: DISCONTINUED | OUTPATIENT
Start: 2025-03-24 | End: 2025-03-26 | Stop reason: HOSPADM

## 2025-03-24 RX ORDER — LIDOCAINE HYDROCHLORIDE 20 MG/ML
JELLY TOPICAL PRN
Status: DISCONTINUED | OUTPATIENT
Start: 2025-03-24 | End: 2025-03-26 | Stop reason: HOSPADM

## 2025-03-24 RX ORDER — SODIUM CHLORIDE 0.9 % (FLUSH) 0.9 %
5-40 SYRINGE (ML) INJECTION EVERY 12 HOURS SCHEDULED
Status: DISCONTINUED | OUTPATIENT
Start: 2025-03-24 | End: 2025-03-26 | Stop reason: HOSPADM

## 2025-03-24 RX ORDER — CYCLOBENZAPRINE HCL 10 MG
10 TABLET ORAL 3 TIMES DAILY
Status: DISCONTINUED | OUTPATIENT
Start: 2025-03-24 | End: 2025-03-26 | Stop reason: HOSPADM

## 2025-03-24 RX ORDER — PHENAZOPYRIDINE HYDROCHLORIDE 100 MG/1
200 TABLET, FILM COATED ORAL
Status: DISCONTINUED | OUTPATIENT
Start: 2025-03-25 | End: 2025-03-26 | Stop reason: HOSPADM

## 2025-03-24 RX ORDER — IBUPROFEN 600 MG/1
600 TABLET, FILM COATED ORAL EVERY 6 HOURS SCHEDULED
Status: DISCONTINUED | OUTPATIENT
Start: 2025-03-24 | End: 2025-03-26 | Stop reason: HOSPADM

## 2025-03-24 RX ORDER — GABAPENTIN 300 MG/1
300 CAPSULE ORAL 3 TIMES DAILY
Status: DISCONTINUED | OUTPATIENT
Start: 2025-03-24 | End: 2025-03-26 | Stop reason: HOSPADM

## 2025-03-24 RX ORDER — LANOLIN ALCOHOL/MO/W.PET/CERES
400 CREAM (GRAM) TOPICAL ONCE
Status: COMPLETED | OUTPATIENT
Start: 2025-03-24 | End: 2025-03-24

## 2025-03-24 RX ORDER — ONDANSETRON 2 MG/ML
4 INJECTION INTRAMUSCULAR; INTRAVENOUS EVERY 6 HOURS PRN
Status: DISCONTINUED | OUTPATIENT
Start: 2025-03-24 | End: 2025-03-24

## 2025-03-24 RX ORDER — LIDOCAINE HYDROCHLORIDE AND EPINEPHRINE 15; 5 MG/ML; UG/ML
INJECTION, SOLUTION EPIDURAL
Status: DISCONTINUED | OUTPATIENT
Start: 2025-03-24 | End: 2025-03-24 | Stop reason: SDUPTHER

## 2025-03-24 RX ORDER — SODIUM CHLORIDE, SODIUM LACTATE, POTASSIUM CHLORIDE, AND CALCIUM CHLORIDE .6; .31; .03; .02 G/100ML; G/100ML; G/100ML; G/100ML
500 INJECTION, SOLUTION INTRAVENOUS PRN
Status: DISCONTINUED | OUTPATIENT
Start: 2025-03-24 | End: 2025-03-26 | Stop reason: HOSPADM

## 2025-03-24 RX ORDER — ACETAMINOPHEN 500 MG
1000 TABLET ORAL EVERY 6 HOURS SCHEDULED
Status: DISCONTINUED | OUTPATIENT
Start: 2025-03-24 | End: 2025-03-26 | Stop reason: HOSPADM

## 2025-03-24 RX ORDER — MORPHINE SULFATE 4 MG/ML
INJECTION, SOLUTION INTRAMUSCULAR; INTRAVENOUS
Status: DISCONTINUED
Start: 2025-03-24 | End: 2025-03-24

## 2025-03-24 RX ORDER — IBUPROFEN 600 MG/1
600 TABLET, FILM COATED ORAL EVERY 6 HOURS PRN
Qty: 120 TABLET | Refills: 1 | Status: SHIPPED | OUTPATIENT
Start: 2025-03-24 | End: 2025-05-23

## 2025-03-24 RX ORDER — SIMETHICONE 80 MG
80 TABLET,CHEWABLE ORAL EVERY 6 HOURS PRN
Status: DISCONTINUED | OUTPATIENT
Start: 2025-03-24 | End: 2025-03-26 | Stop reason: HOSPADM

## 2025-03-24 RX ORDER — LIDOCAINE HYDROCHLORIDE 10 MG/ML
INJECTION, SOLUTION INFILTRATION; PERINEURAL
Status: DISCONTINUED
Start: 2025-03-24 | End: 2025-03-24

## 2025-03-24 RX ORDER — NALOXONE HYDROCHLORIDE 0.4 MG/ML
INJECTION, SOLUTION INTRAMUSCULAR; INTRAVENOUS; SUBCUTANEOUS PRN
Status: DISCONTINUED | OUTPATIENT
Start: 2025-03-24 | End: 2025-03-24

## 2025-03-24 RX ORDER — LIDOCAINE HYDROCHLORIDE 10 MG/ML
20 INJECTION, SOLUTION EPIDURAL; INFILTRATION; INTRACAUDAL; PERINEURAL ONCE
Status: DISCONTINUED | OUTPATIENT
Start: 2025-03-24 | End: 2025-03-26 | Stop reason: HOSPADM

## 2025-03-24 RX ORDER — CYCLOBENZAPRINE HCL 10 MG
10 TABLET ORAL 3 TIMES DAILY PRN
Status: DISCONTINUED | OUTPATIENT
Start: 2025-03-24 | End: 2025-03-24

## 2025-03-24 RX ORDER — LANOLIN
CREAM (ML) TOPICAL PRN
Status: DISCONTINUED | OUTPATIENT
Start: 2025-03-24 | End: 2025-03-26 | Stop reason: HOSPADM

## 2025-03-24 RX ORDER — ONDANSETRON 2 MG/ML
4 INJECTION INTRAMUSCULAR; INTRAVENOUS EVERY 6 HOURS PRN
Status: DISCONTINUED | OUTPATIENT
Start: 2025-03-24 | End: 2025-03-26 | Stop reason: HOSPADM

## 2025-03-24 RX ORDER — BISACODYL 10 MG
10 SUPPOSITORY, RECTAL RECTAL DAILY PRN
Status: DISCONTINUED | OUTPATIENT
Start: 2025-03-24 | End: 2025-03-26 | Stop reason: HOSPADM

## 2025-03-24 RX ORDER — ROPIVACAINE HYDROCHLORIDE 2 MG/ML
INJECTION, SOLUTION EPIDURAL; INFILTRATION; PERINEURAL
Status: COMPLETED
Start: 2025-03-24 | End: 2025-03-24

## 2025-03-24 RX ORDER — LIDOCAINE 4 G/G
1 PATCH TOPICAL DAILY
Status: DISCONTINUED | OUTPATIENT
Start: 2025-03-24 | End: 2025-03-26 | Stop reason: HOSPADM

## 2025-03-24 RX ORDER — ONDANSETRON 4 MG/1
4 TABLET, ORALLY DISINTEGRATING ORAL EVERY 6 HOURS PRN
Status: DISCONTINUED | OUTPATIENT
Start: 2025-03-24 | End: 2025-03-24

## 2025-03-24 RX ORDER — SENNA AND DOCUSATE SODIUM 50; 8.6 MG/1; MG/1
2 TABLET, FILM COATED ORAL NIGHTLY
Status: DISCONTINUED | OUTPATIENT
Start: 2025-03-24 | End: 2025-03-26 | Stop reason: HOSPADM

## 2025-03-24 RX ORDER — MORPHINE SULFATE 4 MG/ML
4 INJECTION, SOLUTION INTRAMUSCULAR; INTRAVENOUS ONCE
Status: COMPLETED | OUTPATIENT
Start: 2025-03-24 | End: 2025-03-24

## 2025-03-24 RX ORDER — ACETAMINOPHEN 500 MG
1000 TABLET ORAL EVERY 6 HOURS PRN
Qty: 120 TABLET | Refills: 0 | Status: SHIPPED | OUTPATIENT
Start: 2025-03-24 | End: 2025-04-23

## 2025-03-24 RX ORDER — EPHEDRINE SULFATE/0.9% NACL/PF 25 MG/5 ML
10 SYRINGE (ML) INTRAVENOUS EVERY 10 MIN PRN
Status: DISCONTINUED | OUTPATIENT
Start: 2025-03-24 | End: 2025-03-24

## 2025-03-24 RX ORDER — FERROUS SULFATE 325(65) MG
325 TABLET, DELAYED RELEASE (ENTERIC COATED) ORAL
Status: DISCONTINUED | OUTPATIENT
Start: 2025-03-25 | End: 2025-03-26 | Stop reason: HOSPADM

## 2025-03-24 RX ORDER — SODIUM CHLORIDE 9 MG/ML
INJECTION, SOLUTION INTRAVENOUS PRN
Status: DISCONTINUED | OUTPATIENT
Start: 2025-03-24 | End: 2025-03-26 | Stop reason: HOSPADM

## 2025-03-24 RX ORDER — ONDANSETRON 4 MG/1
4 TABLET, ORALLY DISINTEGRATING ORAL EVERY 6 HOURS PRN
Status: DISCONTINUED | OUTPATIENT
Start: 2025-03-24 | End: 2025-03-26 | Stop reason: HOSPADM

## 2025-03-24 RX ORDER — SENNOSIDES A AND B 8.6 MG/1
1 TABLET, FILM COATED ORAL 2 TIMES DAILY
Qty: 60 TABLET | Refills: 0 | Status: SHIPPED | OUTPATIENT
Start: 2025-03-24 | End: 2025-04-23

## 2025-03-24 RX ADMIN — SODIUM CHLORIDE, PRESERVATIVE FREE 10 ML: 5 INJECTION INTRAVENOUS at 21:14

## 2025-03-24 RX ADMIN — Medication 1 MILLI-UNITS/MIN: at 00:52

## 2025-03-24 RX ADMIN — ACETAMINOPHEN 1000 MG: 500 TABLET ORAL at 16:15

## 2025-03-24 RX ADMIN — HYDROXYZINE HYDROCHLORIDE 25 MG: 25 TABLET, FILM COATED ORAL at 16:15

## 2025-03-24 RX ADMIN — SENNOSIDES, DOCUSATE SODIUM 2 TABLET: 50; 8.6 TABLET, FILM COATED ORAL at 21:14

## 2025-03-24 RX ADMIN — EPHEDRINE SULFATE 10 MG: 5 INJECTION INTRAVENOUS at 03:06

## 2025-03-24 RX ADMIN — IBUPROFEN 600 MG: 600 TABLET ORAL at 13:10

## 2025-03-24 RX ADMIN — CYCLOBENZAPRINE 10 MG: 10 TABLET, FILM COATED ORAL at 18:00

## 2025-03-24 RX ADMIN — Medication 166.7 ML: at 11:15

## 2025-03-24 RX ADMIN — LEVOTHYROXINE SODIUM 50 MCG: 50 TABLET ORAL at 06:29

## 2025-03-24 RX ADMIN — ROPIVACAINE HYDROCHLORIDE 10 ML/HR: 2 INJECTION, SOLUTION EPIDURAL; INFILTRATION at 01:38

## 2025-03-24 RX ADMIN — MORPHINE SULFATE 4 MG: 4 INJECTION INTRAVENOUS at 11:20

## 2025-03-24 RX ADMIN — Medication 400 MG: at 03:26

## 2025-03-24 RX ADMIN — ROPIVACAINE HYDROCHLORIDE 8 ML: 2 INJECTION, SOLUTION EPIDURAL; INFILTRATION at 01:36

## 2025-03-24 RX ADMIN — GABAPENTIN 300 MG: 300 CAPSULE ORAL at 21:12

## 2025-03-24 RX ADMIN — IBUPROFEN 600 MG: 600 TABLET ORAL at 21:12

## 2025-03-24 RX ADMIN — SODIUM CHLORIDE, PRESERVATIVE FREE 10 ML: 5 INJECTION INTRAVENOUS at 21:05

## 2025-03-24 RX ADMIN — ACETAMINOPHEN 1000 MG: 500 TABLET ORAL at 23:55

## 2025-03-24 RX ADMIN — DIPHENHYDRAMINE HYDROCHLORIDE 25 MG: 50 INJECTION INTRAMUSCULAR; INTRAVENOUS at 07:33

## 2025-03-24 RX ADMIN — Medication 87.3 MILLI-UNITS/MIN: at 11:26

## 2025-03-24 RX ADMIN — ROPIVACAINE HYDROCHLORIDE 10 ML/HR: 2 INJECTION, SOLUTION EPIDURAL; INFILTRATION at 08:21

## 2025-03-24 RX ADMIN — Medication 2.5 MILLION UNITS: at 06:30

## 2025-03-24 RX ADMIN — DIPHENHYDRAMINE HYDROCHLORIDE 25 MG: 50 INJECTION INTRAMUSCULAR; INTRAVENOUS at 03:26

## 2025-03-24 RX ADMIN — LIDOCAINE HYDROCHLORIDE,EPINEPHRINE BITARTRATE 3 ML: 15; .005 INJECTION, SOLUTION EPIDURAL; INFILTRATION; INTRACAUDAL; PERINEURAL at 01:28

## 2025-03-24 RX ADMIN — ACETAMINOPHEN 1000 MG: 500 TABLET ORAL at 09:42

## 2025-03-24 RX ADMIN — TAMSULOSIN HYDROCHLORIDE 0.4 MG: 0.4 CAPSULE ORAL at 21:12

## 2025-03-24 RX ADMIN — CYCLOBENZAPRINE 10 MG: 10 TABLET, FILM COATED ORAL at 06:29

## 2025-03-24 RX ADMIN — Medication 2.5 MILLION UNITS: at 03:13

## 2025-03-24 ASSESSMENT — PAIN DESCRIPTION - LOCATION
LOCATION: HEAD
LOCATION: ABDOMEN
LOCATION: PERINEUM
LOCATION: BACK

## 2025-03-24 ASSESSMENT — PAIN DESCRIPTION - DESCRIPTORS
DESCRIPTORS: CRAMPING
DESCRIPTORS: ACHING;DISCOMFORT

## 2025-03-24 ASSESSMENT — PAIN - FUNCTIONAL ASSESSMENT: PAIN_FUNCTIONAL_ASSESSMENT: ACTIVITIES ARE NOT PREVENTED

## 2025-03-24 ASSESSMENT — PAIN SCALES - GENERAL
PAINLEVEL_OUTOF10: 5
PAINLEVEL_OUTOF10: 4
PAINLEVEL_OUTOF10: 5
PAINLEVEL_OUTOF10: 2
PAINLEVEL_OUTOF10: 10

## 2025-03-24 ASSESSMENT — PAIN DESCRIPTION - ORIENTATION: ORIENTATION: MID;LOWER

## 2025-03-24 NOTE — CARE COORDINATION
ANTEPARTUM NOTE    39 weeks gestation of pregnancy [Z3A.39]    Marry was admitted to L&D on 3/23/2025 for RR IOL @ 39 6/7    OB GYN Provider: [facility]    Will meet with patient after delivery to verify name/address/phone/insurance and discuss discharge planning.     Anticipate DC home 2 nights after vaginal delivery or 4 nights after C/S delivery as long as hemodynamically stable.

## 2025-03-24 NOTE — PLAN OF CARE
Problem: Pain  Goal: Verbalizes/displays adequate comfort level or baseline comfort level  Outcome: Progressing     Problem: Vaginal Birth or  Section  Goal: Fetal and maternal status remain reassuring during the birth process  Description:  Birth OB-Pregnancy care plan goal which identifies if the fetal and maternal status remain reassuring during the birth process  Outcome: Progressing     Problem: Postpartum  Goal: Experiences normal postpartum course  Description:  Postpartum OB-Pregnancy care plan goal which identifies if the mother is experiencing a normal postpartum course  Outcome: Progressing     Problem: Postpartum  Goal: Experiences normal postpartum course  Description:  Postpartum OB-Pregnancy care plan goal which identifies if the mother is experiencing a normal postpartum course  Outcome: Progressing  Goal: Appropriate maternal -  bonding  Description:  Postpartum OB-Pregnancy care plan goal which identifies if the mother and  are bonding appropriately  Outcome: Progressing  Goal: Establishment of infant feeding pattern  Description:  Postpartum OB-Pregnancy care plan goal which identifies if the mother is establishing a feeding pattern with their   Outcome: Progressing  Goal: Incisions, wounds, or drain sites healing without S/S of infection  Outcome: Progressing     Problem: Infection - Adult  Goal: Absence of infection at discharge  Outcome: Progressing  Goal: Absence of infection during hospitalization  Outcome: Progressing  Goal: Absence of fever/infection during anticipated neutropenic period  Outcome: Progressing     Problem: Safety - Adult  Goal: Free from fall injury  Outcome: Progressing     Problem: Chronic Conditions and Co-morbidities  Goal: Patient's chronic conditions and co-morbidity symptoms are monitored and maintained or improved  Outcome: Progressing

## 2025-03-24 NOTE — PROGRESS NOTES
Labor Progress Note    Marry Tenorio is a 30 y.o. female  at 40w0d  Writer presents to bedside after being called by RN due to prolonged deceleration.  RN states that patient's bladder was drained and was repositioned on the left when the prolonged deceleration was noted.  RN reposition patient to the right and fetal heart rate recovered.  Cervical check was performed at that time which revealed 6/70/0    The patient was seen and examined. Her pain is well controlled with an epidural. She reports fetal movement is present, complains of contractions, complains of loss of fluid, denies vaginal bleeding.       Vital Signs:  Vitals:    25 0500 25 0530 25 0600 25 0634   BP: 101/70 104/65 (!) 87/54 103/63   Pulse: 60 65 58 74   Resp: 18 17 16 18   Temp:  98 °F (36.7 °C) 98 °F (36.7 °C)    TempSrc:  Oral Oral    SpO2: 100% 98% 99%        FHT: 120, moderate variability, accelerations present, decelerations prolonged from 6636-4409  Contractions: regular, every 4 minutes    Chaperone for Intimate Exam: Chaperone was present for entire exam, Chaperone Name: Jessica KAUR  Cervical Exam: 6 cm dilated, 70 effaced, 0 station  Pitocin: @ 4 mu/min    Membranes: Ruptured blood tinged  Scalp Electrode in place: absent  Intrauterine Pressure Catheter in Place: absent    Interventions: SVE    Assessment/Plan:  Marry Tenorio is a 30 y.o. female  at 40w0d admitted for RR IOL    - GBS bacteruria, Pen G for GBS prophylaxis   - VSS, afebrile    - Prolonged decel but otherwise cat 1 w regular contractions    - Membranes Ruptured blood tinged   - SVE 6/70%/0   - cEFM and TOCO   - S/p nash   - S/p Cytotec 25 PO x1   - S/p Nitrous   - S/p Morphine/Compazine x1   - Epidural in place and functioning    - Continue pitocin per protocol. Will continue position changes    Attending updated and in agreement with plan    Nathalie Ann MD  Ob/Gyn Resident  3/24/2025, 7:48 AM

## 2025-03-24 NOTE — PROGRESS NOTES
Labor Progress Note    Marry Tenorio is a 30 y.o. female  at 39w6d  The patient was seen and examined. Her pain is well controlled. She reports fetal movement is present, denies contractions, complains of loss of fluid, complains of vaginal bleeding.       R/B/A to AROM discussed. SVE performed, patient 70/0. Patient amenable to AROM at this time. [place] used, AROM performed revealing clear fluid. Patient tolerated procedure well.     Vital Signs:  Vitals:    25 1719 25 1915 25 2139   BP: 120/74 108/71    Pulse: 69 75    Resp: 18 17    Temp: 98 °F (36.7 °C) 98 °F (36.7 °C)    TempSrc: Oral Oral    SpO2: 98% 100% 100%       FHT: 120, moderate variability, accelerations present, decelerations absent  Contractions: regular, every 4 minutes    Chaperone for Intimate Exam: Chaperone was present for entire exam, Chaperone Name: VINCENT Bingham   Cervical Exam: 4 cm dilated, 70% effaced, 0 station  Pitocin: none    Membranes: Ruptured blood stained  Scalp Electrode in place: absent  Intrauterine Pressure Catheter in Place: absent    Interventions: SVE, AROM    Assessment/Plan:  Marry Tenorio is a 30 y.o. female  at 39w6d admitted for RR IOL    - GBS bacteruria, Pen G for GBS prophylaxis  - VSS, afebrile    - cEFM and TOCO: cat 1 w/ regular contractions   - AROM (bld tinged) @ 2341   - SVE 4/70%/0   - s/p Cytotec 25 mcg PO x1 (patient never received second dose of Cytotec 25 mcg PO as nash balloon came out prior to administration)   - s/p nitrous, s/p morphine/compazine x1   - Pitocin ordered to initiate per protocol   - Continue to monitor     Attending updated and in agreement with plan    Veena Goldman MD  Ob/Gyn Resident  3/23/2025, 11:45 PM

## 2025-03-24 NOTE — PROGRESS NOTES
POST PARTUM DAY # 1    Marry Tenorio is a 30 y.o. female  This patient was seen & examined today.  on 3/24/25    Her pregnancy was complicated by:   Patient Active Problem List   Diagnosis    Polysubstance abuse (HCC)    Bipolar 1 disorder (HCC)    PTSD    Major depressive disorder    Dyspareunia in female    ADHD, combined type    Idiopathic generalized epilepsy    ASC-H, hrHPV (other) positive    Hypersomnia    Hx migraine    Former smoker    Sleep walking disorder    Urinary frequency    GBS bacteriuria    Primary hypothyroid    Hx MRSA    Insufficient prenatal care in second trimester    High-risk pregnancy in second trimester    ANDRE    Marijuana use    Genital herpes    Fetal drug exposure (HCC)    Vitamin D deficiency    Pregravid BMI 26.76    Vapes nicotine/flavoring    UTI x1    Second pregnancy    39 weeks gestation of pregnancy     3/24/25 M Apg 8/9 Wt 6#14       Today she is doing well without any chief complaint. Her lochia is light. She denies chest pain, shortness of breath, headache, lightheadedness and blurred vision. She is breast feeding and she denies any breast tenderness. She is ambulating well. Her voiding pattern is normal. I reviewed signs and symptoms of post partum depression with the patient, she currently denies any of these symptoms. She is tolerating solids.     Vital Signs:  Vitals:    25 1345 25 1400 25 1405 25 0614   BP: 104/73 107/69 (!) 113/53 104/69   Pulse: 92 88 88 84   Resp:   16 16   Temp:   98.1 °F (36.7 °C) 98 °F (36.7 °C)   TempSrc:   Axillary Oral   SpO2:   98%        Physical Exam:  General:  no apparent distress, alert and cooperative  Neurologic:  alert, oriented, normal speech, no focal findings or movement disorder noted  Lungs:  No increased work of breathing or conversational dyspnea  Heart: regular rate   Abdomen: abdomen soft, non-distended, non-tender  Fundus: non-tender, firm, below umbilicus  Extremities:  no calf tenderness,

## 2025-03-24 NOTE — CARE COORDINATION
Social Work     Sw reviewed medical record (current active problem list) and tox screens and found no current concerns.    Mom had +THC ASHLEY on 10/23/24, she is negative at delivery.      Mom has MH hx (bipolar, PTSD, dep) and hx of polysubstance use (thc, opiates, cocaine- prior to pregnancy, exact dates unknown)     Sw spoke with mom and dad briefly to explain Sw role, inquire if any needs or concerns, and provide safe sleep education and discuss.  Mom denied any needs or questions and informs baby has a safe sleep environment (crib, bass, pnp) and carseat.    Parents had many questions about formula help and supports.  Sw encouraged mom to contact WIC, and also discussed [facility] and [facility] for diaper bank referrals, parents accepting of referral, Dontrell submitted.       Mom denied any current s/s of anxiety or depression and is aware to reach out to OB if any s/s occur after dc.    Mom is currently talking rapidly, she discusses she stopped all her psych meds when she learned of pregnancy, does feel she will need to restart a depression med minimally now that she is delivered.  Mom encouraged to discuss with staff and nurse informed to let physicians know.     Mom declined being linked with therapy or psych at this time, but has been linked in the past and states she is comfortable reaching out to previous providers if needed.       Mom reports a  good support system with fob (Jean Licona) and his family and denied any current questions or needs.      Mom reports this is her 1st baby.       Mom states ped not yet chosen, she does have list.    Sw discussed MAT meds and mom reports she stopped using sublocade when she learned of pregnancy.       Sw discussed Nasrin Mandate with parents.    SAMREEN (Tomeka) informed.      No other concerns at this time, baby is cleared to dc home with mom.      Sw encouraged parents to reach out if any issues or concerns arise.

## 2025-03-24 NOTE — FLOWSHEET NOTE
Pt ambulatory to restroom with standby assist. Pt voided. Brunilda care performed. New gown, pad and panites applied. Pt to 744 via wheelchair with all belongings and baby in arms. Bedside report given to Thi KAUR. All questions answered at this time.

## 2025-03-24 NOTE — PROGRESS NOTES
Attending Physician Statement  I have discussed the care of Marry Tenorio, including pertinent history and exam findings, with the resident. I have reviewed the key elements of all parts of the encounter with the resident.  I agree with the assessment, plan and orders as documented by the resident.  (GE Modifier)    Martine Cortez DO  [facility]  3/24/2025, 1:34 PM    
levothyroxine with concern for how it may affect fetus and because it \"wasn't working anyway.\" Patient reports she currently prays and that it's working.    3/19/25: reports taking Synthroid      Hx MRSA 10/21/2024     07/25/21: Nasal PCR, MRSA positive      Insufficient prenatal care in second trimester 10/21/2024     08/21/24: Patient presented to [facility] with pregnancy of unknown gestational age[facility] [place] [facility] for consult, potential transfer  08/29/24: Met with Dr. Umaña, was amenable to transfer care to [facility].  10/15/24: Dating ultrasound at 17w1d  10/21/24: OB intake at 18w0d      High-risk pregnancy in second trimester 10/21/2024     10/21/24: MFM referral placed for anatomy scan and, if indicated, consult.        GBS bacteriuria 09/03/2024     Positive urine culture 8/29/24 and 10/23/24      Dyspareunia in female 10/05/2023     Reports it started after she had sex too soon after her D&C. Recommended she follow up after delivery if symptoms persist with urogyn. Please send referral if still indicated post delivery.     Patient concerned about pain with delivery due to symptoms. Recommendation made for epidural to assist in controlling or minimizing pain during labor/cervical checks/delivery.      Hypersomnia 04/19/2023     10/21/24: Patient denies hypersomnia.      Hx migraine 12/19/2022    Urinary frequency 05/31/2022 08/29/24: Patient was seen by Urology for this  years ago, was told low Bladder Compliance. Patient was placed on medications but stopped them a long time ago. Urine culture ordered today. Discussed she will need further follow up with it outside of pregnancy.     10/21/24: Patient reports urinary frequency is continuing to worsen with advancing pregnancy.      ASC-H, hrHPV (other) positive 02/17/2022 01/03/22: Pap, ASC-H, hrHPV (other) positive  02/17/22: colposcopy, negative dysplasia  12/13/24 ASCUS with + HPV- needs colposcopy done

## 2025-03-24 NOTE — L&D DELIVERY NOTE
Vaginal Delivery Note  Department of Obstetrics and Gynecology  [facility]       Patient: Marry Tenorio   : 1995  MRN: [id number]   Date of delivery: 3/24/25     Pre-operative Diagnosis: Marry Tenorio  at 40w0d  Risk reducing induction of labor  Anxiety/depression/bipolar  Posttraumatic stress disorder  Epilepsy  Hypothyroidism  Genital herpes  Fetal drug exposure  Urinary tract infection x 1  History of polysubstance use  History of abnormal Pap smear  History of migraines  BMI 28    Post-operative Diagnosis:     living infant male  Risk reducing induction of labor  Anxiety/depression/bipolar  Posttraumatic stress disorder  Epilepsy  Hypothyroidism  Genital herpes  Fetal drug exposure  Urinary tract infection x 1  History of polysubstance use  History of abnormal Pap smear  History of migraines  BMI 28    Delivering Obstetrician & Assistant(s): Dr. López; Nathalie Ann MD, PGY1    Infant Information:   Information for the patient's :  Cristhian Tenorio [[id number]]      Information for the patient's :  Cristhian Tenorio [[id number]]        Apgar scores: 8 at 1 minute and 9 at 5 minutes.     Anesthesia:  epidural anesthesia    Application and Delivery:    She was known to be GBS positive and received Pen G for antibiotic prophylaxis.     The patient progressed well, received an epidural, became complete and felt the urge to push. After pushing with contractions the fetal head delivered Cephalic, left occiput anterior over an intact perineum, nuchal cord was not present.  The anterior, then posterior shoulder delivered easily and atraumatically followed by the rest of the infant. Nose and mouth suctioned with bulb suction, infant was stimulated and dried. Cord was clamped and cut after one minute delayed cord clamping and infant was placed on maternal abdomen, and attended by RN for evaluation. The delivery of the placenta was spontaneous and

## 2025-03-24 NOTE — PLAN OF CARE
Problem: Pain  Goal: Verbalizes/displays adequate comfort level or baseline comfort level  3/24/2025 1511 by Thi Garcia RN  Outcome: Progressing  3/24/2025 0700 by Otilia Patel RN  Outcome: Progressing     Problem: Vaginal Birth or  Section  Goal: Fetal and maternal status remain reassuring during the birth process  Description:  Birth OB-Pregnancy care plan goal which identifies if the fetal and maternal status remain reassuring during the birth process  3/24/2025 151 by Thi Garcia RN  Outcome: Completed  3/24/2025 0700 by Otilia Patel RN  Outcome: Progressing     Problem: Postpartum  Goal: Experiences normal postpartum course  Description:  Postpartum OB-Pregnancy care plan goal which identifies if the mother is experiencing a normal postpartum course  3/24/2025 1511 by Thi Garcia RN  Outcome: Progressing  3/24/2025 0700 by Otilia Patel RN  Outcome: Progressing  Goal: Appropriate maternal -  bonding  Description:  Postpartum OB-Pregnancy care plan goal which identifies if the mother and  are bonding appropriately  3/24/2025 1511 by Thi Garcia RN  Outcome: Progressing  3/24/2025 0700 by Otilia Patel RN  Outcome: Progressing  Goal: Establishment of infant feeding pattern  Description:  Postpartum OB-Pregnancy care plan goal which identifies if the mother is establishing a feeding pattern with their   3/24/2025 1511 by Thi Garcia RN  Outcome: Progressing  3/24/2025 0700 by Otilia Patel RN  Outcome: Progressing  Goal: Incisions, wounds, or drain sites healing without S/S of infection  3/24/2025 1511 by Thi Garcia RN  Outcome: Progressing  3/24/2025 0700 by Otilia Patel RN  Outcome: Progressing     Problem: Infection - Adult  Goal: Absence of infection at discharge  3/24/2025 1511 by Thi Garcia RN  Outcome: Progressing  3/24/2025 0700 by Otilia Patel RN  Outcome: Progressing  Goal: Absence of infection

## 2025-03-24 NOTE — FLOWSHEET NOTE
Pt educated on the use of self-administered  nitrous oxide for pain control and side effects. Pt educated on when and how to use the mask appropriately. Pt educated that she is the only person allowed to hold the mask to her face and that no one should prop the mask against her face. Pt also educated that she is the only person in the room allowed to use the mask.Pt informed that she cannot be out of bed without a trained support person with her. Pt completed a return demonstration of the use of nitrous oxide and all questions and concerns were addressed. RN verified the presence of a signed agreement form for the nitrous oxide. Nitrous oxide and oxygen at a 50-50 mix was initiated at 2139. RN remains at bedside for the first 15 mins post initiation. Pt declines side effects at this time.

## 2025-03-24 NOTE — FLOWSHEET NOTE
0110,   ELIAN Tuttle, at bedside.  Epidural procedure explained, risks discussed.  Pt verbalizes consent for epidural.   0118 patient positioned for epidural. 0123 Time out completed.   0126 catheter placed. 0128 test dose given.  Epidural catheter taped and secured per anesthesia.   0132 to low fowlers with left uterine displacement. 0135 loading dose given. 0135 pump initiated.  Pt tolerated procedure well.

## 2025-03-24 NOTE — ANESTHESIA PROCEDURE NOTES
Epidural Block    Patient location during procedure: OB  End time: 3/24/2025 1:27 AM  Reason for block: labor epidural  Staffing  Performed: resident/CRNA   Anesthesiologist: Sharla Mares MD  Resident/CRNA: Nicolle Bush APRN - CRNA  Performed by: Nicolle Bush APRN - CRNA  Authorized by: Sharla Mares MD    Epidural  Patient position: sitting  Prep: Betadine  Patient monitoring: continuous pulse ox and frequent blood pressure checks  Approach: midline  Location: L3-4  Injection technique: LILLIE air  Guidance: Doppler guided  Provider prep: mask and sterile gloves  Needle  Needle type: Tuohy   Needle gauge: 17 G  Needle insertion depth: 6.5 cm  Catheter type: side hole  Catheter size: 19 G  Catheter at skin depth: 13 cm  Test dose: negativeCatheter Secured: tegaderm and tape  Assessment  Sensory level: T4  Hemodynamics: stable  Attempts: 1  Outcomes: uncomplicated and patient tolerated procedure well  Preanesthetic Checklist  Completed: patient identified, IV checked, site marked, risks and benefits discussed, surgical/procedural consents, equipment checked, pre-op evaluation, timeout performed, anesthesia consent given, oxygen available, monitors applied/VS acknowledged, fire risk safety assessment completed and verbalized and blood product R/B/A discussed and consented

## 2025-03-24 NOTE — ANESTHESIA PRE PROCEDURE
Department of Anesthesiology  Preprocedure Note       Name:  Marry Tenorio   Age:  30 y.o.  :  1995                                          MRN:  [id number]         Date:  3/24/2025      Surgeon: * No surgeons listed *    Procedure: * No procedures listed *    Medications prior to admission:   Prior to Admission medications    Medication Sig Start Date End Date Taking? Authorizing Provider   loratadine-pseudoephedrine (CLARITIN-D 24 HOUR)  MG per extended release tablet Take 1 tablet by mouth daily 25   Stephany Marie, OLGA - CORNELIO   famotidine (PEPCID) 20 MG tablet Take 1 tablet by mouth 2 times daily  Patient not taking: Reported on 12/3/2024 10/27/24   Duglas Luciano DO   buprenorphine-naloxone (SUBOXONE) 8-2 MG FILM SL film Place 1 Film under the tongue in the morning and at bedtime. Indications: Patient is taking \"as needed\"  Patient not taking: Reported on 12/3/2024 8/12/24   Malika Irizarry MD   VITAMIN D3 50 MCG ( UT) CAPS capsule Take 1 capsule by mouth daily 24   Malika Irizarry MD   levothyroxine (SYNTHROID) 50 MCG tablet Take 1 tablet by mouth Daily 24   Malika Irizarry MD   Prenatal Vit-Fe Fumarate-FA (PRENATAL VITAMIN) 28-0.8 MG TABS tablet Take 1 tablet by mouth daily 24   Malika Irizarry MD   topiramate ER (TROKENDI XR) 50 MG CP24 Take 50 mg by mouth daily  Patient not taking: Reported on 10/21/2024 3/26/24   Malika Irizarry MD       Current medications:    Current Facility-Administered Medications   Medication Dose Route Frequency Provider Last Rate Last Admin    ROPivacaine (NAROPIN) 0.2% injection 0.2%             ROPivacaine 0.2% in sodium chloride 0.9% (OB)  10 mL/hr Epidural Continuous Sharla Mares MD        naloxone 0.4 mg in 10 mL sodium chloride syringe   IntraVENous PRN Sharla Mares MD        ePHEDrine injection 10 mg  10 mg IntraVENous Q10 Min PRN Sharla Mares MD        lactated ringers infusion

## 2025-03-24 NOTE — CARE COORDINATION
CASE MANAGEMENT POST-PARTUM TRANSITIONAL CARE PLAN    39 weeks gestation of pregnancy [Z3A.39]    OB Provider: [facility]    Writer met shannan Tuttle at her bedside to discuss DCP. She is S/P  on 3/24/2025    Writer verified address,her phone number and emergency contacts are all correct on facesheet.. She states she lives with her  Jean Licona. She denied barriers with transportation home, to doctor's appointments or with paying for medications upon discharge home.     [facility] insurance correct. Writer notified Marry she has 30 days from date of birth to add  to insurance policy by contacting [employer].  If they would like to add baby to dad's insurance, he would need to do through work within 30 days.  Both parents verbalized understanding.    Infant name on BC: Kin Licona.   Infant PCP undecided. List given to parents.     DME: no  HOME CARE: no    Anticipated DC of mother and infant 1-2 days after .     BS RISK OF UNPLANNED READMISSION 2.0             3.6 Total Score

## 2025-03-24 NOTE — LACTATION NOTE
Mom had baby on the right breast. She noted that the baby keeps slipping down. Assisted with a deep latch. Baby did several bursts of sucking, then slips down or off the nipple. Reassurance given. Reviewed signs of a good latch. Discussed feeding patterns as well as how to know the baby is getting enough at breast. Encouraged frequent attempts on breast and offering both breasts at a feeding. Mom to call out for assistance as needed.

## 2025-03-24 NOTE — PROGRESS NOTES
Obstetric/Gynecology Resident Interval Note    Tracing reviewed.    Fetal Heart Monitor:  Baseline Heart Rate 120, moderate variability, present accelerations, absent decelerations  [place]: contractions, irregular, every 4-8 minutes    Patient received first dose of Cytotec 25 mcg PO at 1833. Tracing is category 1. Priest remains in place. Patient has received morphine/compazine x1. Will order Cytotec 25 mcg PO x1. Continue to monitor.    Veena Goldman MD  OB/GYN Resident, PGY1  [place], [place]  3/23/2025, 11:19 PM

## 2025-03-24 NOTE — PROGRESS NOTES
Labor Progress Note    Marry Tenorio is a 30 y.o. female  at 40w0d  The patient was seen and examined. Her pain is well controlled. She reports fetal movement is present, complains of contractions, complains of loss of fluid, complains of vaginal bleeding.       Vital Signs:  Vitals:    25 0301 25 0311 25 0316 25 0321   BP: (!) 89/49 110/75 (!) 105/91 121/77   Pulse: 60  70 77   Resp: 19  18 17   Temp:       TempSrc:       SpO2: 99%  100% 99%     FHT: 110, moderate variability, accelerations present, decelerations absent  Contractions: regular, every 3-4 minutes    Chaperone for Intimate Exam: Chaperone was present for entire exam, Chaperone Name: VINCENT Bingham  Cervical Exam: 4 cm dilated, 70 effaced, 0 station  Pitocin: @ 1 mu/min    Membranes: Ruptured blood tinged  Scalp Electrode in place: absent  Intrauterine Pressure Catheter in Place: present    Interventions: SVE, IUPC placed    Assessment/Plan:  Marry Tenorio is a 30 y.o. female  at 40w0d admitted for RR IOL    - GBS bacteruria, Pen G for GBS prophylaxis  - VSS, afebrile    - cEFM and TOCO: cat 1 w/ regular contractions   - AROM (bld) @ 2341   - SVE 4/70%/0   - s/p Priest, Cytotec 25 mcg PO x1   - s/p nitrous, morphine/compazine x1   - Epidural in place with adequate pain control   - MAP noted to be < 65 and FHR consistently 110s. Ephedrine given with writer in room.    - Patient complaining of restless legs. Mag ox 400 mg x1 ordered.   - Continue pitocin per protocol    Anxiety/Depression/Bipolar/PTSD   - Reports mood is stable   - Currently on no medications    Epilepsy   - Currently on no medications   - No seizure activity; last seizure activity several years ago     Hypothyroidism   - Continue Synthroid 50 mcg qD    Attending updated and in agreement with plan    Veena Goldman MD  Ob/Gyn Resident  3/24/2025, 3:18 AM

## 2025-03-24 NOTE — PROGRESS NOTES
Labor Progress Note    Marry Tenorio is a 30 y.o. female  at 40w0d  The patient was seen and examined. Her pain is well controlled. She reports fetal movement is present, complains of contractions, complains of loss of fluid, complains of vaginal bleeding.       Vital Signs:  Vitals:    25 0500 25 0530 25 0600 25 0634   BP: 101/70 104/65 (!) 87/54 103/63   Pulse: 60 65 58 74   Resp: 18 17 16 18   Temp:  98 °F (36.7 °C) 98 °F (36.7 °C)    TempSrc:  Oral Oral    SpO2: 100% 98% 99%      FHT: 120, moderate variability, accelerations present, decelerations absent  Contractions: regular, every 3-4 minutes    Chaperone for Intimate Exam: Chaperone was present for entire exam, Chaperone Name: VINCENT Bingham  Cervical Exam: 4 cm dilated, 70 effaced, 0 station  Pitocin: @ 2 mu/min    Membranes: Ruptured blood tinged  Scalp Electrode in place: absent  Intrauterine Pressure Catheter in Place: present    Interventions: SVE    Assessment/Plan:  Marry Tenorio is a 30 y.o. female  at 40w0d admitted for RR IOL    - GBS bacteruria, Pen G for GBS prophylaxis  - VSS, afebrile    - cEFM and TOCO: cat 1 w/ regular contractions   - AROM (bld) @ 2341   - SVE 4/70%/0   - s/p Priest, Cytotec 25 mcg PO x1   - s/p nitrous, morphine/compazine x1   - Epidural in place with adequate pain control   - Continue pitocin per protocol. Will continue with aggressive position changes.     Attending updated and in agreement with plan    Veena Goldman MD  Ob/Gyn Resident  3/24/2025, 6:45 AM

## 2025-03-25 LAB
MICROORGANISM SPEC CULT: NO GROWTH
SPECIMEN DESCRIPTION: NORMAL

## 2025-03-25 PROCEDURE — 1220000000 HC SEMI PRIVATE OB R&B

## 2025-03-25 PROCEDURE — 6370000000 HC RX 637 (ALT 250 FOR IP)

## 2025-03-25 PROCEDURE — 2500000003 HC RX 250 WO HCPCS

## 2025-03-25 RX ORDER — GABAPENTIN 100 MG/1
100 CAPSULE ORAL ONCE
Status: COMPLETED | OUTPATIENT
Start: 2025-03-25 | End: 2025-03-25

## 2025-03-25 RX ORDER — DIPHENHYDRAMINE HCL 25 MG
25 TABLET ORAL EVERY 6 HOURS PRN
Status: DISCONTINUED | OUTPATIENT
Start: 2025-03-25 | End: 2025-03-26 | Stop reason: HOSPADM

## 2025-03-25 RX ORDER — SERTRALINE HYDROCHLORIDE 25 MG/1
25 TABLET, FILM COATED ORAL DAILY
Status: DISCONTINUED | OUTPATIENT
Start: 2025-03-25 | End: 2025-03-26 | Stop reason: HOSPADM

## 2025-03-25 RX ORDER — SERTRALINE HYDROCHLORIDE 25 MG/1
25 TABLET, FILM COATED ORAL DAILY
Qty: 30 TABLET | Refills: 3 | Status: SHIPPED | OUTPATIENT
Start: 2025-03-25 | End: 2025-03-26 | Stop reason: HOSPADM

## 2025-03-25 RX ADMIN — ACETAMINOPHEN 1000 MG: 500 TABLET ORAL at 18:00

## 2025-03-25 RX ADMIN — SODIUM CHLORIDE, PRESERVATIVE FREE 10 ML: 5 INJECTION INTRAVENOUS at 20:30

## 2025-03-25 RX ADMIN — CYCLOBENZAPRINE 10 MG: 10 TABLET, FILM COATED ORAL at 15:55

## 2025-03-25 RX ADMIN — ACETAMINOPHEN 1000 MG: 500 TABLET ORAL at 11:54

## 2025-03-25 RX ADMIN — SERTRALINE HYDROCHLORIDE 25 MG: 25 TABLET ORAL at 09:51

## 2025-03-25 RX ADMIN — TAMSULOSIN HYDROCHLORIDE 0.4 MG: 0.4 CAPSULE ORAL at 09:53

## 2025-03-25 RX ADMIN — SODIUM CHLORIDE, PRESERVATIVE FREE 10 ML: 5 INJECTION INTRAVENOUS at 09:52

## 2025-03-25 RX ADMIN — GABAPENTIN 100 MG: 100 CAPSULE ORAL at 13:01

## 2025-03-25 RX ADMIN — IBUPROFEN 600 MG: 600 TABLET ORAL at 11:54

## 2025-03-25 RX ADMIN — GABAPENTIN 300 MG: 300 CAPSULE ORAL at 15:54

## 2025-03-25 RX ADMIN — PHENAZOPYRIDINE HYDROCHLORIDE 200 MG: 100 TABLET ORAL at 17:10

## 2025-03-25 RX ADMIN — PHENAZOPYRIDINE HYDROCHLORIDE 200 MG: 100 TABLET ORAL at 12:37

## 2025-03-25 RX ADMIN — CYCLOBENZAPRINE 10 MG: 10 TABLET, FILM COATED ORAL at 06:00

## 2025-03-25 RX ADMIN — GABAPENTIN 300 MG: 300 CAPSULE ORAL at 09:51

## 2025-03-25 RX ADMIN — DIPHENHYDRAMINE HYDROCHLORIDE 25 MG: 25 TABLET ORAL at 06:01

## 2025-03-25 RX ADMIN — PHENAZOPYRIDINE HYDROCHLORIDE 200 MG: 100 TABLET ORAL at 09:52

## 2025-03-25 RX ADMIN — ACETAMINOPHEN 1000 MG: 500 TABLET ORAL at 06:12

## 2025-03-25 RX ADMIN — IBUPROFEN 600 MG: 600 TABLET ORAL at 17:59

## 2025-03-25 RX ADMIN — CYCLOBENZAPRINE 10 MG: 10 TABLET, FILM COATED ORAL at 09:51

## 2025-03-25 RX ADMIN — IBUPROFEN 600 MG: 600 TABLET ORAL at 06:01

## 2025-03-25 ASSESSMENT — PAIN SCALES - GENERAL
PAINLEVEL_OUTOF10: 4
PAINLEVEL_OUTOF10: 7
PAINLEVEL_OUTOF10: 6
PAINLEVEL_OUTOF10: 5

## 2025-03-25 ASSESSMENT — PAIN - FUNCTIONAL ASSESSMENT: PAIN_FUNCTIONAL_ASSESSMENT: ACTIVITIES ARE NOT PREVENTED

## 2025-03-25 ASSESSMENT — PAIN DESCRIPTION - LOCATION
LOCATION: ABDOMEN
LOCATION: VAGINA

## 2025-03-25 ASSESSMENT — PAIN DESCRIPTION - DESCRIPTORS: DESCRIPTORS: CRAMPING

## 2025-03-25 NOTE — PROGRESS NOTES
Obstetric/Gynecology Resident Interval Note    Resident to bedside to discuss complaint of urinary frequency. Upon entry to room patient is standing at bedside with her partner in no acute distress. Patient states frustration that she does not feel like a priority and her motrin/tylenol and flexeril are not helping.     VSS, afebrile. Upon chart review patient urine output adequate. Discussed with patient expectations following vaginal delivery and repair and she states understanding. Offered patient use of other symptomatic medications including Gabapentin TID, Lidocaine patch, lidocaine jelly, and Flomax/Pyridium.     Patient is amenable to this plan and reports that she feels heard. Will continue to monitor closely.    Kary Cordova DO  OB/GYN Resident, PGY2  [place], [place]  3/24/2025, 8:41 PM

## 2025-03-25 NOTE — PLAN OF CARE
Problem: Pain  Goal: Verbalizes/displays adequate comfort level or baseline comfort level  Outcome: Progressing     Problem: Postpartum  Goal: Experiences normal postpartum course  Description:  Postpartum OB-Pregnancy care plan goal which identifies if the mother is experiencing a normal postpartum course  Outcome: Progressing  Goal: Appropriate maternal -  bonding  Description:  Postpartum OB-Pregnancy care plan goal which identifies if the mother and  are bonding appropriately  Outcome: Progressing  Goal: Establishment of infant feeding pattern  Description:  Postpartum OB-Pregnancy care plan goal which identifies if the mother is establishing a feeding pattern with their   Outcome: Progressing  Goal: Incisions, wounds, or drain sites healing without S/S of infection  Outcome: Progressing     Problem: Infection - Adult  Goal: Absence of infection at discharge  Outcome: Progressing  Goal: Absence of infection during hospitalization  Outcome: Progressing  Goal: Absence of fever/infection during anticipated neutropenic period  Outcome: Progressing     Problem: Safety - Adult  Goal: Free from fall injury  Outcome: Progressing     Problem: Chronic Conditions and Co-morbidities  Goal: Patient's chronic conditions and co-morbidity symptoms are monitored and maintained or improved  Outcome: Progressing     Problem: Discharge Planning  Goal: Discharge to home or other facility with appropriate resources  Outcome: Progressing

## 2025-03-25 NOTE — FLOWSHEET NOTE
[facility]  PHYSICAL THERAPY  [] EVALUATION  [x] DAILY NOTE (LAND) [] DAILY NOTE (AQUATIC ) [] PROGRESS NOTE [] DISCHARGE NOTE    [] [facility]   [] [address]    [] [address] [place]   [x] Marilu [place]abel    Date: 2021  Patient Name:  Gonzales Duffy  : 1971  MRN: [id number]  CSN: [id number]    Referring Practitioner Oracio Perez DO   Diagnosis Cramp and spasm [R25.2]    Treatment Diagnosis Difficulty walking    Date of Evaluation 21    Additional Pertinent History Spinal cord injury; incontinence       Functional Outcome Measure Used Tinetti   Functional Outcome Score  (21)       Insurance: Primary: Payor: Kilo Mendez /  /  / ,   Secondary:    Authorization Information: Pre-certification not needed; unlimited PT/OT/ST visits; aquatic therapy is covered; modalities covered except for hot/cold packs    Visit # 5, 5/10 for progress note   Visits Allowed: 144 units allowed    Recertification Date:    Physician Follow-Up:    Physician Orders:    History of Present Illness: Donny Collet returns to therapy to address progressive weakness of bilateral LE secondary to a spinal cord injury. Donny Collet had surgery and recovered well initially, however a few weeks later he noticed increase in tone and weakness in bilateral LE. SUBJECTIVE: Reports doing well.  Feeling better today    TREATMENT   Precautions:    Pain:0/10     X in shaded column indicates activity completed today   Modalities Parameters/  Location  Notes                     Manual Therapy Time/Technique  Notes                     Exercise/Intervention   Notes   NuStep 6 min  Level 4 x UE: 12; seat: 9          // bars: forward, marching and side stepping 2x  x           Airex: anterior/poterior wt shifts; marching; squats    x    Sit-stand squats  10x  x Without UE support    Seated hip abd/add; hip openers 15x  x Green band    Seated hip flexion, knee Summoned to patients room same complaints as @ 1938, Patient feels like she is not being taken seriously and questioned writer about priority of care. Asked \"if I had an emergency would they make me a priority. Writer empathizes with patient and and offers reassurance. Educated regarding priority of care. States \"I feel like they don't care about me, so I'm just gonna keep bugging them until they take care of me and if they're not gonna, I want a new doctor.\" Message sent to Dr. Goldman. Dr. Goldman in @ 2030 to examine patient. Orders received and initiated.   extension and return to starting position 10x  x    Seated ankle DF 10x  x           Alternating step touch 10x 6 inch x    Forward step ups  5x 6 inch x bilaterally   Hurdles: forward, lateral, side stepping 2x ea green x    BOSU: alternating forward lunges  10x  x      Specific Interventions Next Treatment: NuStep; static and dynamic balance and gait activities    Activity/Treatment Tolerance:  [x]  Patient tolerated treatment well  []  Patient limited by fatigue  []  Patient limited by pain   []  Patient limited by medical complications  []  Other:     Assessment: Added BOSU lunges for greater balance/stabilization challenge. Continues to struggle with proprioception and patterning with left LE. Body Structures/Functions/Activity Limitations: edema, impaired activity tolerance, impaired balance, impaired cognition, impaired endurance, impaired motor control, impaired sensation, impaired strength, pain and abnormal gait  Prognosis: fair    GOALS:  Patient Goal: Improve LE strength so he can walk and get back to work    Short Term Goals:  Time Frame: 4 weeks  1. Krissy Arroyo will perform 8+ sit-stands within a 30 sec time frame as his eccentric quad strength improves. 2. Rian's Tinetti score will improve to >18/28 indicating mild fall risk. 3. Krissy Arroyo will be able to walk 100+yds with his FWW without reporting fatigue and/or without legs giving out as his stamina improves. Long Term Goals:  Time Frame: 8 weeks  1. Krissy Arroyo will be discharged with independent HEP. 2. Rian's Tinetti score will improve to >20/28 indicating no fall risk. 3. Krissy Arroyo will be able to perform 10+ sit to stands in 30 sec indicating no fall risk. Patient Education:   [x]  HEP/Education Completed: Plan of Care, Goals,    Medbridge Access Code:  []  No new Education completed  []  Reviewed Prior HEP      [x]  Patient verbalized and/or demonstrated understanding of education provided.   []  Patient unable to verbalize and/or demonstrate understanding

## 2025-03-25 NOTE — FLOWSHEET NOTE
Patient complains of bladder, back and leg pain. Says its a 7 but only when she's  Sitting and attempts to get up. When she's standing she denies pain. However, she states \"I'm not gonna stand my whole time here just so I don't have pain. You need to do something because this is awful and motrin and muscle relaxer's aren't doing anything for me. And the bladder pain is so intense that I have to walk like an 80 year old. She is insisting on stronger medication and insists someone come to see her. Dr. Goldman notified.

## 2025-03-25 NOTE — LACTATION NOTE
Assisted with position and latch on the left breast in football hold. Reviewed feeding patterns and length of feeds. Encouraged frequent attempts at breast and limiting the volume of formula given.

## 2025-03-26 VITALS
SYSTOLIC BLOOD PRESSURE: 98 MMHG | TEMPERATURE: 98.6 F | HEART RATE: 79 BPM | DIASTOLIC BLOOD PRESSURE: 56 MMHG | RESPIRATION RATE: 16 BRPM | OXYGEN SATURATION: 98 %

## 2025-03-26 LAB
T4 FREE SERPL-MCNC: 0.9 NG/DL (ref 0.92–1.68)
TSH SERPL DL<=0.05 MIU/L-ACNC: 4.81 UIU/ML (ref 0.27–4.2)

## 2025-03-26 PROCEDURE — 2500000003 HC RX 250 WO HCPCS

## 2025-03-26 PROCEDURE — 36415 COLL VENOUS BLD VENIPUNCTURE: CPT

## 2025-03-26 PROCEDURE — 6370000000 HC RX 637 (ALT 250 FOR IP)

## 2025-03-26 PROCEDURE — 84439 ASSAY OF FREE THYROXINE: CPT

## 2025-03-26 PROCEDURE — 84443 ASSAY THYROID STIM HORMONE: CPT

## 2025-03-26 RX ORDER — LIDOCAINE 4 G/G
1 PATCH TOPICAL ONCE
Status: COMPLETED | OUTPATIENT
Start: 2025-03-26 | End: 2025-03-26

## 2025-03-26 RX ORDER — HYDROXYZINE HYDROCHLORIDE 25 MG/1
25 TABLET, FILM COATED ORAL 3 TIMES DAILY PRN
Qty: 30 TABLET | Refills: 0 | Status: SHIPPED | OUTPATIENT
Start: 2025-03-26 | End: 2025-04-05

## 2025-03-26 RX ORDER — GABAPENTIN 300 MG/1
300 CAPSULE ORAL 3 TIMES DAILY
Qty: 90 CAPSULE | Refills: 0 | Status: SHIPPED | OUTPATIENT
Start: 2025-03-26 | End: 2025-04-25

## 2025-03-26 RX ADMIN — IBUPROFEN 600 MG: 600 TABLET ORAL at 13:00

## 2025-03-26 RX ADMIN — CYCLOBENZAPRINE 10 MG: 10 TABLET, FILM COATED ORAL at 15:11

## 2025-03-26 RX ADMIN — CYCLOBENZAPRINE 10 MG: 10 TABLET, FILM COATED ORAL at 00:18

## 2025-03-26 RX ADMIN — CYCLOBENZAPRINE 10 MG: 10 TABLET, FILM COATED ORAL at 09:54

## 2025-03-26 RX ADMIN — ACETAMINOPHEN 1000 MG: 500 TABLET ORAL at 13:00

## 2025-03-26 RX ADMIN — PHENAZOPYRIDINE HYDROCHLORIDE 200 MG: 100 TABLET ORAL at 09:54

## 2025-03-26 RX ADMIN — SODIUM CHLORIDE, PRESERVATIVE FREE 10 ML: 5 INJECTION INTRAVENOUS at 09:55

## 2025-03-26 RX ADMIN — IBUPROFEN 600 MG: 600 TABLET ORAL at 06:25

## 2025-03-26 RX ADMIN — ACETAMINOPHEN 1000 MG: 500 TABLET ORAL at 00:17

## 2025-03-26 RX ADMIN — ACETAMINOPHEN 1000 MG: 500 TABLET ORAL at 06:25

## 2025-03-26 RX ADMIN — GABAPENTIN 300 MG: 300 CAPSULE ORAL at 09:54

## 2025-03-26 RX ADMIN — BENZOCAINE: 0.1 GEL TOPICAL at 00:27

## 2025-03-26 RX ADMIN — TAMSULOSIN HYDROCHLORIDE 0.4 MG: 0.4 CAPSULE ORAL at 09:53

## 2025-03-26 RX ADMIN — SERTRALINE HYDROCHLORIDE 25 MG: 25 TABLET ORAL at 13:00

## 2025-03-26 RX ADMIN — IBUPROFEN 600 MG: 600 TABLET ORAL at 00:17

## 2025-03-26 RX ADMIN — DIPHENHYDRAMINE HYDROCHLORIDE 25 MG: 25 TABLET ORAL at 00:18

## 2025-03-26 RX ADMIN — PHENAZOPYRIDINE HYDROCHLORIDE 200 MG: 100 TABLET ORAL at 13:02

## 2025-03-26 RX ADMIN — GABAPENTIN 300 MG: 300 CAPSULE ORAL at 00:17

## 2025-03-26 RX ADMIN — GABAPENTIN 300 MG: 300 CAPSULE ORAL at 15:11

## 2025-03-26 ASSESSMENT — PAIN DESCRIPTION - DESCRIPTORS
DESCRIPTORS: SORE
DESCRIPTORS: SORE
DESCRIPTORS: CRAMPING
DESCRIPTORS: ACHING

## 2025-03-26 ASSESSMENT — PAIN DESCRIPTION - ORIENTATION: ORIENTATION: MID

## 2025-03-26 ASSESSMENT — PAIN DESCRIPTION - LOCATION
LOCATION: TEETH
LOCATION: PERINEUM
LOCATION: ABDOMEN

## 2025-03-26 ASSESSMENT — PAIN SCALES - GENERAL
PAINLEVEL_OUTOF10: 4
PAINLEVEL_OUTOF10: 2
PAINLEVEL_OUTOF10: 10

## 2025-03-26 NOTE — CARE COORDINATION
Discharge Report    [facility]  Clinical Case Management Department  Written by: ANN FRAZIER RN    Patient Name: Marry Tenorio  Attending Provider: Rylan Doll DO  Admit Date: 3/23/2025  4:58 PM  MRN: [id number]  Account: [id number]                     : 1995  Discharge Date: 3/26/25      Disposition: home    ANN FRAZIER RN

## 2025-03-26 NOTE — PROGRESS NOTES
POST PARTUM DAY # 2    Marry Tenorio is a 30 y.o. female  This patient was seen & examined today.  on 3/24/25    Her pregnancy was complicated by:   Patient Active Problem List   Diagnosis    Polysubstance abuse (HCC)    Bipolar 1 disorder (HCC)    PTSD    Major depressive disorder    Dyspareunia in female    ADHD, combined type    Idiopathic generalized epilepsy    ASC-H, hrHPV (other) positive    Hypersomnia    Hx migraine    Former smoker    Sleep walking disorder    Urinary frequency    GBS bacteriuria    Primary hypothyroid    Hx MRSA    Insufficient prenatal care in second trimester    High-risk pregnancy in second trimester    ANDRE    Marijuana use    Genital herpes    Fetal drug exposure (HCC)    Vitamin D deficiency    Pregravid BMI 26.76    Vapes nicotine/flavoring    UTI x1    Second pregnancy    39 weeks gestation of pregnancy     3/24/25 M Apg 8/9 Wt 6#14       Today she is doing well without any chief obstetric complaint. She states her right bottom teeth hurt and are sensitive. She has a root canal and fillings scheduled at the end of April. Patient endorses some relief with Orajel. Her lochia is light. She denies chest pain, shortness of breath, headache, lightheadedness and blurred vision. She is breast feeding and she denies any breast tenderness. She is ambulating well. Her voiding pattern is normal. I reviewed signs and symptoms of post partum depression with the patient, she currently denies any of these symptoms. She is tolerating solids.     Vital Signs:  Vitals:    25 0940 25 1610 25 2027 25 0022   BP: 105/64 119/67 107/66 114/74   Pulse: 79 90 89 82   Resp: 18 18 16 16   Temp: 98.2 °F (36.8 °C) 97.3 °F (36.3 °C) 98.1 °F (36.7 °C) 98.4 °F (36.9 °C)   TempSrc: Oral Oral Oral Oral   SpO2: 98% 98% 94% 93%       Physical Exam:  General:  no apparent distress, alert and cooperative  Neurologic:  alert, oriented, normal speech, no focal findings or movement disorder

## 2025-03-26 NOTE — FLOWSHEET NOTE
[place]  Depression Scale completed and documented. Paper copy placed in chart. Pt has no questions or concerns at this time. Pt score 9. Provider notified of question 10.    Permethrin lotion  What is this medicine?  PERMETHRIN (per METH rin) is used to treat head lice infestations. It acts by destroying both the lice and their eggs.  This medicine may be used for other purposes; ask your health care provider or pharmacist if you have questions.  COMMON BRAND NAME(S): Nix Complete Lice Elimination Kit, Nix Lice Killing Creme Rinse  What should I tell my health care provider before I take this medicine?  They need to know if you have any of these conditions:  -asthma  -an unusual or allergic reaction to permethrin, veterinary or household insecticides, other medicines, chrysanthemums, foods, dyes, or preservatives  -pregnant or trying to get pregnant  -breast-feeding  How should I use this medicine?  This medicine is for external use only. Do not take by mouth. Follow the directions on the product label. Do not wash hair with conditioner or a combination shampoo-conditioner immediately before applying this medicine. Follow product-specific instructions for length of application and product removal. All products should be rinsed from the hair over a sink or tub to limit skin exposure; do not use hot water. In general, do not re-wash the hair for 1 to 2 days after use.  Talk to your pediatrician regarding the use of this medicine in children. While this drug may be prescribed for children as young as 2 months old for selected conditions, precautions do apply.  Overdosage: If you think you have taken too much of this medicine contact a poison control center or emergency room at once.  NOTE: This medicine is only for you. Do not share this medicine with others.  What if I miss a dose?  This does not apply.  What may interact with this medicine?  Interactions are not expected. Do not use any other skin products on the affected area without telling your doctor or health care professional.  This list may not describe all possible interactions. Give your health care provider a list of all the  medicines, herbs, non-prescription drugs, or dietary supplements you use. Also tell them if you smoke, drink alcohol, or use illegal drugs. Some items may interact with your medicine.  What should I watch for while using this medicine?  This medicine is used as a single application treatment. If live lice are observed 7 or more days after initial application, a second treatment may be needed.  Head lice can be spread from one person to another by direct contact with clothing, hats, scarves, bedding, towels, washcloths, hairbrushes, and green. All members of your household should be examined for head lice and should receive treatment if they are found to be infected. If you have any questions about this, check with your doctor or health care professional.  To prevent reinfection or spreading of the infection, the following steps should be taken: Machine wash all clothing, bedding, towels, and washcloths in very hot water and dry them using the hot cycle of a dryer for at least 20 minutes. Clothing or bedding that cannot be washed should be dry cleaned or sealed in an airtight plastic bag for 2 weeks. Shampoo any wigs or hairpieces. You should also wash all hairbrushes and green in very hot soapy water (above 130 degrees F) for 5 to 10 minutes. Do not share your hairbrushes or green with other people. Wash all toys in very hot water (above 130 degrees F) for 5 to 10 minutes or seal in an airtight plastic bag for 2 weeks. Also, clean the house or room by vacuuming furniture, rugs, and floors.  What side effects may I notice from receiving this medicine?  Side effects that usually do not require medical attention (report to your doctor or health care professional if they continue or are bothersome):  -itching  -redness or mild swelling of the scalp  -stinging or burning  -tingling sensation  This list may not describe all possible side effects. Call your doctor for medical advice about side effects. You may report side  effects to FDA at 2-124-FDA-5685.  Where should I keep my medicine?  Keep out of the reach of children.  Store at room temperature away from heat and direct light. Do not refrigerate or freeze. After treatment, throw away any unused medicine.  NOTE: This sheet is a summary. It may not cover all possible information. If you have questions about this medicine, talk to your doctor, pharmacist, or health care provider.  © 2018 Elsevier/Gold Standard (2017-07-14 11:51:24)  Ivermectin tablets  What is this medicine?  IVERMECTIN (eye martin MEK tin) is an anti-infective. It is used to treat infections of some parasites.  This medicine may be used for other purposes; ask your health care provider or pharmacist if you have questions.  COMMON BRAND NAME(S): Stromectol  What should I tell my health care provider before I take this medicine?  They need to know if you have any of these conditions:  -asthma  -liver disease  -an unusual or allergic reaction to ivermectin, other medicines, foods, dyes, or preservatives  -pregnant or trying to get pregnant  -breast-feeding  How should I use this medicine?  Take this medicine by mouth with a full glass of water. Follow the directions on the prescription label. Take this medicine on an empty stomach, at least 30 minutes before or 2 hours after food. Do not take with food. Take your medicine at regular intervals. Do not take your medicine more often than directed. Take all of your medicine as directed even if you think you are better. Do not skip doses or stop your medicine early.  Talk to your pediatrician regarding the use of this medicine in children. Special care may be needed.  Overdosage: If you think you have taken too much of this medicine contact a poison control center or emergency room at once.  NOTE: This medicine is only for you. Do not share this medicine with others.  What if I miss a dose?  If you miss a dose, take it as soon as you can. If it is almost time for your next  dose, take only that dose. Do not take double or extra doses.  What may interact with this medicine?  -medicines that treat or prevent blood clots like warfarin  This list may not describe all possible interactions. Give your health care provider a list of all the medicines, herbs, non-prescription drugs, or dietary supplements you use. Also tell them if you smoke, drink alcohol, or use illegal drugs. Some items may interact with your medicine.  What should I watch for while using this medicine?  See your doctor or health care professional for a follow-up visit as directed. You will need to have tests done to check that the infection is cleared. You may need retreatment. Tell your doctor if your symptoms do not improve or if they get worse.  Practice good hygiene to prevent infection of others. Wash your hands, scrub your fingernails and shower often. Every day change and launder linens and undergarments. Scrub toilets often and keep floors clean.  What side effects may I notice from receiving this medicine?  Side effects that you should report to your doctor or health care professional as soon as possible:  -allergic reactions like skin rash, itching or hives, swelling of the face, lips, or tongue  -breathing problems  -changes in vision  -chest pain  -confusion  -eye pain, swelling, redness  -fast, irregular heartrate  -feeling dizzy, faint  -fever  -redness, blistering, peeling or loosening of the skin, including inside the mouth  -seizures  -uncontrolled urination, bowel movements  -unusual swelling  -unusually weak or tired  Side effects that usually do not require medical attention (report to your doctor or health care professional if they continue or are bothersome):  -constipation, diarrhea  -headache  -joint or muscle pain  -loss of appetite  -nausea, vomiting  -stomach pain  -tender glands in the neck, armpits, or groin  -tremor  This list may not describe all possible side effects. Call your doctor for  medical advice about side effects. You may report side effects to FDA at 6-145-NVZ-7461.  Where should I keep my medicine?  Keep out of the reach of children.  Store at room temperature below 30 degrees C (86 degrees F). Keep container tightly closed. Throw away any unused medicine after the expiration date.  NOTE: This sheet is a summary. It may not cover all possible information. If you have questions about this medicine, talk to your doctor, pharmacist, or health care provider.  © 2018 Elsevier/Gold Standard (2009-05-07 13:18:45)  Scabies, Adult  Scabies is a skin condition that happens when very small insects get under the skin (infestation). This causes a rash and severe itchiness. Scabies can spread from person to person (is contagious). If you get scabies, it is common for others in your household to get scabies too.  With proper treatment, symptoms usually go away in 2-4 weeks. Scabies usually does not cause lasting problems.  What are the causes?  This condition is caused by mites (Sarcoptes scabiei, or human itch mites) that can only be seen with a microscope. The mites get into the top layer of skin and lay eggs. Scabies can spread from person to person through:  · Close contact with a person who has scabies.  · Contact with infested items, such as towels, bedding, or clothing.    What increases the risk?  This condition is more likely to develop in:  · People who live in nursing homes and other extended-care facilities.  · People who have sexual contact with a partner who has scabies.  · Young children who attend  facilities.  · People who care for others who are at increased risk for scabies.    What are the signs or symptoms?  Symptoms of this condition may include:  · Severe itchiness. This is often worse at night.  · A rash that includes tiny red bumps or blisters. The rash commonly occurs on the wrist, elbow, armpit, fingers, waist, groin, or buttocks. Bumps may form a line (burrow) in  some areas.  · Skin irritation. This can include scaly patches or sores.    How is this diagnosed?  This condition is diagnosed with a physical exam. Your health care provider will look closely at your skin. In some cases, your health care provider may take a sample of your affected skin (skin scraping) and have it examined under a microscope.  How is this treated?  This condition may be treated with:  · Medicated cream or lotion that kills the mites. This is spread on the entire body and left on for several hours. Usually, one treatment with medicated cream or lotion is enough to kill all of the mites. In severe cases, the treatment may be repeated.  · Medicated cream that relieves itching.  · Medicines that help to relieve itching.  · Medicines that kill the mites. This treatment is rarely used.    Follow these instructions at home:    Medicines  · Take or apply over-the-counter and prescription medicines as told by your health care provider.  · Apply medicated cream or lotion as told by your health care provider.  · Do not wash off the medicated cream or lotion until the necessary amount of time has passed.  Skin Care  · Avoid scratching your affected skin.  · Keep your fingernails closely trimmed to reduce injury from scratching.  · Take cool baths or apply cool washcloths to help reduce itching.  General instructions  · Clean all items that you recently had contact with, including bedding, clothing, and furniture. Do this on the same day that your treatment starts.  ? Use hot water when you wash items.  ? Place unwashable items into closed, airtight plastic bags for at least 3 days. The mites cannot live for more than 3 days away from human skin.  ? Vacuum furniture and mattresses that you use.  · Make sure that other people who may have been infested are examined by a health care provider. These include members of your household and anyone who may have had contact with infested items.  · Keep all follow-up  visits as told by your health care provider. This is important.  Contact a health care provider if:  · You have itching that does not go away after 4 weeks of treatment.  · You continue to develop new bumps or burrows.  · You have redness, swelling, or pain in your rash area after treatment.  · You have fluid, blood, or pus coming from your rash.  This information is not intended to replace advice given to you by your health care provider. Make sure you discuss any questions you have with your health care provider.  Document Released: 09/07/2016 Document Revised: 05/25/2017 Document Reviewed: 07/19/2016  [employer] Interactive Patient Education © 2018 Elsevier Inc.

## 2025-03-26 NOTE — FLOWSHEET NOTE
2320 Dr Goldman notified that pt was complaining of 10/10 tooth pain. Orajel was ordered. Residents notified.    0015 pt informed writer that she had googled pain meds for tooth pain and stated \"opioids are used for this pain\" and listed several kinds of opioids. RN gave pt an ice pack per pt request. Pt rinsed with salt water per pt request. Pt was given all available pain meds and orajel.     0110 pt complaining of 8/10 tooth pain. Reported that she threw her PPD/shaken baby papers across the room and her ice pack.     0140 Dr Hermosillo updated on pt pain level and situation above. Dr to bedside.

## 2025-03-26 NOTE — FLOWSHEET NOTE
Writer sat with pt and went over discharge paperwork and education. Pt knows that she needs to schedule a follow up appointment with her OB for 2 weeks. Pt aware of the medications that she needs to be taking and how to take them. Post birth warning signs education complete. IV removed. Enough supplies given to patient for a couple of days. Pt doesn't have any questions or concerns at this time. Pt left unit at 1720 with all belongings, via WC, holding baby in her arms and accompanied by . Pt walked to car by a staff member.

## 2025-03-26 NOTE — LACTATION NOTE
Baby received formula throughout the night.  Pt states she would like to continue and try breastfeeding.  She does have a breast pump and reports she can't afford formula.  Already established with WIC, advised to contact WIC about obtaining formula.  Encouraged to call LC if she would like assistance with breastfeeding.

## 2025-03-26 NOTE — PROGRESS NOTES
CLINICAL PHARMACY NOTE: MEDS TO BEDS    Total # of Prescriptions Filled: 4   The following medications were delivered to the patient:  Tylenol  Senna  Motrin  Sertraline  Gabapentin  hydroxyzine    Additional Documentation:

## 2025-03-26 NOTE — FLOWSHEET NOTE
This writer shared concerns with ELZBIETA and Dr Hooker that pt reported being so angry during the night that she threw things around her room (baby was fortunately in the nursery at pt's request at that time); pt had baby in the nursery overnight the last 2 nights at her request with nurses doing all the feedings; pt refused to take baby back for feeding at 0930 this morning, despite baby crying and obviously hungry, directing the nurse to feed baby; pt's history regarding suboxone/sublocade use/program is unclear, though UDS admittedly negative for any substance on admission; pt has not followed through on making a  appt for baby at [facility] as yet (after directions yesterday and today), obviously anxious and overwhelmed and fixated on insurance issues, derick. re: [facility] \"hanging up on\" her when she called them.

## 2025-03-26 NOTE — CARE COORDINATION
Copied from baby's chart    Case Management    Per phone call from Leeann RN, Dr. Hooker requested CM make 's first appointment at [facility] for patient's mom, Marry Tenorio as she gets too anxious when discussing how to contact the office to make appointment.     ALFRED scheduled  appointment w/ Marian ESPINOZA 3/28 @ 2:20pm

## 2025-03-26 NOTE — CARE COORDINATION
Social Work    CW met with mom in hospital and met with dad in home.      Baby is cleared to dc home with mom.      Nurse updated.

## 2025-03-26 NOTE — CARE COORDINATION
Social Work    Sw approached by nurse Bradshaw this morning for clarification on pt. (Hx of Mat, hx of drug use).    Sw clarified what pt reported to SW at initial assessment:    On Sublicade until she learned of pregnancy, then stopped and also stopped all psych meds.    [facility] was informed of above and +THC ASHLEY from 10/23/24.    Nurse Bradshaw reports mom has started Zoloft after speaking to physicians.      Sw then received call from [facility] containing a summary of concerns that have been vocalized (exact details unknown) Below is summary Sw recieved:    Concern for possible drug seeking behavior- tooth ache 10/10- when given ice and orajel informed physician she needs opiates  Refusing to take baby to room- wants baby kept in WIN (even when baby crying and hungry)  Making statements about not taking baby to medical appts due to baby not having insurance.  Mom acting mean towards staff: throwing things in room.    Sw called [facility] Clearing to learn who CW is and inform.  Per clearing case screened out (not opened)- Sw called intake back to ask why case screened out (per [facility] standards, THC use in 2nd trimester is automatic opening of case).    Intake (Rhonda) unsure why initial call screened out, she is re referring the original call and adding new above concerns.    Sw will await call from [facility] to determine if baby is cleared for dc or if they need to see mom and baby before making dc plan.      No dc for baby until [facility] relays plan.

## 2025-03-26 NOTE — PLAN OF CARE
Problem: Pain  Goal: Verbalizes/displays adequate comfort level or baseline comfort level  3/26/2025 1533 by Lian Pena RN  Outcome: Completed  3/26/2025 0410 by Manju Hope RN  Outcome: Progressing     Problem: Postpartum  Goal: Experiences normal postpartum course  Description:  Postpartum OB-Pregnancy care plan goal which identifies if the mother is experiencing a normal postpartum course  3/26/2025 1533 by Lian Pena RN  Outcome: Completed  3/26/2025 0410 by Manju Hope RN  Outcome: Progressing  Goal: Appropriate maternal -  bonding  Description:  Postpartum OB-Pregnancy care plan goal which identifies if the mother and  are bonding appropriately  3/26/2025 1533 by Lian Pena RN  Outcome: Completed  3/26/2025 0410 by Manju Hope RN  Outcome: Progressing  Goal: Establishment of infant feeding pattern  Description:  Postpartum OB-Pregnancy care plan goal which identifies if the mother is establishing a feeding pattern with their   3/26/2025 1533 by Lian Pena RN  Outcome: Completed  3/26/2025 0410 by Manju Hope RN  Outcome: Progressing  Goal: Incisions, wounds, or drain sites healing without S/S of infection  3/26/2025 1533 by Lian Pena RN  Outcome: Completed  3/26/2025 0410 by Manju Hope RN  Outcome: Progressing     Problem: Infection - Adult  Goal: Absence of infection at discharge  3/26/2025 1533 by Lian Pena RN  Outcome: Completed  3/26/2025 0410 by Manju Hope RN  Outcome: Progressing  Goal: Absence of infection during hospitalization  3/26/2025 1533 by Lian Pena RN  Outcome: Completed  3/26/2025 041 by Manju Hope RN  Outcome: Progressing  Goal: Absence of fever/infection during anticipated neutropenic period  3/26/2025 1533 by Lian Pena RN  Outcome: Completed  3/26/2025 0410 by Manju Hope RN  Outcome: Progressing     Problem: Safety - Adult  Goal: Free from fall injury  3/26/2025 1533 by Lian Pena

## 2025-03-27 NOTE — ANESTHESIA POSTPROCEDURE EVALUATION
Department of Anesthesiology  Postprocedure Note    Patient: Marry Tenorio  MRN: [id number]  YOB: 1995  Date of evaluation: 3/27/2025    Procedure Summary       Date: 03/24/25 Room / Location:     Anesthesia Start: 0118 Anesthesia Stop: 1110    Procedure: Labor Analgesia Diagnosis:     Scheduled Providers:  Responsible Provider: Sharla Mares MD    Anesthesia Type: epidural ASA Status: 2            Anesthesia Type: No value filed.    Janell Phase I:      Janell Phase II:      Anesthesia Post Evaluation    Patient location during evaluation: floor  Patient participation: complete - patient participated  Level of consciousness: awake and alert  Pain score: 0  Airway patency: patent  Nausea & Vomiting: no vomiting and no nausea  Cardiovascular status: hemodynamically stable  Respiratory status: acceptable  Hydration status: stable  Pain management: adequate    No notable events documented.

## 2025-04-01 ENCOUNTER — TELEPHONE (OUTPATIENT)
Dept: PSYCHIATRY | Age: 30
End: 2025-04-01

## 2025-04-01 NOTE — TELEPHONE ENCOUNTER
This writer attempted to contact patient by phone this date at 2:30 PM after patient was referred to us by Dr. Langford.  1st attempt to call was picked up and then immediately hung up after this writer identified himself.  2nd attempt was made immediately after and call then went to voicemail.  This writer left a message with contact information and requesting patient call back.

## 2025-04-07 ENCOUNTER — POSTPARTUM VISIT (OUTPATIENT)
Dept: OBGYN | Age: 30
End: 2025-04-07

## 2025-04-07 VITALS
SYSTOLIC BLOOD PRESSURE: 112 MMHG | WEIGHT: 154 LBS | DIASTOLIC BLOOD PRESSURE: 71 MMHG | HEART RATE: 90 BPM | BODY MASS INDEX: 26.43 KG/M2

## 2025-04-07 DIAGNOSIS — E03.8 OTHER SPECIFIED HYPOTHYROIDISM: ICD-10-CM

## 2025-04-07 DIAGNOSIS — R87.611 ATYPICAL SQUAMOUS CELLS CANNOT EXCLUDE HIGH GRADE SQUAMOUS INTRAEPITHELIAL LESION ON CYTOLOGIC SMEAR OF CERVIX (ASC-H): ICD-10-CM

## 2025-04-07 DIAGNOSIS — F32.9 MAJOR DEPRESSIVE DISORDER, REMISSION STATUS UNSPECIFIED, UNSPECIFIED WHETHER RECURRENT: ICD-10-CM

## 2025-04-07 PROBLEM — O09.92 HIGH-RISK PREGNANCY IN SECOND TRIMESTER: Status: RESOLVED | Noted: 2024-10-21 | Resolved: 2025-04-07

## 2025-04-07 PROCEDURE — 99024 POSTOP FOLLOW-UP VISIT: CPT

## 2025-04-07 NOTE — PROGRESS NOTES
Postpartum Visit    Marry Tenorio is a 30 y.o. female , 2 weeks Post Partum s/p spontaneous vaginal delivery on 3/23/25    The patient was seen. She has no chief complaint today.    She is bottle feeding and denies signs or symptoms of mastitis.  The patient completed the E.P.D.S. Post Partum Depression Evaluation form and EPDS Score of 0. She does not have signs or symptoms of post partum depression. She denies any suicidal thoughts with a plan, intent to harm others, and delusional ideas. She does admit to having good home support. Today her lochia is light she denies any dizziness or shortness of breath.  Her bowels are regular and she denies any urinary tract symptomology.       Her pregnancy was complicated by:  Patient Active Problem List    Diagnosis Date Noted     3/24/25 M Apg 8/9 Wt 6#14 2025    39 weeks gestation of pregnancy 2025    Second pregnancy 2025    UTI x1 2025     Overview Note:     25: reports UTI sx, keflex QID is sent, UA and Ucx ordered      ANDRE 10/22/2024    Marijuana use 10/22/2024     Overview Note:     10/22/24: The patient reports smoking marijuana for appetite. The patient was counseled against the use of marijuana/Thc in pregnancy; maternal/Fetal risks reviewed. Patient advised to cease use of marijuana and all related products; [place] mandate explained. Patient verbalized understanding.        Genital herpes 10/22/2024     Overview Note:     10/21/24: Patient reports hx genital herpes but no outbreak in a very long time.    3/19/25: Thorough discussed held with pt and partner. Discussed R/B of suppressive therapy and the patient thoroughly declined.       Fetal drug exposure (HCC) 10/22/2024     Overview Note:     10/21/24: Possible exposures: Levothyroxine, clonodine, Rexulti, Lexapro, Adderall, Terkendi, Suboxone      Vitamin D deficiency 10/22/2024    Pregravid BMI 26.76 10/22/2024     Overview Note:     10/21/24: The patient was counseled

## 2025-04-15 NOTE — PROGRESS NOTES
Attending Physician Statement  I have discussed the care of Marry Tenorio, including pertinent history and exam findings, with the resident. I have reviewed the key elements of all parts of the encounter with the resident.  I agree with the assessment, plan and orders as documented by the resident.  (GE Modifier)    Martine Cortez DO  [facility]  4/15/2025, 2:09 PM

## 2025-05-09 ENCOUNTER — PROCEDURE VISIT (OUTPATIENT)
Age: 30
End: 2025-05-09

## 2025-05-09 ENCOUNTER — HOSPITAL ENCOUNTER (OUTPATIENT)
Age: 30
Setting detail: SPECIMEN
Discharge: HOME | End: 2025-05-09

## 2025-05-09 VITALS
HEART RATE: 60 BPM | DIASTOLIC BLOOD PRESSURE: 62 MMHG | BODY MASS INDEX: 24.89 KG/M2 | WEIGHT: 145 LBS | SYSTOLIC BLOOD PRESSURE: 94 MMHG

## 2025-05-09 DIAGNOSIS — R87.610 ASCUS OF CERVIX WITH NEGATIVE HIGH RISK HPV: Primary | ICD-10-CM

## 2025-05-09 DIAGNOSIS — N94.6 DYSMENORRHEA: ICD-10-CM

## 2025-05-09 NOTE — PROGRESS NOTES
working.    3/19/25: reports taking Synthroid      Hx MRSA 10/21/2024     07/25/21: Nasal PCR, MRSA positive      Insufficient prenatal care in second trimester 10/21/2024     08/21/24: Patient presented to [facility] with pregnancy of unknown gestational age. WO referred to [facility] for consult, potential transfer  08/29/24: Met with Dr. Umaña, was amenable to transfer care to [facility].  10/15/24: Dating ultrasound at 17w1d  10/21/24: OB intake at 18w0d      GBS bacteriuria 09/03/2024     Positive urine culture 8/29/24 and 10/23/24      Dyspareunia in female 10/05/2023     Reports it started after she had sex too soon after her D&C. Recommended she follow up after delivery if symptoms persist with urogyn. Please send referral if still indicated post delivery.     Patient concerned about pain with delivery due to symptoms. Recommendation made for epidural to assist in controlling or minimizing pain during labor/cervical checks/delivery.      Hypersomnia 04/19/2023     10/21/24: Patient denies hypersomnia.      Hx migraine 12/19/2022    Urinary frequency 05/31/2022 08/29/24: Patient was seen by Urology for this  years ago, was told low Bladder Compliance. Patient was placed on medications but stopped them a long time ago. Urine culture ordered today. Discussed she will need further follow up with it outside of pregnancy.     10/21/24: Patient reports urinary frequency is continuing to worsen with advancing pregnancy.      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  Discussed that colposcopy can be completed as soon as 4 weeks postpartum though patient preferred to complete this at 6 weeks postpartum.      Sleep

## 2025-05-10 PROBLEM — O23.43 URINARY TRACT INFECTION IN MOTHER DURING THIRD TRIMESTER OF PREGNANCY: Status: RESOLVED | Noted: 2025-02-07 | Resolved: 2025-05-10

## 2025-05-10 PROBLEM — O09.32 INSUFFICIENT PRENATAL CARE IN SECOND TRIMESTER: Status: RESOLVED | Noted: 2024-10-21 | Resolved: 2025-05-10

## 2025-05-10 PROBLEM — E55.9 VITAMIN D DEFICIENCY: Status: RESOLVED | Noted: 2024-10-22 | Resolved: 2025-05-10

## 2025-05-10 PROBLEM — Z3A.39 39 WEEKS GESTATION OF PREGNANCY: Status: RESOLVED | Noted: 2025-03-23 | Resolved: 2025-05-10

## 2025-05-10 PROBLEM — Z34.90 SECOND PREGNANCY: Status: RESOLVED | Noted: 2025-02-25 | Resolved: 2025-05-10

## 2025-05-13 LAB — SURGICAL PATHOLOGY REPORT: NORMAL

## 2025-05-15 ENCOUNTER — RESULTS FOLLOW-UP (OUTPATIENT)
Dept: OBGYN | Age: 30
End: 2025-05-15

## 2025-05-27 ENCOUNTER — PROCEDURE VISIT (OUTPATIENT)
Age: 30
End: 2025-05-27
Payer: MEDICAID

## 2025-05-27 VITALS — HEIGHT: 64 IN | BODY MASS INDEX: 24.88 KG/M2

## 2025-05-27 DIAGNOSIS — Z97.5 NEXPLANON IN PLACE: ICD-10-CM

## 2025-05-27 DIAGNOSIS — Z30.017 ENCOUNTER FOR INITIAL PRESCRIPTION OF NEXPLANON: ICD-10-CM

## 2025-05-27 DIAGNOSIS — N94.6 DYSMENORRHEA: Primary | ICD-10-CM

## 2025-05-27 LAB
CONTROL: ABNORMAL
PREGNANCY TEST URINE, POC: ABNORMAL

## 2025-05-27 PROCEDURE — PBSHW PBB SHADOW CHARGE: Performed by: STUDENT IN AN ORGANIZED HEALTH CARE EDUCATION/TRAINING PROGRAM

## 2025-05-27 PROCEDURE — 99999 PR OFFICE/OUTPT VISIT,PROCEDURE ONLY: CPT | Performed by: STUDENT IN AN ORGANIZED HEALTH CARE EDUCATION/TRAINING PROGRAM

## 2025-05-27 PROCEDURE — PBSHW POCT URINE PREGNANCY: Performed by: STUDENT IN AN ORGANIZED HEALTH CARE EDUCATION/TRAINING PROGRAM

## 2025-05-27 PROCEDURE — 81025 URINE PREGNANCY TEST: CPT

## 2025-05-27 PROCEDURE — 11981 INSERTION DRUG DLVR IMPLANT: CPT

## 2025-05-27 PROCEDURE — 11981 INSERTION DRUG DLVR IMPLANT: CPT | Performed by: STUDENT IN AN ORGANIZED HEALTH CARE EDUCATION/TRAINING PROGRAM

## 2025-05-27 RX ORDER — LIDOCAINE HYDROCHLORIDE AND EPINEPHRINE 10; 10 MG/ML; UG/ML
20 INJECTION, SOLUTION INFILTRATION; PERINEURAL ONCE
Status: CANCELLED | OUTPATIENT
Start: 2025-05-27 | End: 2025-05-27

## 2025-05-27 RX ORDER — LIDOCAINE HYDROCHLORIDE AND EPINEPHRINE 10; 10 MG/ML; UG/ML
10 INJECTION, SOLUTION INFILTRATION; PERINEURAL ONCE
Status: COMPLETED | OUTPATIENT
Start: 2025-05-27 | End: 2025-05-29

## 2025-05-27 RX ADMIN — ETONOGESTREL 68 MG: 68 IMPLANT SUBCUTANEOUS at 11:15

## 2025-05-27 NOTE — PROGRESS NOTES
Attending Physician Statement  I have discussed the care of Marry Tenorio, including pertinent history and exam findings,  with the resident. I have reviewed the key elements of all parts of the encounter with the resident.  I agree with the assessment, plan and orders as documented by the resident.  (GC Modifier)    Electronically signed by Rylan Doll DO  5/27/2025  11:26 AM

## 2025-05-27 NOTE — PROGRESS NOTES
[facility]   Nexplanon insertion Procedure Note    Marry Tenorio  2025                       Primary Care Physician: None, None      Subjective:   Marry Tenorio 30 y.o. female  is here for previously scheduled Nexplanon Insertion due to history of Dysmenorrhea.  No LMP recorded.. She has no complaints today.     Vitals:   Height 1.626 m (5' 4.02\"), not currently breastfeeding.    The patient was counseled on the procedure. Risks, benefits and alternatives were reviewed. A consent was reviewed and obtained.    Nexplanon insertion:  The patient was positioned in supine position on the exam table with her right arm bent up.  A kinsey was made approximately 8 cm superior to the medial epicondyl of the humerus.  The area was cleansed with betadine and allowed to dry. The track of insertion was infiltrated with 1% lidocaine.  The Nexplanon  was used to insert the device just under the skin without difficulty.  At the end, the Nexplanon was palpable under the skin.  The betadine was wiped clean and a pressure dressing applied to the insertion site.  The patient tolerated the procedure well.     Lab:  Pregnancy Test:  Lab Results   Component Value Date    PREGTESTUR  2018      Comment:      neg    PREGSERUM NEGATIVE 2023    HCGQUANT 90,641 (H) 2023       Assessment & Plan:  Marry Tenorio 30 y.o. female  for Nexplanon Insertion   - VSS   - Patient has no complaints today   - Pregnancy test negative   - Patient has a history of dysmenorrhea and desires Nexplanon for management as well as contraception benefit   - Consent reviewed and signed   - Nexplanon inserted by standard protocol as documented above   - Counseled on 3 year life span of Nexplanon   - Discussed care and pain expectations as well as reasons to call or present to the ED for further evaluation. Patient states understanding.    Patient Active Problem List    Diagnosis Date Noted    Nexplanon 25

## 2025-05-29 PROCEDURE — PBSHW PBB SHADOW CHARGE: Performed by: STUDENT IN AN ORGANIZED HEALTH CARE EDUCATION/TRAINING PROGRAM

## 2025-05-29 RX ADMIN — LIDOCAINE HYDROCHLORIDE AND EPINEPHRINE 10 ML: 10; 10 INJECTION, SOLUTION INFILTRATION; PERINEURAL at 08:52

## 2025-07-21 ENCOUNTER — TELEPHONE (OUTPATIENT)
Age: 30
End: 2025-07-21

## 2025-07-21 NOTE — TELEPHONE ENCOUNTER
Patient calling to reschedule follow up nexplanon insertion (pt miss appointment Monday 7/21/25) and tightness in chest, stated she was sent in hospital for tightness in chest.  Next available 8/18/25 but pt wanted something sooner.

## 2025-08-18 ENCOUNTER — OFFICE VISIT (OUTPATIENT)
Age: 30
End: 2025-08-18
Payer: MEDICAID

## 2025-08-18 VITALS — WEIGHT: 118.2 LBS | SYSTOLIC BLOOD PRESSURE: 105 MMHG | DIASTOLIC BLOOD PRESSURE: 61 MMHG | BODY MASS INDEX: 20.28 KG/M2

## 2025-08-18 DIAGNOSIS — R42 DIZZINESS: ICD-10-CM

## 2025-08-18 DIAGNOSIS — Z87.59 HISTORY OF POSTPARTUM DEPRESSION: ICD-10-CM

## 2025-08-18 DIAGNOSIS — Z86.59 HISTORY OF POSTPARTUM DEPRESSION: ICD-10-CM

## 2025-08-18 DIAGNOSIS — R07.89 CHEST TIGHTNESS: Primary | ICD-10-CM

## 2025-08-18 PROCEDURE — 99212 OFFICE O/P EST SF 10 MIN: CPT

## 2025-08-18 PROCEDURE — G8427 DOCREV CUR MEDS BY ELIG CLIN: HCPCS

## 2025-08-18 PROCEDURE — 99213 OFFICE O/P EST LOW 20 MIN: CPT

## 2025-08-18 PROCEDURE — G8420 CALC BMI NORM PARAMETERS: HCPCS

## 2025-08-18 PROCEDURE — 1036F TOBACCO NON-USER: CPT

## 2025-08-25 ENCOUNTER — HOSPITAL ENCOUNTER (EMERGENCY)
Age: 30
Discharge: HOME | End: 2025-08-26
Attending: EMERGENCY MEDICINE
Payer: MEDICAID

## 2025-08-25 ENCOUNTER — APPOINTMENT (OUTPATIENT)
Dept: GENERAL RADIOLOGY | Age: 30
End: 2025-08-25
Payer: MEDICAID

## 2025-08-25 VITALS
DIASTOLIC BLOOD PRESSURE: 80 MMHG | OXYGEN SATURATION: 100 % | TEMPERATURE: 98.4 F | HEART RATE: 61 BPM | SYSTOLIC BLOOD PRESSURE: 112 MMHG

## 2025-08-25 DIAGNOSIS — R07.9 CHEST PAIN, UNSPECIFIED TYPE: Primary | ICD-10-CM

## 2025-08-25 LAB
ALBUMIN SERPL-MCNC: 4.6 G/DL (ref 3.5–5.2)
ALBUMIN/GLOB SERPL: 1.6 {RATIO} (ref 1–2.5)
ALP SERPL-CCNC: 56 U/L (ref 35–104)
ALT SERPL-CCNC: 12 U/L (ref 10–35)
ANION GAP SERPL CALCULATED.3IONS-SCNC: 12 MMOL/L (ref 9–16)
AST SERPL-CCNC: 27 U/L (ref 10–35)
BASOPHILS # BLD: 0.12 K/UL (ref 0–0.2)
BASOPHILS NFR BLD: 2 % (ref 0–2)
BILIRUB SERPL-MCNC: 0.6 MG/DL (ref 0–1.2)
BUN SERPL-MCNC: 7 MG/DL (ref 6–20)
CALCIUM SERPL-MCNC: 10 MG/DL (ref 8.6–10.4)
CHLORIDE SERPL-SCNC: 106 MMOL/L (ref 98–107)
CO2 SERPL-SCNC: 21 MMOL/L (ref 20–31)
CREAT SERPL-MCNC: 0.9 MG/DL (ref 0.6–0.9)
D DIMER PPP FEU-MCNC: 0.42 UG/ML FEU (ref 0–0.57)
EOSINOPHIL # BLD: 0.47 K/UL (ref 0–0.44)
EOSINOPHILS RELATIVE PERCENT: 7 % (ref 1–4)
ERYTHROCYTE [DISTWIDTH] IN BLOOD BY AUTOMATED COUNT: 16.2 % (ref 11.8–14.4)
GFR, ESTIMATED: 89 ML/MIN/1.73M2
GLUCOSE SERPL-MCNC: 86 MG/DL (ref 74–99)
HCT VFR BLD AUTO: 43 % (ref 36.3–47.1)
HGB BLD-MCNC: 14.2 G/DL (ref 11.9–15.1)
IMM GRANULOCYTES # BLD AUTO: <0.03 K/UL (ref 0–0.3)
IMM GRANULOCYTES NFR BLD: 0 %
LYMPHOCYTES NFR BLD: 2.37 K/UL (ref 1.1–3.7)
LYMPHOCYTES RELATIVE PERCENT: 37 % (ref 24–43)
MCH RBC QN AUTO: 27.7 PG (ref 25.2–33.5)
MCHC RBC AUTO-ENTMCNC: 33 G/DL (ref 28.4–34.8)
MCV RBC AUTO: 83.8 FL (ref 82.6–102.9)
MONOCYTES NFR BLD: 0.41 K/UL (ref 0.1–1.2)
MONOCYTES NFR BLD: 6 % (ref 3–12)
NEUTROPHILS NFR BLD: 48 % (ref 36–65)
NEUTS SEG NFR BLD: 3.03 K/UL (ref 1.5–8.1)
NRBC BLD-RTO: 0 PER 100 WBC
PLATELET # BLD AUTO: 215 K/UL (ref 138–453)
PMV BLD AUTO: 12.9 FL (ref 8.1–13.5)
POTASSIUM SERPL-SCNC: 4.1 MMOL/L (ref 3.7–5.3)
PROT SERPL-MCNC: 7.4 G/DL (ref 6.6–8.7)
RBC # BLD AUTO: 5.13 M/UL (ref 3.95–5.11)
RBC # BLD: ABNORMAL 10*6/UL
SODIUM SERPL-SCNC: 139 MMOL/L (ref 136–145)
TROPONIN I SERPL HS-MCNC: <6 NG/L (ref 0–14)
TSH SERPL DL<=0.05 MIU/L-ACNC: 2.41 UIU/ML (ref 0.27–4.2)
WBC OTHER # BLD: 6.4 K/UL (ref 3.5–11.3)

## 2025-08-25 PROCEDURE — 84443 ASSAY THYROID STIM HORMONE: CPT

## 2025-08-25 PROCEDURE — 71046 X-RAY EXAM CHEST 2 VIEWS: CPT

## 2025-08-25 PROCEDURE — 93005 ELECTROCARDIOGRAM TRACING: CPT

## 2025-08-25 PROCEDURE — 85379 FIBRIN DEGRADATION QUANT: CPT

## 2025-08-25 PROCEDURE — 80053 COMPREHEN METABOLIC PANEL: CPT

## 2025-08-25 PROCEDURE — 99285 EMERGENCY DEPT VISIT HI MDM: CPT

## 2025-08-25 PROCEDURE — 85025 COMPLETE CBC W/AUTO DIFF WBC: CPT

## 2025-08-25 PROCEDURE — 84484 ASSAY OF TROPONIN QUANT: CPT

## 2025-08-26 LAB
EKG ATRIAL RATE: 51 BPM
EKG P AXIS: 63 DEGREES
EKG P-R INTERVAL: 174 MS
EKG Q-T INTERVAL: 422 MS
EKG QRS DURATION: 86 MS
EKG QTC CALCULATION (BAZETT): 388 MS
EKG R AXIS: 81 DEGREES
EKG T AXIS: 49 DEGREES
EKG VENTRICULAR RATE: 51 BPM
TROPONIN I SERPL HS-MCNC: <6 NG/L (ref 0–14)

## 2025-08-26 PROCEDURE — 84484 ASSAY OF TROPONIN QUANT: CPT

## 2025-08-26 PROCEDURE — 93010 ELECTROCARDIOGRAM REPORT: CPT | Performed by: INTERNAL MEDICINE

## 2025-08-26 ASSESSMENT — ENCOUNTER SYMPTOMS
SHORTNESS OF BREATH: 0
CHEST TIGHTNESS: 1
NAUSEA: 0
ABDOMINAL PAIN: 0
BACK PAIN: 0
COUGH: 0
DIARRHEA: 0
VOMITING: 0

## 2025-08-26 ASSESSMENT — HEART SCORE: ECG: NORMAL

## (undated) DEVICE — SURGICAL SUCTION CONNECTING TUBE WITH MALE CONNECTOR AND SUCTION CLAMP, 2 FT. LONG (.6 M), 5 MM I.D.: Brand: CONMED

## (undated) DEVICE — SUTURE PDS II SZ 0 L27IN ABSRB VLT L36MM CT-1 1/2 CIR Z340H

## (undated) DEVICE — SVMMC ORTH SPL DRP PK

## (undated) DEVICE — BNDG,ELSTC,MATRIX,STRL,4"X5YD,LF,HOOK&LP: Brand: MEDLINE

## (undated) DEVICE — DRAPE,U/ SHT,SPLIT,PLAS,STERIL: Brand: MEDLINE

## (undated) DEVICE — BANDAGE,GAUZE,BULKEE II,4.5"X4.1YD,STRL: Brand: MEDLINE

## (undated) DEVICE — APPLICATOR MEDICATED 10.5 CC SOLUTION HI LT ORNG CHLORAPREP

## (undated) DEVICE — CUFF REPROC TRNQT DPSB W/PLC RED 18IN

## (undated) DEVICE — PADDING,UNDERCAST,COTTON, 3X4YD STERILE: Brand: MEDLINE

## (undated) DEVICE — SUTURE MCRYL SZ 2-0 L27IN ABSRB UD SH L26MM TAPERPOINT NDL Y417H

## (undated) DEVICE — PADDING,UNDERCAST,COTTON, 4"X4YD STERILE: Brand: MEDLINE

## (undated) DEVICE — LIQUIBAND RAPID ADHESIVE 36/CS 0.8ML: Brand: MEDLINE

## (undated) DEVICE — APPLICATOR MEDICATED 26 CC SOLUTION HI LT ORNG CHLORAPREP

## (undated) DEVICE — BIT DRL QC 2.5X135 MM 45 MM CALIB NS

## (undated) DEVICE — SUTURE ETHLN SZ 2-0 L18IN NONABSORBABLE BLK L26MM PS 3/8 585H

## (undated) DEVICE — BANDAGE COMPR W6INXL12FT SMOOTH FOR LIMB EXSANG ESMARCH

## (undated) DEVICE — SCREW BNE L16MM DIA3.5MM CORT S STL ST NONCANNULATED LOK: Type: IMPLANTABLE DEVICE | Site: RADIUS | Status: NON-FUNCTIONAL

## (undated) DEVICE — GARMENT,MEDLINE,DVT,INT,CALF,MED, GEN2: Brand: MEDLINE

## (undated) DEVICE — TUBING SUCT 12FR MAL ALUM SHFT FN CAP VENT UNIV CONN W/ OBT

## (undated) DEVICE — GLOVE ORANGE PI 8   MSG9080

## (undated) DEVICE — CYSTO/BLADDER IRRIGATION SET, REGULATING CLAMP

## (undated) DEVICE — SHEET,DRAPE,70X100,STERILE: Brand: MEDLINE

## (undated) DEVICE — GOWN,SIRUS,NONRNF,SETINSLV,XL,20/CS: Brand: MEDLINE

## (undated) DEVICE — GLOVE ORANGE PI 7 1/2   MSG9075

## (undated) DEVICE — STRAP,POSITIONING,KNEE/BODY,FOAM,4X60": Brand: MEDLINE